# Patient Record
Sex: FEMALE | Race: WHITE | NOT HISPANIC OR LATINO | Employment: FULL TIME | URBAN - METROPOLITAN AREA
[De-identification: names, ages, dates, MRNs, and addresses within clinical notes are randomized per-mention and may not be internally consistent; named-entity substitution may affect disease eponyms.]

---

## 2023-02-28 PROBLEM — G89.29 CHRONIC FOOT PAIN: Status: ACTIVE | Noted: 2023-02-28

## 2023-02-28 PROBLEM — F32.1 DEPRESSION, MAJOR, SINGLE EPISODE, MODERATE (MULTI): Status: ACTIVE | Noted: 2023-02-28

## 2023-02-28 PROBLEM — E66.811 CLASS 1 OBESITY WITH BODY MASS INDEX (BMI) OF 34.0 TO 34.9 IN ADULT: Status: ACTIVE | Noted: 2023-02-28

## 2023-02-28 PROBLEM — R74.8 ELEVATED LIVER ENZYMES: Status: ACTIVE | Noted: 2023-02-28

## 2023-02-28 PROBLEM — E78.2 MIXED HYPERLIPIDEMIA: Status: ACTIVE | Noted: 2023-02-28

## 2023-02-28 PROBLEM — R73.01 IMPAIRED FASTING GLUCOSE: Status: ACTIVE | Noted: 2023-02-28

## 2023-02-28 PROBLEM — R92.8 ABNORMAL MAMMOGRAM: Status: ACTIVE | Noted: 2023-02-28

## 2023-02-28 PROBLEM — M25.512 CHRONIC PAIN OF BOTH SHOULDERS: Status: ACTIVE | Noted: 2023-02-28

## 2023-02-28 PROBLEM — M25.551 HIP PAIN, RIGHT: Status: ACTIVE | Noted: 2023-02-28

## 2023-02-28 PROBLEM — F11.20 OPIOID DEPENDENCE ON AGONIST THERAPY (MULTI): Status: ACTIVE | Noted: 2023-02-28

## 2023-02-28 PROBLEM — M25.511 CHRONIC PAIN OF BOTH SHOULDERS: Status: ACTIVE | Noted: 2023-02-28

## 2023-02-28 PROBLEM — M79.7 FIBROMYALGIA: Status: ACTIVE | Noted: 2023-02-28

## 2023-02-28 PROBLEM — M54.2 NECK PAIN: Status: ACTIVE | Noted: 2023-02-28

## 2023-02-28 PROBLEM — R80.9 MICROALBUMINURIA: Status: ACTIVE | Noted: 2023-02-28

## 2023-02-28 PROBLEM — F11.90 METHADONE USE: Status: ACTIVE | Noted: 2023-02-28

## 2023-02-28 PROBLEM — I10 PRIMARY HYPERTENSION: Status: ACTIVE | Noted: 2023-02-28

## 2023-02-28 PROBLEM — G89.29 CHRONIC PAIN OF BOTH SHOULDERS: Status: ACTIVE | Noted: 2023-02-28

## 2023-02-28 PROBLEM — E66.9 CLASS 1 OBESITY WITH BODY MASS INDEX (BMI) OF 34.0 TO 34.9 IN ADULT: Status: ACTIVE | Noted: 2023-02-28

## 2023-02-28 PROBLEM — R03.0 ELEVATED BP WITHOUT DIAGNOSIS OF HYPERTENSION: Status: ACTIVE | Noted: 2023-02-28

## 2023-02-28 PROBLEM — F41.1 GENERALIZED ANXIETY DISORDER: Status: ACTIVE | Noted: 2023-02-28

## 2023-02-28 PROBLEM — M79.673 CHRONIC FOOT PAIN: Status: ACTIVE | Noted: 2023-02-28

## 2023-02-28 RX ORDER — NALOXONE HYDROCHLORIDE 4 MG/.1ML
SPRAY NASAL
COMMUNITY
Start: 2020-11-05

## 2023-02-28 RX ORDER — BUPRENORPHINE AND NALOXONE 8; 2 MG/1; MG/1
FILM, SOLUBLE BUCCAL; SUBLINGUAL
COMMUNITY
Start: 2021-03-24

## 2023-02-28 RX ORDER — MELOXICAM 7.5 MG/1
7.5 TABLET ORAL
COMMUNITY
End: 2023-03-22 | Stop reason: ALTCHOICE

## 2023-02-28 RX ORDER — LOSARTAN POTASSIUM 25 MG/1
25 TABLET ORAL DAILY
COMMUNITY
End: 2023-09-21

## 2023-03-22 ENCOUNTER — OFFICE VISIT (OUTPATIENT)
Dept: PRIMARY CARE | Facility: CLINIC | Age: 43
End: 2023-03-22
Payer: COMMERCIAL

## 2023-03-22 VITALS
BODY MASS INDEX: 39.75 KG/M2 | SYSTOLIC BLOOD PRESSURE: 136 MMHG | HEART RATE: 96 BPM | WEIGHT: 216 LBS | HEIGHT: 62 IN | OXYGEN SATURATION: 97 % | RESPIRATION RATE: 16 BRPM | DIASTOLIC BLOOD PRESSURE: 88 MMHG

## 2023-03-22 DIAGNOSIS — J32.0 MAXILLARY SINUSITIS, UNSPECIFIED CHRONICITY: ICD-10-CM

## 2023-03-22 DIAGNOSIS — I10 PRIMARY HYPERTENSION: ICD-10-CM

## 2023-03-22 DIAGNOSIS — R74.8 ELEVATED LIVER ENZYMES: Primary | ICD-10-CM

## 2023-03-22 DIAGNOSIS — E66.09 CLASS 1 OBESITY DUE TO EXCESS CALORIES WITH SERIOUS COMORBIDITY AND BODY MASS INDEX (BMI) OF 34.0 TO 34.9 IN ADULT: ICD-10-CM

## 2023-03-22 DIAGNOSIS — R73.03 PREDIABETES: ICD-10-CM

## 2023-03-22 DIAGNOSIS — F11.20 OPIOID DEPENDENCE ON AGONIST THERAPY (MULTI): ICD-10-CM

## 2023-03-22 DIAGNOSIS — M79.7 FIBROMYALGIA: ICD-10-CM

## 2023-03-22 PROBLEM — F11.90 METHADONE USE: Status: RESOLVED | Noted: 2023-02-28 | Resolved: 2023-03-22

## 2023-03-22 PROBLEM — F32.1 DEPRESSION, MAJOR, SINGLE EPISODE, MODERATE (MULTI): Status: RESOLVED | Noted: 2023-02-28 | Resolved: 2023-03-22

## 2023-03-22 PROBLEM — R03.0 ELEVATED BP WITHOUT DIAGNOSIS OF HYPERTENSION: Status: RESOLVED | Noted: 2023-02-28 | Resolved: 2023-03-22

## 2023-03-22 PROCEDURE — 99214 OFFICE O/P EST MOD 30 MIN: CPT | Performed by: STUDENT IN AN ORGANIZED HEALTH CARE EDUCATION/TRAINING PROGRAM

## 2023-03-22 PROCEDURE — 3008F BODY MASS INDEX DOCD: CPT | Performed by: STUDENT IN AN ORGANIZED HEALTH CARE EDUCATION/TRAINING PROGRAM

## 2023-03-22 PROCEDURE — 3075F SYST BP GE 130 - 139MM HG: CPT | Performed by: STUDENT IN AN ORGANIZED HEALTH CARE EDUCATION/TRAINING PROGRAM

## 2023-03-22 PROCEDURE — 1036F TOBACCO NON-USER: CPT | Performed by: STUDENT IN AN ORGANIZED HEALTH CARE EDUCATION/TRAINING PROGRAM

## 2023-03-22 PROCEDURE — 3079F DIAST BP 80-89 MM HG: CPT | Performed by: STUDENT IN AN ORGANIZED HEALTH CARE EDUCATION/TRAINING PROGRAM

## 2023-03-22 PROCEDURE — 96127 BRIEF EMOTIONAL/BEHAV ASSMT: CPT | Performed by: STUDENT IN AN ORGANIZED HEALTH CARE EDUCATION/TRAINING PROGRAM

## 2023-03-22 RX ORDER — AMOXICILLIN 875 MG/1
875 TABLET, FILM COATED ORAL 2 TIMES DAILY
Qty: 20 TABLET | Refills: 0 | Status: SHIPPED | OUTPATIENT
Start: 2023-03-22 | End: 2023-04-01

## 2023-03-22 RX ORDER — IBUPROFEN 800 MG/1
800 TABLET ORAL 3 TIMES DAILY PRN
Qty: 90 TABLET | Refills: 1 | Status: SHIPPED | OUTPATIENT
Start: 2023-03-22 | End: 2023-04-21

## 2023-03-22 ASSESSMENT — COLUMBIA-SUICIDE SEVERITY RATING SCALE - C-SSRS
6. HAVE YOU EVER DONE ANYTHING, STARTED TO DO ANYTHING, OR PREPARED TO DO ANYTHING TO END YOUR LIFE?: NO
1. IN THE PAST MONTH, HAVE YOU WISHED YOU WERE DEAD OR WISHED YOU COULD GO TO SLEEP AND NOT WAKE UP?: NO
2. HAVE YOU ACTUALLY HAD ANY THOUGHTS OF KILLING YOURSELF?: NO

## 2023-03-22 ASSESSMENT — ENCOUNTER SYMPTOMS
SINUS PAIN: 1
LOSS OF SENSATION IN FEET: 0
APPETITE CHANGE: 0
DYSPHORIC MOOD: 0
PALPITATIONS: 0
OCCASIONAL FEELINGS OF UNSTEADINESS: 0
SINUS PRESSURE: 1
FATIGUE: 1
ACTIVITY CHANGE: 0
UNEXPECTED WEIGHT CHANGE: 0
DEPRESSION: 0
DECREASED CONCENTRATION: 0
FEVER: 0
ARTHRALGIAS: 1
SHORTNESS OF BREATH: 0

## 2023-03-22 ASSESSMENT — PATIENT HEALTH QUESTIONNAIRE - PHQ9
1. LITTLE INTEREST OR PLEASURE IN DOING THINGS: NOT AT ALL
2. FEELING DOWN, DEPRESSED OR HOPELESS: NOT AT ALL
SUM OF ALL RESPONSES TO PHQ9 QUESTIONS 1 AND 2: 0

## 2023-03-22 NOTE — ASSESSMENT & PLAN NOTE
Hx did not tolerate well muscle relaxers, mobic, or gabapentin  Ibuprofen working well, will send more, review of kidney function, needs to take with food

## 2023-03-22 NOTE — ASSESSMENT & PLAN NOTE
>15m  Medication discussion-not a candidate for phentermine d/t BP, discussion of wellbutrin  Discussion of emotional aspect to eating and identifying triggers, identifying positive food choices like fiber and protein to help cravings and fuel body  Counseling on finding activity that is enjoyable and able to be done on a consistent basis  Handout with more in depth info given

## 2023-03-22 NOTE — ASSESSMENT & PLAN NOTE
LV liver enzymes remained elevated and elevated lipid panel  Discussed repeating labs, avoiding alcohol, and if still elevated pursuing US

## 2023-03-22 NOTE — PROGRESS NOTES
"Subjective   Patient ID: Etelvina Banks is a 42 y.o. female who presents for Follow-up and Sinus Problem (Drainage ).    HPI   41 yo female presents for follow up    LV liver enzymes remained elevated and elevated lipid panel  Discussed repeating labs, avoiding alcohol, and if still elevated pursuing US     We discussed fibromyalgia pains and trying non opioid therapies given her hx  Is est with [facility], denies any cravings or lapses  Started on mobic patient did not find this of much help however  Does not tolerate muscle relaxers well  Does not tolerate gabapentin well, did better on lyrica    Sinus drainage 2 weeks of sinus pain and pressure    BP up today mother recently    Review of Systems   Constitutional:  Positive for fatigue. Negative for activity change, appetite change, fever and unexpected weight change.   HENT:  Positive for congestion, sinus pressure and sinus pain.    Respiratory:  Negative for shortness of breath.    Cardiovascular:  Negative for chest pain, palpitations and leg swelling.   Musculoskeletal:  Positive for arthralgias.   Allergic/Immunologic: Negative for immunocompromised state.   Psychiatric/Behavioral:  Negative for decreased concentration and dysphoric mood.      Objective   /88   Pulse 96   Resp 16   Ht 1.575 m (5' 2\")   Wt 98 kg (216 lb)   LMP 03/01/2023 (Exact Date)   SpO2 97%   BMI 39.51 kg/m²     Physical Exam  Constitutional:       Appearance: Normal appearance. She is obese.   HENT:      Head: Normocephalic and atraumatic.      Comments: Sinus pressure with palpation  Cardiovascular:      Rate and Rhythm: Normal rate and regular rhythm.      Pulses: Normal pulses.   Pulmonary:      Effort: Pulmonary effort is normal. No respiratory distress.      Breath sounds: No wheezing.   Musculoskeletal:      Right lower leg: No edema.      Left lower leg: No edema.   Skin:     Coloration: Skin is not pale.   Neurological:      General: No focal deficit present.      " Mental Status: She is alert and oriented to person, place, and time.   Psychiatric:         Mood and Affect: Mood normal.         Behavior: Behavior normal.     Assessment/Plan   Problem List Items Addressed This Visit          Circulatory    Primary hypertension       Musculoskeletal    Fibromyalgia     Hx did not tolerate well muscle relaxers, mobic, or gabapentin  Ibuprofen working well, will send more, review of kidney function, needs to take with food          Relevant Medications    ibuprofen 800 mg tablet       Endocrine/Metabolic    Class 1 obesity with body mass index (BMI) of 34.0 to 34.9 in adult     >15m  Medication discussion-not a candidate for phentermine d/t BP, discussion of wellbutrin  Discussion of emotional aspect to eating and identifying triggers, identifying positive food choices like fiber and protein to help cravings and fuel body  Counseling on finding activity that is enjoyable and able to be done on a consistent basis  Handout with more in depth info given                   Other    Opioid dependence on agonist therapy (CMS/HCC)     Est with [facility] for suboxone therapy, no cravings or concerns          Elevated liver enzymes - Primary     LV liver enzymes remained elevated and elevated lipid panel  Discussed repeating labs, avoiding alcohol, and if still elevated pursuing US          Relevant Orders    Hepatic function panel    Lipid Panel    CBC     Other Visit Diagnoses       Prediabetes        Maxillary sinusitis, unspecified chronicity        Relevant Medications    amoxicillin (Amoxil) 875 mg tablet        Patient has exhibited symptoms for more than 7 days with worsened features and symptoms including one of the clinical practice guidelines to indicate bacterial sinusitis (purulent nasal drainage, unilateral nasal obstruction, facial pain/pressure, and/or anosmia).In patients with rhinosinusitis, antibiotic therapy is recommended if the above criteria are met.   Patient is to be  treated with: amoxicillin  Patient is advised on recommended duration of treatment, allergies are checked with the patient to assure no hx of reaction, patient advised on possible side effects of this medication.   Patient may also use symptomatic relief for this condition including nasal saline irrigation, intranasal corticosteroids.

## 2023-07-11 DIAGNOSIS — M79.673 CHRONIC FOOT PAIN, UNSPECIFIED LATERALITY: Primary | ICD-10-CM

## 2023-07-11 DIAGNOSIS — G89.29 CHRONIC FOOT PAIN, UNSPECIFIED LATERALITY: Primary | ICD-10-CM

## 2023-07-11 RX ORDER — IBUPROFEN 800 MG/1
800 TABLET ORAL 3 TIMES DAILY PRN
Qty: 90 TABLET | Refills: 0 | Status: SHIPPED | OUTPATIENT
Start: 2023-07-11 | End: 2023-08-10

## 2023-07-11 RX ORDER — IBUPROFEN 800 MG/1
800 TABLET ORAL 3 TIMES DAILY PRN
COMMUNITY
End: 2023-07-11 | Stop reason: SDUPTHER

## 2023-09-21 ENCOUNTER — OFFICE VISIT (OUTPATIENT)
Dept: PRIMARY CARE | Facility: CLINIC | Age: 43
End: 2023-09-21
Payer: COMMERCIAL

## 2023-09-21 ENCOUNTER — APPOINTMENT (OUTPATIENT)
Dept: PRIMARY CARE | Facility: CLINIC | Age: 43
End: 2023-09-21
Payer: COMMERCIAL

## 2023-09-21 VITALS
WEIGHT: 200.4 LBS | BODY MASS INDEX: 36.88 KG/M2 | SYSTOLIC BLOOD PRESSURE: 124 MMHG | HEART RATE: 86 BPM | DIASTOLIC BLOOD PRESSURE: 86 MMHG | HEIGHT: 62 IN | OXYGEN SATURATION: 96 %

## 2023-09-21 DIAGNOSIS — I10 PRIMARY HYPERTENSION: ICD-10-CM

## 2023-09-21 DIAGNOSIS — R73.03 PREDIABETES: ICD-10-CM

## 2023-09-21 DIAGNOSIS — M25.511 CHRONIC PAIN OF BOTH SHOULDERS: ICD-10-CM

## 2023-09-21 DIAGNOSIS — R74.8 ELEVATED LIVER ENZYMES: Primary | ICD-10-CM

## 2023-09-21 DIAGNOSIS — F11.20 OPIOID DEPENDENCE ON AGONIST THERAPY (MULTI): ICD-10-CM

## 2023-09-21 DIAGNOSIS — M25.512 CHRONIC PAIN OF BOTH SHOULDERS: ICD-10-CM

## 2023-09-21 DIAGNOSIS — G89.29 CHRONIC PAIN OF BOTH SHOULDERS: ICD-10-CM

## 2023-09-21 PROCEDURE — 99214 OFFICE O/P EST MOD 30 MIN: CPT | Performed by: STUDENT IN AN ORGANIZED HEALTH CARE EDUCATION/TRAINING PROGRAM

## 2023-09-21 PROCEDURE — 1036F TOBACCO NON-USER: CPT | Performed by: STUDENT IN AN ORGANIZED HEALTH CARE EDUCATION/TRAINING PROGRAM

## 2023-09-21 PROCEDURE — 3074F SYST BP LT 130 MM HG: CPT | Performed by: STUDENT IN AN ORGANIZED HEALTH CARE EDUCATION/TRAINING PROGRAM

## 2023-09-21 PROCEDURE — 3008F BODY MASS INDEX DOCD: CPT | Performed by: STUDENT IN AN ORGANIZED HEALTH CARE EDUCATION/TRAINING PROGRAM

## 2023-09-21 PROCEDURE — 3079F DIAST BP 80-89 MM HG: CPT | Performed by: STUDENT IN AN ORGANIZED HEALTH CARE EDUCATION/TRAINING PROGRAM

## 2023-09-21 RX ORDER — IBUPROFEN 800 MG/1
800 TABLET ORAL 3 TIMES DAILY PRN
Qty: 180 TABLET | Refills: 3 | Status: SHIPPED | OUTPATIENT
Start: 2023-09-21 | End: 2024-09-20

## 2023-09-21 RX ORDER — LOSARTAN POTASSIUM 50 MG/1
50 TABLET ORAL DAILY
Qty: 90 TABLET | Refills: 3 | Status: SHIPPED | OUTPATIENT
Start: 2023-09-21 | End: 2024-09-20

## 2023-09-21 RX ORDER — DICLOFENAC SODIUM 10 MG/G
4 GEL TOPICAL 4 TIMES DAILY
Qty: 450 G | Refills: 2 | Status: SHIPPED | OUTPATIENT
Start: 2023-09-21 | End: 2024-04-24 | Stop reason: WASHOUT

## 2023-09-21 RX ORDER — LIDOCAINE 50 MG/G
1 PATCH TOPICAL DAILY
Qty: 30 PATCH | Refills: 0 | Status: SHIPPED | OUTPATIENT
Start: 2023-09-21 | End: 2023-10-21

## 2023-09-21 ASSESSMENT — ENCOUNTER SYMPTOMS
FACIAL ASYMMETRY: 0
SHORTNESS OF BREATH: 0
WOUND: 0
LIGHT-HEADEDNESS: 0
ARTHRALGIAS: 1
CONFUSION: 0
DEPRESSION: 0
FEVER: 0
LOSS OF SENSATION IN FEET: 0
OCCASIONAL FEELINGS OF UNSTEADINESS: 0

## 2023-09-21 ASSESSMENT — COLUMBIA-SUICIDE SEVERITY RATING SCALE - C-SSRS
1. IN THE PAST MONTH, HAVE YOU WISHED YOU WERE DEAD OR WISHED YOU COULD GO TO SLEEP AND NOT WAKE UP?: NO
2. HAVE YOU ACTUALLY HAD ANY THOUGHTS OF KILLING YOURSELF?: NO

## 2023-09-21 ASSESSMENT — ANXIETY QUESTIONNAIRES
3. WORRYING TOO MUCH ABOUT DIFFERENT THINGS: NOT AT ALL
GAD7 TOTAL SCORE: 0
IF YOU CHECKED OFF ANY PROBLEMS ON THIS QUESTIONNAIRE, HOW DIFFICULT HAVE THESE PROBLEMS MADE IT FOR YOU TO DO YOUR WORK, TAKE CARE OF THINGS AT HOME, OR GET ALONG WITH OTHER PEOPLE: NOT DIFFICULT AT ALL
5. BEING SO RESTLESS THAT IT IS HARD TO SIT STILL: NOT AT ALL
7. FEELING AFRAID AS IF SOMETHING AWFUL MIGHT HAPPEN: NOT AT ALL
2. NOT BEING ABLE TO STOP OR CONTROL WORRYING: NOT AT ALL
4. TROUBLE RELAXING: NOT AT ALL
6. BECOMING EASILY ANNOYED OR IRRITABLE: NOT AT ALL
1. FEELING NERVOUS, ANXIOUS, OR ON EDGE: NOT AT ALL

## 2023-09-21 ASSESSMENT — PATIENT HEALTH QUESTIONNAIRE - PHQ9
2. FEELING DOWN, DEPRESSED OR HOPELESS: NOT AT ALL
1. LITTLE INTEREST OR PLEASURE IN DOING THINGS: NOT AT ALL
SUM OF ALL RESPONSES TO PHQ9 QUESTIONS 1 AND 2: 0

## 2023-09-21 NOTE — ASSESSMENT & PLAN NOTE
Continue ibuprofen, meloxicam and celebrex not as effective  Lidocaine, icy hot, voltaren gel for topical options  Can consider shoulder injection, steroid burst, at next appointment if continued issues  Has complete PT before  Last xray 2020, no concerns

## 2023-09-21 NOTE — PROGRESS NOTES
"Patient Name:  Etelvina Banks  MRN:  [id number]  :  1980    Subjective   Patient ID: Etelvina Banks is a 43 y.o. female who presents for Annual Exam.    HPI     42 yo female presents for shoulder and BP concerns    New concern of R arm pain for 1-2 months, different from usual arthritis   Aching pain, trouble with lifting arm over head   Has been taking the ibuprofen     At home Bps have been high and at suboxone    Review of Systems   Constitutional:  Negative for fever.   Eyes:  Negative for visual disturbance.   Respiratory:  Negative for shortness of breath.    Cardiovascular:  Negative for chest pain.   Musculoskeletal:  Positive for arthralgias.   Skin:  Negative for wound.   Allergic/Immunologic: Negative for immunocompromised state.   Neurological:  Negative for facial asymmetry and light-headedness.   Psychiatric/Behavioral:  Negative for behavioral problems and confusion.      Objective   /86   Pulse 86   Ht 1.575 m (5' 2\")   Wt 90.9 kg (200 lb 6.4 oz)   SpO2 96%   BMI 36.65 kg/m²     Physical Exam  Constitutional:       Appearance: Normal appearance.   HENT:      Head: Normocephalic and atraumatic.   Eyes:      Extraocular Movements: Extraocular movements intact.   Cardiovascular:      Rate and Rhythm: Normal rate and regular rhythm.   Pulmonary:      Effort: Pulmonary effort is normal. No respiratory distress.   Musculoskeletal:      Right lower leg: No edema.      Left lower leg: No edema.      Comments: AC joint tenderness  Decreased abduction   Skin:     Coloration: Skin is not jaundiced or pale.   Neurological:      General: No focal deficit present.      Mental Status: She is alert and oriented to person, place, and time.   Psychiatric:         Mood and Affect: Mood normal.         Behavior: Behavior normal.         Thought Content: Thought content normal.         Judgment: Judgment normal.     Assessment/Plan   Problem List Items Addressed This Visit       Chronic pain of both " shoulders     Continue ibuprofen, meloxicam and celebrex not as effective  Lidocaine, icy hot, voltaren gel for topical options  Can consider shoulder injection, steroid burst, at next appointment if continued issues  Has complete PT before  Last xray 2020, no concerns         Relevant Medications    ibuprofen 800 mg tablet    diclofenac sodium (Voltaren) 1 % gel gel    lidocaine (Lidoderm) 5 % patch    Opioid dependence on agonist therapy (CMS/HCC) (Chronic)     Est with [facility] for suboxone therapy, no cravings or concerns          Elevated liver enzymes - Primary    Relevant Orders    CBC and Auto Differential    Comprehensive metabolic panel    Lipid Panel    Primary hypertension (Chronic)     Poor diastolic control, increase losartan to 50mg  Goal BP >130/80  Work on maintaining a healthy weight, monitoring sodium intake, consistent activity and exercise  Avoid chronic use of NSAIDs  Do not use sudafed for decongestant when ill           Relevant Medications    losartan (Cozaar) 50 mg tablet    Other Relevant Orders    Follow Up In [facility] - PCP - Established     Other Visit Diagnoses       Prediabetes        Relevant Orders    Hemoglobin A1c

## 2023-09-21 NOTE — PATIENT INSTRUCTIONS
Increase BP medication  Refill ibuprofen, sent in 2 topical options  Can try capsicin   Labs please complete

## 2023-09-21 NOTE — ASSESSMENT & PLAN NOTE
Poor diastolic control, increase losartan to 50mg  Goal BP >130/80  Work on maintaining a healthy weight, monitoring sodium intake, consistent activity and exercise  Avoid chronic use of NSAIDs  Do not use sudafed for decongestant when ill

## 2023-10-30 ENCOUNTER — APPOINTMENT (OUTPATIENT)
Dept: PRIMARY CARE | Facility: CLINIC | Age: 43
End: 2023-10-30
Payer: COMMERCIAL

## 2024-03-26 ENCOUNTER — LAB (OUTPATIENT)
Dept: LAB | Facility: LAB | Age: 44
End: 2024-03-26
Payer: COMMERCIAL

## 2024-03-26 DIAGNOSIS — R74.8 ELEVATED LIVER ENZYMES: ICD-10-CM

## 2024-03-26 DIAGNOSIS — R73.03 PREDIABETES: ICD-10-CM

## 2024-03-26 DIAGNOSIS — F11.21 OPIOID DEPENDENCE, IN REMISSION (MULTI): Primary | ICD-10-CM

## 2024-03-26 DIAGNOSIS — F11.21 OPIOID DEPENDENCE, IN REMISSION (MULTI): ICD-10-CM

## 2024-03-26 LAB
ALBUMIN SERPL BCP-MCNC: 4.3 G/DL (ref 3.4–5)
ALP SERPL-CCNC: 104 U/L (ref 33–110)
ALT SERPL W P-5'-P-CCNC: 44 U/L (ref 7–45)
ANION GAP SERPL CALC-SCNC: 13 MMOL/L (ref 10–20)
AST SERPL W P-5'-P-CCNC: 28 U/L (ref 9–39)
BASOPHILS # BLD AUTO: 0.05 X10*3/UL (ref 0–0.1)
BASOPHILS NFR BLD AUTO: 0.7 %
BILIRUB SERPL-MCNC: 0.5 MG/DL (ref 0–1.2)
BUN SERPL-MCNC: 13 MG/DL (ref 6–23)
CALCIUM SERPL-MCNC: 9.3 MG/DL (ref 8.6–10.3)
CHLORIDE SERPL-SCNC: 104 MMOL/L (ref 98–107)
CHOLEST SERPL-MCNC: 201 MG/DL (ref 0–199)
CHOLESTEROL/HDL RATIO: 4.5
CO2 SERPL-SCNC: 28 MMOL/L (ref 21–32)
CREAT SERPL-MCNC: 0.81 MG/DL (ref 0.5–1.05)
EGFRCR SERPLBLD CKD-EPI 2021: >90 ML/MIN/1.73M*2
EOSINOPHIL # BLD AUTO: 0.07 X10*3/UL (ref 0–0.7)
EOSINOPHIL NFR BLD AUTO: 1 %
ERYTHROCYTE [DISTWIDTH] IN BLOOD BY AUTOMATED COUNT: 12.5 % (ref 11.5–14.5)
GLUCOSE SERPL-MCNC: 126 MG/DL (ref 74–99)
HBV SURFACE AB SER-ACNC: <3.1 MIU/ML
HBV SURFACE AG SERPL QL IA: NONREACTIVE
HCT VFR BLD AUTO: 41.9 % (ref 36–46)
HDLC SERPL-MCNC: 44.4 MG/DL
HGB BLD-MCNC: 13.6 G/DL (ref 12–16)
IMM GRANULOCYTES # BLD AUTO: 0.01 X10*3/UL (ref 0–0.7)
IMM GRANULOCYTES NFR BLD AUTO: 0.1 % (ref 0–0.9)
LDLC SERPL CALC-MCNC: 138 MG/DL
LYMPHOCYTES # BLD AUTO: 2.14 X10*3/UL (ref 1.2–4.8)
LYMPHOCYTES NFR BLD AUTO: 30.8 %
MCH RBC QN AUTO: 28.6 PG (ref 26–34)
MCHC RBC AUTO-ENTMCNC: 32.5 G/DL (ref 32–36)
MCV RBC AUTO: 88 FL (ref 80–100)
MONOCYTES # BLD AUTO: 0.4 X10*3/UL (ref 0.1–1)
MONOCYTES NFR BLD AUTO: 5.8 %
NEUTROPHILS # BLD AUTO: 4.27 X10*3/UL (ref 1.2–7.7)
NEUTROPHILS NFR BLD AUTO: 61.6 %
NON HDL CHOLESTEROL: 157 MG/DL (ref 0–149)
NRBC BLD-RTO: 0 /100 WBCS (ref 0–0)
PLATELET # BLD AUTO: 304 X10*3/UL (ref 150–450)
POTASSIUM SERPL-SCNC: 4.5 MMOL/L (ref 3.5–5.3)
PROT SERPL-MCNC: 6.9 G/DL (ref 6.4–8.2)
RBC # BLD AUTO: 4.75 X10*6/UL (ref 4–5.2)
SODIUM SERPL-SCNC: 140 MMOL/L (ref 136–145)
TRIGL SERPL-MCNC: 92 MG/DL (ref 0–149)
VLDL: 18 MG/DL (ref 0–40)
WBC # BLD AUTO: 6.9 X10*3/UL (ref 4.4–11.3)

## 2024-03-26 PROCEDURE — 36415 COLL VENOUS BLD VENIPUNCTURE: CPT

## 2024-03-26 PROCEDURE — 86481 TB AG RESPONSE T-CELL SUSP: CPT

## 2024-03-26 PROCEDURE — 87389 HIV-1 AG W/HIV-1&-2 AB AG IA: CPT

## 2024-03-26 PROCEDURE — 86704 HEP B CORE ANTIBODY TOTAL: CPT

## 2024-03-26 PROCEDURE — 87340 HEPATITIS B SURFACE AG IA: CPT

## 2024-03-26 PROCEDURE — 80053 COMPREHEN METABOLIC PANEL: CPT

## 2024-03-26 PROCEDURE — 80061 LIPID PANEL: CPT

## 2024-03-26 PROCEDURE — 86706 HEP B SURFACE ANTIBODY: CPT

## 2024-03-26 PROCEDURE — 85025 COMPLETE CBC W/AUTO DIFF WBC: CPT

## 2024-03-26 PROCEDURE — 83036 HEMOGLOBIN GLYCOSYLATED A1C: CPT

## 2024-03-26 PROCEDURE — 86803 HEPATITIS C AB TEST: CPT

## 2024-03-26 PROCEDURE — 86780 TREPONEMA PALLIDUM: CPT

## 2024-03-27 LAB
EST. AVERAGE GLUCOSE BLD GHB EST-MCNC: 140 MG/DL
HBA1C MFR BLD: 6.5 %
HBV CORE AB SER QL: NONREACTIVE
HCV AB SER QL: NONREACTIVE
HIV 1+2 AB+HIV1 P24 AG SERPL QL IA: NONREACTIVE
TREPONEMA PALLIDUM IGG+IGM AB [PRESENCE] IN SERUM OR PLASMA BY IMMUNOASSAY: NONREACTIVE

## 2024-03-28 LAB
NIL(NEG) CONTROL SPOT COUNT: NORMAL
PANEL A SPOT COUNT: 0
PANEL B SPOT COUNT: 0
POS CONTROL SPOT COUNT: NORMAL
T-SPOT. TB INTERPRETATION: NEGATIVE

## 2024-04-24 ENCOUNTER — TELEPHONE (OUTPATIENT)
Dept: PRIMARY CARE | Facility: CLINIC | Age: 44
End: 2024-04-24

## 2024-04-24 ENCOUNTER — OFFICE VISIT (OUTPATIENT)
Dept: PRIMARY CARE | Facility: CLINIC | Age: 44
End: 2024-04-24
Payer: COMMERCIAL

## 2024-04-24 VITALS
DIASTOLIC BLOOD PRESSURE: 80 MMHG | WEIGHT: 203 LBS | OXYGEN SATURATION: 96 % | BODY MASS INDEX: 37.36 KG/M2 | HEIGHT: 62 IN | HEART RATE: 81 BPM | SYSTOLIC BLOOD PRESSURE: 110 MMHG

## 2024-04-24 DIAGNOSIS — Z12.11 ENCOUNTER FOR SCREENING FOR MALIGNANT NEOPLASM OF COLON: ICD-10-CM

## 2024-04-24 DIAGNOSIS — Z12.31 ENCOUNTER FOR SCREENING MAMMOGRAM FOR BREAST CANCER: ICD-10-CM

## 2024-04-24 DIAGNOSIS — F11.20 OPIOID DEPENDENCE ON AGONIST THERAPY (MULTI): Chronic | ICD-10-CM

## 2024-04-24 DIAGNOSIS — E11.9 TYPE 2 DIABETES MELLITUS WITHOUT COMPLICATION, WITHOUT LONG-TERM CURRENT USE OF INSULIN (MULTI): Primary | ICD-10-CM

## 2024-04-24 DIAGNOSIS — E66.01 CLASS 2 SEVERE OBESITY DUE TO EXCESS CALORIES WITH SERIOUS COMORBIDITY AND BODY MASS INDEX (BMI) OF 37.0 TO 37.9 IN ADULT (MULTI): ICD-10-CM

## 2024-04-24 DIAGNOSIS — M25.473 ANKLE EDEMA: ICD-10-CM

## 2024-04-24 PROBLEM — E66.812 CLASS 2 OBESITY WITH BODY MASS INDEX (BMI) OF 37.0 TO 37.9 IN ADULT: Status: ACTIVE | Noted: 2023-02-28

## 2024-04-24 PROBLEM — E66.9 CLASS 2 OBESITY WITH BODY MASS INDEX (BMI) OF 37.0 TO 37.9 IN ADULT: Chronic | Status: ACTIVE | Noted: 2023-02-28

## 2024-04-24 PROBLEM — E66.812 CLASS 2 OBESITY WITH BODY MASS INDEX (BMI) OF 37.0 TO 37.9 IN ADULT: Chronic | Status: ACTIVE | Noted: 2023-02-28

## 2024-04-24 PROCEDURE — 4010F ACE/ARB THERAPY RXD/TAKEN: CPT | Performed by: STUDENT IN AN ORGANIZED HEALTH CARE EDUCATION/TRAINING PROGRAM

## 2024-04-24 PROCEDURE — 1036F TOBACCO NON-USER: CPT | Performed by: STUDENT IN AN ORGANIZED HEALTH CARE EDUCATION/TRAINING PROGRAM

## 2024-04-24 PROCEDURE — 3074F SYST BP LT 130 MM HG: CPT | Performed by: STUDENT IN AN ORGANIZED HEALTH CARE EDUCATION/TRAINING PROGRAM

## 2024-04-24 PROCEDURE — 99214 OFFICE O/P EST MOD 30 MIN: CPT | Performed by: STUDENT IN AN ORGANIZED HEALTH CARE EDUCATION/TRAINING PROGRAM

## 2024-04-24 PROCEDURE — 3044F HG A1C LEVEL LT 7.0%: CPT | Performed by: STUDENT IN AN ORGANIZED HEALTH CARE EDUCATION/TRAINING PROGRAM

## 2024-04-24 PROCEDURE — 3079F DIAST BP 80-89 MM HG: CPT | Performed by: STUDENT IN AN ORGANIZED HEALTH CARE EDUCATION/TRAINING PROGRAM

## 2024-04-24 PROCEDURE — 3008F BODY MASS INDEX DOCD: CPT | Performed by: STUDENT IN AN ORGANIZED HEALTH CARE EDUCATION/TRAINING PROGRAM

## 2024-04-24 PROCEDURE — 96127 BRIEF EMOTIONAL/BEHAV ASSMT: CPT | Performed by: STUDENT IN AN ORGANIZED HEALTH CARE EDUCATION/TRAINING PROGRAM

## 2024-04-24 PROCEDURE — 3050F LDL-C >= 130 MG/DL: CPT | Performed by: STUDENT IN AN ORGANIZED HEALTH CARE EDUCATION/TRAINING PROGRAM

## 2024-04-24 RX ORDER — CREAM BASE NO.31
CREAM (GRAM) MISCELLANEOUS
COMMUNITY

## 2024-04-24 RX ORDER — TIRZEPATIDE 2.5 MG/.5ML
2.5 INJECTION, SOLUTION SUBCUTANEOUS
Qty: 3 ML | Refills: 0 | Status: SHIPPED | OUTPATIENT
Start: 2024-04-28 | End: 2024-04-24 | Stop reason: WASHOUT

## 2024-04-24 RX ORDER — METFORMIN HYDROCHLORIDE 500 MG/1
TABLET, EXTENDED RELEASE ORAL
Qty: 100 TABLET | Refills: 0 | Status: SHIPPED | OUTPATIENT
Start: 2024-04-24

## 2024-04-24 RX ORDER — HYDROCHLOROTHIAZIDE 12.5 MG/1
12.5 TABLET ORAL DAILY PRN
Qty: 30 TABLET | Refills: 2 | Status: SHIPPED | OUTPATIENT
Start: 2024-04-24 | End: 2025-04-24

## 2024-04-24 ASSESSMENT — ENCOUNTER SYMPTOMS
FATIGUE: 1
OCCASIONAL FEELINGS OF UNSTEADINESS: 0
WHEEZING: 0
CONFUSION: 0
LOSS OF SENSATION IN FEET: 0
FEVER: 0
PALPITATIONS: 0
DEPRESSION: 0
SHORTNESS OF BREATH: 0
HEADACHES: 0
UNEXPECTED WEIGHT CHANGE: 1
FACIAL ASYMMETRY: 0
ARTHRALGIAS: 1

## 2024-04-24 ASSESSMENT — ANXIETY QUESTIONNAIRES
7. FEELING AFRAID AS IF SOMETHING AWFUL MIGHT HAPPEN: NOT AT ALL
GAD7 TOTAL SCORE: 0
5. BEING SO RESTLESS THAT IT IS HARD TO SIT STILL: NOT AT ALL
4. TROUBLE RELAXING: NOT AT ALL
3. WORRYING TOO MUCH ABOUT DIFFERENT THINGS: NOT AT ALL
1. FEELING NERVOUS, ANXIOUS, OR ON EDGE: NOT AT ALL
6. BECOMING EASILY ANNOYED OR IRRITABLE: NOT AT ALL
2. NOT BEING ABLE TO STOP OR CONTROL WORRYING: NOT AT ALL

## 2024-04-24 ASSESSMENT — PATIENT HEALTH QUESTIONNAIRE - PHQ9
2. FEELING DOWN, DEPRESSED OR HOPELESS: NOT AT ALL
SUM OF ALL RESPONSES TO PHQ9 QUESTIONS 1 AND 2: 0
1. LITTLE INTEREST OR PLEASURE IN DOING THINGS: NOT AT ALL

## 2024-04-24 ASSESSMENT — COLUMBIA-SUICIDE SEVERITY RATING SCALE - C-SSRS
1. IN THE PAST MONTH, HAVE YOU WISHED YOU WERE DEAD OR WISHED YOU COULD GO TO SLEEP AND NOT WAKE UP?: NO
2. HAVE YOU ACTUALLY HAD ANY THOUGHTS OF KILLING YOURSELF?: NO
6. HAVE YOU EVER DONE ANYTHING, STARTED TO DO ANYTHING, OR PREPARED TO DO ANYTHING TO END YOUR LIFE?: NO

## 2024-04-24 NOTE — ASSESSMENT & PLAN NOTE
Previoulsy well tolerated hydrochlorothiazide, will make PRN only monitoring labs for K and kidney function

## 2024-04-24 NOTE — TELEPHONE ENCOUNTER
Patient said that her pharmacy cannot get the Mounjaro in and is asking if you can prescribe something like metformin

## 2024-04-24 NOTE — ASSESSMENT & PLAN NOTE
>15m  Medication discussion-ozempic and mounjaro now that she is also diabetic    Discussion of possible SE, she understands risks  Discussion of emotional aspect to eating and identifying triggers, identifying positive food choices like fiber and protein to help cravings and fuel body  Counseling on finding activity that is enjoyable and able to be done on a consistent basis

## 2024-04-24 NOTE — PROGRESS NOTES
"Patient Name:  Etelvina Banks  MRN:  [id number]  :  1980    Subjective   Patient ID: Etelvina Banks is a 44 y.o. female who presents for Weight Gain.    HPI     43 yo female presents for follow up and has concerns    Last visit 200, today 203  She is very concerned about weight gain     New diabetes also on labs   Strong FH     Ongoing edema of the legs    No PND or orthopnea   Worse with walking and heat    Review of Systems   Constitutional:  Positive for fatigue and unexpected weight change. Negative for fever.   Respiratory:  Negative for shortness of breath and wheezing.    Cardiovascular:  Positive for leg swelling. Negative for chest pain and palpitations.   Musculoskeletal:  Positive for arthralgias.   Allergic/Immunologic: Negative for immunocompromised state.   Neurological:  Negative for facial asymmetry and headaches.   Psychiatric/Behavioral:  Negative for confusion.      Objective   /80 (BP Location: Right arm, Patient Position: Sitting)   Pulse 81   Ht 1.575 m (5' 2\")   Wt 92.1 kg (203 lb)   SpO2 96%   BMI 37.13 kg/m²     Physical Exam  Constitutional:       Appearance: Normal appearance.   HENT:      Head: Normocephalic and atraumatic.   Cardiovascular:      Rate and Rhythm: Normal rate and regular rhythm.   Pulmonary:      Effort: Pulmonary effort is normal. No respiratory distress.      Breath sounds: No stridor.   Musculoskeletal:      Comments: Trace bilateral ankle edema    Skin:     General: Skin is warm.      Coloration: Skin is not jaundiced or pale.   Neurological:      General: No focal deficit present.      Mental Status: She is alert and oriented to person, place, and time.   Psychiatric:         Mood and Affect: Mood normal.         Behavior: Behavior normal.         Assessment/Plan   Problem List Items Addressed This Visit             ICD-10-CM    Opioid dependence on agonist therapy (Multi) (Chronic) F11.20     Est with [facility] for suboxone therapy, no cravings or " concerns          Class 2 obesity with body mass index (BMI) of 37.0 to 37.9 in adult (Chronic) E66.9, Z68.37     >15m  Medication discussion-ozempic and mounjaro now that she is also diabetic    Discussion of possible SE, she understands risks  Discussion of emotional aspect to eating and identifying triggers, identifying positive food choices like fiber and protein to help cravings and fuel body  Counseling on finding activity that is enjoyable and able to be done on a consistent basis             Type 2 diabetes mellitus without complication, without long-term current use of insulin (Multi) - Primary (Chronic) E11.9     Mounjaro sent to start given concomitant desire for weight loss   -Encourage regular follow up with opthomology  -Encourage regular follow up with podiatry     Diabetes Preventative Measures:   Lipid: Given high cholesterol recommendation statin, she would like to work on lifestyle initially   BP: on losartan            Relevant Medications    tirzepatide (Mounjaro) 2.5 mg/0.5 mL pen injector (Start on 4/28/2024)    Other Relevant Orders    Basic metabolic panel    Hemoglobin A1c    Follow Up In [facility] - PCP - Established    Ankle edema (Chronic) M25.473     Previoulsy well tolerated hydrochlorothiazide, will make PRN only monitoring labs for K and kidney function            Relevant Medications    hydroCHLOROthiazide (Microzide) 12.5 mg tablet     Other Visit Diagnoses         Codes    Encounter for screening for malignant neoplasm of colon     Z12.11    Relevant Orders    Cologuard® colon cancer screening    Encounter for screening mammogram for breast cancer     Z12.31    Relevant Orders    BI mammo bilateral screening tomosynthesis

## 2024-04-24 NOTE — TELEPHONE ENCOUNTER
I can send to another pharmacy, I dont think we have even done the prior auth yet. I can also try to ozempic. But if she does not wish to try either I can send metformin.

## 2024-04-24 NOTE — ASSESSMENT & PLAN NOTE
Mounjaro sent to start given concomitant desire for weight loss   -Encourage regular follow up with opthomology  -Encourage regular follow up with podiatry     Diabetes Preventative Measures:   Lipid: Given high cholesterol recommendation statin, she would like to work on lifestyle initially   BP: on losartan

## 2024-05-08 ENCOUNTER — HOSPITAL ENCOUNTER (OUTPATIENT)
Dept: RADIOLOGY | Facility: HOSPITAL | Age: 44
Discharge: HOME | End: 2024-05-08
Payer: COMMERCIAL

## 2024-05-08 VITALS — BODY MASS INDEX: 35.88 KG/M2 | HEIGHT: 62 IN | WEIGHT: 195 LBS

## 2024-05-08 DIAGNOSIS — Z12.31 ENCOUNTER FOR SCREENING MAMMOGRAM FOR BREAST CANCER: ICD-10-CM

## 2024-05-08 PROCEDURE — 77063 BREAST TOMOSYNTHESIS BI: CPT | Performed by: RADIOLOGY

## 2024-05-08 PROCEDURE — 77063 BREAST TOMOSYNTHESIS BI: CPT

## 2024-05-08 PROCEDURE — 77067 SCR MAMMO BI INCL CAD: CPT | Performed by: RADIOLOGY

## 2024-05-14 ENCOUNTER — TELEPHONE (OUTPATIENT)
Dept: PRIMARY CARE | Facility: CLINIC | Age: 44
End: 2024-05-14
Payer: COMMERCIAL

## 2024-05-14 DIAGNOSIS — R92.8 ABNORMAL MAMMOGRAM: Primary | ICD-10-CM

## 2024-05-14 NOTE — TELEPHONE ENCOUNTER
----- Message from Judith Baeza DO sent at 5/14/2024 11:26 AM EDT -----  Can we please reach out to the radiology dept- recommending spot magnification view. Is that an additional order I need to place or are they following up with the patient? Thanks

## 2024-05-29 LAB — NONINV COLON CA DNA+OCC BLD SCRN STL QL: NEGATIVE

## 2024-05-30 ENCOUNTER — TELEPHONE (OUTPATIENT)
Dept: PRIMARY CARE | Facility: CLINIC | Age: 44
End: 2024-05-30
Payer: COMMERCIAL

## 2024-05-30 NOTE — TELEPHONE ENCOUNTER
----- Message from Judith Baeza, DO sent at 5/30/2024  6:55 AM EDT -----  Cologuard negative, which means they did not find anything abnormal. Plan to repeat in 3 years.

## 2024-06-04 ENCOUNTER — HOSPITAL ENCOUNTER (OUTPATIENT)
Dept: RADIOLOGY | Facility: HOSPITAL | Age: 44
Discharge: HOME | End: 2024-06-04
Payer: COMMERCIAL

## 2024-06-04 VITALS — WEIGHT: 190 LBS | BODY MASS INDEX: 34.96 KG/M2 | HEIGHT: 62 IN

## 2024-06-04 DIAGNOSIS — R92.8 ABNORMAL MAMMOGRAM: ICD-10-CM

## 2024-06-04 DIAGNOSIS — R92.8 ABNORMAL FINDINGS ON DIAGNOSTIC IMAGING OF BREAST: Primary | ICD-10-CM

## 2024-06-04 DIAGNOSIS — R92.1 BREAST CALCIFICATION, LEFT: ICD-10-CM

## 2024-06-04 PROCEDURE — 77065 DX MAMMO INCL CAD UNI: CPT | Mod: LEFT SIDE

## 2024-06-04 PROCEDURE — 77065 DX MAMMO INCL CAD UNI: CPT | Mod: LT

## 2024-06-04 NOTE — NURSING NOTE
After patient review of diagnostic results with Dr. Emmanuel, support provided. Written literature regarding abnormal breast imaging and breast biopsy including what to expect before, during, and after the procedure reviewed with the patient. All questions answered. Patient selected Dr. Dimas for surgical consultation 6/5 12:30PM with biopsy to follow 6/7 8:00AM. Information provided and reviewed to include provider information and how to reach me directly with questions or concerns before concluding visit.

## 2024-06-04 NOTE — Clinical Note
Your patient Etelvina Banks seen for diagnostic breast imaging today has final results available for your review. I assisted with follow up scheduling and education review today. She will see Dr. Dimas for consultation 6/5, with biopsy to follow 6/7.

## 2024-06-04 NOTE — PROGRESS NOTES
Patient follow up scheduling:  left breast stereotactic biopsy per provider recommendation, 6/7 8:00AM.

## 2024-06-05 ENCOUNTER — TELEPHONE (OUTPATIENT)
Dept: PRIMARY CARE | Facility: CLINIC | Age: 44
End: 2024-06-05

## 2024-06-05 ENCOUNTER — OFFICE VISIT (OUTPATIENT)
Dept: SURGERY | Facility: CLINIC | Age: 44
End: 2024-06-05
Payer: COMMERCIAL

## 2024-06-05 VITALS
HEART RATE: 82 BPM | OXYGEN SATURATION: 98 % | SYSTOLIC BLOOD PRESSURE: 123 MMHG | HEIGHT: 62 IN | DIASTOLIC BLOOD PRESSURE: 87 MMHG | BODY MASS INDEX: 35.11 KG/M2 | WEIGHT: 190.8 LBS

## 2024-06-05 DIAGNOSIS — Z91.89 AT HIGH RISK FOR BREAST CANCER: ICD-10-CM

## 2024-06-05 DIAGNOSIS — R92.30 DENSE BREAST TISSUE: ICD-10-CM

## 2024-06-05 DIAGNOSIS — R92.1 CALCIFICATION OF LEFT BREAST: Primary | ICD-10-CM

## 2024-06-05 PROCEDURE — 3050F LDL-C >= 130 MG/DL: CPT | Performed by: SURGERY

## 2024-06-05 PROCEDURE — 99203 OFFICE O/P NEW LOW 30 MIN: CPT | Performed by: SURGERY

## 2024-06-05 PROCEDURE — 3079F DIAST BP 80-89 MM HG: CPT | Performed by: SURGERY

## 2024-06-05 PROCEDURE — 3008F BODY MASS INDEX DOCD: CPT | Performed by: SURGERY

## 2024-06-05 PROCEDURE — 3074F SYST BP LT 130 MM HG: CPT | Performed by: SURGERY

## 2024-06-05 PROCEDURE — 4010F ACE/ARB THERAPY RXD/TAKEN: CPT | Performed by: SURGERY

## 2024-06-05 PROCEDURE — 3044F HG A1C LEVEL LT 7.0%: CPT | Performed by: SURGERY

## 2024-06-05 NOTE — PROGRESS NOTES
GENERAL SURGERY OFFICE NOTE    Patient: Etelvina Bnaks    Age: 44 y.o.   Gender: female    MRN: [id number]    PCP: Judith Baeza DO        SUBJECTIVE     Chief Complaint  New Patient Visit (Patient was referred by Dr. Baeza for abnormal left breast mammogram. Patient states that she has always had tenderness to both of her breasts. )       ZIYAD Main is a 44-year-old white female who I am seeing in consultation at the request of her primary care physician for evaluation of left breast calcifications.  She states that she occasionally gets some breast tenderness, but this is fairly normal for her.  It is more of a diffuse tenderness, and not 1 specific spot.  She normally gets her yearly screening mammograms.  She had no associated symptoms or new concerns going into her most recent screening mammogram.  Screening mammogram identified coarse calcifications of the upper outer quadrant of the left breast which are unchanged from prior mammograms.  However, new branching calcifications of the lower outer quadrant of the left breast were identified.  This area spanned approximately a 6 cm region.  Stereotactic biopsy was recommended.    Risk factors for breast cancer: 44-year-old white female; menarche at age 9; first live birth at age 21; no previous breast biopsy; mother diagnosed with breast cancer in her mid 60s.  She is premenopausal.  She also had a maternal uncle and a maternal aunt who both had cancer, but she is not sure which type.  This gives her a 5-year Tracie score of 1.8% and a lifetime risk of 18.9% which puts her in a high risk category.  Using the [facility] Breast cancer risk evaluation tool, this patient's 10-year risk of breast cancer is 4.6% ( average risk is 2.3%) and lifetime risk is 19.4% (average lifetime risk is 10.1%). This puts her in a high risk category for developing breast cancer.    ROS  Review of Systems   Constitutional:  Negative for chills, fatigue and fever.   HENT:   Negative for congestion, ear pain and hearing loss.    Eyes:  Negative for pain and redness.   Respiratory:  Negative for cough and shortness of breath.    Cardiovascular:  Negative for chest pain and leg swelling.   Gastrointestinal:  Negative for abdominal pain, constipation, diarrhea, nausea and vomiting.   Endocrine: Negative for polyphagia.   Genitourinary:  Negative for dysuria, flank pain, frequency and hematuria.   Musculoskeletal:  Negative for arthralgias and myalgias.   Skin:  Negative for rash and wound.   Allergic/Immunologic: Negative for immunocompromised state.   Neurological:  Positive for headaches. Negative for speech difficulty and weakness.   Hematological:  Does not bruise/bleed easily.   Psychiatric/Behavioral:  Negative for agitation and confusion.           HISTORY     Past Medical History:   Diagnosis Date    Body mass index (BMI)30.0-30.9, adult 06/04/2021    Body mass index (BMI) of 30.0 to 30.9 in adult    Depression, major, single episode, moderate (Multi) 02/28/2023    Headache, unspecified     Bad headache        Past Surgical History:   Procedure Laterality Date    OTHER SURGICAL HISTORY  03/11/2019    Ovarian cystectomy    OTHER SURGICAL HISTORY  10/14/2022    Tubal ligation    OTHER SURGICAL HISTORY  03/22/2021    Foot surgery        Family History   Problem Relation Name Age of Onset    Breast cancer Mother      Arthritis Mother      Hypertension Mother      Asthma Brother      Diabetes Other      Cancer Other aunt         No Known Allergies     Social History     Tobacco Use   Smoking Status Former    Types: Cigarettes    Passive exposure: Past   Smokeless Tobacco Never        Social History     Substance and Sexual Activity   Alcohol Use Never        HOME MEDICATIONS  Current Outpatient Medications   Medication Instructions    buprenorphine-naloxone (Suboxone) 8-2 mg SL film sublingual, Take 1 film SL twice kurt    cream base no.31, bulk, (Transdermal Pain Base) cream  independent "Transdermal Pain Base External Cream Refills: 0 Active    hydroCHLOROthiazide (MICROZIDE) 12.5 mg, oral, Daily PRN    ibuprofen 800 mg, oral, 3 times daily PRN    losartan (COZAAR) 50 mg, oral, Daily    metFORMIN XR (Glucophage-XR) 500 mg 24 hr tablet Take 1 tablet PO daily for 2 weeks, if tolerating well increase to 2 tablets PO daily.    naloxone (Narcan) 4 mg/0.1 mL nasal spray nasal, Spray 4 mg intranasally once if needed for overdose or respiratory depression          OBJECTIVE   Last Recorded Vitals.  Blood pressure 123/87, pulse 82, height 1.575 m (5' 2\"), weight 86.5 kg (190 lb 12.8 oz), SpO2 98%.     PHYSICAL EXAM  Physical Exam   General: Well-developed, well-nourished and in no acute distress.  Head: Normocephalic. Atraumatic.  Neck/thyroid: Neck is supple.   Eyes: Pupils equal round and reactive to light. Conjunctiva normal.  ENMT: No masses or deformity of external nose. External ears without masses.  Respiratory/Chest:  Normal respiratory effort.  Breast: Moderate sized, symmetrical breasts.  No palpable masses of either breast.  No skin changes and no nipple discharge.  Lymphatics: No palpable lymphadenopathy of the cervical, supraclavicular or axillary regions.  Cardiovascular: Regular rate and rhythm.   Abdomen: Soft, nontender, nondistended.   Musculoskeletal: Joints and limbs are grossly normal. Normal gait. Normal range of motion of major joints.  Neuro: Oriented to person, place and time. No obvious neurological deficit. Motor strength grossly normal.  Psych: Normal mood and affect.    RESULTS   Labs  No results found for this or any previous visit (from the past 24 hour(s)).    Radiology Resutls  BI mammo left diagnostic    Result Date: 6/4/2024  Interpreted By:  Ryanne Emmanuel, STUDY: BI MAMMO LEFT DIAGNOSTIC;  6/4/2024 2:04 pm   ACCESSION NUMBER(S): [id number]   ORDERING CLINICIAN: KRISTY VARGHESE   INDICATION: Signs/Symptoms:abnormal mammogram.     COMPARISON: 05/08/2024 and 06/03/2022   " FINDINGS: Density:  The breast tissue is heterogeneously dense, which may obscure small masses.   Numerous very fine calcifications are present lower outer quadrant posterior depth which are in a distribution suggesting branching pattern. The calcifications of greatest concern span in AP length of about 6 cm. No associated mass is identified. More superiorly in the upper-outer quadrant there are scattered more coarse calcifications which have not changed.   I discussed my findings and recommendation for biopsy with the patient. Through our nurse navigator arrangements were made for the patient to see Dr. Dimas 06/05/2024 with biopsy scheduled for 06/07/2024.       Suspicious calcifications lower outer quadrant posterior depth for which surgical referral and stereotactic biopsy are recommended   BI-RADS CATEGORY:   BI-RADS Category:  4 Suspicious. Recommendation:  Surgical Consultation and Biopsy. Recommended Date:  Immediate. Laterality:  Left.   For any future breast imaging appointments, please call [phone]).       MACRO: None     Signed by: Ryanne Emmanuel 6/4/2024 3:14 PM Dictation workstation:   [id number]        ASSESSMENT / PLAN   Diagnoses and all orders for this visit:  Calcification of left breast  Dense breast tissue  At high risk for breast cancer      Plan  June 2024: LEFT: 6cm area of LOQ with branching calcifications    1.  Reviewed with the patient that she does have some coarse calcifications of the upper outer quadrant of the left breast, but these are unchanged over the last several mammograms.  No further intervention is required for these.  2.  She does have some new calcifications of the lower outer quadrant of the left breast which appear to span over 6 cm area and are in a branching formation.  Therefore, stereotactic biopsy of these calcifications is recommended.  The process of the biopsy was reviewed with the patient.  Her stereotactic biopsy is scheduled for 6/7/2024.   Reviewed with the radiologist regarding possible to site biopsy.  She felt that the calcifications all appeared similar and did not require any to site biopsy at this time.  3.  She will follow-up in the office with a virtual appointment on 6/14/2024 to review biopsy results.  4.  If her final pathology is benign, she would qualify for more frequent screening with alternating yearly mammogram and breast MRI given her lifetime risk score of near 20%.  Can discuss this with her in further detail and provided her with educational material regarding the high risk screening process depending on her test results from her stereotactic biopsy of her left breast calcifications.  5.  With her increased risk of breast cancer, and mother diagnosed with breast cancer in her mid 60s, can consider genetic consultation for possible genetic panel testing.      Mirtha Dimas MD, FACS  [facility] General Surgery  [address] [place] [address];   [address] [employer] [address]  [place]  [address] [phone]

## 2024-06-05 NOTE — PATIENT INSTRUCTIONS
1.  Keep your appointment for your mammogram-guided biopsy of your left breast calcifications on Friday, 6/7/2024.  This biopsy is performed in the radiology department of [facility].  2.  If you have any problems with the biopsy site (bleeding or concern for infection), please contact Dr. Dimas's office.  [phone]  3.  Follow-up in Dr. Dimas's office on Friday, 6/14/2024, with a virtual appointment to review your biopsy results.

## 2024-06-05 NOTE — LETTER
June 6, 2024     Judith Baeza DO  [address] [facility] [address]  [facility] [address]    Patient: Etelvina Banks   YOB: 1980   Date of Visit: 6/5/2024       Dear Dr. Judith Baeza DO:    Thank you for referring Etelvina Banks to me for evaluation. Below are my notes for this consultation.  If you have questions, please do not hesitate to call me. I look forward to following your patient along with you.       Sincerely,     Mirtha Dimas MD      CC: No Recipients  ______________________________________________________________________________________        GENERAL SURGERY OFFICE NOTE    Patient: Etelvina Banks    Age: 44 y.o.   Gender: female    MRN: [id number]    PCP: Judith Baeza DO        SUBJECTIVE     Chief Complaint  New Patient Visit (Patient was referred by Dr. Baeza for abnormal left breast mammogram. Patient states that she has always had tenderness to both of her breasts. )       ZIYAD Main is a 44-year-old white female who I am seeing in consultation at the request of her primary care physician for evaluation of left breast calcifications.  She states that she occasionally gets some breast tenderness, but this is fairly normal for her.  It is more of a diffuse tenderness, and not 1 specific spot.  She normally gets her yearly screening mammograms.  She had no associated symptoms or new concerns going into her most recent screening mammogram.  Screening mammogram identified coarse calcifications of the upper outer quadrant of the left breast which are unchanged from prior mammograms.  However, new branching calcifications of the lower outer quadrant of the left breast were identified.  This area spanned approximately a 6 cm region.  Stereotactic biopsy was recommended.    Risk factors for breast cancer: 44-year-old white female; menarche at age 9; first live birth at age 21; no previous breast biopsy; mother diagnosed with breast cancer in her mid 60s.   She is premenopausal.  She also had a maternal uncle and a maternal aunt who both had cancer, but she is not sure which type.  This gives her a 5-year Tracie score of 1.8% and a lifetime risk of 18.9% which puts her in a high risk category.  Using the [facility] Breast cancer risk evaluation tool, this patient's 10-year risk of breast cancer is 4.6% ( average risk is 2.3%) and lifetime risk is 19.4% (average lifetime risk is 10.1%). This puts her in a high risk category for developing breast cancer.    ROS  Review of Systems   Constitutional:  Negative for chills, fatigue and fever.   HENT:  Negative for congestion, ear pain and hearing loss.    Eyes:  Negative for pain and redness.   Respiratory:  Negative for cough and shortness of breath.    Cardiovascular:  Negative for chest pain and leg swelling.   Gastrointestinal:  Negative for abdominal pain, constipation, diarrhea, nausea and vomiting.   Endocrine: Negative for polyphagia.   Genitourinary:  Negative for dysuria, flank pain, frequency and hematuria.   Musculoskeletal:  Negative for arthralgias and myalgias.   Skin:  Negative for rash and wound.   Allergic/Immunologic: Negative for immunocompromised state.   Neurological:  Positive for headaches. Negative for speech difficulty and weakness.   Hematological:  Does not bruise/bleed easily.   Psychiatric/Behavioral:  Negative for agitation and confusion.           HISTORY     Past Medical History:   Diagnosis Date   • Body mass index (BMI)30.0-30.9, adult 06/04/2021    Body mass index (BMI) of 30.0 to 30.9 in adult   • Depression, major, single episode, moderate (Multi) 02/28/2023   • Headache, unspecified     Bad headache        Past Surgical History:   Procedure Laterality Date   • OTHER SURGICAL HISTORY  03/11/2019    Ovarian cystectomy   • OTHER SURGICAL HISTORY  10/14/2022    Tubal ligation   • OTHER SURGICAL HISTORY  03/22/2021    Foot surgery        Family History   Problem Relation Name Age of Onset   •  "Breast cancer Mother     • Arthritis Mother     • Hypertension Mother     • Asthma Brother     • Diabetes Other     • Cancer Other aunt         No Known Allergies     Social History     Tobacco Use   Smoking Status Former   • Types: Cigarettes   • Passive exposure: Past   Smokeless Tobacco Never        Social History     Substance and Sexual Activity   Alcohol Use Never        HOME MEDICATIONS  Current Outpatient Medications   Medication Instructions   • buprenorphine-naloxone (Suboxone) 8-2 mg SL film sublingual, Take 1 film SL twice kurt   • cream base no.31, bulk, (Transdermal Pain Base) cream Transdermal Pain Base External Cream Refills: 0 Active   • hydroCHLOROthiazide (MICROZIDE) 12.5 mg, oral, Daily PRN   • ibuprofen 800 mg, oral, 3 times daily PRN   • losartan (COZAAR) 50 mg, oral, Daily   • metFORMIN XR (Glucophage-XR) 500 mg 24 hr tablet Take 1 tablet PO daily for 2 weeks, if tolerating well increase to 2 tablets PO daily.   • naloxone (Narcan) 4 mg/0.1 mL nasal spray nasal, Spray 4 mg intranasally once if needed for overdose or respiratory depression          OBJECTIVE   Last Recorded Vitals.  Blood pressure 123/87, pulse 82, height 1.575 m (5' 2\"), weight 86.5 kg (190 lb 12.8 oz), SpO2 98%.     PHYSICAL EXAM  Physical Exam   General: Well-developed, well-nourished and in no acute distress.  Head: Normocephalic. Atraumatic.  Neck/thyroid: Neck is supple.   Eyes: Pupils equal round and reactive to light. Conjunctiva normal.  ENMT: No masses or deformity of external nose. External ears without masses.  Respiratory/Chest:  Normal respiratory effort.  Breast: Moderate sized, symmetrical breasts.  No palpable masses of either breast.  No skin changes and no nipple discharge.  Lymphatics: No palpable lymphadenopathy of the cervical, supraclavicular or axillary regions.  Cardiovascular: Regular rate and rhythm.   Abdomen: Soft, nontender, nondistended.   Musculoskeletal: Joints and limbs are grossly normal. " Normal gait. Normal range of motion of major joints.  Neuro: Oriented to person, place and time. No obvious neurological deficit. Motor strength grossly normal.  Psych: Normal mood and affect.    RESULTS   Labs  No results found for this or any previous visit (from the past 24 hour(s)).    Radiology Resutls  BI mammo left diagnostic    Result Date: 6/4/2024  Interpreted By:  Ryanne Emmanuel, STUDY: BI MAMMO LEFT DIAGNOSTIC;  6/4/2024 2:04 pm   ACCESSION NUMBER(S): [id number]   ORDERING CLINICIAN: KRISTY VARGHESE   INDICATION: Signs/Symptoms:abnormal mammogram.     COMPARISON: 05/08/2024 and 06/03/2022   FINDINGS: Density:  The breast tissue is heterogeneously dense, which may obscure small masses.   Numerous very fine calcifications are present lower outer quadrant posterior depth which are in a distribution suggesting branching pattern. The calcifications of greatest concern span in AP length of about 6 cm. No associated mass is identified. More superiorly in the upper-outer quadrant there are scattered more coarse calcifications which have not changed.   I discussed my findings and recommendation for biopsy with the patient. Through our nurse navigator arrangements were made for the patient to see Dr. Dimas 06/05/2024 with biopsy scheduled for 06/07/2024.       Suspicious calcifications lower outer quadrant posterior depth for which surgical referral and stereotactic biopsy are recommended   BI-RADS CATEGORY:   BI-RADS Category:  4 Suspicious. Recommendation:  Surgical Consultation and Biopsy. Recommended Date:  Immediate. Laterality:  Left.   For any future breast imaging appointments, please call [phone] ([phone]).       MACRO: None     Signed by: Ryanne Emmanuel 6/4/2024 3:14 PM Dictation workstation:   [id number]        ASSESSMENT / PLAN   Diagnoses and all orders for this visit:  Calcification of left breast  Dense breast tissue  At high risk for breast cancer      Plan  June 2024: LEFT: 6cm area of  LOQ with branching calcifications    1.  Reviewed with the patient that she does have some coarse calcifications of the upper outer quadrant of the left breast, but these are unchanged over the last several mammograms.  No further intervention is required for these.  2.  She does have some new calcifications of the lower outer quadrant of the left breast which appear to span over 6 cm area and are in a branching formation.  Therefore, stereotactic biopsy of these calcifications is recommended.  The process of the biopsy was reviewed with the patient.  Her stereotactic biopsy is scheduled for 6/7/2024.  Reviewed with the radiologist regarding possible to site biopsy.  She felt that the calcifications all appeared similar and did not require any to site biopsy at this time.  3.  She will follow-up in the office with a virtual appointment on 6/14/2024 to review biopsy results.  4.  If her final pathology is benign, she would qualify for more frequent screening with alternating yearly mammogram and breast MRI given her lifetime risk score of near 20%.  Can discuss this with her in further detail and provided her with educational material regarding the high risk screening process depending on her test results from her stereotactic biopsy of her left breast calcifications.  5.  With her increased risk of breast cancer, and mother diagnosed with breast cancer in her mid 60s, can consider genetic consultation for possible genetic panel testing.      Mirtha Dimas MD, FACS  [facility] General Surgery  [address] [place] [address];   [facility] [address]; [address]  [place]  [address] [phone]

## 2024-06-05 NOTE — TELEPHONE ENCOUNTER
----- Message from Judith Baeza DO sent at 6/5/2024  7:07 AM EDT -----  Messaged from breast team, they have alerted patient to findings and set up follow up times.

## 2024-06-06 ASSESSMENT — ENCOUNTER SYMPTOMS
WEAKNESS: 0
NAUSEA: 0
VOMITING: 0
HEMATURIA: 0
COUGH: 0
SHORTNESS OF BREATH: 0
DYSURIA: 0
EYE PAIN: 0
ARTHRALGIAS: 0
BRUISES/BLEEDS EASILY: 0
FREQUENCY: 0
CONFUSION: 0
FEVER: 0
FLANK PAIN: 0
SPEECH DIFFICULTY: 0
ABDOMINAL PAIN: 0
POLYPHAGIA: 0
MYALGIAS: 0
AGITATION: 0
DIARRHEA: 0
CHILLS: 0
HEADACHES: 1
WOUND: 0
CONSTIPATION: 0
EYE REDNESS: 0
FATIGUE: 0

## 2024-06-07 ENCOUNTER — HOSPITAL ENCOUNTER (OUTPATIENT)
Dept: RADIOLOGY | Facility: HOSPITAL | Age: 44
Discharge: HOME | End: 2024-06-07
Payer: COMMERCIAL

## 2024-06-07 DIAGNOSIS — R92.8 ABNORMAL FINDINGS ON DIAGNOSTIC IMAGING OF BREAST: ICD-10-CM

## 2024-06-07 DIAGNOSIS — R92.1 BREAST CALCIFICATION, LEFT: ICD-10-CM

## 2024-06-07 PROCEDURE — 77065 DX MAMMO INCL CAD UNI: CPT | Mod: LT

## 2024-06-07 PROCEDURE — 2720000007 HC OR 272 NO HCPCS

## 2024-06-07 PROCEDURE — 19081 BX BREAST 1ST LESION STRTCTC: CPT | Mod: LT

## 2024-06-13 LAB
LAB AP ASR DISCLAIMER: NORMAL
LABORATORY COMMENT REPORT: NORMAL
PATH REPORT.FINAL DX SPEC: NORMAL
PATH REPORT.GROSS SPEC: NORMAL
PATH REPORT.RELEVANT HX SPEC: NORMAL
PATH REPORT.TOTAL CANCER: NORMAL

## 2024-06-14 ENCOUNTER — APPOINTMENT (OUTPATIENT)
Dept: SURGERY | Facility: CLINIC | Age: 44
End: 2024-06-14
Payer: COMMERCIAL

## 2024-06-14 DIAGNOSIS — Z91.89 AT HIGH RISK FOR BREAST CANCER: ICD-10-CM

## 2024-06-14 DIAGNOSIS — R92.1 CALCIFICATION OF LEFT BREAST: ICD-10-CM

## 2024-06-14 DIAGNOSIS — N60.92 ATYPICAL DUCTAL HYPERPLASIA OF LEFT BREAST: Primary | ICD-10-CM

## 2024-06-14 DIAGNOSIS — R92.30 DENSE BREAST TISSUE: ICD-10-CM

## 2024-06-14 PROCEDURE — 4010F ACE/ARB THERAPY RXD/TAKEN: CPT | Performed by: SURGERY

## 2024-06-14 PROCEDURE — 3050F LDL-C >= 130 MG/DL: CPT | Performed by: SURGERY

## 2024-06-14 PROCEDURE — 99442 PR PHYS/QHP TELEPHONE EVALUATION 11-20 MIN: CPT | Performed by: SURGERY

## 2024-06-14 PROCEDURE — 3044F HG A1C LEVEL LT 7.0%: CPT | Performed by: SURGERY

## 2024-06-14 PROCEDURE — 1036F TOBACCO NON-USER: CPT | Performed by: SURGERY

## 2024-06-14 PROCEDURE — 3008F BODY MASS INDEX DOCD: CPT | Performed by: SURGERY

## 2024-06-14 ASSESSMENT — ENCOUNTER SYMPTOMS
EYE REDNESS: 0
MYALGIAS: 0
POLYPHAGIA: 0
VOMITING: 0
DYSURIA: 0
FATIGUE: 0
FLANK PAIN: 0
BRUISES/BLEEDS EASILY: 0
ABDOMINAL PAIN: 0
ARTHRALGIAS: 0
CHILLS: 0
CONSTIPATION: 0
WOUND: 0
CONFUSION: 0
DIARRHEA: 0
HEMATURIA: 0
FREQUENCY: 0
HEADACHES: 1
EYE PAIN: 0
SHORTNESS OF BREATH: 0
WEAKNESS: 0
SPEECH DIFFICULTY: 0
NAUSEA: 0
AGITATION: 0
FEVER: 0
COUGH: 0

## 2024-06-14 NOTE — PROGRESS NOTES
GENERAL SURGERY OFFICE NOTE    Patient: Etelvina Banks    Age: 44 y.o.   Gender: female    MRN: [id number]    PCP: Judith Baeza, DO        SUBJECTIVE     Chief Complaint  Follow-up (Patient is following up for a left breast biopsy. Patient states that she has a lot of bruising. Patient states that she did not have any problems with the biopsy. )       HPI  The patient was interviewed via a phone. We spent approximately 10.5 minutes in the phone communication. The patient gave consent for this type of visit.  I performed this visit using real-time telehealth tools, including a telephone connection between Etelvina at her home and myself / Dr. Dimas at the [facility] office.    Etelvina returns the office via phone visit for follow-up after undergoing a 2 site stereotactic biopsy for left breast calcifications (spanning approximately 6 cm).  She states that she had a moderate amount of bruising which has affected about the force of her breast tissue.  However, she has no concerns for bleeding or infection.    Risk factors for breast cancer: 44-year-old white female; menarche at age 9; first live birth at age 21; 1 breast biopsy with atypical ductal hyperplasia; mother diagnosed with breast cancer in her mid 60s.  She is premenopausal.  She also had a maternal uncle and a maternal aunt who both had cancer, but she is not sure which type.  This gives her a 5-year-old Tracie score of 5.1 and a lifetime risk of 38.5% which puts her in a high risk category.  Using the Tyrer-Cuzick Breast cancer risk evaluation tool, this patient's 10-year risk of breast cancer is 11.2% ( average risk is 2.3%) and lifetime risk is 40.5% (average lifetime risk is 10.1%). This puts her in a high risk category for developing breast cancer.    ROS  Review of Systems   Constitutional:  Negative for chills, fatigue and fever.   HENT:  Negative for congestion, ear pain and hearing loss.    Eyes:  Negative for pain and redness.   Respiratory:   Negative for cough and shortness of breath.    Cardiovascular:  Negative for chest pain and leg swelling.   Gastrointestinal:  Negative for abdominal pain, constipation, diarrhea, nausea and vomiting.   Endocrine: Negative for polyphagia.   Genitourinary:  Negative for dysuria, flank pain, frequency and hematuria.   Musculoskeletal:  Negative for arthralgias and myalgias.   Skin:  Negative for rash and wound.   Allergic/Immunologic: Negative for immunocompromised state.   Neurological:  Positive for headaches. Negative for speech difficulty and weakness.   Hematological:  Does not bruise/bleed easily.   Psychiatric/Behavioral:  Negative for agitation and confusion.           HISTORY     Past Medical History:   Diagnosis Date    Body mass index (BMI)30.0-30.9, adult 06/04/2021    Body mass index (BMI) of 30.0 to 30.9 in adult    Depression, major, single episode, moderate (Multi) 02/28/2023    Diabetes mellitus (Multi) 4-01-24    Headache, unspecified     Bad headache        Past Surgical History:   Procedure Laterality Date    BREAST BIOPSY Left 06/07/2024    OTHER SURGICAL HISTORY  03/11/2019    Ovarian cystectomy    OTHER SURGICAL HISTORY  10/14/2022    Tubal ligation    OTHER SURGICAL HISTORY  03/22/2021    Foot surgery        Family History   Problem Relation Name Age of Onset    Breast cancer Mother Wanda     Arthritis Mother Wanda     Hypertension Mother Wanda     Asthma Brother Zenon     Diabetes Other      Cancer Other aunt         No Known Allergies     Social History     Tobacco Use   Smoking Status Former    Current packs/day: 0.00    Types: Cigarettes    Passive exposure: Past   Smokeless Tobacco Never        Social History     Substance and Sexual Activity   Alcohol Use Never        HOME MEDICATIONS  Current Outpatient Medications   Medication Instructions    buprenorphine-naloxone (Suboxone) 8-2 mg SL film sublingual, Take 1 film SL twice kurt    cream base no.31, bulk, (Transdermal Pain Base) cream  Transdermal Pain Base External Cream Refills: 0 Active    hydroCHLOROthiazide (MICROZIDE) 12.5 mg, oral, Daily PRN    ibuprofen 800 mg, oral, 3 times daily PRN    losartan (COZAAR) 50 mg, oral, Daily    metFORMIN XR (Glucophage-XR) 500 mg 24 hr tablet Take 1 tablet PO daily for 2 weeks, if tolerating well increase to 2 tablets PO daily.    naloxone (Narcan) 4 mg/0.1 mL nasal spray nasal, Spray 4 mg intranasally once if needed for overdose or respiratory depression          OBJECTIVE   Last Recorded Vitals.  There were no vitals taken for this visit.     PHYSICAL EXAM  Physical Exam   Previous exam:  General: Well-developed, well-nourished and in no acute distress.  Head: Normocephalic. Atraumatic.  Neck/thyroid: Neck is supple.   Eyes: Pupils equal round and reactive to light. Conjunctiva normal.  ENMT: No masses or deformity of external nose. External ears without masses.  Respiratory/Chest:  Normal respiratory effort.  Breast: Moderate sized, symmetrical breasts.  No palpable masses of either breast.  No skin changes and no nipple discharge.  Lymphatics: No palpable lymphadenopathy of the cervical, supraclavicular or axillary regions.  Cardiovascular: Regular rate and rhythm.   Abdomen: Soft, nontender, nondistended.   Musculoskeletal: Joints and limbs are grossly normal. Normal gait. Normal range of motion of major joints.  Neuro: Oriented to person, place and time. No obvious neurological deficit. Motor strength grossly normal.  Psych: Normal mood and affect.    RESULTS   Labs  A.  LEFT BREAST CALCIFICATIONS, POSTERIOR, STEREOTACTIC CORE NEEDLE BIOPSY:  --ATYPICAL DUCTAL HYPERPLASIA WITH ASSOCIATED MICROCALCIFICATIONS, SEE NOTE  --BENIGN BREAST DUCTS AND LOBULES WITH ASSOCIATED MICROCALCIFICATIONS     Note: Multiple deeper levels are examined.  Immunohistochemical stains with appropriate controls for CK5/6 and ER are performed on block A1, A2, A3, A4, and A5.  CK5/6 immunohistochemical stain shows absent mosaic  pattern of staining with ER positivity, supporting above diagnosis.     B.  LEFT BREAST CALCIFICATIONS, ANTERIOR, STEREOTACTIC CORE NEEDLE BIOPSY:  --ATYPICAL DUCTAL HYPERPLASIA WITH ASSOCIATED MICROCALCIFICATIONS, SEE NOTE     Note: Multiple deeper levels are examined.  Immunohistochemical stains with appropriate controls for CK5/6 and ER are performed on block B1, B2, B4 and B5.  CK5/6 immunohistochemical stain shows absent mosaic pattern of staining with ER positivity, supporting above diagnosis.     : Dr. Slade (A1, B1).   Electronically signed by Alberta Flowers MD on 6/13/2024 at 1314      [facility]       Radiology Resutls  Addendum    Interpreted By:  Ryanne Emmanuel,   ADDENDUM:  Pathology shows atypical ductal hyperplasia at both biopsy sites.  Pathology is concordant with imaging.      Signed by: Ryanne Emmanuel 6/13/2024 2:48 PM      -------- ORIGINAL REPORT --------  Dictation workstation:   [id number]   Addended by Ryanne Emmanuel MD on 6/13/2024  2:48 PM     Study Result    Narrative & Impression   Interpreted By:  Ryanne Emmanuel,   STUDY:  BI STEREOTACTIC GUIDED BREAST LEFT LOCALIZATION AND BIOPSY; BI MAMMO  LEFT POST BIOPSY CLIP;  6/7/2024 8:56 am; 6/7/2024 9:07 am      ACCESSION NUMBER(S):  [id number]; [id number]      ORDERING CLINICIAN:  PAYAM SILVER      INDICATION:  Left breast calcifications.      FINDINGS:  The procedure was explained to the patient and the patient agreed to  undergo the procedure.      Prior to the procedure, an audible timeout was done to verify patient  identification, site and type of procedure.      PROCEDURE: The region of calcifications spans about 6 cm so posterior  and anterior sampling areas were selected. Posterior sampling was  performed 1st followed by anterior sampling. A  view of the left  breast localized the calcifications of concern in the lower outer  quadrant. A lateral approach was used for both samples. The breast  was  prepped and draped in a sterile manner.  6.5 mL of 2% lidocaine  was injected subcutaneously and then into the deeper tissues of the  posterior site. When advanced to the anterior site 5 mL of 2%  lidocaine was injected subcutaneously and into the deeper tissues. 5  tissue core specimens were then obtained with a 9-gauge vacuum  assisted biopsy needle from the posterior site and using a separate  biopsy device 5 tissue core samples were obtained from the anterior  sample site, with minimal bleeding (estimated blood loss less than 10  mL) from the entire biopsy. The specimen radiograph demonstrated  multiple calcifications in the tissues from both sample sites. A  Securmark tissue marker was then placed into the posterior biopsy  site and a Tumark Q marker was placed at the anterior sample site.  Hemostasis was achieved with manual compression.  The patient  tolerated the procedure without difficulty.      Pathology specimens were labeled with patient identifiers while the  patient was in the procedure room, and then sent to the pathology  department.      The postbiopsy mammogram demonstrates satisfactory placement of the  tissue markers which are  by about 5 cm on both mammographic  projections.      The patient experienced no complications during the procedure.  Home-going/follow-up instructions were reviewed with the patient  before she left the department.      IMPRESSION:  Status post stereotactic guided core needle biopsy of Left breast  calcifications at 2 sites followed by tissue marker placement.  Pathology is pending.      POST PROCEDURE MAMMOGRAM FOR MARKER PLACEMENT.           ASSESSMENT / PLAN   Diagnoses and all orders for this visit:  Atypical ductal hyperplasia of left breast  -     MR breast bilateral w contrast full protocol; Future  Calcification of left breast  At high risk for breast cancer  -     Referral to Genetics; Future  Dense breast tissue        Plan  June 2024: LEFT: 6cm area  of LOQ with branching calcifications (6cm) with CNBx2 showing ADH    1.  Reviewed with the patient that she does have some coarse calcifications of the upper outer quadrant of the left breast, but these are unchanged over the last several mammograms.  No further intervention is required for these.  2.  On screening mammogram she was found to have new calcifications of the lower outer quadrant of the left breast which appear to span over 6 cm area and are in a branching formation.  Therefore, a 2 site stereotactic biopsy of these calcifications was performed.  Both biopsies demonstrate atypical ductal hyperplasia.  Given the large area of potential involvement (6 cm) will get an MRI of the breast before recommending surgical intervention.  Reviewed that diagnosis of atypical ductal hyperplasia is usually an indication for surgical excision to rule out underlying cancer.  Given the patient's high risk factors for developing cancer, would recommend excision of this region.  However a 6 cm area could cause significant disfigurement.  If MRI not suggestive of underlying cancer, may treat her with chemo preventative medications and close monitoring of the area.  3.  Her new calculations of her risk factors put her well over the 20% lifetime risk for developing breast cancer which puts her in a high risk category.  Will send the high risk information booklet for her education.  Pending results of current MRI, she would qualify for more frequent screening with a yearly mammogram alternating with a yearly breast MRI so that she gets radiographic evaluation every 6 months.  4.  Her 5-year Tracie score is greater than 3%.  Therefore, she would qualify for chemo preventative medications.  5.  Refer to genetics for consultation and possible genetic testing.  6.  I will call the patient with the MRI results to determine the next step in her treatment plan.      Mirtha Dimas MD, FACS  [facility] General Surgery  [address];   [address]  [place]  [address] [phone]

## 2024-06-14 NOTE — PATIENT INSTRUCTIONS
1.  The biopsy of both sites in your breast showed atypical ductal hyperplasia.  This can indicate some underlying cancer, and does put you at increased risk of developing breast cancer.  To better evaluate this area (potentially a 6 cm area) a breast MRI will be performed.  Dr. Dimas will call you with these results.  2.  Because of your increased risk of developing breast cancer, a referral has been made for genetics consultation to consider genetic testing.  3.  Please read the high risk booklet provided from the office.  You do qualify for more frequent breast cancer screening with a yearly mammogram and a yearly breast MRI.  4.  Depending on the results of your breast MRI, can consider chemo preventative medication (a pill that you will take daily for 5 years to block the hormone effects on your breast tissue) to reduce your risk of developing breast cancer.

## 2024-06-18 ENCOUNTER — TELEPHONE (OUTPATIENT)
Dept: PRIMARY CARE | Facility: CLINIC | Age: 44
End: 2024-06-18
Payer: COMMERCIAL

## 2024-06-18 DIAGNOSIS — E11.9 TYPE 2 DIABETES MELLITUS WITHOUT COMPLICATION, WITHOUT LONG-TERM CURRENT USE OF INSULIN (MULTI): ICD-10-CM

## 2024-06-18 NOTE — TELEPHONE ENCOUNTER
Needs a refill on her Metformin 500 mg takes  it 2 times a day please send to [facility] in [place]

## 2024-06-19 RX ORDER — METFORMIN HYDROCHLORIDE 500 MG/1
TABLET, EXTENDED RELEASE ORAL
Qty: 180 TABLET | Refills: 1 | Status: SHIPPED | OUTPATIENT
Start: 2024-06-19 | End: 2024-06-20 | Stop reason: SDUPTHER

## 2024-06-20 RX ORDER — METFORMIN HYDROCHLORIDE 500 MG/1
TABLET, EXTENDED RELEASE ORAL
Qty: 180 TABLET | Refills: 1 | Status: SHIPPED | OUTPATIENT
Start: 2024-06-20

## 2024-07-03 ENCOUNTER — HOSPITAL ENCOUNTER (OUTPATIENT)
Dept: RADIOLOGY | Facility: HOSPITAL | Age: 44
Discharge: HOME | End: 2024-07-03
Payer: COMMERCIAL

## 2024-07-03 DIAGNOSIS — N60.92 ATYPICAL DUCTAL HYPERPLASIA OF LEFT BREAST: ICD-10-CM

## 2024-07-03 PROCEDURE — 2550000001 HC RX 255 CONTRASTS: Performed by: SURGERY

## 2024-07-03 PROCEDURE — 77049 MRI BREAST C-+ W/CAD BI: CPT

## 2024-07-03 PROCEDURE — A9575 INJ GADOTERATE MEGLUMI 0.1ML: HCPCS | Performed by: SURGERY

## 2024-07-03 RX ORDER — GADOTERATE MEGLUMINE 376.9 MG/ML
0.2 INJECTION INTRAVENOUS
Status: COMPLETED | OUTPATIENT
Start: 2024-07-03 | End: 2024-07-03

## 2024-07-09 ENCOUNTER — TELEPHONE (OUTPATIENT)
Dept: SURGERY | Facility: CLINIC | Age: 44
End: 2024-07-09
Payer: COMMERCIAL

## 2024-07-09 DIAGNOSIS — N63.20 MASS OF LEFT BREAST, UNSPECIFIED QUADRANT: Primary | ICD-10-CM

## 2024-07-09 NOTE — TELEPHONE ENCOUNTER
----- Message from Usha Rivas MA sent at 7/9/2024  3:49 PM EDT -----  Regarding: FW: Schedule for diagnostic mammogram/ultrasound  Patient is scheduled for diagnostic mammogram 7/18/24 at 12:30 with ultrasound to follow. Left message for patient to call the office.  ----- Message -----  From: Mirtha Dimas MD  Sent: 7/9/2024   2:26 PM EDT  To: Usha Rivas MA  Subject: RE: Schedule for diagnostic mammogram/ultras#    Done. Make sure this is done at the same time as the diagnostic mammogram.  Yes  ----- Message -----  From: Usha Rivas MA  Sent: 7/9/2024   2:18 PM EDT  To: Mirtha Dimas MD  Subject: RE: Schedule for diagnostic mammogram/ultras#    Please put in the ultrasound order.  ----- Message -----  From: Mirtha Dimas MD  Sent: 7/9/2024   1:16 PM EDT  To: Usha Rivas MA  Subject: Schedule for diagnostic mammogram/ultrasound     Schedule for first available: Left diagnostic mammogram and ultrasound (for evaluation of left breast mass seen on breast MRI).  Dr. Dimas will call the patient with the results.

## 2024-07-09 NOTE — TELEPHONE ENCOUNTER
Talked with Etelvina regarding the MRI results.  She has agreed to proceed with the diagnostic mammogram and ultrasound evaluation of the left breast to try to identify the left breast mass seen on breast MRI.  If able to see the mass by mammogram/ultrasound, can schedule biopsy at [facility].  If unable to see the mass by mammogram/ultrasound, will need to schedule for an MRI guided biopsy of the left breast mass.  Will call the patient with the results of the diagnostic mammogram/ultrasound to determine how and where the biopsy will be scheduled.    Also reviewed that the area of the atypical ductal hyperplasia does extend for fairly large area.  Treatment of the atypical ductal hyperplasia area will depend on the results of the biopsy of the left breast mass.  Treatment options could include excision (with likely disfigurement of the left breast given the large area), monitoring every 6 months which she already qualifies for given her high risk factors for chemo preventative medication since her 5-year Tracie score is 5% with close monitoring every 6 months as well.    ----- Message from Mirtha Dimas MD sent at 7/8/2024  9:47 AM EDT -----  Regarding: MRI follow up  Tried calling her about the breast MRI results. The non-mass enhancing area with biopsy x2 proven ADH measures 8.8x2.5x1,5cm. There is also suggestion of a mass superior to this area for which a dx mammo, US and possible biopsy is recommended. If unable to see on mammo/US, will need MRI-guided biopsy. Left message on her cell phone voice mail box. Will try calling again later. I did review the MRI results with [facility] radiologist who agreed with the plan.

## 2024-07-17 ENCOUNTER — APPOINTMENT (OUTPATIENT)
Dept: PRIMARY CARE | Facility: CLINIC | Age: 44
End: 2024-07-17
Payer: COMMERCIAL

## 2024-07-18 ENCOUNTER — HOSPITAL ENCOUNTER (OUTPATIENT)
Dept: RADIOLOGY | Facility: HOSPITAL | Age: 44
Discharge: HOME | End: 2024-07-18
Payer: COMMERCIAL

## 2024-07-18 DIAGNOSIS — N63.20 MASS OF LEFT BREAST, UNSPECIFIED QUADRANT: ICD-10-CM

## 2024-07-18 DIAGNOSIS — N63.20 MASS OF LEFT BREAST, UNSPECIFIED QUADRANT: Primary | ICD-10-CM

## 2024-07-18 PROCEDURE — 77065 DX MAMMO INCL CAD UNI: CPT | Mod: LT

## 2024-07-18 PROCEDURE — 76642 ULTRASOUND BREAST LIMITED: CPT | Mod: LT

## 2024-07-18 NOTE — NURSING NOTE
After patient review of diagnostic results with Dr. Rendon, support provided. Patient reports previous discussion with Dr. Dimas regarding consideration for MRI biopsy if 2nd look imaging did not present another option, and states she was aware this would be done at another campus. Patient scheduling preferences reviewed, and in agreement for a call with visit details after collaboration with Dr. Dimas's office. Patient denies further questions or needs at this time.

## 2024-07-19 ENCOUNTER — TELEPHONE (OUTPATIENT)
Dept: SURGERY | Facility: CLINIC | Age: 44
End: 2024-07-19
Payer: COMMERCIAL

## 2024-07-19 NOTE — TELEPHONE ENCOUNTER
----- Message from Usha VALENTE sent at 7/19/2024  9:35 AM EDT -----  Regarding: RE: Scheduled for MRI guided left breast biopsy  Patient needs to be scheduled for an H&P prior to her MRI guided breast biopsy. This can be virtual or in person. Left message for patient to call the office.  ----- Message -----  From: Usha Rivas MA  Sent: 7/18/2024   3:10 PM EDT  To: Usha Rivas MA  Subject: RE: Scheduled for MRI guided left breast bio#    Emailed radiology with the patient info. Radiology will reach out to the patient to schedule. Patient is informed.  ----- Message -----  From: Mirtha Dimas MD  Sent: 7/18/2024   2:43 PM EDT  To: Usha Rivas MA  Subject: Scheduled for MRI guided left breast biopsy      1.  Will schedule for left breast biopsy MRI guided.  Please discuss patient preferences with breast nurse navigator who has already talked with the patient today regarding the need for the MRI guided biopsy.  2.  Schedule for office appointment with me 1 week after the MRI guided biopsy to review the results.

## 2024-07-23 ENCOUNTER — APPOINTMENT (OUTPATIENT)
Dept: SURGERY | Facility: CLINIC | Age: 44
End: 2024-07-23
Payer: COMMERCIAL

## 2024-07-23 VITALS
OXYGEN SATURATION: 95 % | WEIGHT: 184.8 LBS | DIASTOLIC BLOOD PRESSURE: 85 MMHG | SYSTOLIC BLOOD PRESSURE: 119 MMHG | BODY MASS INDEX: 34.01 KG/M2 | HEIGHT: 62 IN | HEART RATE: 90 BPM

## 2024-07-23 DIAGNOSIS — R92.30 DENSE BREAST TISSUE: ICD-10-CM

## 2024-07-23 DIAGNOSIS — N60.92 ATYPICAL DUCTAL HYPERPLASIA OF LEFT BREAST: Primary | ICD-10-CM

## 2024-07-23 DIAGNOSIS — Z91.89 AT HIGH RISK FOR BREAST CANCER: ICD-10-CM

## 2024-07-23 DIAGNOSIS — R92.1 CALCIFICATION OF LEFT BREAST: ICD-10-CM

## 2024-07-23 DIAGNOSIS — N63.20 MASS OF LEFT BREAST, UNSPECIFIED QUADRANT: ICD-10-CM

## 2024-07-23 PROCEDURE — 1036F TOBACCO NON-USER: CPT | Performed by: SURGERY

## 2024-07-23 PROCEDURE — 3050F LDL-C >= 130 MG/DL: CPT | Performed by: SURGERY

## 2024-07-23 PROCEDURE — 99213 OFFICE O/P EST LOW 20 MIN: CPT | Performed by: SURGERY

## 2024-07-23 PROCEDURE — 4010F ACE/ARB THERAPY RXD/TAKEN: CPT | Performed by: SURGERY

## 2024-07-23 PROCEDURE — 3008F BODY MASS INDEX DOCD: CPT | Performed by: SURGERY

## 2024-07-23 PROCEDURE — 3079F DIAST BP 80-89 MM HG: CPT | Performed by: SURGERY

## 2024-07-23 PROCEDURE — 3074F SYST BP LT 130 MM HG: CPT | Performed by: SURGERY

## 2024-07-23 PROCEDURE — 3044F HG A1C LEVEL LT 7.0%: CPT | Performed by: SURGERY

## 2024-07-23 ASSESSMENT — ENCOUNTER SYMPTOMS
HEADACHES: 1
CHILLS: 0
CONSTIPATION: 0
SHORTNESS OF BREATH: 0
COUGH: 0
HEMATURIA: 0
VOMITING: 0
EYE PAIN: 0
FATIGUE: 0
CONFUSION: 0
FLANK PAIN: 0
POLYPHAGIA: 0
MYALGIAS: 0
DIARRHEA: 0
FEVER: 0
DYSURIA: 0
WOUND: 0
ARTHRALGIAS: 0
ABDOMINAL PAIN: 0
SPEECH DIFFICULTY: 0
BRUISES/BLEEDS EASILY: 0
WEAKNESS: 0
EYE REDNESS: 0
NAUSEA: 0
AGITATION: 0
FREQUENCY: 0

## 2024-07-23 NOTE — PROGRESS NOTES
GENERAL SURGERY OFFICE NOTE    Patient: Etelvina Banks    Age: 44 y.o.   Gender: female    MRN: [id number]    PCP: Judith Baeza, DO        SUBJECTIVE     Chief Complaint  Follow-up (Patient is here for an H&P prior to her MRI guided breast biopsy 7/30/24. Patient states no new or worsening problems. )       ZIYAD Main returns the office for follow-up of the left breast atypical ductal hyperplasia and abnormal mass/non-mass enhancement area measuring 1.5 cm as seen on breast MRI.  After last office visit, she underwent a breast MRI showing the enhancement area of the left breast (previously biopsies showed ADH) measuring 8.8 x 2.5 x 1.5 cm with an additional 1.5 cm area of non-mass enhancement (possible underlying mass) in the lower outer quadrant of the left breast separate from the previous ADH area.  She underwent a follow-up mammogram and ultrasound, but the 1.5 cm area of concern could not be identified on either evaluation.  Therefore, she requires an MRI guided biopsy of this region.  She has not had any changes in her breast.  The previous bruising has resolved.  No palpable masses, skin changes or nipple discharge.  No change in medications.    Risk factors for breast cancer: 44-year-old white female; menarche at age 9; first live birth at age 21; 1 breast biopsy with atypical ductal hyperplasia; mother diagnosed with breast cancer in her mid 60s.  She is premenopausal.  She also had a maternal uncle and a maternal aunt who both had cancer, but she is not sure which type.  This gives her a 5-year-old Tracie score of 5.1 and a lifetime risk of 38.5% which puts her in a high risk category.  Using the Tyrer-Cuzick Breast cancer risk evaluation tool, this patient's 10-year risk of breast cancer is 11.2% ( average risk is 2.3%) and lifetime risk is 40.5% (average lifetime risk is 10.1%). This puts her in a high risk category for developing breast cancer.    ROS  Review of Systems   Constitutional:  Negative  for chills, fatigue and fever.   HENT:  Negative for congestion, ear pain and hearing loss.    Eyes:  Negative for pain and redness.   Respiratory:  Negative for cough and shortness of breath.    Cardiovascular:  Negative for chest pain and leg swelling.   Gastrointestinal:  Negative for abdominal pain, constipation, diarrhea, nausea and vomiting.   Endocrine: Negative for polyphagia.   Genitourinary:  Negative for dysuria, flank pain, frequency and hematuria.   Musculoskeletal:  Negative for arthralgias and myalgias.   Skin:  Negative for rash and wound.   Allergic/Immunologic: Negative for immunocompromised state.   Neurological:  Positive for headaches. Negative for speech difficulty and weakness.   Hematological:  Does not bruise/bleed easily.   Psychiatric/Behavioral:  Negative for agitation and confusion.           HISTORY     Past Medical History:   Diagnosis Date    Body mass index (BMI)30.0-30.9, adult 06/04/2021    Body mass index (BMI) of 30.0 to 30.9 in adult    Depression, major, single episode, moderate (Multi) 02/28/2023    Diabetes mellitus (Multi) 4-01-24    Headache, unspecified     Bad headache        Past Surgical History:   Procedure Laterality Date    BREAST BIOPSY Left 06/07/2024    OTHER SURGICAL HISTORY  03/11/2019    Ovarian cystectomy    OTHER SURGICAL HISTORY  10/14/2022    Tubal ligation    OTHER SURGICAL HISTORY  03/22/2021    Foot surgery        Family History   Problem Relation Name Age of Onset    Breast cancer Mother Wanda     Arthritis Mother Wanda     Hypertension Mother Wanda     Asthma Brother Zenon     Diabetes Other      Cancer Other aunt         No Known Allergies     Social History     Tobacco Use   Smoking Status Former    Types: Cigarettes    Passive exposure: Past   Smokeless Tobacco Never        Social History     Substance and Sexual Activity   Alcohol Use Never        HOME MEDICATIONS  Current Outpatient Medications   Medication Instructions    buprenorphine-naloxone  "(Suboxone) 8-2 mg SL film sublingual, Take 1 film SL twice kurt    cream base no.31, bulk, (Transdermal Pain Base) cream Transdermal Pain Base External Cream Refills: 0 Active    hydroCHLOROthiazide (MICROZIDE) 12.5 mg, oral, Daily PRN    ibuprofen 800 mg, oral, 3 times daily PRN    losartan (COZAAR) 50 mg, oral, Daily    metFORMIN XR (Glucophage-XR) 500 mg 24 hr tablet Take 2 tablet PO daily    naloxone (Narcan) 4 mg/0.1 mL nasal spray nasal, Spray 4 mg intranasally once if needed for overdose or respiratory depression          OBJECTIVE   Last Recorded Vitals.  Blood pressure 119/85, pulse 90, height 1.575 m (5' 2\"), weight 83.8 kg (184 lb 12.8 oz), SpO2 95%.     PHYSICAL EXAM  Physical Exam   Previous exam:  General: Well-developed, well-nourished and in no acute distress.  Head: Normocephalic. Atraumatic.  Neck/thyroid: Neck is supple.   Eyes: Pupils equal round and reactive to light. Conjunctiva normal.  ENMT: No masses or deformity of external nose. External ears without masses.  Respiratory/Chest:  Normal respiratory effort.  Breast: Moderate sized, symmetrical breasts.  No palpable masses of either breast.  No skin changes and no nipple discharge.  Lymphatics: No palpable lymphadenopathy of the cervical, supraclavicular or axillary regions.  Cardiovascular: Regular rate and rhythm.   Abdomen: Soft, nontender, nondistended.   Musculoskeletal: Joints and limbs are grossly normal. Normal gait. Normal range of motion of major joints.  Neuro: Oriented to person, place and time. No obvious neurological deficit. Motor strength grossly normal.  Psych: Normal mood and affect.    RESULTS   Labs  A.  LEFT BREAST CALCIFICATIONS, POSTERIOR, STEREOTACTIC CORE NEEDLE BIOPSY:  --ATYPICAL DUCTAL HYPERPLASIA WITH ASSOCIATED MICROCALCIFICATIONS, SEE NOTE  --BENIGN BREAST DUCTS AND LOBULES WITH ASSOCIATED MICROCALCIFICATIONS     Note: Multiple deeper levels are examined.  Immunohistochemical stains with appropriate controls for " CK5/6 and ER are performed on block A1, A2, A3, A4, and A5.  CK5/6 immunohistochemical stain shows absent mosaic pattern of staining with ER positivity, supporting above diagnosis.     B.  LEFT BREAST CALCIFICATIONS, ANTERIOR, STEREOTACTIC CORE NEEDLE BIOPSY:  --ATYPICAL DUCTAL HYPERPLASIA WITH ASSOCIATED MICROCALCIFICATIONS, SEE NOTE     Note: Multiple deeper levels are examined.  Immunohistochemical stains with appropriate controls for CK5/6 and ER are performed on block B1, B2, B4 and B5.  CK5/6 immunohistochemical stain shows absent mosaic pattern of staining with ER positivity, supporting above diagnosis.     : Dr. Slade (A1, B1).   Electronically signed by Alberta Flowers MD on 6/13/2024 at 1314      [facility]       Radiology Resutls  MR BREAST BILATERAL WITH CONTRAST FULL PROTOCOL;  7/3/2024 6:12 pm      ACCESSION NUMBER(S):  [id number]      ORDERING CLINICIAN:  PAYAM SILVER      INDICATION:  Recently diagnosed left breast atypical ductal hyperplasia x2.      COMPARISON:  No prior MRIs are available for comparison. Correlation is made to  mammograms 05/08/2024 and 06/07/2024.      TECHNIQUE:  Using a dedicated breast coil, STIR axial and T1-weighted fat  saturation axial images of the breasts were obtained, the latter both  before and after intravenous administration of Gadolinium DTPA. On an  independent workstation, 3-D images were formulated using [employer]  including time enhancement curves, subtraction images and MIP images.      Intravenous contrast: 17 mL of Dotarem      FINDINGS:  Density: Heterogeneous fibroglandular tissue.      There is symmetric minimal bilateral background enhancement.      RIGHT BREAST:  No suspicious mass or nonmass enhancement is  identified.      No axillary or internal mammary lymphadenopathy is appreciated.      LEFT BREAST: Signal void from a tissue marker is identified in the  lower outer quadrant at anterior depth on slice 41 from recent core  biopsy  with pathology demonstrating ADH. There is an associated 1.1  cm post biopsy hematoma. Signal void from a tissue marker is also  identified in the deep lower outer quadrant on slice 50 also from  recent core biopsy with pathology demonstrating ADH. Segmental non  mass enhancement spans the 2 tissue markers and measures  approximately 8.8 x 2.5 x 1.5 cm in anteroposterior, transverse and  craniocaudal dimension on slice 50. Anterior extent clears the nipple  by at least 2 cm. Focal non mass enhancement versus a possible mass  is present just superior to this area in the lower outer quadrant at  middle to posterior depth on slice 57 and measures approximately 1.5  cm in maximum dimension. This is best visualized on the sagittal  reconstructions. Review of mammograms 05/08/2024 demonstrates a  possible mass/architectural distortion with calcifications in the  slight inferior/lateral breast at middle to posterior depth on MLO  slice 15 and CC slice 13 which possibly corresponds with the area on  MRI.      No axillary or internal mammary lymphadenopathy is appreciated.      NON-BREAST FINDINGS:  None.      IMPRESSION:  1. 2 site biopsy-proven ADH in the left breast with associated  segmental non mass enhancement as described above. Suspicious focal  non mass enhancement versus a possible mass is noted just superior to  this area with possible correlate seen on mammogram. Recommendation  is for diagnostic additional views, possible MRI directed ultrasound  and possible biopsy. If a target is not visualized for biopsy, an MRI  guided biopsy is recommended. A yellow alert was submitted  communicating findings to the referring physician. A pre-procedure  form has been completed.  2. No MRI evidence of malignancy in the right breast.      BI-RADS CATEGORY:  BI-RADS Category:  4 Suspicious.  Recommendation:  Surgical Consultation and Biopsy.  Recommended Date:  Immediate.  Laterality:  Left.  Method of Detection: Category Sdbt  - 3D Screening    ASSESSMENT / PLAN   Diagnoses and all orders for this visit:  Atypical ductal hyperplasia of left breast  Mass of left breast, unspecified quadrant  Calcification of left breast  At high risk for breast cancer  Dense breast tissue          Plan  June 2024: LEFT: 6cm area of LOQ with branching calcifications (6cm) with CNBx2 showing ADH; MRI showed area of enhancement of the left breast (area previously biopsy proven ADH) measuring 8.8 x 2.5 x 1.5 cm with a 1.5 cm non-mass/mass enhancement area of the lower outer quadrant separate from ADH area (not visible by mammogram or ultrasound); core needle biopsy of 1.5 cm non-mass/mass seen on MRI    1.  Reviewed with the patient that she does have some coarse calcifications of the upper outer quadrant of the left breast, but these are unchanged over the last several mammograms.  Breast MRI did not show any concerns in this area.  No further intervention is required for these.  2.  On screening mammogram she was found to have new calcifications of the lower outer quadrant of the left breast which appear to span over 6 cm area and are in a branching formation.  Therefore, a 2 site stereotactic biopsy of these calcifications was performed.  Both biopsies demonstrate atypical ductal hyperplasia.  Given the large area of potential involvement (6 cm by mammogram) a breast MRI was obtained.  The breast MRI suggest the area of enhancement with the atypical ductal hyperplasia measured 8.8 x 2.5 x 1.5 cm.  There was also another area measuring 1.5 cm of a non-mass enhancement (possibly with an underlying mass) on the MRI which was concerning and required biopsy.  Mammogram and ultrasound of this 1.5 cm area could not identify an underlying abnormality.  Therefore, she is scheduled for an MRI guided biopsy of this 1.5 cm area on 7/30/2024.  She will follow-up with a virtual appointment on 8/6/2024 to review the biopsy results.  3.  Her new calculations of her risk  factors put her well over the 20% lifetime risk for developing breast cancer which puts her in a high risk category.  She was given the high risk booklet.  Should she elect not to undergo any surgical intervention for the atypical ductal hyperplasia, she would at least qualify for more frequent screening with alternating mammograms and breast MRIs so that a radiographic evaluation would be performed every 6 months.  4.  Her 5-year Tracie score is greater than 3%.  Therefore, she would qualify for chemo preventative medications.  5.  She has genetics consultation scheduled for 10/1/2024.  6.  Discussed multiple different treatment options which all depend on the results of the MRI biopsy of the 1.5 cm non-mass enhancement/mass area.  Reviewed that with the atypical ductal hyperplasia, that underlying invasive cancer cannot be completely ruled out.  Would typically recommend at least a lumpectomy surgery for the atypical ductal hyperplasia.  She could also consider postponing the lumpectomy until after her genetics evaluation to see if she is BRCA positive, or not.  Could also consider starting her chemo preventative medications while awaiting genetic testing.  Will discuss next step in her treatment plan pending the results of the MRI biopsy.      Mirtha Dimas MD, FACS  [facility] General Surgery  [address];   [address]; [address]  [place]  [address] [phone]

## 2024-07-23 NOTE — PATIENT INSTRUCTIONS
1.  Keep your appointment for your MRI-guided biopsy of your left breast mass on 7/30/2024.  This biopsy is performed in the radiology department of [facility].  2.  If you have any problems with the biopsy site (bleeding or concern for infection), please contact Dr. Dimas's office.  [phone]  3.  Follow-up with a virtual appointment with Dr. Dimas's office on 8/6/2024 to review your biopsy results.  4.  Plan to keep your appointment for genetics consultation on 10/1/2024.

## 2024-07-29 ENCOUNTER — TELEPHONE (OUTPATIENT)
Dept: RADIOLOGY | Facility: HOSPITAL | Age: 44
End: 2024-07-29
Payer: COMMERCIAL

## 2024-07-30 ENCOUNTER — HOSPITAL ENCOUNTER (OUTPATIENT)
Dept: RADIOLOGY | Facility: CLINIC | Age: 44
Discharge: HOME | End: 2024-07-30
Payer: COMMERCIAL

## 2024-07-30 DIAGNOSIS — N63.20 MASS OF LEFT BREAST, UNSPECIFIED QUADRANT: ICD-10-CM

## 2024-07-30 DIAGNOSIS — R92.8 OTHER ABNORMAL AND INCONCLUSIVE FINDINGS ON DIAGNOSTIC IMAGING OF BREAST: ICD-10-CM

## 2024-07-30 PROCEDURE — 19085 BX BREAST 1ST LESION MR IMAG: CPT

## 2024-07-30 PROCEDURE — 2500000005 HC RX 250 GENERAL PHARMACY W/O HCPCS: Performed by: RADIOLOGY

## 2024-07-30 PROCEDURE — 2720000007 HC OR 272 NO HCPCS

## 2024-07-30 PROCEDURE — 2550000001 HC RX 255 CONTRASTS: Performed by: SURGERY

## 2024-07-30 PROCEDURE — A9575 INJ GADOTERATE MEGLUMI 0.1ML: HCPCS | Performed by: SURGERY

## 2024-07-30 PROCEDURE — 77065 DX MAMMO INCL CAD UNI: CPT

## 2024-07-30 RX ORDER — GADOTERATE MEGLUMINE 376.9 MG/ML
17 INJECTION INTRAVENOUS
Status: COMPLETED | OUTPATIENT
Start: 2024-07-30 | End: 2024-07-30

## 2024-07-30 ASSESSMENT — PAIN SCALES - GENERAL
PAINLEVEL_OUTOF10: 0 - NO PAIN
PAINLEVEL_OUTOF10: 0 - NO PAIN

## 2024-07-30 ASSESSMENT — PAIN - FUNCTIONAL ASSESSMENT: PAIN_FUNCTIONAL_ASSESSMENT: 0-10

## 2024-07-30 NOTE — DISCHARGE INSTRUCTIONS
1300:Procedural steps explained and patient given opportunity to verbalize concerns and seek clarification.  Post procedure self-care and potential for bruising , hematoma, and pain reviewed.  Patient verbalizes understanding.      1300:Patient offered aromatherapy, warm blankets and music.   Guided imagery, touch and relaxation breathing to be used throughout the procedure.        1343:Pressure held x 10 minutes, incision dry, steri strips intact and compression dressing applied. Ice pack applied.     1410:AFTER THE TEST  A steri-strip and bandage will be placed over the incision. You may shower after 24 hours. Remove bandage after 24 hours. Remove bandage after the shower. Leave the steri-strips in place to fall off on their own. If after 1 week the steri-strips are still on, you may remove them. Avoid swimming or soaking in tub for 3 days.     You may have mild discomfort at the test site. If needed, you may take Tylenol (Acetaminophen) for pain. Please avoid taking NSAIDs, Motrin, Advil, Aleve, or ibuprofen for 24 hours following the biopsy. After 24 hours you may resume NSAIDSs.     If you take aspirin, Plavix, Coumadin, Xarelto or Eliquis please tell us. If these medications were stopped by your provider, please ask them when to resume.     You may have some tenderness, bruising or slight bleeding at the site. Please apply ice packs to the site for 15 minutes on and 15 minutes off for a 2 hour minimum.     Most people can return to their usual routine after the procedure. Avoid Strenuous activity for 24 hours.     Sleep in a bra the night after your biopsy. Continue to do so for comfort.     Call your provider if you have any of the following symptoms :  Fever  Increased pain  Increased bleeding  Redness  Increased swelling  Yellowish drainage  Your provider will get the biopsy results within 5 - 7 days. Call your provider with any questions.     Patient education brochure and pain/comfort measures have been  reviewed.   Phone number provided to contact [facility] if problems arise.     Patient verbalized understanding of home going instructions.     1414:  Patient discharged ambulatory

## 2024-08-05 LAB
LABORATORY COMMENT REPORT: NORMAL
PATH REPORT.FINAL DX SPEC: NORMAL
PATH REPORT.GROSS SPEC: NORMAL
PATH REPORT.TOTAL CANCER: NORMAL

## 2024-08-06 ENCOUNTER — APPOINTMENT (OUTPATIENT)
Dept: SURGERY | Facility: CLINIC | Age: 44
End: 2024-08-06
Payer: COMMERCIAL

## 2024-08-06 DIAGNOSIS — N60.92 ATYPICAL DUCTAL HYPERPLASIA OF LEFT BREAST: Primary | ICD-10-CM

## 2024-08-06 DIAGNOSIS — Z91.89 AT HIGH RISK FOR BREAST CANCER: ICD-10-CM

## 2024-08-06 DIAGNOSIS — N63.20 MASS OF LEFT BREAST, UNSPECIFIED QUADRANT: ICD-10-CM

## 2024-08-06 DIAGNOSIS — R92.30 DENSE BREAST TISSUE: ICD-10-CM

## 2024-08-06 DIAGNOSIS — R92.1 CALCIFICATION OF LEFT BREAST: ICD-10-CM

## 2024-08-06 PROCEDURE — 99442 PR PHYS/QHP TELEPHONE EVALUATION 11-20 MIN: CPT | Performed by: SURGERY

## 2024-08-06 PROCEDURE — 3044F HG A1C LEVEL LT 7.0%: CPT | Performed by: SURGERY

## 2024-08-06 PROCEDURE — 3050F LDL-C >= 130 MG/DL: CPT | Performed by: SURGERY

## 2024-08-06 PROCEDURE — 4010F ACE/ARB THERAPY RXD/TAKEN: CPT | Performed by: SURGERY

## 2024-08-06 PROCEDURE — 1036F TOBACCO NON-USER: CPT | Performed by: SURGERY

## 2024-08-06 ASSESSMENT — ENCOUNTER SYMPTOMS
EYE PAIN: 0
CHILLS: 0
DYSURIA: 0
ARTHRALGIAS: 0
COUGH: 0
FREQUENCY: 0
FLANK PAIN: 0
FEVER: 0
DIARRHEA: 0
HEMATURIA: 0
CONFUSION: 0
NAUSEA: 0
HEADACHES: 1
CONSTIPATION: 0
FATIGUE: 0
BRUISES/BLEEDS EASILY: 0
AGITATION: 0
ABDOMINAL PAIN: 0
SHORTNESS OF BREATH: 0
WOUND: 0
POLYPHAGIA: 0
WEAKNESS: 0
VOMITING: 0
MYALGIAS: 0
SPEECH DIFFICULTY: 0
EYE REDNESS: 0

## 2024-08-06 NOTE — PROGRESS NOTES
GENERAL SURGERY OFFICE NOTE    Patient: Etelvina Banks    Age: 44 y.o.   Gender: female    MRN: [id number]    PCP: Judith Baeza, DO        SUBJECTIVE     Chief Complaint  Follow-up (Patient id following up after left breast MRI guided biopsy. Patient states that she has minimal bruising. )       HPI  The patient was interviewed via a phone. We spent approximately 19 minutes in the phone communication. The patient gave consent for this type of visit.  I performed this visit using real-time telehealth tools, including a telephone connection between Etelvina at work and myself / Dr. Dimas at the [facility] office.    Etelvina returns to the office via phone visit for follow-up after undergoing an MRI guided biopsy of the left breast mass.  This breast biopsy was the third biopsy of the left breast.  Previous 2 biopsies of the areas of calcification showed atypical ductal hyperplasia.  Since the MRI guided biopsy, she has not had any significant issues.  She did have some bruising, but felt that it was less than what she had previously.  She has no active bleeding and is not concerned for any infection.  She is here to review biopsy results.    Risk factors for breast cancer: 44-year-old white female; menarche at age 9; first live birth at age 21; 1 breast biopsy with atypical ductal hyperplasia; mother diagnosed with breast cancer in her mid 60s.  She is premenopausal.  She also had a maternal uncle and a maternal aunt who both had cancer, but she is not sure which type.  This gives her a 5-year-old Tracie score of 5.1 and a lifetime risk of 38.5% which puts her in a high risk category.  Using the Tyrer-Cuzick Breast cancer risk evaluation tool, this patient's 10-year risk of breast cancer is 11.2% ( average risk is 2.3%) and lifetime risk is 40.5% (average lifetime risk is 10.1%). This puts her in a high risk category for developing breast cancer.    ROS  Review of Systems   Constitutional:  Negative for chills,  fatigue and fever.   HENT:  Negative for congestion, ear pain and hearing loss.    Eyes:  Negative for pain and redness.   Respiratory:  Negative for cough and shortness of breath.    Cardiovascular:  Negative for chest pain and leg swelling.   Gastrointestinal:  Negative for abdominal pain, constipation, diarrhea, nausea and vomiting.   Endocrine: Negative for polyphagia.   Genitourinary:  Negative for dysuria, flank pain, frequency and hematuria.   Musculoskeletal:  Negative for arthralgias and myalgias.   Skin:  Negative for rash and wound.   Allergic/Immunologic: Negative for immunocompromised state.   Neurological:  Positive for headaches. Negative for speech difficulty and weakness.   Hematological:  Does not bruise/bleed easily.   Psychiatric/Behavioral:  Negative for agitation and confusion.           HISTORY     Past Medical History:   Diagnosis Date    Body mass index (BMI)30.0-30.9, adult 06/04/2021    Body mass index (BMI) of 30.0 to 30.9 in adult    Depression, major, single episode, moderate (Multi) 02/28/2023    Diabetes mellitus (Multi) 4-01-24    Headache, unspecified     Bad headache        Past Surgical History:   Procedure Laterality Date    BREAST BIOPSY Left 06/07/2024    OTHER SURGICAL HISTORY  03/11/2019    Ovarian cystectomy    OTHER SURGICAL HISTORY  10/14/2022    Tubal ligation    OTHER SURGICAL HISTORY  03/22/2021    Foot surgery        Family History   Problem Relation Name Age of Onset    Breast cancer Mother Wanda     Arthritis Mother Wanda     Hypertension Mother Wanda     Asthma Brother Zenon     Diabetes Other      Cancer Other aunt         No Known Allergies     Social History     Tobacco Use   Smoking Status Former    Current packs/day: 0.00    Types: Cigarettes    Passive exposure: Past   Smokeless Tobacco Never        Social History     Substance and Sexual Activity   Alcohol Use Never        HOME MEDICATIONS  Current Outpatient Medications   Medication Instructions     buprenorphine-naloxone (Suboxone) 8-2 mg SL film sublingual, Take 1 film SL twice kurt    cream base no.31, bulk, (Transdermal Pain Base) cream Transdermal Pain Base External Cream Refills: 0 Active    hydroCHLOROthiazide (MICROZIDE) 12.5 mg, oral, Daily PRN    ibuprofen 800 mg, oral, 3 times daily PRN    losartan (COZAAR) 50 mg, oral, Daily    metFORMIN XR (Glucophage-XR) 500 mg 24 hr tablet Take 2 tablet PO daily    naloxone (Narcan) 4 mg/0.1 mL nasal spray nasal, Spray 4 mg intranasally once if needed for overdose or respiratory depression          OBJECTIVE   Last Recorded Vitals.  Last menstrual period 07/26/2024.     PHYSICAL EXAM  Physical Exam   Previous exam:  General: Well-developed, well-nourished and in no acute distress.  Head: Normocephalic. Atraumatic.  Neck/thyroid: Neck is supple.   Eyes: Pupils equal round and reactive to light. Conjunctiva normal.  ENMT: No masses or deformity of external nose. External ears without masses.  Respiratory/Chest:  Normal respiratory effort.  Breast: Moderate sized, symmetrical breasts.  No palpable masses of either breast.  No skin changes and no nipple discharge.  Lymphatics: No palpable lymphadenopathy of the cervical, supraclavicular or axillary regions.  Cardiovascular: Regular rate and rhythm.   Abdomen: Soft, nontender, nondistended.   Musculoskeletal: Joints and limbs are grossly normal. Normal gait. Normal range of motion of major joints.  Neuro: Oriented to person, place and time. No obvious neurological deficit. Motor strength grossly normal.  Psych: Normal mood and affect.    RESULTS   Labs  A.  LEFT BREAST CALCIFICATIONS, POSTERIOR, STEREOTACTIC CORE NEEDLE BIOPSY:  --ATYPICAL DUCTAL HYPERPLASIA WITH ASSOCIATED MICROCALCIFICATIONS, SEE NOTE  --BENIGN BREAST DUCTS AND LOBULES WITH ASSOCIATED MICROCALCIFICATIONS     Note: Multiple deeper levels are examined.  Immunohistochemical stains with appropriate controls for CK5/6 and ER are performed on block A1,  A2, A3, A4, and A5.  CK5/6 immunohistochemical stain shows absent mosaic pattern of staining with ER positivity, supporting above diagnosis.     B.  LEFT BREAST CALCIFICATIONS, ANTERIOR, STEREOTACTIC CORE NEEDLE BIOPSY:  --ATYPICAL DUCTAL HYPERPLASIA WITH ASSOCIATED MICROCALCIFICATIONS, SEE NOTE     Note: Multiple deeper levels are examined.  Immunohistochemical stains with appropriate controls for CK5/6 and ER are performed on block B1, B2, B4 and B5.  CK5/6 immunohistochemical stain shows absent mosaic pattern of staining with ER positivity, supporting above diagnosis.     : Dr. Slade (A1, B1).   Electronically signed by Alebrta Flowers MD on 6/13/2024 at 1314      [facility]     FINAL DIAGNOSIS   A. BREAST, LEFT, LOWER OUTER QUADRANT, CORE BIOPSY:      -- Atypical ductal hyperplasia with microcalcifications.  -- Flat epithelial atypia with microcalcifications.  -- Microscopic intraductal papilloma.  -- Usual ductal hyperplasia and columnar cell change with microcalcifications.   Electronically signed by Indu Lopez MD PhD on 8/5/2024 at 1235       [facility]iology [facility] [[email or web address]  MR BREAST BILATERAL WITH CONTRAST FULL PROTOCOL;  7/3/2024 6:12 pm      ACCESSION NUMBER(S):  [id number]      ORDERING CLINICIAN:  PAYAM SILVER      INDICATION:  Recently diagnosed left breast atypical ductal hyperplasia x2.      COMPARISON:  No prior MRIs are available for comparison. Correlation is made to  mammograms 05/08/2024 and 06/07/2024.      TECHNIQUE:  Using a dedicated breast coil, STIR axial and T1-weighted fat  saturation axial images of the breasts were obtained, the latter both  before and after intravenous administration of Gadolinium DTPA. On an  independent workstation, 3-D images were formulated using [employer]  including time enhancement curves, subtraction images and MIP images.      Intravenous contrast: 17 mL of Dotarem      FINDINGS:  Density: Heterogeneous fibroglandular tissue.      There is symmetric minimal  bilateral background enhancement.      RIGHT BREAST:  No suspicious mass or nonmass enhancement is  identified.      No axillary or internal mammary lymphadenopathy is appreciated.      LEFT BREAST: Signal void from a tissue marker is identified in the  lower outer quadrant at anterior depth on slice 41 from recent core  biopsy with pathology demonstrating ADH. There is an associated 1.1  cm post biopsy hematoma. Signal void from a tissue marker is also  identified in the deep lower outer quadrant on slice 50 also from  recent core biopsy with pathology demonstrating ADH. Segmental non  mass enhancement spans the 2 tissue markers and measures  approximately 8.8 x 2.5 x 1.5 cm in anteroposterior, transverse and  craniocaudal dimension on slice 50. Anterior extent clears the nipple  by at least 2 cm. Focal non mass enhancement versus a possible mass  is present just superior to this area in the lower outer quadrant at  middle to posterior depth on slice 57 and measures approximately 1.5  cm in maximum dimension. This is best visualized on the sagittal  reconstructions. Review of mammograms 05/08/2024 demonstrates a  possible mass/architectural distortion with calcifications in the  slight inferior/lateral breast at middle to posterior depth on MLO  slice 15 and CC slice 13 which possibly corresponds with the area on  MRI.      No axillary or internal mammary lymphadenopathy is appreciated.      NON-BREAST FINDINGS:  None.      IMPRESSION:  1. 2 site biopsy-proven ADH in the left breast with associated  segmental non mass enhancement as described above. Suspicious focal  non mass enhancement versus a possible mass is noted just superior to  this area with possible correlate seen on mammogram. Recommendation  is for diagnostic additional views, possible MRI directed ultrasound  and possible biopsy. If a target is not visualized for biopsy, an MRI  guided biopsy is recommended. A yellow alert was submitted  communicating  findings to the referring physician. A pre-procedure  form has been completed.  2. No MRI evidence of malignancy in the right breast.      BI-RADS CATEGORY:  BI-RADS Category:  4 Suspicious.  Recommendation:  Surgical Consultation and Biopsy.  Recommended Date:  Immediate.  Laterality:  Left.  Method of Detection: Category Sdbt - 3D Screening    ASSESSMENT / PLAN   Diagnoses and all orders for this visit:  Atypical ductal hyperplasia of left breast  Calcification of left breast  Mass of left breast, unspecified quadrant  At high risk for breast cancer  Dense breast tissue      Plan  June 2024: LEFT: 6cm area of LOQ with branching calcifications (6cm) with CNBx2 showing ADH; MRI showed area of enhancement of the left breast (area previously biopsy proven ADH) measuring 8.8 x 2.5 x 1.5 cm with a 1.5 cm non-mass/mass enhancement area of the lower outer quadrant separate from ADH area (not visible by mammogram or ultrasound); core needle biopsy of 1.5 cm non-mass/mass seen on MRI also demonstrated ADH    1.  Reviewed with the patient that she does have some coarse calcifications of the upper outer quadrant of the left breast, but these are unchanged over the last several mammograms.  Breast MRI did not show any concerns in this area.  No further intervention is required for these.  2.  On screening mammogram she was found to have new calcifications of the lower outer quadrant of the left breast which appear to span over 6 cm area and are in a branching formation.  Therefore, a 2 site stereotactic biopsy of these calcifications was performed.  Both biopsies demonstrate atypical ductal hyperplasia.  Given the large area of potential involvement (6 cm by mammogram) a breast MRI was obtained.  The breast MRI suggest the area of enhancement with the atypical ductal hyperplasia measured 8.8 x 2.5 x 1.5 cm.  There was also another area measuring 1.5 cm of a non-mass enhancement (possibly with an underlying mass) on the MRI  which was concerning and required biopsy.  Mammogram and ultrasound of this 1.5 cm area could not identify an underlying abnormality.  Therefore, she is scheduled for an MRI guided biopsy of this 1.5 cm area on 7/30/2024.  Pathology shows atypical ductal hyperplasia.  3.  Her new calculations of her risk factors put her well over the 20% lifetime risk for developing breast cancer which puts her in a high risk category.  She was given the high risk booklet.  Should she elect not to undergo any surgical intervention for the atypical ductal hyperplasia, she would at least qualify for more frequent screening with alternating mammograms and breast MRIs so that a radiographic evaluation would be performed every 6 months.  4.  Her 5-year Tracie score is greater than 3%.  Therefore, she would qualify for chemo preventative medications.  5.  She has genetics consultation scheduled for 10/1/2024.  6.  Reviewed her case with mother breast colleague.  She has extensive atypical ductal hyperplasia in the left breast.  However, definitive cancer has not been diagnosed.  We did review that there could be underlying cancer associated with atypical ductal hyperplasia.  Will plan for surgical lumpectomy of all 3 of the previous biopsied sites with anticipation that all of the calcifications will not be removed.  Would not recommend a cancer procedure unless definitive cancer identified on final pathology.  If she does have underlying cancer, would recommend mastectomy at that time.  Surgical intervention for a left breast Magseed lumpectomy x 3 was reviewed with the patient.  Risk included, but not limited to, infection, bleeding, injury to surrounding tissue, possible need for further surgery, nonresolution of all symptoms, allergic reaction to medication and even death.  Will schedule surgery for 9/12/2024.      Mirtha Dimas MD, FACS  [facility] General Surgery  [address] [place] [address];   [employer]  [facility]  [place]  [address]  [phone]

## 2024-08-06 NOTE — PATIENT INSTRUCTIONS
1.  Plan to keep your appointment for genetics consultation on 10/1/2024.  2.  You have had 3 biopsies of your left breast.  All 3 of these biopsies show atypical ductal hyperplasia (ADH).  Although ADH is not cancer, there can be underlying cancer associated with ADH.  A surgical lumpectomy x 3 of the left breast is recommended.  The lumpectomy will be scheduled for 9/12/2024.  3.  Prior to your lumpectomy surgery, you will get a phone call from radiology to schedule an appointment for a Magseed x3 placement.  The Magseed is a location device used at the time of surgery to identify the areas that require biopsy.

## 2024-08-07 ENCOUNTER — APPOINTMENT (OUTPATIENT)
Dept: PRIMARY CARE | Facility: CLINIC | Age: 44
End: 2024-08-07
Payer: COMMERCIAL

## 2024-08-09 ENCOUNTER — TELEPHONE (OUTPATIENT)
Dept: RADIOLOGY | Facility: HOSPITAL | Age: 44
End: 2024-08-09
Payer: COMMERCIAL

## 2024-08-09 DIAGNOSIS — N60.92 ATYPICAL DUCTAL HYPERPLASIA OF LEFT BREAST: Primary | ICD-10-CM

## 2024-08-09 NOTE — TELEPHONE ENCOUNTER
This RN Navigator attempted to contact patient for preoperative outreach to assist with magseed follow up scheduling and to identify barriers/needs, and offer support.  Left message requesting patient call back.

## 2024-08-16 NOTE — TELEPHONE ENCOUNTER
Patient returned call to this RN. Reintroduced myself to patient and role. Patient states understanding need for magseed placement prior to surgery, and accepts 9/4 2:00PM for visit per her scheduling preference for afternoon. Discussed what to expect before, during, and the procedure. Patient questions answered as needed. Patient verbalized understanding and denied further needs before concluding our call.

## 2024-08-27 ENCOUNTER — TELEPHONE (OUTPATIENT)
Dept: PRIMARY CARE | Facility: CLINIC | Age: 44
End: 2024-08-27
Payer: COMMERCIAL

## 2024-08-27 NOTE — TELEPHONE ENCOUNTER
Patient had a 3 month follow up with Dr. Baeza 8/7/2024 that was cancelled, does the patient want to reschedule?

## 2024-09-03 ENCOUNTER — PRE-ADMISSION TESTING (OUTPATIENT)
Dept: PREADMISSION TESTING | Facility: HOSPITAL | Age: 44
End: 2024-09-03
Payer: COMMERCIAL

## 2024-09-03 ENCOUNTER — HOSPITAL ENCOUNTER (OUTPATIENT)
Dept: CARDIOLOGY | Facility: HOSPITAL | Age: 44
Discharge: HOME | End: 2024-09-03
Payer: COMMERCIAL

## 2024-09-03 VITALS
RESPIRATION RATE: 16 BRPM | HEART RATE: 83 BPM | OXYGEN SATURATION: 98 % | BODY MASS INDEX: 33.49 KG/M2 | DIASTOLIC BLOOD PRESSURE: 82 MMHG | HEIGHT: 62 IN | SYSTOLIC BLOOD PRESSURE: 112 MMHG | TEMPERATURE: 98.2 F | WEIGHT: 182 LBS

## 2024-09-03 DIAGNOSIS — I10 PRIMARY HYPERTENSION: Chronic | ICD-10-CM

## 2024-09-03 DIAGNOSIS — E11.9 TYPE 2 DIABETES MELLITUS WITHOUT COMPLICATION, WITHOUT LONG-TERM CURRENT USE OF INSULIN (MULTI): Chronic | ICD-10-CM

## 2024-09-03 DIAGNOSIS — Z01.818 PRE-OP EVALUATION: ICD-10-CM

## 2024-09-03 DIAGNOSIS — N60.92 ATYPICAL DUCTAL HYPERPLASIA OF LEFT BREAST: ICD-10-CM

## 2024-09-03 DIAGNOSIS — Z01.818 PRE-OP EVALUATION: Primary | ICD-10-CM

## 2024-09-03 LAB
ANION GAP SERPL CALC-SCNC: 10 MMOL/L (ref 10–20)
ATRIAL RATE: 76 BPM
BUN SERPL-MCNC: 11 MG/DL (ref 6–23)
CALCIUM SERPL-MCNC: 8.9 MG/DL (ref 8.6–10.3)
CHLORIDE SERPL-SCNC: 104 MMOL/L (ref 98–107)
CO2 SERPL-SCNC: 27 MMOL/L (ref 21–32)
CREAT SERPL-MCNC: 0.72 MG/DL (ref 0.5–1.05)
EGFRCR SERPLBLD CKD-EPI 2021: >90 ML/MIN/1.73M*2
ERYTHROCYTE [DISTWIDTH] IN BLOOD BY AUTOMATED COUNT: 12.6 % (ref 11.5–14.5)
GLUCOSE SERPL-MCNC: 106 MG/DL (ref 74–99)
HCT VFR BLD AUTO: 39.1 % (ref 36–46)
HGB BLD-MCNC: 13.2 G/DL (ref 12–16)
MCH RBC QN AUTO: 29.3 PG (ref 26–34)
MCHC RBC AUTO-ENTMCNC: 33.8 G/DL (ref 32–36)
MCV RBC AUTO: 87 FL (ref 80–100)
NRBC BLD-RTO: 0 /100 WBCS (ref 0–0)
P AXIS: 22 DEGREES
PLATELET # BLD AUTO: 304 X10*3/UL (ref 150–450)
POTASSIUM SERPL-SCNC: 3.9 MMOL/L (ref 3.5–5.3)
PR INTERVAL: 133 MS
Q ONSET: 249 MS
QRS COUNT: 12 BEATS
QRS DURATION: 98 MS
QT INTERVAL: 392 MS
QTC CALCULATION(BAZETT): 444 MS
QTC FREDERICIA: 425 MS
R AXIS: -1 DEGREES
RBC # BLD AUTO: 4.51 X10*6/UL (ref 4–5.2)
SODIUM SERPL-SCNC: 137 MMOL/L (ref 136–145)
T AXIS: 9 DEGREES
T OFFSET: 445 MS
VENTRICULAR RATE: 77 BPM
WBC # BLD AUTO: 5.8 X10*3/UL (ref 4.4–11.3)

## 2024-09-03 PROCEDURE — 93010 ELECTROCARDIOGRAM REPORT: CPT | Performed by: INTERNAL MEDICINE

## 2024-09-03 PROCEDURE — 93005 ELECTROCARDIOGRAM TRACING: CPT

## 2024-09-03 PROCEDURE — 85027 COMPLETE CBC AUTOMATED: CPT

## 2024-09-03 PROCEDURE — 36415 COLL VENOUS BLD VENIPUNCTURE: CPT

## 2024-09-03 PROCEDURE — 80048 BASIC METABOLIC PNL TOTAL CA: CPT

## 2024-09-03 ASSESSMENT — ENCOUNTER SYMPTOMS
ALLERGIC/IMMUNOLOGIC NEGATIVE: 1
PSYCHIATRIC NEGATIVE: 1
GASTROINTESTINAL NEGATIVE: 1
EYES NEGATIVE: 1
MUSCULOSKELETAL NEGATIVE: 1
ENDOCRINE NEGATIVE: 1
CONSTITUTIONAL NEGATIVE: 1
NEUROLOGICAL NEGATIVE: 1
RESPIRATORY NEGATIVE: 1
HEMATOLOGIC/LYMPHATIC NEGATIVE: 1
CARDIOVASCULAR NEGATIVE: 1

## 2024-09-03 ASSESSMENT — LIFESTYLE VARIABLES: SMOKING_STATUS: NONSMOKER

## 2024-09-03 NOTE — PREPROCEDURE INSTRUCTIONS
Medication List            Accurate as of September 3, 2024 11:27 AM. Always use your most recent med list.                buprenorphine-naloxone 8-2 mg SL film  Commonly known as: Suboxone     hydroCHLOROthiazide 12.5 mg tablet  Commonly known as: Microzide  Take 1 tablet (12.5 mg) by mouth once daily as needed (pitting edema).     ibuprofen 800 mg tablet  Take 1 tablet (800 mg) by mouth 3 times a day as needed for mild pain (1 - 3) (pain).     losartan 50 mg tablet  Commonly known as: Cozaar  Take 1 tablet (50 mg) by mouth once daily.     metFORMIN  mg 24 hr tablet  Commonly known as: Glucophage-XR  Take 2 tablet PO daily     naloxone 4 mg/0.1 mL nasal spray  Commonly known as: Narcan     Transdermal Pain Base cream  Generic drug: cream base no.31 (bulk)                              NPO Instructions:  Nothing to eat or drink after midnight  Hydrate well the day before  Continue suboxone up to surgery but clarify with prescribing doctor for day of surgery  Do a bedtime snack night before protein and a carb      Additional Instructions:     Wear  comfortable loose fitting clothing  Do not use moisturizers, creams, lotions or perfume  All jewelry and valuables should be left at home  Shower morning of surgery no deodorant under the left  Call with any questions [phone]

## 2024-09-03 NOTE — H&P
History Of Present Illness  Etelvina Banks is a 44 y.o. female presenting with atypical ductal hyperplasia of left breast. Scheduled for left breast magseed lumpectomy x3 under general anesthesia per Dr. Kim on 9/12/24.   Patient has a family history of breast cancer.      Past Medical History  Past Medical History:   Diagnosis Date    Arthritis     Body mass index (BMI)30.0-30.9, adult 06/04/2021    Body mass index (BMI) of 30.0 to 30.9 in adult    Depression, major, single episode, moderate (Multi) 02/28/2023    Diabetes mellitus (Multi) 4-01-24    Headache, unspecified     Bad headache    Hypertension     Type 2 diabetes mellitus (Multi)     Vision loss     Wears dentures     upper only       Surgical History  Past Surgical History:   Procedure Laterality Date    BREAST BIOPSY Left 06/07/2024    OTHER SURGICAL HISTORY  03/11/2019    Ovarian cystectomy    OTHER SURGICAL HISTORY  10/14/2022    Tubal ligation    OTHER SURGICAL HISTORY Right 03/22/2021    arthritis metal plate and screw        Social History  She reports that she has quit smoking. Her smoking use included cigarettes. She has been exposed to tobacco smoke. She has never used smokeless tobacco. She reports that she does not drink alcohol and does not use drugs.    Family History  Family History   Problem Relation Name Age of Onset    Breast cancer Mother Wanda     Arthritis Mother Wanda     Hypertension Mother Wanda     Asthma Brother Zenon     Diabetes Other      Cancer Other aunt         Allergies  Patient has no known allergies.    Review of Systems   Constitutional: Negative.    HENT: Negative.     Eyes: Negative.    Respiratory: Negative.     Cardiovascular: Negative.    Gastrointestinal: Negative.    Endocrine: Negative.    Genitourinary: Negative.    Musculoskeletal: Negative.    Skin: Negative.         Breast mass.   Allergic/Immunologic: Negative.    Neurological: Negative.    Hematological: Negative.    Psychiatric/Behavioral: Negative.    "  All other systems reviewed and are negative.       Physical Exam  Vitals and nursing note reviewed.   Constitutional:       Appearance: Normal appearance.   HENT:      Head: Normocephalic.      Nose: Nose normal.      Mouth/Throat:      Comments:   Mallampati: 2  TMD: >3  Finger breadth: 3  Dentition:  full upper denture   Neck ROM: full   Eyes:      Pupils: Pupils are equal, round, and reactive to light.   Cardiovascular:      Rate and Rhythm: Normal rate and regular rhythm.      Heart sounds: Normal heart sounds, S1 normal and S2 normal.   Pulmonary:      Effort: Pulmonary effort is normal.      Breath sounds: Normal breath sounds.      Comments: Lungs clear throughout all fields.   Abdominal:      General: Bowel sounds are normal.      Palpations: Abdomen is soft.      Comments: Bowel sounds active x4 quads    Musculoskeletal:         General: Normal range of motion.      Cervical back: Normal range of motion.      Right lower leg: No edema.      Left lower leg: No edema.   Skin:     General: Skin is warm and dry.   Neurological:      General: No focal deficit present.      Mental Status: She is alert and oriented to person, place, and time.   Psychiatric:         Mood and Affect: Mood normal.         Behavior: Behavior normal.         Thought Content: Thought content normal.         Judgment: Judgment normal.          Last Recorded Vitals  Blood pressure 112/82, pulse 83, temperature 36.8 °C (98.2 °F), temperature source Oral, resp. rate 16, height 1.575 m (5' 2\"), weight 82.6 kg (182 lb), SpO2 98%.    Visit Vitals  /82   Pulse 83   Temp 36.8 °C (98.2 °F) (Oral)   Resp 16       DASI Risk Score    No data to display       Caprini DVT Assessment      Flowsheet Row Most Recent Value   DVT Score 7   History Prior major surgery, Previous malignancy   Age 40-59 years   BMI 31-40 (Obesity)          Modified Frailty Index    No data to display       CHADS2 Stroke Risk  Current as of 5 hours ago        N/A 3 to " 100%: High Risk   2 to < 3%: Medium Risk   0 to < 2%: Low Risk     Last Change: N/A          This score determines the patient's risk of having a stroke if the patient has atrial fibrillation.        This score is not applicable to this patient. Components are not calculated.          Revised Cardiac Risk Index      Flowsheet Row Most Recent Value   Revised Cardiac Risk Calculator 0          Apfel Simplified Score      Flowsheet Row Most Recent Value   Apfel Simplified Score Calculator 3          Risk Analysis Index Results This Encounter    No data found in the last 1 encounters.       Stop Bang Score      Flowsheet Row Most Recent Value   Do you snore loudly? 0   Do you often feel tired or fatigued after your sleep? 0   Has anyone ever observed you stop breathing in your sleep? 0   Do you have or are you being treated for high blood pressure? 1   Recent BMI (Calculated) 33.8   Is BMI greater than 35 kg/m2? 0=No   Age older than 50 years old? 0=No   Is your neck circumference greater than 17 inches (Male) or 16 inches (Female)? 0   Gender - Male 0=No   STOP-BANG Total Score 1          Current Outpatient Medications on File Prior to Visit   Medication Sig Dispense Refill    buprenorphine-naloxone (Suboxone) 8-2 mg SL film Place under the tongue. Take 1 film SL twice kurt      cream base no.31, bulk, (Transdermal Pain Base) cream once daily as needed.      hydroCHLOROthiazide (Microzide) 12.5 mg tablet Take 1 tablet (12.5 mg) by mouth once daily as needed (pitting edema). (Patient taking differently: Take 1 tablet (12.5 mg) by mouth once daily as needed (pitting edema). Takes every other day) 30 tablet 2    ibuprofen 800 mg tablet Take 1 tablet (800 mg) by mouth 3 times a day as needed for mild pain (1 - 3) (pain). 180 tablet 3    losartan (Cozaar) 50 mg tablet Take 1 tablet (50 mg) by mouth once daily. (Patient taking differently: Take 1 tablet (50 mg) by mouth once daily at bedtime.) 90 tablet 3    metFORMIN XR  (Glucophage-XR) 500 mg 24 hr tablet Take 2 tablet PO daily 180 tablet 1    naloxone (Narcan) 4 mg/0.1 mL nasal spray Administer into affected nostril(s). Spray 4 mg intranasally once if needed for overdose or respiratory depression       No current facility-administered medications on file prior to visit.        Relevant Results  Results for orders placed or performed in visit on 09/03/24 (from the past 24 hour(s))   Basic Metabolic Panel   Result Value Ref Range    Glucose 106 (H) 74 - 99 mg/dL    Sodium 137 136 - 145 mmol/L    Potassium 3.9 3.5 - 5.3 mmol/L    Chloride 104 98 - 107 mmol/L    Bicarbonate 27 21 - 32 mmol/L    Anion Gap 10 10 - 20 mmol/L    Urea Nitrogen 11 6 - 23 mg/dL    Creatinine 0.72 0.50 - 1.05 mg/dL    eGFR >90 >60 mL/min/1.73m*2    Calcium 8.9 8.6 - 10.3 mg/dL   CBC   Result Value Ref Range    WBC 5.8 4.4 - 11.3 x10*3/uL    nRBC 0.0 0.0 - 0.0 /100 WBCs    RBC 4.51 4.00 - 5.20 x10*6/uL    Hemoglobin 13.2 12.0 - 16.0 g/dL    Hematocrit 39.1 36.0 - 46.0 %    MCV 87 80 - 100 fL    MCH 29.3 26.0 - 34.0 pg    MCHC 33.8 32.0 - 36.0 g/dL    RDW 12.6 11.5 - 14.5 %    Platelets 304 150 - 450 x10*3/uL                 Assessment/Plan   Problem List Items Addressed This Visit       Primary hypertension (Chronic)    Relevant Orders    Basic Metabolic Panel (Completed)    CBC (Completed)    ECG 12 Lead    Type 2 diabetes mellitus without complication, without long-term current use of insulin (Multi) (Chronic)    Relevant Orders    Basic Metabolic Panel (Completed)    CBC (Completed)    ECG 12 Lead    Atypical ductal hyperplasia of left breast    Relevant Orders    Basic Metabolic Panel (Completed)    CBC (Completed)    ECG 12 Lead     Other Visit Diagnoses       Pre-op evaluation    -  Primary    Relevant Orders    Basic Metabolic Panel (Completed)    CBC (Completed)    ECG 12 Lead            Atypical ductal hyperplasia of left breast. Scheduled for left breast magseed lumpectomy x3 under general anesthesia  per Dr. Kim on 9/12/24.   CBC, BMP ordered and reviewed. WNL.  EKG today is SR.  H&P completed today.   Patient will check with her Suboxone clinic about a medication hold order to see if they would like her to stop it.    All surgery instructions reviewed with patient by RN. Verbalized understanding.       I spent 30 minutes in the professional and overall care of this patient.      Ada Christiansen, KATIANA-CNS

## 2024-09-04 ENCOUNTER — HOSPITAL ENCOUNTER (OUTPATIENT)
Dept: RADIOLOGY | Facility: HOSPITAL | Age: 44
Discharge: HOME | End: 2024-09-04
Payer: COMMERCIAL

## 2024-09-04 DIAGNOSIS — N60.92 ATYPICAL DUCTAL HYPERPLASIA OF LEFT BREAST: ICD-10-CM

## 2024-09-04 PROCEDURE — 2780000003 HC OR 278 NO HCPCS

## 2024-09-04 PROCEDURE — A4648 IMPLANTABLE TISSUE MARKER: HCPCS

## 2024-09-04 PROCEDURE — 19281 PERQ DEVICE BREAST 1ST IMAG: CPT | Mod: LT

## 2024-09-10 ENCOUNTER — ANESTHESIA EVENT (OUTPATIENT)
Dept: OPERATING ROOM | Facility: HOSPITAL | Age: 44
End: 2024-09-10
Payer: COMMERCIAL

## 2024-09-11 ENCOUNTER — PREP FOR PROCEDURE (OUTPATIENT)
Dept: SURGERY | Facility: HOSPITAL | Age: 44
End: 2024-09-11
Payer: COMMERCIAL

## 2024-09-11 NOTE — H&P
GENERAL SURGERY PREOP H&P     Patient: Etelvina Banks    Age: 44 y.o.   Gender: female    MRN: [id number]    PCP: Judith Baeza,           SUBJECTIVE        HPI  Etelvina presents for a left breast lumpectomy x 3 for atypical ductal hyperplasia.  She was found on a screening mammogram to have extensive calcifications of the left breast.  She ultimately underwent multiple core needle biopsies and an MRI.  This breast biopsy was the third biopsy of the left breast.  Previous 2 biopsies of the areas of calcification showed atypical ductal hyperplasia.  Since the MRI guided biopsy, she has not had any significant issues.  She did have some bruising, but felt that it was less than what she had previously.  She has no active bleeding and is not concerned for any infection.  All 3 areas showed atypical ductal hyperplasia.  Definitive cancer was not identified.  However, with atypical ductal hyperplasia and high risk factors for breast cancer, it was felt that she needed further tissue diagnosis.  Therefore a 3 point Magseed lumpectomy surgery was recommended.  She has already undergone placement of Magseed x 3.     Risk factors for breast cancer: 44-year-old white female; menarche at age 9; first live birth at age 21; 1 breast biopsy with atypical ductal hyperplasia; mother diagnosed with breast cancer in her mid 60s.  She is premenopausal.  She also had a maternal uncle and a maternal aunt who both had cancer, but she is not sure which type.  This gives her a 5-year-old Tracie score of 5.1 and a lifetime risk of 38.5% which puts her in a high risk category.  Using the Tyrer-Cuzick Breast cancer risk evaluation tool, this patient's 10-year risk of breast cancer is 11.2% ( average risk is 2.3%) and lifetime risk is 40.5% (average lifetime risk is 10.1%). This puts her in a high risk category for developing breast cancer.     ROS  Review of Systems   Constitutional:  Negative for chills, fatigue and fever.   HENT:  Negative  for congestion, ear pain and hearing loss.    Eyes:  Negative for pain and redness.   Respiratory:  Negative for cough and shortness of breath.    Cardiovascular:  Negative for chest pain and leg swelling.   Gastrointestinal:  Negative for abdominal pain, constipation, diarrhea, nausea and vomiting.   Endocrine: Negative for polyphagia.   Genitourinary:  Negative for dysuria, flank pain, frequency and hematuria.   Musculoskeletal:  Negative for arthralgias and myalgias.   Skin:  Negative for rash and wound.   Allergic/Immunologic: Negative for immunocompromised state.   Neurological:  Positive for headaches. Negative for speech difficulty and weakness.   Hematological:  Does not bruise/bleed easily.   Psychiatric/Behavioral:  Negative for agitation and confusion.             HISTORY      Medical History        Past Medical History:   Diagnosis Date    Body mass index (BMI)30.0-30.9, adult 06/04/2021     Body mass index (BMI) of 30.0 to 30.9 in adult    Depression, major, single episode, moderate (Multi) 02/28/2023    Diabetes mellitus (Multi) 4-01-24    Headache, unspecified       Bad headache            Surgical History         Past Surgical History:   Procedure Laterality Date    BREAST BIOPSY Left 06/07/2024    OTHER SURGICAL HISTORY   03/11/2019     Ovarian cystectomy    OTHER SURGICAL HISTORY   10/14/2022     Tubal ligation    OTHER SURGICAL HISTORY   03/22/2021     Foot surgery            Family History          Family History   Problem Relation Name Age of Onset    Breast cancer Mother Wanda      Arthritis Mother Wanda      Hypertension Mother Wanda      Asthma Brother Zenon      Diabetes Other        Cancer Other aunt              Allergies   No Known Allergies         Tobacco Use History   Social History           Tobacco Use   Smoking Status Former    Current packs/day: 0.00    Types: Cigarettes    Passive exposure: Past   Smokeless Tobacco Never            Social History          Substance and Sexual  Activity   Alcohol Use Never         HOME MEDICATIONS       Current Outpatient Medications   Medication Instructions    buprenorphine-naloxone (Suboxone) 8-2 mg SL film sublingual, Take 1 film SL twice kurt    cream base no.31, bulk, (Transdermal Pain Base) cream Transdermal Pain Base External Cream Refills: 0 Active    hydroCHLOROthiazide (MICROZIDE) 12.5 mg, oral, Daily PRN    ibuprofen 800 mg, oral, 3 times daily PRN    losartan (COZAAR) 50 mg, oral, Daily    metFORMIN XR (Glucophage-XR) 500 mg 24 hr tablet Take 2 tablet PO daily    naloxone (Narcan) 4 mg/0.1 mL nasal spray nasal, Spray 4 mg intranasally once if needed for overdose or respiratory depression            OBJECTIVE   Last Recorded Vitals.  Last menstrual period 07/26/2024.      PHYSICAL EXAM  Physical Exam   Previous exam:  General: Well-developed, well-nourished and in no acute distress.  Head: Normocephalic. Atraumatic.  Neck/thyroid: Neck is supple.   Eyes: Pupils equal round and reactive to light. Conjunctiva normal.  ENMT: No masses or deformity of external nose. External ears without masses.  Respiratory/Chest:  Normal respiratory effort.  Breast: Moderate sized, symmetrical breasts.  No palpable masses of either breast.  No skin changes and no nipple discharge.  Lymphatics: No palpable lymphadenopathy of the cervical, supraclavicular or axillary regions.  Cardiovascular: Regular rate and rhythm.   Abdomen: Soft, nontender, nondistended.   Musculoskeletal: Joints and limbs are grossly normal. Normal gait. Normal range of motion of major joints.  Neuro: Oriented to person, place and time. No obvious neurological deficit. Motor strength grossly normal.  Psych: Normal mood and affect.     RESULTS   Labs       A.  LEFT BREAST CALCIFICATIONS, POSTERIOR, STEREOTACTIC CORE NEEDLE BIOPSY:  --ATYPICAL DUCTAL HYPERPLASIA WITH ASSOCIATED MICROCALCIFICATIONS, SEE NOTE  --BENIGN BREAST DUCTS AND LOBULES WITH ASSOCIATED MICROCALCIFICATIONS     Note: Multiple  deeper levels are examined.  Immunohistochemical stains with appropriate controls for CK5/6 and ER are performed on block A1, A2, A3, A4, and A5.  CK5/6 immunohistochemical stain shows absent mosaic pattern of staining with ER positivity, supporting above diagnosis.     B.  LEFT BREAST CALCIFICATIONS, ANTERIOR, STEREOTACTIC CORE NEEDLE BIOPSY:  --ATYPICAL DUCTAL HYPERPLASIA WITH ASSOCIATED MICROCALCIFICATIONS, SEE NOTE     Note: Multiple deeper levels are examined.  Immunohistochemical stains with appropriate controls for CK5/6 and ER are performed on block B1, B2, B4 and B5.  CK5/6 immunohistochemical stain shows absent mosaic pattern of staining with ER positivity, supporting above diagnosis.     : Dr. Slade (A1, B1).   Electronically signed by Alberta Flowers MD on 6/13/2024 at 1314       [facility]      FINAL DIAGNOSIS   A. BREAST, LEFT, LOWER OUTER QUADRANT, CORE BIOPSY:      -- Atypical ductal hyperplasia with microcalcifications.  -- Flat epithelial atypia with microcalcifications.  -- Microscopic intraductal papilloma.  -- Usual ductal hyperplasia and columnar cell change with microcalcifications.   Electronically signed by Indu Lopez MD PhD on 8/5/2024 at 1235         [facility]  MR BREAST BILATERAL WITH CONTRAST FULL PROTOCOL;  7/3/2024 6:12 pm      ACCESSION NUMBER(S):  [id number]      ORDERING CLINICIAN:  PAYAM SILVER      INDICATION:  Recently diagnosed left breast atypical ductal hyperplasia x2.      COMPARISON:  No prior MRIs are available for comparison. Correlation is made to  mammograms 05/08/2024 and 06/07/2024.      TECHNIQUE:  Using a dedicated breast coil, STIR axial and T1-weighted fat  saturation axial images of the breasts were obtained, the latter both  before and after intravenous administration of Gadolinium DTPA. On an  independent workstation, 3-D images were formulated using MICMALI  including time enhancement curves, subtraction images and MIP images.       Intravenous contrast: 17 mL of Dotarem      FINDINGS:  Density: Heterogeneous fibroglandular tissue.      There is symmetric minimal bilateral background enhancement.      RIGHT BREAST:  No suspicious mass or nonmass enhancement is  identified.      No axillary or internal mammary lymphadenopathy is appreciated.      LEFT BREAST: Signal void from a tissue marker is identified in the  lower outer quadrant at anterior depth on slice 41 from recent core  biopsy with pathology demonstrating ADH. There is an associated 1.1  cm post biopsy hematoma. Signal void from a tissue marker is also  identified in the deep lower outer quadrant on slice 50 also from  recent core biopsy with pathology demonstrating ADH. Segmental non  mass enhancement spans the 2 tissue markers and measures  approximately 8.8 x 2.5 x 1.5 cm in anteroposterior, transverse and  craniocaudal dimension on slice 50. Anterior extent clears the nipple  by at least 2 cm. Focal non mass enhancement versus a possible mass  is present just superior to this area in the lower outer quadrant at  middle to posterior depth on slice 57 and measures approximately 1.5  cm in maximum dimension. This is best visualized on the sagittal  reconstructions. Review of mammograms 05/08/2024 demonstrates a  possible mass/architectural distortion with calcifications in the  slight inferior/lateral breast at middle to posterior depth on MLO  slice 15 and CC slice 13 which possibly corresponds with the area on  MRI.      No axillary or internal mammary lymphadenopathy is appreciated.      NON-BREAST FINDINGS:  None.      IMPRESSION:  1. 2 site biopsy-proven ADH in the left breast with associated  segmental non mass enhancement as described above. Suspicious focal  non mass enhancement versus a possible mass is noted just superior to  this area with possible correlate seen on mammogram. Recommendation  is for diagnostic additional views, possible MRI directed ultrasound  and  possible biopsy. If a target is not visualized for biopsy, an MRI  guided biopsy is recommended. A yellow alert was submitted  communicating findings to the referring physician. A pre-procedure  form has been completed.  2. No MRI evidence of malignancy in the right breast.      BI-RADS CATEGORY:  BI-RADS Category:  4 Suspicious.  Recommendation:  Surgical Consultation and Biopsy.  Recommended Date:  Immediate.  Laterality:  Left.  Method of Detection: Category Sdbt - 3D Screening     ASSESSMENT / PLAN   Diagnoses and all orders for this visit:  Atypical ductal hyperplasia of left breast  Calcification of left breast  Mass of left breast, unspecified quadrant  At high risk for breast cancer  Dense breast tissue        Plan  June 2024: LEFT: 6cm area of LOQ with branching calcifications (6cm) with CNBx2 showing ADH; MRI showed area of enhancement of the left breast (area previously biopsy proven ADH) measuring 8.8 x 2.5 x 1.5 cm with a 1.5 cm non-mass/mass enhancement area of the lower outer quadrant separate from ADH area (not visible by mammogram or ultrasound); core needle biopsy of 1.5 cm non-mass/mass seen on MRI also demonstrated ADH     1.  Reviewed with the patient that she does have some coarse calcifications of the upper outer quadrant of the left breast, but these are unchanged over the last several mammograms.  Breast MRI did not show any concerns in this area.  No further intervention is required for these.  2.  On screening mammogram she was found to have new calcifications of the lower outer quadrant of the left breast which appear to span over 6 cm area and are in a branching formation.  Therefore, a 2 site stereotactic biopsy of these calcifications was performed.  Both biopsies demonstrate atypical ductal hyperplasia.  Given the large area of potential involvement (6 cm by mammogram) a breast MRI was obtained.  The breast MRI suggest the area of enhancement with the atypical ductal hyperplasia  measured 8.8 x 2.5 x 1.5 cm.  There was also another area measuring 1.5 cm of a non-mass enhancement (possibly with an underlying mass) on the MRI which was concerning and required biopsy.  Mammogram and ultrasound of this 1.5 cm area could not identify an underlying abnormality.  Therefore, she is scheduled for an MRI guided biopsy of this 1.5 cm area on 7/30/2024.  Pathology shows atypical ductal hyperplasia.  3.  Her new calculations of her risk factors put her well over the 20% lifetime risk for developing breast cancer which puts her in a high risk category.  She was given the high risk booklet.  Should she elect not to undergo any surgical intervention for the atypical ductal hyperplasia, she would at least qualify for more frequent screening with alternating mammograms and breast MRIs so that a radiographic evaluation would be performed every 6 months.  4.  Her 5-year Tracie score is greater than 3%.  Therefore, she would qualify for chemo preventative medications.  5.  She has genetics consultation scheduled for 10/1/2024.  6.  She has extensive atypical ductal hyperplasia in the left breast.  However, definitive cancer has not been diagnosed.  We did review that there could be underlying cancer associated with atypical ductal hyperplasia.  Will plan for surgical lumpectomy of all 3 of the previous biopsied sites with anticipation that all of the calcifications will not be removed.  Would not recommend a cancer procedure unless definitive cancer identified on final pathology.  If she does have underlying cancer, would recommend mastectomy at that time.  Surgical intervention for a left breast Magseed lumpectomy x 3 was reviewed with the patient.  Risk included, but not limited to, infection, bleeding, injury to surrounding tissue, possible need for further surgery, nonresolution of all symptoms, allergic reaction to medication and even death.  Will schedule surgery for 9/12/2024.        Mirtha Dimas,  MD, FACS  [facility]  [address] [place] [address];   [address]  [place]  [address] [phone]

## 2024-09-11 NOTE — H&P (VIEW-ONLY)
GENERAL SURGERY PREOP H&P     Patient: Etelvina Banks    Age: 44 y.o.   Gender: female    MRN: [id number]    PCP: Judith Baeza,           SUBJECTIVE        HPI  Etelvina presents for a left breast lumpectomy x 3 for atypical ductal hyperplasia.  She was found on a screening mammogram to have extensive calcifications of the left breast.  She ultimately underwent multiple core needle biopsies and an MRI.  This breast biopsy was the third biopsy of the left breast.  Previous 2 biopsies of the areas of calcification showed atypical ductal hyperplasia.  Since the MRI guided biopsy, she has not had any significant issues.  She did have some bruising, but felt that it was less than what she had previously.  She has no active bleeding and is not concerned for any infection.  All 3 areas showed atypical ductal hyperplasia.  Definitive cancer was not identified.  However, with atypical ductal hyperplasia and high risk factors for breast cancer, it was felt that she needed further tissue diagnosis.  Therefore a 3 point Magseed lumpectomy surgery was recommended.  She has already undergone placement of Magseed x 3.     Risk factors for breast cancer: 44-year-old white female; menarche at age 9; first live birth at age 21; 1 breast biopsy with atypical ductal hyperplasia; mother diagnosed with breast cancer in her mid 60s.  She is premenopausal.  She also had a maternal uncle and a maternal aunt who both had cancer, but she is not sure which type.  This gives her a 5-year-old Tracie score of 5.1 and a lifetime risk of 38.5% which puts her in a high risk category.  Using the Tyrer-Cuzick Breast cancer risk evaluation tool, this patient's 10-year risk of breast cancer is 11.2% ( average risk is 2.3%) and lifetime risk is 40.5% (average lifetime risk is 10.1%). This puts her in a high risk category for developing breast cancer.     ROS  Review of Systems   Constitutional:  Negative for chills, fatigue and fever.   HENT:  Negative  for congestion, ear pain and hearing loss.    Eyes:  Negative for pain and redness.   Respiratory:  Negative for cough and shortness of breath.    Cardiovascular:  Negative for chest pain and leg swelling.   Gastrointestinal:  Negative for abdominal pain, constipation, diarrhea, nausea and vomiting.   Endocrine: Negative for polyphagia.   Genitourinary:  Negative for dysuria, flank pain, frequency and hematuria.   Musculoskeletal:  Negative for arthralgias and myalgias.   Skin:  Negative for rash and wound.   Allergic/Immunologic: Negative for immunocompromised state.   Neurological:  Positive for headaches. Negative for speech difficulty and weakness.   Hematological:  Does not bruise/bleed easily.   Psychiatric/Behavioral:  Negative for agitation and confusion.             HISTORY      Medical History        Past Medical History:   Diagnosis Date    Body mass index (BMI)30.0-30.9, adult 06/04/2021     Body mass index (BMI) of 30.0 to 30.9 in adult    Depression, major, single episode, moderate (Multi) 02/28/2023    Diabetes mellitus (Multi) 4-01-24    Headache, unspecified       Bad headache            Surgical History         Past Surgical History:   Procedure Laterality Date    BREAST BIOPSY Left 06/07/2024    OTHER SURGICAL HISTORY   03/11/2019     Ovarian cystectomy    OTHER SURGICAL HISTORY   10/14/2022     Tubal ligation    OTHER SURGICAL HISTORY   03/22/2021     Foot surgery            Family History          Family History   Problem Relation Name Age of Onset    Breast cancer Mother Wanda      Arthritis Mother Wanda      Hypertension Mother Wanda      Asthma Brother Zenon      Diabetes Other        Cancer Other aunt              Allergies   No Known Allergies         Tobacco Use History   Social History           Tobacco Use   Smoking Status Former    Current packs/day: 0.00    Types: Cigarettes    Passive exposure: Past   Smokeless Tobacco Never            Social History          Substance and Sexual  Activity   Alcohol Use Never         HOME MEDICATIONS       Current Outpatient Medications   Medication Instructions    buprenorphine-naloxone (Suboxone) 8-2 mg SL film sublingual, Take 1 film SL twice kurt    cream base no.31, bulk, (Transdermal Pain Base) cream Transdermal Pain Base External Cream Refills: 0 Active    hydroCHLOROthiazide (MICROZIDE) 12.5 mg, oral, Daily PRN    ibuprofen 800 mg, oral, 3 times daily PRN    losartan (COZAAR) 50 mg, oral, Daily    metFORMIN XR (Glucophage-XR) 500 mg 24 hr tablet Take 2 tablet PO daily    naloxone (Narcan) 4 mg/0.1 mL nasal spray nasal, Spray 4 mg intranasally once if needed for overdose or respiratory depression            OBJECTIVE   Last Recorded Vitals.  Last menstrual period 07/26/2024.      PHYSICAL EXAM  Physical Exam   Previous exam:  General: Well-developed, well-nourished and in no acute distress.  Head: Normocephalic. Atraumatic.  Neck/thyroid: Neck is supple.   Eyes: Pupils equal round and reactive to light. Conjunctiva normal.  ENMT: No masses or deformity of external nose. External ears without masses.  Respiratory/Chest:  Normal respiratory effort.  Breast: Moderate sized, symmetrical breasts.  No palpable masses of either breast.  No skin changes and no nipple discharge.  Lymphatics: No palpable lymphadenopathy of the cervical, supraclavicular or axillary regions.  Cardiovascular: Regular rate and rhythm.   Abdomen: Soft, nontender, nondistended.   Musculoskeletal: Joints and limbs are grossly normal. Normal gait. Normal range of motion of major joints.  Neuro: Oriented to person, place and time. No obvious neurological deficit. Motor strength grossly normal.  Psych: Normal mood and affect.     RESULTS   Labs       A.  LEFT BREAST CALCIFICATIONS, POSTERIOR, STEREOTACTIC CORE NEEDLE BIOPSY:  --ATYPICAL DUCTAL HYPERPLASIA WITH ASSOCIATED MICROCALCIFICATIONS, SEE NOTE  --BENIGN BREAST DUCTS AND LOBULES WITH ASSOCIATED MICROCALCIFICATIONS     Note: Multiple  deeper levels are examined.  Immunohistochemical stains with appropriate controls for CK5/6 and ER are performed on block A1, A2, A3, A4, and A5.  CK5/6 immunohistochemical stain shows absent mosaic pattern of staining with ER positivity, supporting above diagnosis.     B.  LEFT BREAST CALCIFICATIONS, ANTERIOR, STEREOTACTIC CORE NEEDLE BIOPSY:  --ATYPICAL DUCTAL HYPERPLASIA WITH ASSOCIATED MICROCALCIFICATIONS, SEE NOTE     Note: Multiple deeper levels are examined.  Immunohistochemical stains with appropriate controls for CK5/6 and ER are performed on block B1, B2, B4 and B5.  CK5/6 immunohistochemical stain shows absent mosaic pattern of staining with ER positivity, supporting above diagnosis.     : Dr. Slade (A1, B1).   Electronically signed by Alberta Flowers MD on 6/13/2024 at 1314       [facility]      FINAL DIAGNOSIS   A. BREAST, LEFT, LOWER OUTER QUADRANT, CORE BIOPSY:      -- Atypical ductal hyperplasia with microcalcifications.  -- Flat epithelial atypia with microcalcifications.  -- Microscopic intraductal papilloma.  -- Usual ductal hyperplasia and columnar cell change with microcalcifications.   Electronically signed by Indu Lopez MD PhD on 8/5/2024 at 1235         [facility]  MR BREAST BILATERAL WITH CONTRAST FULL PROTOCOL;  7/3/2024 6:12 pm      ACCESSION NUMBER(S):  [id number]      ORDERING CLINICIAN:  PAYAM SILVER      INDICATION:  Recently diagnosed left breast atypical ductal hyperplasia x2.      COMPARISON:  No prior MRIs are available for comparison. Correlation is made to  mammograms 05/08/2024 and 06/07/2024.      TECHNIQUE:  Using a dedicated breast coil, STIR axial and T1-weighted fat  saturation axial images of the breasts were obtained, the latter both  before and after intravenous administration of Gadolinium DTPA. On an  independent workstation, 3-D images were formulated using FERTILE EARTH SYSTEMS  including time enhancement curves, subtraction images and MIP images.       Intravenous contrast: 17 mL of Dotarem      FINDINGS:  Density: Heterogeneous fibroglandular tissue.      There is symmetric minimal bilateral background enhancement.      RIGHT BREAST:  No suspicious mass or nonmass enhancement is  identified.      No axillary or internal mammary lymphadenopathy is appreciated.      LEFT BREAST: Signal void from a tissue marker is identified in the  lower outer quadrant at anterior depth on slice 41 from recent core  biopsy with pathology demonstrating ADH. There is an associated 1.1  cm post biopsy hematoma. Signal void from a tissue marker is also  identified in the deep lower outer quadrant on slice 50 also from  recent core biopsy with pathology demonstrating ADH. Segmental non  mass enhancement spans the 2 tissue markers and measures  approximately 8.8 x 2.5 x 1.5 cm in anteroposterior, transverse and  craniocaudal dimension on slice 50. Anterior extent clears the nipple  by at least 2 cm. Focal non mass enhancement versus a possible mass  is present just superior to this area in the lower outer quadrant at  middle to posterior depth on slice 57 and measures approximately 1.5  cm in maximum dimension. This is best visualized on the sagittal  reconstructions. Review of mammograms 05/08/2024 demonstrates a  possible mass/architectural distortion with calcifications in the  slight inferior/lateral breast at middle to posterior depth on MLO  slice 15 and CC slice 13 which possibly corresponds with the area on  MRI.      No axillary or internal mammary lymphadenopathy is appreciated.      NON-BREAST FINDINGS:  None.      IMPRESSION:  1. 2 site biopsy-proven ADH in the left breast with associated  segmental non mass enhancement as described above. Suspicious focal  non mass enhancement versus a possible mass is noted just superior to  this area with possible correlate seen on mammogram. Recommendation  is for diagnostic additional views, possible MRI directed ultrasound  and  possible biopsy. If a target is not visualized for biopsy, an MRI  guided biopsy is recommended. A yellow alert was submitted  communicating findings to the referring physician. A pre-procedure  form has been completed.  2. No MRI evidence of malignancy in the right breast.      BI-RADS CATEGORY:  BI-RADS Category:  4 Suspicious.  Recommendation:  Surgical Consultation and Biopsy.  Recommended Date:  Immediate.  Laterality:  Left.  Method of Detection: Category Sdbt - 3D Screening     ASSESSMENT / PLAN   Diagnoses and all orders for this visit:  Atypical ductal hyperplasia of left breast  Calcification of left breast  Mass of left breast, unspecified quadrant  At high risk for breast cancer  Dense breast tissue        Plan  June 2024: LEFT: 6cm area of LOQ with branching calcifications (6cm) with CNBx2 showing ADH; MRI showed area of enhancement of the left breast (area previously biopsy proven ADH) measuring 8.8 x 2.5 x 1.5 cm with a 1.5 cm non-mass/mass enhancement area of the lower outer quadrant separate from ADH area (not visible by mammogram or ultrasound); core needle biopsy of 1.5 cm non-mass/mass seen on MRI also demonstrated ADH     1.  Reviewed with the patient that she does have some coarse calcifications of the upper outer quadrant of the left breast, but these are unchanged over the last several mammograms.  Breast MRI did not show any concerns in this area.  No further intervention is required for these.  2.  On screening mammogram she was found to have new calcifications of the lower outer quadrant of the left breast which appear to span over 6 cm area and are in a branching formation.  Therefore, a 2 site stereotactic biopsy of these calcifications was performed.  Both biopsies demonstrate atypical ductal hyperplasia.  Given the large area of potential involvement (6 cm by mammogram) a breast MRI was obtained.  The breast MRI suggest the area of enhancement with the atypical ductal hyperplasia  measured 8.8 x 2.5 x 1.5 cm.  There was also another area measuring 1.5 cm of a non-mass enhancement (possibly with an underlying mass) on the MRI which was concerning and required biopsy.  Mammogram and ultrasound of this 1.5 cm area could not identify an underlying abnormality.  Therefore, she is scheduled for an MRI guided biopsy of this 1.5 cm area on 7/30/2024.  Pathology shows atypical ductal hyperplasia.  3.  Her new calculations of her risk factors put her well over the 20% lifetime risk for developing breast cancer which puts her in a high risk category.  She was given the high risk booklet.  Should she elect not to undergo any surgical intervention for the atypical ductal hyperplasia, she would at least qualify for more frequent screening with alternating mammograms and breast MRIs so that a radiographic evaluation would be performed every 6 months.  4.  Her 5-year Tracie score is greater than 3%.  Therefore, she would qualify for chemo preventative medications.  5.  She has genetics consultation scheduled for 10/1/2024.  6.  She has extensive atypical ductal hyperplasia in the left breast.  However, definitive cancer has not been diagnosed.  We did review that there could be underlying cancer associated with atypical ductal hyperplasia.  Will plan for surgical lumpectomy of all 3 of the previous biopsied sites with anticipation that all of the calcifications will not be removed.  Would not recommend a cancer procedure unless definitive cancer identified on final pathology.  If she does have underlying cancer, would recommend mastectomy at that time.  Surgical intervention for a left breast Magseed lumpectomy x 3 was reviewed with the patient.  Risk included, but not limited to, infection, bleeding, injury to surrounding tissue, possible need for further surgery, nonresolution of all symptoms, allergic reaction to medication and even death.  Will schedule surgery for 9/12/2024.        Mirtha Dimas,  MD, FACS  [facility] General Surgery  [address] [place] [address];   [address]  [place]  [address] [phone]

## 2024-09-12 ENCOUNTER — HOSPITAL ENCOUNTER (OUTPATIENT)
Facility: HOSPITAL | Age: 44
Setting detail: OUTPATIENT SURGERY
Discharge: HOME | End: 2024-09-12
Attending: SURGERY | Admitting: SURGERY
Payer: COMMERCIAL

## 2024-09-12 ENCOUNTER — APPOINTMENT (OUTPATIENT)
Dept: RADIOLOGY | Facility: HOSPITAL | Age: 44
End: 2024-09-12
Payer: COMMERCIAL

## 2024-09-12 ENCOUNTER — APPOINTMENT (OUTPATIENT)
Dept: CARDIOLOGY | Facility: HOSPITAL | Age: 44
End: 2024-09-12
Payer: COMMERCIAL

## 2024-09-12 ENCOUNTER — PHARMACY VISIT (OUTPATIENT)
Dept: PHARMACY | Facility: CLINIC | Age: 44
End: 2024-09-12
Payer: MEDICAID

## 2024-09-12 ENCOUNTER — ANESTHESIA (OUTPATIENT)
Dept: OPERATING ROOM | Facility: HOSPITAL | Age: 44
End: 2024-09-12
Payer: COMMERCIAL

## 2024-09-12 VITALS
SYSTOLIC BLOOD PRESSURE: 106 MMHG | BODY MASS INDEX: 33.49 KG/M2 | TEMPERATURE: 97.6 F | HEIGHT: 62 IN | OXYGEN SATURATION: 99 % | WEIGHT: 182 LBS | HEART RATE: 80 BPM | RESPIRATION RATE: 12 BRPM | DIASTOLIC BLOOD PRESSURE: 65 MMHG

## 2024-09-12 DIAGNOSIS — N60.92 ATYPICAL DUCTAL HYPERPLASIA OF LEFT BREAST: Primary | ICD-10-CM

## 2024-09-12 DIAGNOSIS — R92.8 ABNORMAL MAMMOGRAM OF LEFT BREAST: ICD-10-CM

## 2024-09-12 LAB
ATRIAL RATE: 87 BPM
GLUCOSE BLD MANUAL STRIP-MCNC: 112 MG/DL (ref 74–99)
P AXIS: 34 DEGREES
PR INTERVAL: 131 MS
PREGNANCY TEST URINE, POC: NEGATIVE
Q ONSET: 254 MS
QRS COUNT: 14 BEATS
QRS DURATION: 103 MS
QT INTERVAL: 377 MS
QTC CALCULATION(BAZETT): 457 MS
QTC FREDERICIA: 428 MS
R AXIS: 1 DEGREES
T AXIS: 9 DEGREES
T OFFSET: 443 MS
VENTRICULAR RATE: 88 BPM

## 2024-09-12 PROCEDURE — 2500000005 HC RX 250 GENERAL PHARMACY W/O HCPCS: Performed by: NURSE ANESTHETIST, CERTIFIED REGISTERED

## 2024-09-12 PROCEDURE — 3600000007 HC OR TIME - EACH INCREMENTAL 1 MINUTE - PROCEDURE LEVEL TWO: Performed by: SURGERY

## 2024-09-12 PROCEDURE — 96372 THER/PROPH/DIAG INJ SC/IM: CPT | Performed by: NURSE ANESTHETIST, CERTIFIED REGISTERED

## 2024-09-12 PROCEDURE — 3700000001 HC GENERAL ANESTHESIA TIME - INITIAL BASE CHARGE: Performed by: SURGERY

## 2024-09-12 PROCEDURE — 76098 X-RAY EXAM SURGICAL SPECIMEN: CPT

## 2024-09-12 PROCEDURE — RXMED WILLOW AMBULATORY MEDICATION CHARGE

## 2024-09-12 PROCEDURE — 93005 ELECTROCARDIOGRAM TRACING: CPT | Mod: 59

## 2024-09-12 PROCEDURE — 2500000004 HC RX 250 GENERAL PHARMACY W/ HCPCS (ALT 636 FOR OP/ED): Performed by: ANESTHESIOLOGY

## 2024-09-12 PROCEDURE — 82947 ASSAY GLUCOSE BLOOD QUANT: CPT

## 2024-09-12 PROCEDURE — 19125 EXCISION BREAST LESION: CPT | Performed by: SURGERY

## 2024-09-12 PROCEDURE — 88307 TISSUE EXAM BY PATHOLOGIST: CPT | Mod: TC,PORLAB | Performed by: SURGERY

## 2024-09-12 PROCEDURE — 7100000010 HC PHASE TWO TIME - EACH INCREMENTAL 1 MINUTE: Performed by: SURGERY

## 2024-09-12 PROCEDURE — 7100000002 HC RECOVERY ROOM TIME - EACH INCREMENTAL 1 MINUTE: Performed by: SURGERY

## 2024-09-12 PROCEDURE — 2500000004 HC RX 250 GENERAL PHARMACY W/ HCPCS (ALT 636 FOR OP/ED): Mod: JZ | Performed by: SURGERY

## 2024-09-12 PROCEDURE — 2720000007 HC OR 272 NO HCPCS: Performed by: SURGERY

## 2024-09-12 PROCEDURE — 81025 URINE PREGNANCY TEST: CPT | Performed by: ANESTHESIOLOGY

## 2024-09-12 PROCEDURE — 88307 TISSUE EXAM BY PATHOLOGIST: CPT | Performed by: STUDENT IN AN ORGANIZED HEALTH CARE EDUCATION/TRAINING PROGRAM

## 2024-09-12 PROCEDURE — 2500000004 HC RX 250 GENERAL PHARMACY W/ HCPCS (ALT 636 FOR OP/ED): Performed by: NURSE ANESTHETIST, CERTIFIED REGISTERED

## 2024-09-12 PROCEDURE — 88341 IMHCHEM/IMCYTCHM EA ADD ANTB: CPT | Performed by: STUDENT IN AN ORGANIZED HEALTH CARE EDUCATION/TRAINING PROGRAM

## 2024-09-12 PROCEDURE — 7100000009 HC PHASE TWO TIME - INITIAL BASE CHARGE: Performed by: SURGERY

## 2024-09-12 PROCEDURE — 7100000001 HC RECOVERY ROOM TIME - INITIAL BASE CHARGE: Performed by: SURGERY

## 2024-09-12 PROCEDURE — 2500000005 HC RX 250 GENERAL PHARMACY W/O HCPCS: Performed by: SURGERY

## 2024-09-12 PROCEDURE — 88341 IMHCHEM/IMCYTCHM EA ADD ANTB: CPT | Mod: TC,59,PORLAB | Performed by: SURGERY

## 2024-09-12 PROCEDURE — 93010 ELECTROCARDIOGRAM REPORT: CPT | Performed by: INTERNAL MEDICINE

## 2024-09-12 PROCEDURE — 88342 IMHCHEM/IMCYTCHM 1ST ANTB: CPT | Performed by: STUDENT IN AN ORGANIZED HEALTH CARE EDUCATION/TRAINING PROGRAM

## 2024-09-12 PROCEDURE — 3700000002 HC GENERAL ANESTHESIA TIME - EACH INCREMENTAL 1 MINUTE: Performed by: SURGERY

## 2024-09-12 PROCEDURE — 3600000002 HC OR TIME - INITIAL BASE CHARGE - PROCEDURE LEVEL TWO: Performed by: SURGERY

## 2024-09-12 PROCEDURE — 2500000001 HC RX 250 WO HCPCS SELF ADMINISTERED DRUGS (ALT 637 FOR MEDICARE OP): Performed by: ANESTHESIOLOGY

## 2024-09-12 RX ORDER — KETOROLAC TROMETHAMINE 30 MG/ML
INJECTION, SOLUTION INTRAMUSCULAR; INTRAVENOUS AS NEEDED
Status: DISCONTINUED | OUTPATIENT
Start: 2024-09-12 | End: 2024-09-12

## 2024-09-12 RX ORDER — ONDANSETRON HYDROCHLORIDE 2 MG/ML
4 INJECTION, SOLUTION INTRAVENOUS ONCE AS NEEDED
Status: DISCONTINUED | OUTPATIENT
Start: 2024-09-12 | End: 2024-09-12 | Stop reason: HOSPADM

## 2024-09-12 RX ORDER — BUPIVACAINE HCL/EPINEPHRINE 0.5-1:200K
VIAL (ML) INJECTION AS NEEDED
Status: DISCONTINUED | OUTPATIENT
Start: 2024-09-12 | End: 2024-09-12 | Stop reason: HOSPADM

## 2024-09-12 RX ORDER — ONDANSETRON HYDROCHLORIDE 2 MG/ML
4 INJECTION, SOLUTION INTRAVENOUS ONCE
Status: COMPLETED | OUTPATIENT
Start: 2024-09-12 | End: 2024-09-12

## 2024-09-12 RX ORDER — SODIUM CITRATE AND CITRIC ACID MONOHYDRATE 334; 500 MG/5ML; MG/5ML
30 SOLUTION ORAL ONCE
Status: COMPLETED | OUTPATIENT
Start: 2024-09-12 | End: 2024-09-12

## 2024-09-12 RX ORDER — SODIUM CHLORIDE 9 MG/ML
50 INJECTION, SOLUTION INTRAVENOUS CONTINUOUS
Status: DISCONTINUED | OUTPATIENT
Start: 2024-09-12 | End: 2024-09-12 | Stop reason: HOSPADM

## 2024-09-12 RX ORDER — METOCLOPRAMIDE HYDROCHLORIDE 5 MG/ML
10 INJECTION INTRAMUSCULAR; INTRAVENOUS ONCE
Status: COMPLETED | OUTPATIENT
Start: 2024-09-12 | End: 2024-09-12

## 2024-09-12 RX ORDER — HYDROCODONE BITARTRATE AND ACETAMINOPHEN 7.5; 325 MG/1; MG/1
1 TABLET ORAL EVERY 6 HOURS PRN
Qty: 10 TABLET | Refills: 0 | Status: SHIPPED | OUTPATIENT
Start: 2024-09-12 | End: 2024-09-25 | Stop reason: WASHOUT

## 2024-09-12 RX ORDER — PROPOFOL 10 MG/ML
INJECTION, EMULSION INTRAVENOUS AS NEEDED
Status: DISCONTINUED | OUTPATIENT
Start: 2024-09-12 | End: 2024-09-12

## 2024-09-12 RX ORDER — ACETAMINOPHEN 325 MG/1
975 TABLET ORAL ONCE
Status: COMPLETED | OUTPATIENT
Start: 2024-09-12 | End: 2024-09-12

## 2024-09-12 RX ORDER — LIDOCAINE HCL/PF 100 MG/5ML
SYRINGE (ML) INTRAVENOUS AS NEEDED
Status: DISCONTINUED | OUTPATIENT
Start: 2024-09-12 | End: 2024-09-12

## 2024-09-12 RX ORDER — CEFAZOLIN SODIUM 2 G/100ML
2 INJECTION, SOLUTION INTRAVENOUS ONCE
Status: COMPLETED | OUTPATIENT
Start: 2024-09-12 | End: 2024-09-12

## 2024-09-12 RX ORDER — ALBUTEROL SULFATE 0.83 MG/ML
2.5 SOLUTION RESPIRATORY (INHALATION) ONCE
Status: DISCONTINUED | OUTPATIENT
Start: 2024-09-12 | End: 2024-09-12 | Stop reason: HOSPADM

## 2024-09-12 RX ORDER — PHENYLEPHRINE HCL IN 0.9% NACL 1 MG/10 ML
SYRINGE (ML) INTRAVENOUS AS NEEDED
Status: DISCONTINUED | OUTPATIENT
Start: 2024-09-12 | End: 2024-09-12

## 2024-09-12 RX ORDER — DEXMEDETOMIDINE HYDROCHLORIDE 100 UG/ML
INJECTION, SOLUTION INTRAVENOUS AS NEEDED
Status: DISCONTINUED | OUTPATIENT
Start: 2024-09-12 | End: 2024-09-12

## 2024-09-12 RX ORDER — MIDAZOLAM HYDROCHLORIDE 1 MG/ML
INJECTION, SOLUTION INTRAMUSCULAR; INTRAVENOUS AS NEEDED
Status: DISCONTINUED | OUTPATIENT
Start: 2024-09-12 | End: 2024-09-12

## 2024-09-12 RX ORDER — FAMOTIDINE 10 MG/ML
20 INJECTION INTRAVENOUS ONCE
Status: COMPLETED | OUTPATIENT
Start: 2024-09-12 | End: 2024-09-12

## 2024-09-12 RX ORDER — HYDROMORPHONE HYDROCHLORIDE 0.2 MG/ML
0.2 INJECTION INTRAMUSCULAR; INTRAVENOUS; SUBCUTANEOUS EVERY 5 MIN PRN
Status: DISCONTINUED | OUTPATIENT
Start: 2024-09-12 | End: 2024-09-12 | Stop reason: HOSPADM

## 2024-09-12 RX ORDER — FENTANYL CITRATE 50 UG/ML
INJECTION, SOLUTION INTRAMUSCULAR; INTRAVENOUS AS NEEDED
Status: DISCONTINUED | OUTPATIENT
Start: 2024-09-12 | End: 2024-09-12

## 2024-09-12 SDOH — HEALTH STABILITY: MENTAL HEALTH: CURRENT SMOKER: 0

## 2024-09-12 ASSESSMENT — PAIN SCALES - GENERAL
PAINLEVEL_OUTOF10: 0 - NO PAIN
PAIN_LEVEL: 0
PAINLEVEL_OUTOF10: 0 - NO PAIN

## 2024-09-12 ASSESSMENT — PAIN - FUNCTIONAL ASSESSMENT
PAIN_FUNCTIONAL_ASSESSMENT: 0-10
PAIN_FUNCTIONAL_ASSESSMENT: 0-10

## 2024-09-12 NOTE — ANESTHESIA PROCEDURE NOTES
Airway  Date/Time: 9/12/2024 9:35 AM  Urgency: elective      Staffing  Performed: CRNA   Authorized by: OLEGARIO Turpin    Performed by: OLEGARIO Turpin  Patient location during procedure: OR    Indications and Patient Condition  Indications for airway management: anesthesia  Spontaneous Ventilation: absent  Sedation level: deep  Preoxygenated: yes  Patient position: sniffing  Mask difficulty assessment: 0 - not attempted  Planned trial extubation    Final Airway Details  Final airway type: supraglottic airway      Successful airway: Supraglottic airway: I-gel.  Size 4     Number of attempts at approach: 1    Additional Comments  Atraumatic LMA insertion.

## 2024-09-12 NOTE — LETTER
"September 12, 2024     Patient: Etelvina Banks   YOB: 1980   Date of Visit: 8/6/2024       To Whom It May Concern:    Etelvina Banks underwent surgery on 9/12/2024.  Due to need for recovery postoperatively, she will be out of work for 8 days.  She may return to work after this on \"light duty\".  Light duty includes no lifting more than 10 to 15 pounds, and avoiding any activity that causes pressure or trauma to the chest area.  She will be followed up in the office 2 weeks postoperatively to determine appropriate timing of returning back to work with full duty.    If you have any questions, please contact our office at [phone].        Sincerely,    Mirtha Dimsa MD  [address] [place] [address]  [address] [phone]  "

## 2024-09-12 NOTE — ANESTHESIA PREPROCEDURE EVALUATION
Patient: Etelvina Banks    Procedure Information       Date/Time: 09/12/24 0910    Procedure: Left breast Magseed lumpectomy x 3 (Left: Breast) - 8 AM, 2 hours    Location: [facility] / Virtual [facility]    Surgeons: Mirtha Dimas MD            Relevant Problems   Anesthesia (within normal limits)      Cardiac   (+) Mixed hyperlipidemia   (+) Primary hypertension      Neuro   (+) Generalized anxiety disorder      Endocrine   (+) Class 2 obesity with body mass index (BMI) of 37.0 to 37.9 in adult   (+) Type 2 diabetes mellitus without complication, without long-term current use of insulin (Multi)      Musculoskeletal   (+) Fibromyalgia       Clinical information reviewed:   Tobacco  Allergies  Meds  Problems  Med Hx  Surg Hx   Fam Hx  Soc   Hx        NPO Detail:  NPO/Void Status  Date of Last Liquid: 09/12/24  Time of Last Liquid: 0000  Date of Last Solid: 09/12/24  Time of Last Solid: 0000  Last Intake Type: Light meal         Physical Exam    Airway  Mallampati: III  TM distance: >3 FB  Neck ROM: full     Cardiovascular - normal exam     Dental   (+) upper dentures     Pulmonary - normal exam     Abdominal - normal exam             Anesthesia Plan    History of general anesthesia?: yes  History of complications of general anesthesia?: no    ASA 2     general     The patient is not a current smoker.    intravenous induction   Anesthetic plan and risks discussed with patient.  Use of blood products discussed with patient who consented to blood products.    Plan discussed with CRNA.

## 2024-09-12 NOTE — OP NOTE
Left breast Magseed lumpectomy x 3 (L) Operative Note     Date: 2024  OR Location: [facility]    Name: Etelvina Banks, : 1980, Age: 44 y.o., MRN: [id number], Sex: female    Diagnosis  Pre-op Diagnosis      * Atypical ductal hyperplasia of left breast [N60.92] Post-op Diagnosis     * Atypical ductal hyperplasia of left breast [N60.92]     Procedures  Left breast Magseed lumpectomy x 3  43798 - MD EXC BREAST LES PREOP PLMT RAD MARKER OPEN 1 LES      Surgeons      * Mirtha Dimas - Primary    Resident/Fellow/Other Assistant:  Darnell Roberts SA    Procedure Summary  Anesthesia: General  ASA: II  Anesthesia Staff: CRNA: KATIANA Turpin-CRNA  Estimated Blood Loss: 5mL  Intra-op Medications:   Administrations occurring from 0910 to 1140 on 24:   Medication Name Total Dose   BUPivacaine-EPINEPHrine (Marcaine w/EPI) 0.5 %-1:200,000 injection 30 mL   sodium chloride 0.9% infusion 51.67 mL   ceFAZolin (Ancef) 2 g in dextrose (iso)  mL 2 g              Anesthesia Record               Intraprocedure I/O Totals          Intake    sodium chloride 0.9% infusion 1100.00 mL    ceFAZolin (Ancef) 2 g in dextrose (iso)  mL 100.00 mL    Total Intake 1200 mL          Specimen:   ID Type Source Tests Collected by Time   1 : #1 inferior, short stitch superior, long stitch lateral, skin anterior Tissue BREAST LUMPECTOMY LEFT SURGICAL PATHOLOGY EXAM Mirtha Dimas MD 2024 1001   2 : #2 short stitch superior, long stitch lateral, x2 anterior Tissue BREAST LUMPECTOMY LEFT SURGICAL PATHOLOGY EXAM Mirtha Dimas MD 2024 1012   3 : #3 short stitch supeior, long stitch lateral, x2 anterior Tissue BREAST LUMPECTOMY LEFT SURGICAL PATHOLOGY EXAM Mirtha Dimas MD 2024 1024        Staff:   Mykel: Hayley Ceuto Person: Ruthy  Circulator: Brenda John Circulator: Christie         Drains and/or Catheters: * None in log *    Tourniquet Times:         Implants:     Findings: Radiographic  evaluation of all 3 specimens identified 3 mag seeds, 3 clips and multiple areas of calcifications.    Indications: Etelvina Banks is an 44 y.o. female who is having surgery for Atypical ductal hyperplasia of left breast [N60.92].  She was found to have multiple calcifications of the left breast on mammogram.  She underwent a mammogram guided biopsy x 2 and an MRI guided biopsy x 1 of multiple calcifications of the left breast.  All of these biopsies demonstrated extensive (spanning over a 9 cm area) atypical ductal hyperplasia.  In order to look for any evidence of underlying cancer, 3 point biopsy was recommended.  Prior to surgery, she underwent Magseed placement of all 3 previously biopsied areas.  The benefits and risk of a Magseed lumpectomy x 3 were reviewed with the patient and she signed consent.    The patient was seen in the preoperative area. The risks, benefits, complications, treatment options, non-operative alternatives, expected recovery and outcomes were discussed with the patient. The possibilities of reaction to medication, pulmonary aspiration, injury to surrounding structures, bleeding, recurrent infection, the need for additional procedures, failure to diagnose a condition, and creating a complication requiring transfusion or operation were discussed with the patient. The patient concurred with the proposed plan, giving informed consent.  The site of surgery was properly noted/marked if necessary per policy. The patient has been actively warmed in preoperative area. Preoperative antibiotics have been ordered and given within 1 hours of incision. Venous thrombosis prophylaxis have been ordered including bilateral sequential compression devices    Procedure Details:   She was brought into the operating room and was laid in the supine position.  After placement under general anesthesia, ESTIVEN hose and SCDs were placed on bilateral lower extremities.  The left breast then was prepped with ChloraPrep  and draped in usual sterile fashion.  A pause was taken the correct patient procedure were identified.    Using the Magseed probe, the location of all 3 Magseed was marked on the breast.  Specimens #1 and 2 were inferior to the nipple.  Specimen 1 was slightly superior compared to specimen 2.  Specimen 3 was lateral to the line of the nipple and located in the lower outer quadrant.  The specimen #1, appeared to be quite superficial.  Therefore, a small skin ellipse was made over this area to make sure that adequate tissue was obtained.  This incision then was carried out laterally for easier access to all 3 specimens.  Attention was first focused to specimen #1 which contained the ellipse of skin.  A standard lumpectomy was performed taking approximately a quarter size lumpectomy specimen.  Care was taken to make sure that the Magseed was included in the specimen at all times.  Radiographic evaluation of specimen #1 identified both the Magseed, the previous biopsy clip and multiple calcifications.  This specimen was marked with a short stitch superior, long stitch lateral and skin anteriorly.  A similar process was undertaken for specimen #2 and 3.  Specimen #2 was on the inferior border of specimen #1, and specimen 3 was on the lateral border of specimen #1.  All 3 of the specimens ended up being in communication with each other which created 1 larger cavity.  Specimens #2 and 3 were marked with short stitch superior, long stitch lateral and 2 stitches anteriorly.  At this time clips were used to ede the location of each specimen within the cavity of the lumpectomy.  1 clip was placed at  the previous location of specimen #1, 2 clips were placed inferiorly at the location of specimen #2, and 3 clips were placed laterally indicating the location of specimen #3.   radiographic evaluation of the specimens 2 and 3 also identified within each specimen a biopsy clip, a Magseed and multiple calcifications.      At this  time the cavity was irrigated.  Hemostasis was achieved using cautery.  Half percent Marcaine with epinephrine was used to locally anesthetize the the lumpectomy cavity and skin.  The superficial breast tissue then was closed using interrupted stitches of 2-0 Vicryl suture.  The skin was reapproximated using a running subcuticular stitch of 4-0 Vicryl suture.  The wound then was dressed with Steri-Strips, 4 x 4 and tape.  A standard postoperative bra was put in place.  She tolerated the procedure well.  She was awoken from anesthesia and taken to the recovery room in stable condition.    Counts: Correct x 2  Complications: None.  Drains: None.    Complications:  None; patient tolerated the procedure well.    Disposition: PACU - hemodynamically stable.  Condition: stable         Additional Details:     Attending Attestation: I performed the procedure.    Mirtha Dimas  Phone Number: [phone]

## 2024-09-12 NOTE — ANESTHESIA POSTPROCEDURE EVALUATION
Patient: Etelvina Banks    Procedure Summary       Date: 09/12/24 Room / Location: [facility] 01 / Virtual [facility]    Anesthesia Start: 0923 Anesthesia Stop: 1100    Procedure: Left breast Magseed lumpectomy x 3 (Left: Breast) Diagnosis:       Atypical ductal hyperplasia of left breast      (Atypical ductal hyperplasia of left breast [N60.92])    Surgeons: Mirtha Dimas MD Responsible Provider: OLEGARIO Turpin    Anesthesia Type: general ASA Status: 2            Anesthesia Type: general    Vitals Value Taken Time   /64 09/12/24 1130   Temp 37 °C (98.6 °F) 09/12/24 1051   Pulse 83 09/12/24 1130   Resp 11 09/12/24 1130   SpO2 96 % 09/12/24 1130       Anesthesia Post Evaluation    Patient location during evaluation: PACU  Patient participation: complete - patient participated  Level of consciousness: awake and alert  Pain score: 0  Pain management: adequate  Airway patency: patent  Cardiovascular status: hemodynamically stable  Respiratory status: room air  Hydration status: acceptable  Postoperative Nausea and Vomiting: none    There were no known notable events for this encounter.

## 2024-09-12 NOTE — DISCHARGE INSTRUCTIONS
DISCHARGE INSTRUCTIONS    Patient: Etelvina Bansk  Surgery Date: 9/12/2024    Age: 44 y.o.   Gender: female  Attending: Mirtha Dimas MD    MRN: [id number]  OR Location: [facility]    PCP: Judith Baeza DO           SURGERY INFORMATION   Procedure performed: Procedure(s):  Left breast Magseed lumpectomy x 3   Post-Op diagnosis: Post-op Diagnosis     * Atypical ductal hyperplasia of left breast [N60.92]   Surgeon:    * Mirtha Dimas - Primary     ACTIVITY RESTRICTIONS   1.  Do not engage in sports, heavy work or lifting until cleared by Dr. Dimas.    2.  Do not lift/pull/push with left arm more than 10 pounds for 2 weeks.   3.  Wear the pink postoperative bra at all times for 2 weeks.  Wear this when sleeping as well.  May remove the postoperative bra to shower.  4.  You may drive when off of narcotic pain medication.  5.  Continue wearing the compression stockings (ESTIVEN hose) until you are walking at least 3 times per day.    BATHING   You may shower starting the day after your surgery.  You may use the Hibiclens soap (the liquid soap you used prior to surgery) at your surgical sites.  However, do not use this soap more than 3 times per week.    WOUND CARE / DRAIN CARE   Remove the gauze/tape dressing before your shower.  Pat dry the Steri-Strips after your shower.  You do NOT need to cover the Steri-Strips after your shower unless there is bleeding or drainage.  2.   Allow the Steri-Strips to fall off on their own.  3.   Apply ice to your surgical area for 20 minutes 3 times a day to help with pain and swelling.    MEDICATIONS   A narcotic pain medication has been prescribed.  Use this medication only AS NEEDED for severe pain.  2.   You may use Tylenol, or ibuprofen, in addition to, or instead of, your narcotic pain medication.  3.   Resume all home medications unless previously discussed with your surgeon. Blood thinners can be restarted the day after your surgery unless there is significant  bleeding.    DIET   Diet as tolerated.   For the first 24 hours after surgery, it is recommended that you eat light meals.  Some nausea and vomiting is common for the first 24 hours after surgery.  Drink plenty of fluids.  Minimize your use of caffeinated beverages.    CALLL YOUR SURGEON IF:   Any evidence of infection at your sugical site which can include redness or drainage. Some clear or pinkish drainage is normal for a few days following surgery.  2.   Excessive bleeding from your surgical site. If there is a small amount of bleeding, apply pressure for 20 minutes, then recheck the wound. If the bleeding does not stop, please call your surgeon's office.  3.   Some nausea and vomiting is expected for the first 24 hours after surgery. If you are unable to keep down fluids, or the nausea/vomiting continues beyond 24 hours.  4.   If you are unable to urinate within 8-12 hours after discharge from the hospital.  5.   A low-grade fever after surgery is normal. Notify the office if your temperature goes above 101 degrees.  6.   Any other concerns or questions you have regarding your surgery.      Mirtha Dimas MD, FACS  [facility] General Surgery  [address] [place] [address];   [address]; [address]  [place]  [address] [phone]

## 2024-09-25 ENCOUNTER — APPOINTMENT (OUTPATIENT)
Dept: SURGERY | Facility: CLINIC | Age: 44
End: 2024-09-25
Payer: COMMERCIAL

## 2024-09-25 VITALS
OXYGEN SATURATION: 95 % | HEART RATE: 88 BPM | WEIGHT: 184.8 LBS | HEIGHT: 62 IN | DIASTOLIC BLOOD PRESSURE: 86 MMHG | BODY MASS INDEX: 34.01 KG/M2 | SYSTOLIC BLOOD PRESSURE: 118 MMHG

## 2024-09-25 DIAGNOSIS — Z91.89 AT HIGH RISK FOR BREAST CANCER: ICD-10-CM

## 2024-09-25 DIAGNOSIS — R92.30 DENSE BREAST TISSUE: ICD-10-CM

## 2024-09-25 DIAGNOSIS — Z12.31 ENCOUNTER FOR SCREENING MAMMOGRAM FOR BREAST CANCER: ICD-10-CM

## 2024-09-25 DIAGNOSIS — N60.92 ATYPICAL DUCTAL HYPERPLASIA OF LEFT BREAST: Primary | ICD-10-CM

## 2024-09-25 DIAGNOSIS — R92.1 CALCIFICATION OF LEFT BREAST: ICD-10-CM

## 2024-09-25 PROCEDURE — 3074F SYST BP LT 130 MM HG: CPT | Performed by: SURGERY

## 2024-09-25 PROCEDURE — 99024 POSTOP FOLLOW-UP VISIT: CPT | Performed by: SURGERY

## 2024-09-25 PROCEDURE — 3050F LDL-C >= 130 MG/DL: CPT | Performed by: SURGERY

## 2024-09-25 PROCEDURE — 3079F DIAST BP 80-89 MM HG: CPT | Performed by: SURGERY

## 2024-09-25 PROCEDURE — 3008F BODY MASS INDEX DOCD: CPT | Performed by: SURGERY

## 2024-09-25 PROCEDURE — 3044F HG A1C LEVEL LT 7.0%: CPT | Performed by: SURGERY

## 2024-09-25 ASSESSMENT — ENCOUNTER SYMPTOMS
ABDOMINAL PAIN: 0
DYSURIA: 0
FLANK PAIN: 0
CONSTIPATION: 0
FREQUENCY: 0
HEMATURIA: 0
WEAKNESS: 0
MYALGIAS: 0
COUGH: 0
NAUSEA: 0
SHORTNESS OF BREATH: 0
FEVER: 0
SPEECH DIFFICULTY: 0
POLYPHAGIA: 0
HEADACHES: 1
FATIGUE: 0
EYE PAIN: 0
AGITATION: 0
CHILLS: 0
VOMITING: 0
EYE REDNESS: 0
ARTHRALGIAS: 0
BRUISES/BLEEDS EASILY: 0
DIARRHEA: 0
WOUND: 0
CONFUSION: 0

## 2024-09-25 NOTE — PROGRESS NOTES
GENERAL SURGERY OFFICE NOTE    Patient: Etelvina Banks    Age: 44 y.o.   Gender: female    MRN: [id number]    PCP: Judith Baeza, DO        SUBJECTIVE     Chief Complaint  Follow-up (Patient is here for a 2 weeks PO for her lumpectomy on her left breast on 9/12/24. Patient denies any pain, bleeding or drainage from her breast. Patient does report little tenderness, but nothing to bad. )       HPI  Etelvina returns to the office for 2-week postop check after undergoing a left breast Magseed lumpectomy x 3 for calcifications associated with atypical ductal hyperplasia.  Since surgery, she has not had any significant problems.  She only took 4 of the Norco tablets, and took some ibuprofen for a few days.  She has not had any drainage or bleeding from her incision site.  She is back to eating and drinking well without any nausea or vomiting.  No fevers.  She has gone back to work, but is not doing any heavy lifting.    Risk factors for breast cancer: 44-year-old white female; menarche at age 9; first live birth at age 21; 1 breast biopsy with atypical ductal hyperplasia; mother diagnosed with breast cancer in her mid 60s.  She is premenopausal.  She also had a maternal uncle and a maternal aunt who both had cancer, but she is not sure which type.  This gives her a 5-year-old Tracie score of 5.1 and a lifetime risk of 38.5% which puts her in a high risk category.  Using the Tyrer-Cuzick Breast cancer risk evaluation tool, this patient's 10-year risk of breast cancer is 11.2% ( average risk is 2.3%) and lifetime risk is 40.5% (average lifetime risk is 10.1%). This puts her in a high risk category for developing breast cancer.    ROS  Review of Systems   Constitutional:  Negative for chills, fatigue and fever.   HENT:  Negative for congestion, ear pain and hearing loss.    Eyes:  Negative for pain and redness.   Respiratory:  Negative for cough and shortness of breath.    Cardiovascular:  Negative for chest pain and leg  swelling.   Gastrointestinal:  Negative for abdominal pain, constipation, diarrhea, nausea and vomiting.   Endocrine: Negative for polyphagia.   Genitourinary:  Negative for dysuria, flank pain, frequency and hematuria.   Musculoskeletal:  Negative for arthralgias and myalgias.   Skin:  Negative for rash and wound.   Allergic/Immunologic: Negative for immunocompromised state.   Neurological:  Positive for headaches. Negative for speech difficulty and weakness.   Hematological:  Does not bruise/bleed easily.   Psychiatric/Behavioral:  Negative for agitation and confusion.           HISTORY     Past Medical History:   Diagnosis Date    Arthritis 2015    Body mass index (BMI)30.0-30.9, adult 06/04/2021    Body mass index (BMI) of 30.0 to 30.9 in adult    Depression, major, single episode, moderate (Multi) 02/28/2023    Diabetes mellitus (Multi) 4-01-24    Headache, unspecified     Bad headache    Hypertension     Type 2 diabetes mellitus (Multi)     Vision loss     Wears dentures     upper only        Past Surgical History:   Procedure Laterality Date    BREAST BIOPSY Left 06/07/2024    BREAST BIOPSY Left 9/12/2024    Procedure: Left breast Magseed lumpectomy x 3;  Surgeon: Mirtha Dimas MD;  Location: [facility] OR;  Service: General Surgery;  Laterality: Left;  8 AM, 2 hours    BREAST LUMPECTOMY Left 09/12/2024    Left breast Magseed lumpectomy x 3 (ADH)    OTHER SURGICAL HISTORY  03/11/2019    Ovarian cystectomy    OTHER SURGICAL HISTORY  10/14/2022    Tubal ligation    OTHER SURGICAL HISTORY Right 03/22/2021    arthritis metal plate and screw        Family History   Problem Relation Name Age of Onset    Breast cancer Mother Wanda     Arthritis Mother Wanda     Hypertension Mother Wanda     Asthma Brother Zenon     Diabetes Other Wanda     Cancer Other aunt         No Known Allergies     Social History     Tobacco Use   Smoking Status Former    Types: Cigarettes    Passive exposure: Past   Smokeless Tobacco Never     "    Social History     Substance and Sexual Activity   Alcohol Use Never        HOME MEDICATIONS  Current Outpatient Medications   Medication Instructions    buprenorphine-naloxone (Suboxone) 8-2 mg SL film sublingual, Take 1 film SL twice kurt    cream base no.31, bulk, (Transdermal Pain Base) cream once daily as needed.    hydroCHLOROthiazide (MICROZIDE) 12.5 mg, oral, Daily PRN    losartan (COZAAR) 50 mg, oral, Daily    metFORMIN XR (Glucophage-XR) 500 mg 24 hr tablet Take 2 tablet PO daily    naloxone (Narcan) 4 mg/0.1 mL nasal spray nasal, Spray 4 mg intranasally once if needed for overdose or respiratory depression          OBJECTIVE   Last Recorded Vitals.  Blood pressure 118/86, pulse 88, height 1.575 m (5' 2\"), weight 83.8 kg (184 lb 12.8 oz), SpO2 95%.     PHYSICAL EXAM  Physical Exam   Previous exam:  General: Well-developed, well-nourished and in no acute distress.  Head: Normocephalic. Atraumatic.  Neck/thyroid: Neck is supple.   Eyes: Pupils equal round and reactive to light. Conjunctiva normal.  ENMT: No masses or deformity of external nose. External ears without masses.  Respiratory/Chest:  Normal respiratory effort.  Breast: Moderate sized, symmetrical breasts.  No palpable masses of either breast.  No skin changes and no nipple discharge.  The inferior incision on the left breast is healing well.  There is no bruise or redness.  No drainage.  No soft tissue swelling.  Lymphatics: No palpable lymphadenopathy of the cervical, supraclavicular or axillary regions.  Cardiovascular: Regular rate and rhythm.   Abdomen: Soft, nontender, nondistended.   Musculoskeletal: Joints and limbs are grossly normal. Normal gait. Normal range of motion of major joints.  Neuro: Oriented to person, place and time. No obvious neurological deficit. Motor strength grossly normal.  Psych: Normal mood and affect.    RESULTS   Labs  SURGICAL PATHOLOGY IS STILL PENDING.      A.  LEFT BREAST CALCIFICATIONS, POSTERIOR, " STEREOTACTIC CORE NEEDLE BIOPSY:  --ATYPICAL DUCTAL HYPERPLASIA WITH ASSOCIATED MICROCALCIFICATIONS, SEE NOTE  --BENIGN BREAST DUCTS AND LOBULES WITH ASSOCIATED MICROCALCIFICATIONS     Note: Multiple deeper levels are examined.  Immunohistochemical stains with appropriate controls for CK5/6 and ER are performed on block A1, A2, A3, A4, and A5.  CK5/6 immunohistochemical stain shows absent mosaic pattern of staining with ER positivity, supporting above diagnosis.     B.  LEFT BREAST CALCIFICATIONS, ANTERIOR, STEREOTACTIC CORE NEEDLE BIOPSY:  --ATYPICAL DUCTAL HYPERPLASIA WITH ASSOCIATED MICROCALCIFICATIONS, SEE NOTE     Note: Multiple deeper levels are examined.  Immunohistochemical stains with appropriate controls for CK5/6 and ER are performed on block B1, B2, B4 and B5.  CK5/6 immunohistochemical stain shows absent mosaic pattern of staining with ER positivity, supporting above diagnosis.     : Dr. Slade (A1, B1).   Electronically signed by Alberta Flowers MD on 6/13/2024 at 1314      [facility]     FINAL DIAGNOSIS   A. BREAST, LEFT, LOWER OUTER QUADRANT, CORE BIOPSY:      -- Atypical ductal hyperplasia with microcalcifications.  -- Flat epithelial atypia with microcalcifications.  -- Microscopic intraductal papilloma.  -- Usual ductal hyperplasia and columnar cell change with microcalcifications.   Electronically signed by Indu Lopez MD PhD on 8/5/2024 at 1235       Radiology [facility]  MR BREAST BILATERAL WITH CONTRAST FULL PROTOCOL;  7/3/2024 6:12 pm      ACCESSION NUMBER(S):  [id number]      ORDERING CLINICIAN:  PAYAM SILVER      INDICATION:  Recently diagnosed left breast atypical ductal hyperplasia x2.      COMPARISON:  No prior MRIs are available for comparison. Correlation is made to  mammograms 05/08/2024 and 06/07/2024.      TECHNIQUE:  Using a dedicated breast coil, STIR axial and T1-weighted fat  saturation axial images of the breasts were obtained, the latter both  before and after  intravenous administration of Gadolinium DTPA. On an  independent workstation, 3-D images were formulated using Velomedix[employer]  including time enhancement curves, subtraction images and MIP images.      Intravenous contrast: 17 mL of Dotarem      FINDINGS:  Density: Heterogeneous fibroglandular tissue.      There is symmetric minimal bilateral background enhancement.      RIGHT BREAST:  No suspicious mass or nonmass enhancement is  identified.      No axillary or internal mammary lymphadenopathy is appreciated.      LEFT BREAST: Signal void from a tissue marker is identified in the  lower outer quadrant at anterior depth on slice 41 from recent core  biopsy with pathology demonstrating ADH. There is an associated 1.1  cm post biopsy hematoma. Signal void from a tissue marker is also  identified in the deep lower outer quadrant on slice 50 also from  recent core biopsy with pathology demonstrating ADH. Segmental non  mass enhancement spans the 2 tissue markers and measures  approximately 8.8 x 2.5 x 1.5 cm in anteroposterior, transverse and  craniocaudal dimension on slice 50. Anterior extent clears the nipple  by at least 2 cm. Focal non mass enhancement versus a possible mass  is present just superior to this area in the lower outer quadrant at  middle to posterior depth on slice 57 and measures approximately 1.5  cm in maximum dimension. This is best visualized on the sagittal  reconstructions. Review of mammograms 05/08/2024 demonstrates a  possible mass/architectural distortion with calcifications in the  slight inferior/lateral breast at middle to posterior depth on MLO  slice 15 and CC slice 13 which possibly corresponds with the area on  MRI.      No axillary or internal mammary lymphadenopathy is appreciated.      NON-BREAST FINDINGS:  None.      IMPRESSION:  1. 2 site biopsy-proven ADH in the left breast with associated  segmental non mass enhancement as described above. Suspicious focal  non mass enhancement  versus a possible mass is noted just superior to  this area with possible correlate seen on mammogram. Recommendation  is for diagnostic additional views, possible MRI directed ultrasound  and possible biopsy. If a target is not visualized for biopsy, an MRI  guided biopsy is recommended. A yellow alert was submitted  communicating findings to the referring physician. A pre-procedure  form has been completed.  2. No MRI evidence of malignancy in the right breast.      BI-RADS CATEGORY:  BI-RADS Category:  4 Suspicious.  Recommendation:  Surgical Consultation and Biopsy.  Recommended Date:  Immediate.  Laterality:  Left.  Method of Detection: Category Sdbt - 3D Screening    ASSESSMENT / PLAN   Diagnoses and all orders for this visit:  Atypical ductal hyperplasia of left breast  Calcification of left breast  At high risk for breast cancer  Dense breast tissue  Encounter for screening mammogram for breast cancer  -     BI mammo bilateral screening tomosynthesis; Future        Plan  June 2024: LEFT: 6cm area of LOQ with branching calcifications (6cm) with CNBx2 showing ADH; MRI showed area of enhancement of the left breast (area previously biopsy proven ADH) measuring 8.8 x 2.5 x 1.5 cm with a 1.5 cm non-mass/mass enhancement area of the lower outer quadrant separate from ADH area (not visible by mammogram or ultrasound); core needle biopsy of 1.5 cm non-mass/mass seen on MRI also demonstrated ADH    1.  Reviewed with the patient that she does have some coarse calcifications of the upper outer quadrant of the left breast, but these are unchanged over the last several mammograms.  Breast MRI did not show any concerns in this area.  No further intervention is required for these.  2.  On screening mammogram she was found to have new calcifications of the lower outer quadrant of the left breast which appear to span over 6 cm area and are in a branching formation.  Therefore, a 2 site stereotactic biopsy of these  calcifications was performed.  Both biopsies demonstrate atypical ductal hyperplasia.  Given the large area of potential involvement (6 cm by mammogram) a breast MRI was obtained.  The breast MRI suggest the area of enhancement with the atypical ductal hyperplasia measured 8.8 x 2.5 x 1.5 cm.  There was also another area measuring 1.5 cm of a non-mass enhancement (possibly with an underlying mass) on the MRI which was concerning and required biopsy.  Mammogram and ultrasound of this 1.5 cm area could not identify an underlying abnormality.  Therefore, she underwent an MRI guided biopsy which showed yet another area of atypical ductal hyperplasia.  3.  Her new calculations of her risk factors put her well over the 20% lifetime risk for developing breast cancer which puts her in a high risk category.  She was given the high risk booklet.  Should she elect not to undergo any surgical intervention for the atypical ductal hyperplasia, she would at least qualify for more frequent screening with alternating mammograms and breast MRIs so that a radiographic evaluation would be performed every 6 months.  4.  Her 5-year Tracie score is greater than 3%.  Therefore, she would qualify for chemo preventative medications.  5.  She has genetics consultation scheduled for 10/1/2024.  6.  Reviewed her case with mother breast colleague.  She has extensive atypical ductal hyperplasia in the left breast.  However, definitive cancer has not been diagnosed.  We did review that there could be underlying cancer associated with atypical ductal hyperplasia.  Will plan for surgical lumpectomy of all 3 of the previous biopsied sites with anticipation that all of the calcifications will not be removed.  Would not recommend a cancer procedure unless definitive cancer identified on final pathology.  If she does have underlying cancer, would recommend mastectomy at that time.  She underwent a 3 point lumpectomy of the left breast.  She is healing  well from the surgery, but surgical pathology is still pending.  Will call the patient with the results.  7.  To keep in real with her radiographic evaluation, she will be scheduled for a bilateral mammogram in 6 months with follow-up in the office after the mammogram.  8.  She was given a note for work that she may return back to full duty without restrictions.      Mirtha Dimas MD, FACS  [facility] General Surgery  [address] [place] [address];   [employer] [address]; [address]  [place]  [address] [phone]

## 2024-09-25 NOTE — LETTER
September 25, 2024     Patient: Etelvina Banks   YOB: 1980   Date of Visit: 9/25/2024       To Whom It May Concern:    It is my medical opinion that Etelvina Banks may return to full duty immediately with no restrictions.    If you have any questions or concerns, please don't hesitate to call.         Sincerely,        Mirtha Dimas MD    CC: No Recipients

## 2024-09-25 NOTE — PATIENT INSTRUCTIONS
1.  Your incision site is healing well.  You may wear your own brawl instead of the postoperative brawl as tolerated.  You may also place lotion on at the surgical site for any dryness or itching.  Any vitamin E based or cocoa butter based lotion can help the healing process.  2.  Dr. Dimas will call you when your pathology report from your surgery is available.  3.  Continue doing your monthly self breast exams.  Please call the office if you feel any abnormalities.  [phone]  4.  You will be due for a mammogram of both breast in 6 months.  Follow-up in the office after this mammogram.

## 2024-09-30 PROCEDURE — 88360 TUMOR IMMUNOHISTOCHEM/MANUAL: CPT | Mod: TC,PORLAB | Performed by: SURGERY

## 2024-09-30 PROCEDURE — 88360 TUMOR IMMUNOHISTOCHEM/MANUAL: CPT | Performed by: STUDENT IN AN ORGANIZED HEALTH CARE EDUCATION/TRAINING PROGRAM

## 2024-10-01 ENCOUNTER — APPOINTMENT (OUTPATIENT)
Dept: GENETICS | Facility: CLINIC | Age: 44
End: 2024-10-01
Payer: COMMERCIAL

## 2024-10-01 ENCOUNTER — TELEPHONE (OUTPATIENT)
Dept: SURGERY | Facility: CLINIC | Age: 44
End: 2024-10-01

## 2024-10-02 ENCOUNTER — TELEPHONE (OUTPATIENT)
Dept: SURGERY | Facility: HOSPITAL | Age: 44
End: 2024-10-02
Payer: COMMERCIAL

## 2024-10-02 DIAGNOSIS — D05.12 DUCTAL CARCINOMA IN SITU (DCIS) OF LEFT BREAST: Primary | ICD-10-CM

## 2024-10-02 LAB
LAB AP ASR DISCLAIMER: NORMAL
LAB AP BLOCK FOR ADDITIONAL STUDIES: NORMAL
LABORATORY COMMENT REPORT: NORMAL
PATH REPORT.ADDENDUM SPEC: NORMAL
PATH REPORT.FINAL DX SPEC: NORMAL
PATH REPORT.GROSS SPEC: NORMAL
PATH REPORT.RELEVANT HX SPEC: NORMAL
PATH REPORT.TOTAL CANCER: NORMAL
PATHOLOGY SYNOPTIC REPORT: NORMAL

## 2024-10-02 NOTE — TELEPHONE ENCOUNTER
Was able to talk with Etelvina today, 10/2/24.  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  Given the extent of the DCIS in the left breast, do not feel that breast conservation surgery would leave a good cosmetic outcome.  Therefore did recommend a mastectomy on the left side.  Provided her with different options including a left-sided mastectomy (with and without reconstruction) versus bilateral mastectomy (with and without reconstruction).  Also reviewed that in her final pathology there could be invasive cancer identified.  To help her make her decisions, will:  1.  Refer to plastic surgery for consultation on reconstructive surgery.  2.  Referred to Dr. Ortiz (breast surgeon) for consideration of nipple sparing mastectomy.  She states that she is a B+/C- cup.  3.  She missed her consultation yesterday with genetics.  Will connect with genetics to get her genetic consultation done stat within the next 1 to 2 weeks so that her genetic testing can be sent off as soon as possible.        ----- Message from Mirtha Dimas sent at 10/1/2024 12:49 PM EDT -----  Regarding: RE: Surgical path report  Her path report came back as DCIS with multiple positive margins.  Given the extent and size of the DCIS, would recommend mastectomy surgery.  Will give her option of reconstruction.  Tried to call the patient, but had to leave message.  She is likely at her genetics consultation appointment currently.  Will try again later.  ----- Message -----  From: Mirtha Dimas MD  Sent: 9/26/2024   3:39 PM EDT  To: Mirtha Dimas MD  Subject: Surgical path report                             Her surgical path report from her left Magseed lumpectomy x 3 surgery was not available at the time of her 2-week postop appointment.  When pathology is available, call the patient with the results.

## 2024-10-03 NOTE — TELEPHONE ENCOUNTER
----- Message from Usha VALENTE sent at 10/3/2024 11:33 AM EDT -----  Regarding: RE: referrals  Referral has been sent to Dr. Mcknight's office. I called Dr. Ortiz's office to schedule the patient and her office will call the patient to scheduled. I informed the patient that both plastics and Angel Estrella's office will be reaching out to her to schedule.  ----- Message -----  From: Mirtha Dimas MD  Sent: 10/2/2024   5:07 PM EDT  To: Usha Rivas MA  Subject: referrals                                        1.  Refer to plastic surgery for consultation on reconstructive surgery.  2.  Referred to Dr. Ortiz (breast surgeon) for consideration of nipple sparing mastectomy.  She states that she is a B+/C- cup.

## 2024-10-04 NOTE — PROGRESS NOTES
History of Present Illness:  Etelvina Banks is a 44 y.o. female with a recent diagnosis of DCIS. Ms. Banks was referred to the Cancer Genetics Clinic at [facility] by Mirtha Dimas MD. Ms. Banks is interested in genetic testing as it may assist in her own treatment planning, as well as help identify cancer risk for her family members.    .MWHEREWITH    Cancer Medical History:  Personal history of cancer? Yes   Type: DCIS  Age at diagnosis: 44 years  Summary:   07/2024: Abnormal mammogram  6/7/2024: core needle biopsy identified atypical ductal hyperplasia  9/12/2024: underwent left breast Magseed lumpectomy x 3 for calcifications associated with atypical ductal hyperplasia, final pathology showed ductal carcinoma in situ (DCIS)  Plan is for consultation with breast surgeon and plastic surgeon to discuss options.    History of other cancers: {YES/NO:31181}    Personal history of neoplasia? {YES/NO:14785}  Summary: ***    Prior genetic testing? {YES/NO:99544}  Family member with prior genetic testing? {YES/NO:98703}    Cancer screening history:  Mammograms?  Yes, yearly.  PAP smear? {Yes, most recent or No:62967}   Colonoscopy?  Cologuard 05/2024, normal.   Patient {Denies, Reports:554636} personal history of colorectal polyps. ***   Upper endoscopy? No   Dermatology? {Yes, most recent or No:43229}   Other cancer screening? ***    Reproductive History:  Number of children: ***  Number of pregnancies: ***  Age first birth: 21  Breast feeding? {YES/NO:200010}  Menarche (age): 9  Menopause (age): ***  OCP: {MWOCPUse:04725}  HRT: {YES/NO:200010}    Hysterectomy? {YES/NO:200010}  Oophorectomy? {YES/NO:200010}    Family history:  A 4-generation pedigree was obtained and was significant for the following: ***    Maternal ancestry is ***. Paternal ancestry is ***. There is {maternal\paternal\no known:81186} Ashkenazi Christian ancestry. There is {maternal\paternal\no known:88066} consanguinity.     Genetic  counseling:    Summary    Ms. Banks is a 44 y.o. female with a personal and family history of breast cancer.      Ms. Banks's reported personal history and family history history is concerning for possible hereditary breast cancer.  Ms. Banks meets current national (NCCN) criteria for testing of high-penetrance breast cancer susceptibility genes, including BRCA1, BRCA2, CDH1, PALB2, PTEN, STK11, and TP53.  .    Testing is medically necessary, as it will help determine if Ms. Banks is a candidate for high-risk breast care, including more frequent screenings and consideration of a risk reducing bilateral mastectomy.    Ms. Banks is interested in testing, which is recommended, and was ordered today via the {MWPANELNAMES:38334} panel.    Our discussion is summarized below.    Discussion    We reviewed genes and chromosomes, and inherited forms of breast cancer.  We discussed that most cancers are not due to an inherited genetic susceptibility.  However, in about 5-10% of families, there is an inherited genetic mutation that can make a person more susceptible to developing certain forms of cancer.  Within families with a genetic predisposition to cancer, we often see certain patterns, such as multiple family members with cancer and cancers occurring in multiple generations. In addition, earlier onset and/or bilateral cancers are suggestive of an inherited predisposition. There also tends to be a clustering of certain types of cancer in these families, such as breast and ovarian cancers, or colon and uterine cancers.     We discussed the BRCA1 and BRCA2 genes, which are two genes that have been linked to early-onset breast and/or ovarian cancer.  Mutations in these genes are inherited in a dominant pattern and confer up to an 87% lifetime risk for breast cancer.  This is elevated compared to the general population risk of 10-12%.  In addition, BRCA1 and BRCA2 mutation carriers have up to a 45% lifetime risk for  ovarian cancer, which is elevated over the 2% general population risk.  Mutation carriers who have already been diagnosed with cancer have an increased risk to develop a second, contralateral breast cancer.  BRCA2 gene mutation carriers have an increased risk for male breast cancer, prostate cancer, melanoma, gastric cancer, and pancreatic cancer.    The BRCA genes are inherited in an autosomal dominant fashion. This means that if an individual has a change in one of these genes, their siblings and children have a 50% chance of also having that gene change and a 50% chance of not having the gene change.      Ms. Banks was counseled about hereditary cancer susceptibility including cancer risks, options for increased screening and/or risk reduction, genetic testing, and the implications for other family members.      We discussed performing testing as part of a multi-gene panel that looks at high and moderate penetrance genes. Some genes are considered highly penetrant cancer genes, meaning a mutation in the gene confers a high risk of cancer. On the other hand, there are other intermediate (moderate risk) cancer genes. For many of the moderate risk genes, there may be limited information regarding the degree to which a mutation in the gene affects risk of different types of cancers. Additionally, for some of these moderate risk genes, the appropriate management for individuals who have a mutation in one of these genes is not always clear. In many cases, even if an individual tests positive for a mutation in a moderate risk gene, recommendations are still based on the family history, not the positive test result.  Additionally, unexpected findings may occur, where a mutation is identified that confers a risk for a cancer not seen in the family history.  Lastly, in the era of multigene panel testing, we know that more than one pathogenic or likely pathogenic variant can be identified in any one individual and/or  family.    After a discussion of the different panel options, Ms. Banks elected the {MWPANELS:18223}    In order to facilitate her upcoming surgical plans, we also discussed starting with a stat/rush 13-gene panel (BRCAPlus: ORIANA, BARD1, BRCA1, BRCA2, CDH1, CHEK2, NF1, PALB2, PTEN, RAD51C, RAD51D, STK11, TP53).     We reviewed the types of results we can get back:     - A positive result means that we identified a change in a gene that confers an increased cancer risk for cancer. We will discuss potential changes in management for Ms. aBnks and her family based on the specific gene mutation found.      - A negative (normal) result clears her for a majority of cancer predisposition syndromes that we are aware of, but cannot clear her for any/all cancer predisposition risks. She would continue to be screened based on her personal and family history.      - A Variant of Uncertain Significance (VUS) means that some kind of change was found in a gene, but it is currently unknown whether this specific change is harmful.  These results are treated like a negative, therefore they don't  recommendations or warrant familial variant testing.    We discussed the Genetic Information Nondiscrimination Act (JANIYA). We discussed the protections and limitations of JANIYA. JANIYA prohibits discrimination by health insurance plans and most employers (with >15 employees) based on one's genetic information. JANIYA does not protect against genetic discrimination for life insurance, disability insurance, long-term care, or other insurances.    After a discussion about the risks, benefits, and limitations of genetic testing, Ms. Banks elected to undergo genetic testing for hereditary cancer using the {MWLab:07136} panel described above.  She gave her {MWCONSENTTYPES:62338} consent to proceed with testing.    The stat results are generally available in <=2 weeks with time of blood draw.  The additional testing can take up to an  additional 3 weeks. We agreed to call out the results to Ms. Banks when they are available, though this is also visible through  [employer].  We will make plans for return of a larger panel results once the initial panel results are discussed.    PLAN:    Ms. Banks elected to undergo genetic testing for hereditary cancer using the {MWPANELNAMES:94313} panel.  She gave her {MWCONSENTTYPES:27877} consent to proceed with testing.      An [email or web address] DNA/RNA blood test kit will be sent to Ms. Banks's home; we asked that this kit be overnighted to the patient to expedite testing. Ms. Banks will then bring the test kit to a  outpatient blood draw lab to have her blood drawn for testing (using the test tubes provided in the kit). The sample will then be sent to [employer] for analysis.    The stat results are generally available in <=2 weeks with time of blood draw.  The additional testing can take up to an additional 3 weeks. We agreed to call out the results to Ms. Banks when they are available, though this is also visible through  [employer].  We will make plans for return of a larger panel results once the initial panel results are discussed.    We remain available to Ms. Banks at [phone] if any questions arise regarding information discussed at today's visit. Elbert can be reached directly at [phone].    {MWSIGNOFF:98857}

## 2024-10-09 PROBLEM — R92.8 ABNORMAL MAMMOGRAM: Status: RESOLVED | Noted: 2023-02-28 | Resolved: 2024-10-09

## 2024-10-09 PROBLEM — N60.92 ATYPICAL DUCTAL HYPERPLASIA OF LEFT BREAST: Status: RESOLVED | Noted: 2024-06-14 | Resolved: 2024-10-09

## 2024-10-09 PROBLEM — N63.20 LEFT BREAST MASS: Status: RESOLVED | Noted: 2024-07-09 | Resolved: 2024-10-09

## 2024-10-09 PROBLEM — R92.1 CALCIFICATION OF LEFT BREAST: Status: RESOLVED | Noted: 2024-06-05 | Resolved: 2024-10-09

## 2024-10-09 PROBLEM — Z91.89 AT HIGH RISK FOR BREAST CANCER: Status: RESOLVED | Noted: 2024-06-14 | Resolved: 2024-10-09

## 2024-10-09 NOTE — PROGRESS NOTES
Subjective     Diagnosis date: 2024 left atypical ductal hyperplasia which upgraded on excisional biopsy 2024 to nuclear grade 1-2 ductal carcinoma in situ, ER >95% 3+, pTis cN0, stage 0     Cancer Staging   Ductal carcinoma in situ (DCIS) of left breast  Staging form: Breast, AJCC 8th Edition  - Pathologic stage from 2024: Stage 0 - Signed by Destinee Ortiz MD on 10/10/2024  pTis (DCIS)  cN0  cM0  ER Status: Positive  LA Status: Not assessed  HER2 Status: Not assessed        Etelvina Banks is a 44 y.o. female who was referred by: Dr. Mirtha Dimas  for management of left screen detected atypical ductal hyperplasia which upgraded to nuclear grade 1-2 DCIS at 3 different sites of excisional biopsy with one positive and multiple close margins. She presents to the [facility] for treatment recommendations. Diagnostic imaging  before surgery demonstrated up to 9cm of NME on MRI correlating with the biopsy sites. She denies palpable masses, skin changes, or nipple discharge. She has a family history of breast cancer in her mom.    BREAST IMAGIN24 Bilateral screening mammogram demonstrates heterogeneously dense breast tissue, BI-RADS Category 0, Extensive bilateral calcifications are again noted. Calcifications of the left breast posterior depth inferior to the posterior nipple line MLO tomographic are similar to prior examination but have increased in extent. Spot magnification compression view recommended. There are multiple round asymmetries of the right and left breast similar to prior examination.  24 left diagnostic mammogram and targeted ultrasound, indicates BI-RADS Category 4, Numerous very fine calcifications are present lower outer quadrant posterior depth which are in a distribution suggesting branching pattern. The calcifications of greatest concern span in AP length of about 6 cm. No associated mass is identified. More superiorly in the upper-outer quadrant there are  scattered more coarse calcifications  which have not changed.  7/3/24 bilateral breast MRI RIGHT BREAST:  No suspicious mass or nonmass enhancement is identified. No axillary or internal mammary lymphadenopathy is appreciated. LEFT BREAST: Signal void from a tissue marker is identified in the LOQ at anterior depth (ADH). Signal void from a tissue marker is also identified in the deep LOQ (ADH). Segmental non mass enhancement spans the 2 tissue markers and measures up to 8.8cm. Focal non mass enhancement versus a possible mass is present just superior to this area in the LOQ at middle to posterior depth on slice 57 and measures approximately 1.5 cm in maximum dimension. Review of mammograms 2024 demonstrates a possible mass/architectural distortion with calcifications in the  slight inferior/lateral breast at middle to posterior depth which possibly corresponds with the area on  MRI. No axillary or internal mammary lymphadenopathy is appreciated. NON-BREAST FINDINGS:  None.  24 left diagnostic mammogram/US No discrete mammographic or sonographic finding corresponding to the MRI findings. Consider MRI guided biopsy of the discernible abnormality.    BREAST PROCEDURE: 24 left breast, LOQ, 2 site stereotactic-guided core biopsy at the anterior and posterior aspect of the 6cm of calcifications  24 left breast MR-guided biopsy for NME  24 left Magseed localized excisional biopsy x3    REPRODUCTIVE HEALTH: menarche at 10, pre-menopausal, , age at first birth 21, did not breastfeed, used OCPs for <5 years    MEDICAL HISTORY: hypertension, diabetes, obesity BMI 35    FAMILY CANCER HISTORY:   Mom with breast cancer mid-60s    MEDICAL ONCOLOGY: referral post op if indicated    RADIATION ONCOLOGY: referral post op if indicated    GENETICS: meets NCCN criteria, scheduled now for 10/15    Cancer-related family history includes Breast cancer in her mother; Cancer in an other family member.    Review  "of Systems   Constitutional: Negative.    HENT: Negative.     Eyes: Negative.    Respiratory: Negative.     Cardiovascular: Negative.    Gastrointestinal: Negative.    Endocrine: Negative.    Genitourinary: Negative.    Musculoskeletal: Negative.    Skin: Negative.    Neurological: Negative.    Hematological: Negative.    Psychiatric/Behavioral: Negative.          Objective     Visit Vitals  /85   Pulse 84   Temp 36.1 °C (97 °F) (Temporal)   Resp 18   Ht 1.575 m (5' 2.01\")   Wt 82.9 kg (182 lb 12.2 oz)   SpO2 97%   BMI 33.42 kg/m²   OB Status Having periods   Smoking Status Former   BSA 1.9 m²        Breast: Exam performed in sitting and supine positions.   cup size: C  RIGHT BREAST EXAM:  Breast: no discrete mass  Skin Erythema: no  Peau d' orange: no  Nipple Inversion: no  Nipple Discharge: no     LEFT BREAST EXAM:  Breast: healing excisional biopsy incision in the LOQ above the IMF, at least 2 biopsy sites in the lateral breast with skin tethering, no erythema  Skin Erythema: no  Attachment of Overlying Skin: no  Peau d' orange: no  Chest Wall Attachment: no  Nipple Inversion: no  Nipple Discharge: no     RIGHT EDDIE BASIN EXAM:  Axillary: negative  Infraclavicular: negative  Supraclavicular: negative     LEFT EDDIE BASIN EXAM:  Axillary: negative  Infraclavicular: negative  Supraclavicular: negative        General: Otherwise healthy appearing. Patient is in no acute distress.     HEENT: Conjunctivae are well colored and sclerae nonicteric. Neck is supple, trachea midline. No lymphadenopathy or thyromegaly.     Chest: Respiratory rate and effort normal. No cough.     CV: regular rate and rhythm.     Abdomen: Abdomen is obese, non-tender to palpation.     Extremities: Extremities are atraumatic. There is no evidence of lymphedema.    Shoulder abduction test: (raising affected arm(s) from lateral to 180 degrees overhead, one at a time) no limitations     Neurologic: Neurologically intact. Alert and " oriented.        Imaging    Images from mammogram and MRI were reviewed with breast imaging and results were shared with Ms. Banks today.    Assessment and Plan  The natural history and evolution of DCIS was discussed with Ms. Banks. This included the difference between in-situ and invasive carcinoma, and the distinction between local and systemic disease and local and systemic therapy. For local treatment options, I explained the risks and benefits of breast conservation and mastectomy (with or without reconstruction), including the fact that survival rates are equal with these two approaches, which is essentially 100% with non-invasive disease. She understands the need for free margins, which has not been achieved with her prior surgery. We discussed the option of re-excision v. Mastectomy. She understands it would be incredibly challenging for me to do re-excision, since I was not the original surgeon and I do not know where each of the 3 excisions came from based on where her seeds are on localization imaging. She feels mastectomy would be the better choice for her. The patient was told that meghna staging is not usually required for DCIS, but can be recommended if mastectomy is planned. The reasons for this were explained. I explained that for pure DCIS, for systemic therapy chemotherapy would never be recommended, although endocrine agents such as tamoxifen can be used in women with hormone sensitive disease in order to reduce the risk of local recurrence and future new primaries.    Ms. Banks meets NCCN criteria for genetic testing. I discussed the purpose of obtaining this information would be to help us determine the risk of future breast cancers, but it does not tell us anything about the behavior of the cancer she has today. She understands approximately 5-10% of all breast cancers have a genetic component, so the most likely outcome from testing would be negative for a pathogenic variant. Should she be  positive and at elevated risk for future breast cancer, we could discuss the value of bilateral mastectomy to address her current breast cancer diagnosis and for risk reduction of future breast cancers.    From a surgery standpoint, Ms. Banks would be most interested in mastectomy with reconstruction. We discussed skin sparing v. Nipple sparing. I think oncologically, nipple sparing could be appropriate, but would defer to plastic surgery if the nipple position and blood supply would be appropriately preserved. I think she would be an excellent autologous candidate based on her abdominal adiposity and shape of her chest. I recommended she meet with Dr. Jaye Dewey to discuss her reconstruction options. We discussed that while she could be an immediate MANUEL, we could also consider proceeding in a staged fashion with TE or implant placement at the time of mastectomy and MANUEL performed in a delayed fashion. Referrals will be placed to medical oncology and radiation oncology post op if indicated.    Following this discussion, where all of the patient's questions were answered, we agreed to proceed with genetic testing, at least left skin sparing possible nipple sparing mastectomy, intraoperative lymphatic mapping, axillary sentinel lymph node biopsy followed by immediate reconstruction. Informed consent was obtained in clinic today and surgery will be scheduled as we decide her reconstruction partner and a date that would work for both of us at either [facility] or [facility] if proceeding with immediate MANUEL. Chronic co-morbidities that can increase surgical complications (obese, hypertension, diabetes) were reviewed and noted; these will continue to be managed by her PCP and respective specialists.     Destinee Ortiz MD

## 2024-10-10 ENCOUNTER — TELEPHONE (OUTPATIENT)
Dept: GENETICS | Facility: CLINIC | Age: 44
End: 2024-10-10

## 2024-10-10 ENCOUNTER — APPOINTMENT (OUTPATIENT)
Facility: CLINIC | Age: 44
End: 2024-10-10
Payer: COMMERCIAL

## 2024-10-10 ENCOUNTER — OFFICE VISIT (OUTPATIENT)
Dept: SURGICAL ONCOLOGY | Facility: HOSPITAL | Age: 44
End: 2024-10-10
Payer: COMMERCIAL

## 2024-10-10 VITALS
WEIGHT: 182.76 LBS | SYSTOLIC BLOOD PRESSURE: 127 MMHG | HEIGHT: 62 IN | OXYGEN SATURATION: 97 % | BODY MASS INDEX: 33.63 KG/M2 | DIASTOLIC BLOOD PRESSURE: 85 MMHG | RESPIRATION RATE: 18 BRPM | HEART RATE: 84 BPM | TEMPERATURE: 97 F

## 2024-10-10 DIAGNOSIS — D05.12 DUCTAL CARCINOMA IN SITU (DCIS) OF LEFT BREAST: Primary | ICD-10-CM

## 2024-10-10 PROCEDURE — 3074F SYST BP LT 130 MM HG: CPT | Performed by: SURGERY

## 2024-10-10 PROCEDURE — 1036F TOBACCO NON-USER: CPT | Performed by: SURGERY

## 2024-10-10 PROCEDURE — 3044F HG A1C LEVEL LT 7.0%: CPT | Performed by: SURGERY

## 2024-10-10 PROCEDURE — 3008F BODY MASS INDEX DOCD: CPT | Performed by: SURGERY

## 2024-10-10 PROCEDURE — 3079F DIAST BP 80-89 MM HG: CPT | Performed by: SURGERY

## 2024-10-10 PROCEDURE — 99215 OFFICE O/P EST HI 40 MIN: CPT | Performed by: SURGERY

## 2024-10-10 PROCEDURE — 99205 OFFICE O/P NEW HI 60 MIN: CPT | Performed by: SURGERY

## 2024-10-10 PROCEDURE — 3050F LDL-C >= 130 MG/DL: CPT | Performed by: SURGERY

## 2024-10-10 ASSESSMENT — ENCOUNTER SYMPTOMS
NEUROLOGICAL NEGATIVE: 1
CONSTITUTIONAL NEGATIVE: 1
CARDIOVASCULAR NEGATIVE: 1
RESPIRATORY NEGATIVE: 1
PSYCHIATRIC NEGATIVE: 1
MUSCULOSKELETAL NEGATIVE: 1
GASTROINTESTINAL NEGATIVE: 1
HEMATOLOGIC/LYMPHATIC NEGATIVE: 1
ENDOCRINE NEGATIVE: 1
EYES NEGATIVE: 1

## 2024-10-10 ASSESSMENT — PAIN SCALES - GENERAL: PAINLEVEL: 0-NO PAIN

## 2024-10-10 NOTE — TELEPHONE ENCOUNTER
I called Ms. Banks for our genetics appointment this morning and let her know most genetic counseling appointments take ~1 hour. She informed me she was on her 15-minute break at work and requested a new appointment next week when we have more time. I explained that this would delay genetic testing, patient voiced understanding.    Her appointment with me has been rescheduled for Tuesday,10/15/2024 at 9:30 AM.

## 2024-10-13 NOTE — PROGRESS NOTES
History of Present Illness:  Etelvina Banks is a 44 y.o. female with a personal and family history of breast cancer. Ms. Banks was referred to the Cancer Genetics Clinic at [facility] by Mirtha Dimas MD. Ms. Banks is interested in genetic testing as it may assist in her own treatment planning, as well as help identify cancer risk for her family members.    Cancer Medical History:  Personal history of cancer? Yes   Type: Left breast DCIS  Age at diagnosis: 44  Summary:   Abnormal screening mammogram in 2024 with diagnostic mammogram on 2024 noting suspicious calcifications.  Underwent stereotactic guided core needle biopsy of left breast calcifications at 2 sites followed by tissue marker placement on 2024. Pathology showed left atypical ductal hyperplasia. Underwent left breast Magseed lumpectomy x 3 for calcifications associated with atypical ductal hyperplasia on 2024 with final path indicating nuclear grade 1-2 ductal carcinoma in situ, ER >95% 3+, pTis cN0, stage 0.    No surgery date set, meeting with plastic surgeon later today.    History of other cancers: No     Prior genetic testing? No   Family member with prior genetic testing? No     Cancer screening history:  Mammograms?  Yes, see cancer summary above.  PAP smear?  Last was in her 30's. No history of abnormal paps.    Colonoscopy? No. Cologuard 2024, normal.   Upper endoscopy? No   Dermatology? No   Other cancer screening? None.    Reproductive History:  Number of children: 4  Number of pregnancies: 4  Age first birth: 21  Breast feeding?  For a few weeks.  Menarche (age): 9  Menopause (age): Premenopausal  OCP:  Yes for ~1 month total.  HRT: No     Hysterectomy? No   Oophorectomy? No     Family history:  A 4-generation pedigree was obtained and was significant for the following:     - Patient, 44, recently diagnosed with left breast DCIS.  - Mother,  at 73, with a history of breast cancer diagnosed at  64.  - Maternal aunt,  in her mid to late 40's d/t an unspecified cancer diagnosed in her 40's.  - Maternal uncle  in his mid 50's to late 60's d/t lung cancer.  - Maternal grandfather,  in his late 70's to early 80's, with a history of an unspecified cancer.  - Paternal history is unknown.    Maternal ancestry is Kazakh and Romansh. Paternal ancestry is unknown. There is no known Ashkenazi Episcopal ancestry. There is no known consanguinity.     Genetic counseling:    Summary    Ms. Banks is a 44 y.o. female with a personal and family history of breast cancer.       Ms. Banks's reported personal and family history history is concerning for possible hereditary breast cancer.  Ms. Banks meets current national (NCCN) criteria for testing of high-penetrance breast cancer susceptibility genes, including BRCA1, BRCA2, CDH1, PALB2, PTEN, STK11, and TP53.     Testing is medically necessary, as it will help determine if Ms. Banks is a candidate for high-risk breast care, including more frequent screenings and consideration of a risk reducing bilateral mastectomy.    Ms. Banks is interested in testing, which is recommended, and was ordered today via the [employer] CancerNext-Expanded +Imagine K12 and [employer] BRCAPlus panel.    Our discussion is summarized below.    Discussion    We reviewed genes and chromosomes, and inherited forms of breast cancer.  We discussed that most cancers are not due to an inherited genetic susceptibility.  However, in about 5-10% of families, there is an inherited genetic mutation that can make a person more susceptible to developing certain forms of cancer.  Within families with a genetic predisposition to cancer, we often see certain patterns, such as multiple family members with cancer and cancers occurring in multiple generations. In addition, earlier onset and/or bilateral cancers are suggestive of an inherited predisposition. There also tends to be a clustering of certain types of cancer  in these families, such as breast and ovarian cancers, or colon and uterine cancers.     Ms. Banks has a recent diagnosis of an early-onset breast cancer. We discussed the BRCA1 and BRCA2 genes, which are two genes that have been linked to early-onset breast and/or ovarian cancer.  Mutations in these genes are inherited in a dominant pattern and confer up to an 87% lifetime risk for breast cancer.  This is elevated compared to the general population risk of 10-12%.  In addition, BRCA1 and BRCA2 mutation carriers have up to a 45% lifetime risk for ovarian cancer, which is elevated over the 2% general population risk.  Mutation carriers who have already been diagnosed with cancer have an increased risk to develop a second, contralateral breast cancer.  BRCA2 gene mutation carriers have an increased risk for male breast cancer, prostate cancer, melanoma, gastric cancer, and pancreatic cancer.    The BRCA genes are inherited in an autosomal dominant fashion. This means that if an individual has a change in one of these genes, their siblings and children have a 50% chance of also having that gene change and a 50% chance of not having the gene change.      Ms. Banks was counseled about hereditary cancer susceptibility including cancer risks, options for increased screening and/or risk reduction, genetic testing, and the implications for other family members.      We discussed performing testing as part of a multi-gene panel that looks at high and moderate penetrance genes. Some genes are considered highly penetrant cancer genes, meaning a mutation in the gene confers a high risk of cancer. On the other hand, there are other intermediate (moderate risk) cancer genes. For many of the moderate risk genes, there may be limited information regarding the degree to which a mutation in the gene affects risk of different types of cancers. Additionally, for some of these moderate risk genes, the appropriate management for  individuals who have a mutation in one of these genes is not always clear. In many cases, even if an individual tests positive for a mutation in a moderate risk gene, recommendations are still based on the family history, not the positive test result.  Additionally, unexpected findings may occur, where a mutation is identified that confers a risk for a cancer not seen in the family history.  Lastly, in the era of multigene panel testing, we know that more than one pathogenic or likely pathogenic variant can be identified in any one individual and/or family.    After a discussion of the different panel options, Ms. Banks elected the [facility] CancerNext-Applied [employer] +Replenish panel, which examines the following 71 genes:  AIP, ALK, APC, ORIANA, AXIN2, BAP1, BARD1, BMPR1A, BRCA1, BRCA2, BRIP1, CDC73, CDH1, CDK4, CDKN1B, CDKN2A, CHEK2, CTNNA1, DICER1, EGFR, EGLN1, EPCAM, FH, FLCN, GREM1, HOXB13, KIF1B, KIT, LZTR1, MAX, MEN1, MET, MITF, MLH1, MSH2, MSH3, MSH6, MUTYH, NF1, NF2, NTHL1, PALB2, PDGFRA, PHOX2B, PMS2, POLD12, POLE, POT1, CFCWH4H, PTCH1, PTEN, RAD51C, RAD51D, RB1, RET, SDHA, SDHAF2, SDHB, SDHC, SDHD, SMAD4, SMARCA4, SMARCB1, SMARCE1, STK11, SUFU, BMBC572, TP53, TSC1, TSC2, VHL.     In order to facilitate her upcoming surgical plans, we also discussed starting with a stat/rush 13-gene panel (BRCAPlus: ORIANA, BARD1, BRCA1, BRCA2, CDH1, CHEK2, NF1, PALB2, PTEN, RAD51C, RAD51D, STK11, TP53).     We reviewed the types of results we can get back:     - A positive result means that we identified a change in a gene that confers an increased cancer risk for cancer. We will discuss potential changes in management for Ms. Banks and her family based on the specific gene mutation found.      - A negative (normal) result clears her for a majority of cancer predisposition syndromes that we are aware of, but cannot clear her for any/all cancer predisposition risks. She would continue to be screened based on her personal and family  history.      - A Variant of Uncertain Significance (VUS) means that some kind of change was found in a gene, but it is currently unknown whether this specific change is harmful.  These results are treated like a negative, therefore they don't  recommendations or warrant familial variant testing.    After a discussion about the risks, benefits, and limitations of genetic testing, Ms. Banks elected to undergo genetic testing for hereditary cancer using the [employer] [facility] panel described above.  She gave her verbal consent to proceed with testing.    Results are typically available in 3 weeks from the time of blood draw. Ms. Banks will return to the Cancer Genetics Clinic to discuss her testing results.  At that time, we will make recommendations for both Ms. Banks and her family members in terms of cancer screening and/or cancer risk reduction options.     The stat results are generally available in <=2 weeks with time of blood draw.  The additional testing can take up to an additional 3 weeks. We agreed to call out the results to Ms. Banks when they are available, though this is also visible through  [employer].  We will make plans for return of larger panel results once the initial panel results are discussed.    PLAN:    Ms. Banks elected to undergo genetic testing for hereditary cancer using the [employer] CancerNext-Expanded +RNAinsight and [employer] BRCAPlus panels.  She gave her verbal consent to proceed with testing.      An [employer] DNA/RNA blood test kit will be sent to Ms. Banks's home; we asked that this kit be overnighted to the patient to expedite testing. Etelvina Banks will then bring the test kit to a  outpatient blood draw lab to have her blood drawn for testing (using the test tubes provided in the kit). The sample will then be sent to [facility] for analysis.    The stat results are generally available in <=2 weeks with time of blood draw.  The additional testing can take up to an  additional 3 weeks. We agreed to call out the results to Ms. Banks when they are available, though this is also visible through  [employer].  We will make plans for return of a larger panel results once the initial panel results are discussed.    We remain available to Ms. Banks at [phone] if any questions arise regarding information discussed at today's visit. Elbert can be reached directly at [phone].    Elbert Caruso MS, [facility]  Licensed Genetic Counselor  [facility]  Ph: [phone]     Reviewed by:  Leslye Medina MD, PhD  Clinical   [facility]     Time spent with patient: 39 minutes, (9:30 - 10:09)    Virtual or Telephone Consent    A telephone (audio only) visit between the patient (at the originating site) and provider (at the distant site) was utilized to provide this telehealth service. Verbal consent was requested and obtained from Etelvina Banks on this date, October 15, 2024 for a telehealth visit.    The patient stated they were located in [place] at the time of visit.

## 2024-10-14 NOTE — PROGRESS NOTES
Plastic Surgery Clinic Visit  Breast Reconstruction    Patient Name: Etelvina Banks  MRN: [id number]  Date:  10/15/24    Subjective  Etelvina Banks is a 44 y.o. female newly diagnosed with Left Breat Ductal Carcinoma in Situ (DCIS)  pTis (DCIS) cN0; cM0; ER Status: Positive. She is referred by Dr. Ortiz for breast reconstruction options.    Patient is willing to consider implants. She has no problem with the right breast being worked on to be made symmetric to the other, but would rather not go overboard on the surgery.     Last Mammogram: 5/8/24    Past Medical History:   Diagnosis Date    Arthritis 2015    Body mass index (BMI)30.0-30.9, adult 06/04/2021    Body mass index (BMI) of 30.0 to 30.9 in adult    Depression, major, single episode, moderate (Multi) 02/28/2023    Diabetes mellitus (Multi) 4-01-24    Headache, unspecified     Bad headache    Hypertension     Type 2 diabetes mellitus     Vision loss     Wears dentures     upper only     Past Surgical History:   Procedure Laterality Date    BREAST BIOPSY Left 06/07/2024    BREAST BIOPSY Left 9/12/2024    Procedure: Left breast Magseed lumpectomy x 3;  Surgeon: Mirtha Dimas MD;  Location: [facility];  Service: General Surgery;  Laterality: Left;  8 AM, 2 hours    BREAST LUMPECTOMY Left 09/12/2024    Left breast Magseed lumpectomy x 3 (ADH)    OTHER SURGICAL HISTORY  03/11/2019    Ovarian cystectomy    OTHER SURGICAL HISTORY  10/14/2022    Tubal ligation    OTHER SURGICAL HISTORY Right 03/22/2021    arthritis metal plate and screw     No Known Allergies    Current Outpatient Medications:     buprenorphine-naloxone (Suboxone) 8-2 mg SL film, Place under the tongue. Take 1 film SL twice kurt, Disp: , Rfl:     cream base no.31, bulk, (Transdermal Pain Base) cream, once daily as needed., Disp: , Rfl:     hydroCHLOROthiazide (Microzide) 12.5 mg tablet, Take 1 tablet (12.5 mg) by mouth once daily as needed (pitting edema). (Patient taking differently: Take 1 tablet  (12.5 mg) by mouth once daily as needed (pitting edema). Takes every other day), Disp: 30 tablet, Rfl: 2    metFORMIN XR (Glucophage-XR) 500 mg 24 hr tablet, Take 2 tablet PO daily, Disp: 180 tablet, Rfl: 1    naloxone (Narcan) 4 mg/0.1 mL nasal spray, Administer into affected nostril(s). Spray 4 mg intranasally once if needed for overdose or respiratory depression, Disp: , Rfl:     losartan (Cozaar) 50 mg tablet, Take 1 tablet (50 mg) by mouth once daily. (Patient taking differently: Take 1 tablet (50 mg) by mouth once daily at bedtime.), Disp: 90 tablet, Rfl: 3   Family History   Problem Relation Name Age of Onset    Breast cancer Mother Wanda     Arthritis Mother Wanda     Hypertension Mother Wanda     Asthma Brother Zenon     Diabetes Other Wanda     Cancer Other aunt      Social History     Tobacco Use    Smoking status: Former     Types: Cigarettes     Passive exposure: Past    Smokeless tobacco: Never   Vaping Use    Vaping status: Never Used   Substance Use Topics    Alcohol use: Never    Drug use: Never     Comment: clean 15 yrs   pain medication       ROS:  ROS: All 10 systems were reviewed and are unremarkable except for those mentioned in HPI.    Objective    /77 (BP Location: Right arm, Patient Position: Sitting, BP Cuff Size: Small adult)   Pulse 101   Temp 36.6 °C (97.9 °F) (Temporal)   Wt 84 kg (185 lb 3 oz)   BMI 33.86 kg/m²      Physical Exam:   Constitutional: A&Ox3, calm and cooperative, NAD  Eyes: PERRL, clear sclera   ENMT: Moist mucous membranes, no apparent injuries or lesions  Head/Neck: Neck supple, no JVD  Cardiovascular: Normal rate and regular rhythm, 2+ equal pulses of the distal extremities  Respiratory/Thorax: CTAB, regular respirations on RA, good symmetric chest expansion  Gastrointestinal: Abdomen soft, non tender   Extremities: No peripheral edema  Neurological: A&Ox3  Psychological: Appropriate mood and behavior  Skin: Warm and dry with no lesions or rashes    Breast  Exam:  Bra size:     Left Breast:  NTN: 27.5  BW: 21  NTF:   Comments: webbing present in between breasts. Nipple is 2cm lower than right. Incision 6cm diagonal incision on her lower lateral breast and 4cm incision from the nipple.    Right Breast:  NTN: 25  BW: 21  NTF:   Comments: webbing present in between breasts    Photos  Completed at this visit      Diagnostics   No results found for this or any previous visit (from the past 24 hour(s)).  No results found.    Assessment/Plan  Etelvina Banks is a 44 y.o. female newly diagnosed with Left Breat Ductal Carcinoma in Situ (DCIS)  pTis (DCIS) cN0; cM0; ER Status: Positive. She is referred by Dr. Ortiz for breast reconstruction options.     Discussed lumpectomy vs. Mastectomy and the potential risks and long term effects of both surgeries. Etelvina is leaning more towards a mastectomy  Etelvina is pending genetic lab results to determine if she will proceed with a prophylactic mastectomy on the right side  We are happy to proceed with tissue reconstruction in the event of a repeat lumpectomy to help avoid breast deformity  Recommended tissue expander if we proceed with a mastectomy with bioabsorbable mesh and possible adjacent tissue rearrangment  During tissue expansion, we can than revisit desire for autogulous tissue vs. An implant  6.   In either decision, recommend a mastopexy of the right breast for symmetry   7.   Repeat HGB A1C, CBC, CMP and Pre Albumin ordered today    RTC in two weeks for preop -- consent, TE ordering will need to be done    Scribe Attestation  By signing my name below, Sneha SANDHU, Dinorahibcaitlyn   attest that this documentation has been prepared under the direction and in the presence of Jaye Dewey MD. Verbal consent was obtained from the patient.    Attending Attestation:  Jaye SANDHU MD, personal performed the history, exam, and decision making on this patient.  A total of 45 mins were spent in patient counseling, review of medical  records, and coordination of care.

## 2024-10-15 ENCOUNTER — OFFICE VISIT (OUTPATIENT)
Dept: PLASTIC SURGERY | Facility: CLINIC | Age: 44
End: 2024-10-15
Payer: COMMERCIAL

## 2024-10-15 ENCOUNTER — APPOINTMENT (OUTPATIENT)
Dept: GENETICS | Facility: CLINIC | Age: 44
End: 2024-10-15
Payer: COMMERCIAL

## 2024-10-15 VITALS
HEART RATE: 101 BPM | WEIGHT: 185.19 LBS | TEMPERATURE: 97.9 F | BODY MASS INDEX: 33.86 KG/M2 | SYSTOLIC BLOOD PRESSURE: 120 MMHG | DIASTOLIC BLOOD PRESSURE: 77 MMHG

## 2024-10-15 DIAGNOSIS — Z80.3 FAMILY HISTORY OF BREAST CANCER: ICD-10-CM

## 2024-10-15 DIAGNOSIS — Z01.818 PRE-OP TESTING: Primary | ICD-10-CM

## 2024-10-15 DIAGNOSIS — Z91.89 AT HIGH RISK FOR BREAST CANCER: ICD-10-CM

## 2024-10-15 DIAGNOSIS — Z71.83 ENCOUNTER FOR NONPROCREATIVE GENETIC COUNSELING: Primary | ICD-10-CM

## 2024-10-15 DIAGNOSIS — Z80.1 FAMILY HISTORY OF LUNG CANCER: ICD-10-CM

## 2024-10-15 DIAGNOSIS — D05.12 DUCTAL CARCINOMA IN SITU (DCIS) OF LEFT BREAST: ICD-10-CM

## 2024-10-15 PROCEDURE — 3078F DIAST BP <80 MM HG: CPT | Performed by: SURGERY

## 2024-10-15 PROCEDURE — 3050F LDL-C >= 130 MG/DL: CPT | Performed by: SURGERY

## 2024-10-15 PROCEDURE — 3044F HG A1C LEVEL LT 7.0%: CPT | Performed by: SURGERY

## 2024-10-15 PROCEDURE — 99204 OFFICE O/P NEW MOD 45 MIN: CPT | Performed by: SURGERY

## 2024-10-15 PROCEDURE — GENMD PR GENETICS VISIT (MEDICAID/MEDICARE)

## 2024-10-15 PROCEDURE — 3074F SYST BP LT 130 MM HG: CPT | Performed by: SURGERY

## 2024-10-15 ASSESSMENT — PAIN SCALES - GENERAL: PAINLEVEL: 0-NO PAIN

## 2024-10-17 ENCOUNTER — LAB (OUTPATIENT)
Dept: LAB | Facility: LAB | Age: 44
End: 2024-10-17
Payer: COMMERCIAL

## 2024-10-17 DIAGNOSIS — Z80.1 FAMILY HISTORY OF LUNG CANCER: ICD-10-CM

## 2024-10-17 DIAGNOSIS — Z01.818 PRE-OP TESTING: ICD-10-CM

## 2024-10-17 DIAGNOSIS — Z80.3 FAMILY HISTORY OF BREAST CANCER: ICD-10-CM

## 2024-10-17 DIAGNOSIS — D05.12 DUCTAL CARCINOMA IN SITU (DCIS) OF LEFT BREAST: ICD-10-CM

## 2024-10-17 LAB
ALBUMIN SERPL BCP-MCNC: 4.3 G/DL (ref 3.4–5)
ALP SERPL-CCNC: 84 U/L (ref 33–110)
ALT SERPL W P-5'-P-CCNC: 20 U/L (ref 7–45)
ANION GAP SERPL CALC-SCNC: 12 MMOL/L (ref 10–20)
AST SERPL W P-5'-P-CCNC: 18 U/L (ref 9–39)
BILIRUB SERPL-MCNC: 0.5 MG/DL (ref 0–1.2)
BUN SERPL-MCNC: 7 MG/DL (ref 6–23)
CALCIUM SERPL-MCNC: 9.2 MG/DL (ref 8.6–10.3)
CHLORIDE SERPL-SCNC: 102 MMOL/L (ref 98–107)
CO2 SERPL-SCNC: 30 MMOL/L (ref 21–32)
CREAT SERPL-MCNC: 0.7 MG/DL (ref 0.5–1.05)
EGFRCR SERPLBLD CKD-EPI 2021: >90 ML/MIN/1.73M*2
ERYTHROCYTE [DISTWIDTH] IN BLOOD BY AUTOMATED COUNT: 12.3 % (ref 11.5–14.5)
GLUCOSE SERPL-MCNC: 152 MG/DL (ref 74–99)
HCT VFR BLD AUTO: 37.6 % (ref 36–46)
HGB BLD-MCNC: 12.5 G/DL (ref 12–16)
MCH RBC QN AUTO: 28.9 PG (ref 26–34)
MCHC RBC AUTO-ENTMCNC: 33.2 G/DL (ref 32–36)
MCV RBC AUTO: 87 FL (ref 80–100)
NRBC BLD-RTO: 0 /100 WBCS (ref 0–0)
PLATELET # BLD AUTO: 282 X10*3/UL (ref 150–450)
POTASSIUM SERPL-SCNC: 3.7 MMOL/L (ref 3.5–5.3)
PROT SERPL-MCNC: 6.5 G/DL (ref 6.4–8.2)
RBC # BLD AUTO: 4.32 X10*6/UL (ref 4–5.2)
SODIUM SERPL-SCNC: 140 MMOL/L (ref 136–145)
WBC # BLD AUTO: 7.1 X10*3/UL (ref 4.4–11.3)

## 2024-10-17 PROCEDURE — 80053 COMPREHEN METABOLIC PANEL: CPT

## 2024-10-17 PROCEDURE — 84134 ASSAY OF PREALBUMIN: CPT

## 2024-10-17 PROCEDURE — 36415 COLL VENOUS BLD VENIPUNCTURE: CPT

## 2024-10-17 PROCEDURE — 83036 HEMOGLOBIN GLYCOSYLATED A1C: CPT

## 2024-10-17 PROCEDURE — 85027 COMPLETE CBC AUTOMATED: CPT

## 2024-10-18 LAB
EST. AVERAGE GLUCOSE BLD GHB EST-MCNC: 120 MG/DL
HBA1C MFR BLD: 5.8 %
PREALB SERPL-MCNC: 28.2 MG/DL (ref 18–40)

## 2024-10-23 ENCOUNTER — TELEPHONE (OUTPATIENT)
Dept: SURGERY | Facility: HOSPITAL | Age: 44
End: 2024-10-23
Payer: COMMERCIAL

## 2024-10-23 DIAGNOSIS — D05.12 DUCTAL CARCINOMA IN SITU (DCIS) OF LEFT BREAST: ICD-10-CM

## 2024-10-23 NOTE — TELEPHONE ENCOUNTER
She is scheduled for a left nipple sparing mastectomy with reconstruction with Dr. Ortiz on 11/11/2024.  She may continue to follow-up with Dr. Ortiz postoperatively.    ----- Message from Mirtha Dimas sent at 10/2/2024  5:11 PM EDT -----  Regarding: follow up  The pathology of her lumpectomy x 3 showed DCIS of all 3 sites.  She has been referred for a stat genetics consultation, plastic surgery consultation for possible reconstructive surgery and referral to Dr. Ortiz (breast surgeon) for consideration of a nipple sparing mastectomy.  Follow-up with the patient regarding her appointments and decision regarding the next step in scheduling her surgery.

## 2024-10-27 ENCOUNTER — DOCUMENTATION (OUTPATIENT)
Dept: GENETICS | Facility: CLINIC | Age: 44
End: 2024-10-27
Payer: COMMERCIAL

## 2024-10-28 ENCOUNTER — TELEPHONE (OUTPATIENT)
Dept: GENETICS | Facility: CLINIC | Age: 44
End: 2024-10-28
Payer: COMMERCIAL

## 2024-10-28 LAB
COMMENTS - MP RESULT TYPE: NORMAL
SCAN RESULT: NORMAL

## 2024-10-28 NOTE — H&P (VIEW-ONLY)
PLASTIC SURGERY PRE-OP CLINIC NOTE  Date: 10/29/24  Subjective :  Patient ID: Etelvina Banks  is a 44 y.o. female is here for pre-op appointment     Planned Date of Procedure: 11/11/24  Planned Surgical Procedure: Left Mastectomy with Tissue Expander     HPI:   Etelvina Banks is a 44 y.o. female is here for pre-operative appointment for the above procedure(s).     Currently, the patient states there were no changes in their medications or medical history.     Patient's vitals are Blood pressure 125/89, pulse 89, temperature 36.4 °C (97.6 °F), temperature source Temporal, weight 81.9 kg (180 lb 10.7 oz).   today.     Patient is not currently on an ACE, ARB, or Diuretic.     Patient has Good Rx Card.     Patient is not on chronic NSAIDs.       MEDICATIONS    Current Outpatient Medications:     buprenorphine-naloxone (Suboxone) 8-2 mg SL film, Place under the tongue. Take 1 film SL twice kurt, Disp: , Rfl:     cream base no.31, bulk, (Transdermal Pain Base) cream, once daily as needed., Disp: , Rfl:     hydroCHLOROthiazide (Microzide) 12.5 mg tablet, Take 1 tablet (12.5 mg) by mouth once daily as needed (pitting edema). (Patient taking differently: Take 1 tablet (12.5 mg) by mouth once daily as needed (pitting edema). Takes every other day), Disp: 30 tablet, Rfl: 2    losartan (Cozaar) 50 mg tablet, Take 1 tablet (50 mg) by mouth once daily. (Patient taking differently: Take 1 tablet (50 mg) by mouth once daily at bedtime.), Disp: 90 tablet, Rfl: 3    metFORMIN XR (Glucophage-XR) 500 mg 24 hr tablet, Take 2 tablet PO daily, Disp: 180 tablet, Rfl: 1    naloxone (Narcan) 4 mg/0.1 mL nasal spray, Administer into affected nostril(s). Spray 4 mg intranasally once if needed for overdose or respiratory depression, Disp: , Rfl:      REVIEW OF SYSTEMS:    Constitutional: negative for fevers, chills, unintentional weight loss  HEENT: negative for changes in vision, headaches, changes in hearing, congestion, sore  throat  Cardiovascular: negative for chest pain, palpitations  Respiratory: negative for cough, shortness of breath  Gastrointestinal: negative for nausea, vomiting, diarrhea  Genitourinary: negative for dysuria, hematuria  Musculoskeletal: negative for joint swelling or pain  Skin: negative for rashes or lesions  Psych: negative for depression, anxiety  Endocrine: negative for polyuria, polydipsia, cold/heat intolerance  Hem/Lymph: negative for bleeding disorder    PHYSICAL EXAM  General: alert and oriented, no apparent distress  Psych: normal mood and affect, judgement intact.   Eyes: No conjunctival erythema, extraocular movements intact, no scleral icterus, pupils equal and reactive to light  HENT: normocephalic, atraumatic, no rhinorrhea, no trauma to external meatus, no prior surgical scars  CV: hemodynamically stable  Resp: unlabored, no increased work of breathing, equal bilateral chest rise, no cough or stridor  Abdomen: soft, non-tender, non-distended. No surgical scars present. No rashes noted. No rectus diastasis present   Neuro: Unremarkable without focal findings, AAOx3, CN 2-12 grossly intact  Extremities/Musculoskeletal: Upper and lower extremities are atraumatic in appearance without tenderness or deformity. No swelling or erythema. Full range of motion is noted to all joints. Steady gait noted.    Skin: Intact, soft and warm; no rashes, lesions, or bruising. No large masses or keloids noted on exam.     Focused exam of the breasts:  Left Breast:  NTN: 27.5  BW: 21  NTF:   Comments: webbing present in between breasts. Nipple is 2cm lower than right. Incision 6cm diagonal incision on her lower lateral breast and 4cm incision from the nipple.     Right Breast:  NTN: 25  BW: 21  NTF:   Comments: webbing present in between breasts      LABORATORY  Nutrition  Lab Results   Component Value Date    ALBUMIN 4.3 10/17/2024          ASSESSMENT/PLAN  Etelvina Banks is a 44 y.o. 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 She is referred by Dr. Ortiz for breast reconstruction options.     The patient will be undergoing Left Mastectomy with Tissue Expander on 11/11/24 [facility]    Informed consent discussed with the patient, including: condition, proposed care, treatments and services, alternative forms of treatment, and risks of no treatment.  Details discussed around the procedures to be used, and the risks and hazards involved, potential benefits, and side effects of the patient's proposed care, treatment, and services; the likelihood of the patient achieving his or her goals; and any potential problems that might occur during recuperation. Reasonable alternative also discussed with the patient's proposed care, treatment, and services. The discussion encompasses risks, benefits, and side effects related to the alternative and risks related to not receiving the proposed care, treatment, and services. Written informed consent was obtained.    The patient was counseled to avoid anticoagulation medications and herbals including - but not limited to - ASA, NSAIDs, Plavix, fish oils, and green tea    Patient was counseled to avoid nicotine and areas with high amounts of secondhand smoke.     Patient was counseled to increase protein intake after surgery to aid with wound healing with goal of 120g/day.     Patient was counseled on need for compression garments and/or support bras, given information about where to acquire these garments, and instructions to bring with on the day of surgery.     We discussed postoperative follow-up visits, recuperation and return to work.    Advised patient to contact office with any questions or concerns    All findings and diagnosis was discussed with patient at the time of this visit. Patient states they understand and are in agreement with the treatment plan at the time of this visit.     Photos completed  10/15/24    Medications eRx'd:  -Celebrex 200 mg BID with meals- Good Rx card provided to the patient.  -Gabapentin 300 mg Q8H   -Tylenol 1000 mg Q8H  -BactrimDS BID x 7 days   -Hibiclens - instructed the patient to wash breast and/or abdomen and margins the night before surgery or the morning of surgery; avoid washing face/eyes (2 packets if breast only, and 5 packets if breast and abdomen)      RTC 11/19/24 after surgery     Attending Attestation:  Jaye SANDHU MD, personal performed the history, exam, and decision making on this patient.  A total of 30 mins were spent in patient counseling, review of medical records, and coordination of care.

## 2024-10-28 NOTE — PROGRESS NOTES
PLASTIC SURGERY PRE-OP CLINIC NOTE  Date: 10/29/24  Subjective :  Patient ID: Etelvina Banks  is a 44 y.o. female is here for pre-op appointment     Planned Date of Procedure: 11/11/24  Planned Surgical Procedure: Left Mastectomy with Tissue Expander     HPI:   Etelvina Banks is a 44 y.o. female is here for pre-operative appointment for the above procedure(s).     Currently, the patient states there were no changes in their medications or medical history.     Patient's vitals are Blood pressure 125/89, pulse 89, temperature 36.4 °C (97.6 °F), temperature source Temporal, weight 81.9 kg (180 lb 10.7 oz).   today.     Patient is not currently on an ACE, ARB, or Diuretic.     Patient has Good Rx Card.     Patient is not on chronic NSAIDs.       MEDICATIONS    Current Outpatient Medications:     buprenorphine-naloxone (Suboxone) 8-2 mg SL film, Place under the tongue. Take 1 film SL twice kurt, Disp: , Rfl:     cream base no.31, bulk, (Transdermal Pain Base) cream, once daily as needed., Disp: , Rfl:     hydroCHLOROthiazide (Microzide) 12.5 mg tablet, Take 1 tablet (12.5 mg) by mouth once daily as needed (pitting edema). (Patient taking differently: Take 1 tablet (12.5 mg) by mouth once daily as needed (pitting edema). Takes every other day), Disp: 30 tablet, Rfl: 2    losartan (Cozaar) 50 mg tablet, Take 1 tablet (50 mg) by mouth once daily. (Patient taking differently: Take 1 tablet (50 mg) by mouth once daily at bedtime.), Disp: 90 tablet, Rfl: 3    metFORMIN XR (Glucophage-XR) 500 mg 24 hr tablet, Take 2 tablet PO daily, Disp: 180 tablet, Rfl: 1    naloxone (Narcan) 4 mg/0.1 mL nasal spray, Administer into affected nostril(s). Spray 4 mg intranasally once if needed for overdose or respiratory depression, Disp: , Rfl:      REVIEW OF SYSTEMS:    Constitutional: negative for fevers, chills, unintentional weight loss  HEENT: negative for changes in vision, headaches, changes in hearing, congestion, sore  throat  Cardiovascular: negative for chest pain, palpitations  Respiratory: negative for cough, shortness of breath  Gastrointestinal: negative for nausea, vomiting, diarrhea  Genitourinary: negative for dysuria, hematuria  Musculoskeletal: negative for joint swelling or pain  Skin: negative for rashes or lesions  Psych: negative for depression, anxiety  Endocrine: negative for polyuria, polydipsia, cold/heat intolerance  Hem/Lymph: negative for bleeding disorder    PHYSICAL EXAM  General: alert and oriented, no apparent distress  Psych: normal mood and affect, judgement intact.   Eyes: No conjunctival erythema, extraocular movements intact, no scleral icterus, pupils equal and reactive to light  HENT: normocephalic, atraumatic, no rhinorrhea, no trauma to external meatus, no prior surgical scars  CV: hemodynamically stable  Resp: unlabored, no increased work of breathing, equal bilateral chest rise, no cough or stridor  Abdomen: soft, non-tender, non-distended. No surgical scars present. No rashes noted. No rectus diastasis present   Neuro: Unremarkable without focal findings, AAOx3, CN 2-12 grossly intact  Extremities/Musculoskeletal: Upper and lower extremities are atraumatic in appearance without tenderness or deformity. No swelling or erythema. Full range of motion is noted to all joints. Steady gait noted.    Skin: Intact, soft and warm; no rashes, lesions, or bruising. No large masses or keloids noted on exam.     Focused exam of the breasts:  Left Breast:  NTN: 27.5  BW: 21  NTF:   Comments: webbing present in between breasts. Nipple is 2cm lower than right. Incision 6cm diagonal incision on her lower lateral breast and 4cm incision from the nipple.     Right Breast:  NTN: 25  BW: 21  NTF:   Comments: webbing present in between breasts      LABORATORY  Nutrition  Lab Results   Component Value Date    ALBUMIN 4.3 10/17/2024          ASSESSMENT/PLAN  Etelvina Banks is a 44 y.o. 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 She is referred by Dr. Ortiz for breast reconstruction options.     The patient will be [place] Left M[place] with [place] [facility]    Informed consent discussed with the patient, including: condition, proposed care, treatments and services, alternative forms of treatment, and risks of no treatment.  Details discussed around the procedures to be used, and the risks and hazards involved, potential benefits, and side effects of the patient's proposed care, treatment, and services; the likelihood of the patient achieving his or her goals; and any potential problems that might occur during recuperation. Reasonable alternative also discussed with the patient's proposed care, treatment, and services. The discussion encompasses risks, benefits, and side effects related to the alternative and risks related to not receiving the proposed care, treatment, and services. Written informed consent was obtained.    The patient was counseled to avoid anticoagulation medications and herbals including - but not limited to - ASA, NSAIDs, Plavix, fish oils, and green tea    Patient was counseled to avoid nicotine and areas with high amounts of secondhand smoke.     Patient was counseled to increase protein intake after surgery to aid with wound healing with goal of 120g/day.     Patient was counseled on need for compression garments and/or support bras, given information about where to acquire these garments, and instructions to bring with on the day of surgery.     We discussed postoperative follow-up visits, recuperation and return to work.    Advised patient to contact office with any questions or concerns    All findings and diagnosis was discussed with patient at the time of this visit. Patient states they understand and are in agreement with the treatment plan at the time of this visit.     Photos completed  10/15/24    Medications eRx'd:  -Celebrex 200 mg BID with meals- Good Rx card provided to the patient.  -Gabapentin 300 mg Q8H   -Tylenol 1000 mg Q8H  -BactrimDS BID x 7 days   -Hibiclens - instructed the patient to wash breast and/or abdomen and margins the night before surgery or the morning of surgery; avoid washing face/eyes (2 packets if breast only, and 5 packets if breast and abdomen)      RTC 11/19/24 after surgery     Attending Attestation:  Jaye SANDHU MD, personal performed the history, exam, and decision making on this patient.  A total of 30 mins were spent in patient counseling, review of medical records, and coordination of care.

## 2024-10-29 ENCOUNTER — OFFICE VISIT (OUTPATIENT)
Dept: PLASTIC SURGERY | Facility: CLINIC | Age: 44
End: 2024-10-29
Payer: COMMERCIAL

## 2024-10-29 VITALS
SYSTOLIC BLOOD PRESSURE: 125 MMHG | WEIGHT: 180.67 LBS | HEART RATE: 89 BPM | BODY MASS INDEX: 33.04 KG/M2 | TEMPERATURE: 97.6 F | DIASTOLIC BLOOD PRESSURE: 89 MMHG

## 2024-10-29 DIAGNOSIS — D05.12 DUCTAL CARCINOMA IN SITU (DCIS) OF LEFT BREAST: Primary | ICD-10-CM

## 2024-10-29 DIAGNOSIS — G89.18 POST-OP PAIN: ICD-10-CM

## 2024-10-29 PROCEDURE — 3044F HG A1C LEVEL LT 7.0%: CPT | Performed by: SURGERY

## 2024-10-29 PROCEDURE — 3050F LDL-C >= 130 MG/DL: CPT | Performed by: SURGERY

## 2024-10-29 PROCEDURE — 99214 OFFICE O/P EST MOD 30 MIN: CPT | Performed by: SURGERY

## 2024-10-29 PROCEDURE — 3074F SYST BP LT 130 MM HG: CPT | Performed by: SURGERY

## 2024-10-29 PROCEDURE — 3079F DIAST BP 80-89 MM HG: CPT | Performed by: SURGERY

## 2024-10-29 RX ORDER — SULFAMETHOXAZOLE AND TRIMETHOPRIM 800; 160 MG/1; MG/1
1 TABLET ORAL 2 TIMES DAILY
Qty: 28 TABLET | Refills: 0 | Status: SHIPPED | OUTPATIENT
Start: 2024-10-29 | End: 2024-11-12

## 2024-10-29 RX ORDER — CHLORHEXIDINE GLUCONATE 40 MG/ML
SOLUTION TOPICAL ONCE
Qty: 118 ML | Refills: 0 | Status: SHIPPED | OUTPATIENT
Start: 2024-10-29 | End: 2024-10-29

## 2024-10-29 RX ORDER — GABAPENTIN 300 MG/1
300 CAPSULE ORAL EVERY 8 HOURS
Qty: 42 CAPSULE | Refills: 0 | Status: SHIPPED | OUTPATIENT
Start: 2024-10-29 | End: 2024-11-12

## 2024-10-29 RX ORDER — CELECOXIB 200 MG/1
200 CAPSULE ORAL 2 TIMES DAILY
Qty: 28 CAPSULE | Refills: 0 | Status: SHIPPED | OUTPATIENT
Start: 2024-10-29 | End: 2024-11-12

## 2024-10-29 RX ORDER — ACETAMINOPHEN 500 MG
1000 TABLET ORAL EVERY 8 HOURS
Qty: 84 TABLET | Refills: 0 | Status: SHIPPED | OUTPATIENT
Start: 2024-10-29 | End: 2024-11-12

## 2024-10-29 ASSESSMENT — PAIN SCALES - GENERAL: PAINLEVEL_OUTOF10: 0-NO PAIN

## 2024-10-29 NOTE — LETTER
October 29, 2024     Patient: Etelvina Banks   YOB: 1980   Date of Visit: 10/29/2024       To Whom It May Concern:    Etelvina Banks was seen in my clinic on 10/29/2024. Please excuse Etelvina for her absence from work on this day to make the appointment.    If you have any questions or concerns, please don't hesitate to call.         Sincerely,         Jaye Dewey MD        CC: No Recipients

## 2024-11-04 ENCOUNTER — TELEPHONE (OUTPATIENT)
Dept: GENETICS | Facility: CLINIC | Age: 44
End: 2024-11-04
Payer: COMMERCIAL

## 2024-11-11 ENCOUNTER — HOSPITAL ENCOUNTER (OUTPATIENT)
Dept: RADIOLOGY | Facility: HOSPITAL | Age: 44
Setting detail: OUTPATIENT SURGERY
Discharge: HOME | End: 2024-11-11
Payer: COMMERCIAL

## 2024-11-11 ENCOUNTER — ANESTHESIA EVENT (OUTPATIENT)
Dept: OPERATING ROOM | Facility: HOSPITAL | Age: 44
End: 2024-11-11
Payer: COMMERCIAL

## 2024-11-11 ENCOUNTER — HOSPITAL ENCOUNTER (OUTPATIENT)
Facility: HOSPITAL | Age: 44
Setting detail: OUTPATIENT SURGERY
Discharge: HOME | End: 2024-11-11
Attending: SURGERY | Admitting: SURGERY
Payer: COMMERCIAL

## 2024-11-11 ENCOUNTER — ANESTHESIA (OUTPATIENT)
Dept: OPERATING ROOM | Facility: HOSPITAL | Age: 44
End: 2024-11-11
Payer: COMMERCIAL

## 2024-11-11 VITALS
SYSTOLIC BLOOD PRESSURE: 134 MMHG | RESPIRATION RATE: 14 BRPM | HEART RATE: 94 BPM | HEIGHT: 62 IN | TEMPERATURE: 97.2 F | WEIGHT: 185.19 LBS | OXYGEN SATURATION: 94 % | DIASTOLIC BLOOD PRESSURE: 76 MMHG | BODY MASS INDEX: 34.08 KG/M2

## 2024-11-11 DIAGNOSIS — D05.12 DUCTAL CARCINOMA IN SITU (DCIS) OF LEFT BREAST: Primary | ICD-10-CM

## 2024-11-11 DIAGNOSIS — D05.12 DUCTAL CARCINOMA IN SITU (DCIS) OF LEFT BREAST: ICD-10-CM

## 2024-11-11 LAB
GLUCOSE BLD MANUAL STRIP-MCNC: 133 MG/DL (ref 74–99)
GLUCOSE BLD MANUAL STRIP-MCNC: 186 MG/DL (ref 74–99)
PREGNANCY TEST URINE, POC: NEGATIVE

## 2024-11-11 PROCEDURE — 2500000004 HC RX 250 GENERAL PHARMACY W/ HCPCS (ALT 636 FOR OP/ED): Performed by: SURGERY

## 2024-11-11 PROCEDURE — 88342 IMHCHEM/IMCYTCHM 1ST ANTB: CPT | Performed by: SPECIALIST

## 2024-11-11 PROCEDURE — A38525 PR BX/REMV,LYMPH NODE,DEEP AXILL: Performed by: ANESTHESIOLOGY

## 2024-11-11 PROCEDURE — 2500000005 HC RX 250 GENERAL PHARMACY W/O HCPCS: Performed by: ANESTHESIOLOGY

## 2024-11-11 PROCEDURE — A9520 TC99 TILMANOCEPT DIAG 0.5MCI: HCPCS | Performed by: SURGERY

## 2024-11-11 PROCEDURE — 14302 TIS TRNFR ADDL 30 SQ CM: CPT | Performed by: SURGERY

## 2024-11-11 PROCEDURE — 97165 OT EVAL LOW COMPLEX 30 MIN: CPT | Mod: GO

## 2024-11-11 PROCEDURE — 38900 IO MAP OF SENT LYMPH NODE: CPT | Performed by: SURGERY

## 2024-11-11 PROCEDURE — 97110 THERAPEUTIC EXERCISES: CPT | Mod: GO

## 2024-11-11 PROCEDURE — 38792 RA TRACER ID OF SENTINL NODE: CPT | Performed by: SURGERY

## 2024-11-11 PROCEDURE — 3700000002 HC GENERAL ANESTHESIA TIME - EACH INCREMENTAL 1 MINUTE: Performed by: SURGERY

## 2024-11-11 PROCEDURE — 2500000005 HC RX 250 GENERAL PHARMACY W/O HCPCS: Performed by: NURSE ANESTHETIST, CERTIFIED REGISTERED

## 2024-11-11 PROCEDURE — 2720000007 HC OR 272 NO HCPCS: Performed by: SURGERY

## 2024-11-11 PROCEDURE — A4649 SURGICAL SUPPLIES: HCPCS | Performed by: SURGERY

## 2024-11-11 PROCEDURE — 81025 URINE PREGNANCY TEST: CPT | Performed by: SURGERY

## 2024-11-11 PROCEDURE — 88307 TISSUE EXAM BY PATHOLOGIST: CPT | Mod: TC,AHULAB | Performed by: SURGERY

## 2024-11-11 PROCEDURE — 82947 ASSAY GLUCOSE BLOOD QUANT: CPT

## 2024-11-11 PROCEDURE — 96372 THER/PROPH/DIAG INJ SC/IM: CPT | Performed by: SURGERY

## 2024-11-11 PROCEDURE — 3430000001 HC RX 343 DIAGNOSTIC RADIOPHARMACEUTICALS: Performed by: SURGERY

## 2024-11-11 PROCEDURE — 3600000009 HC OR TIME - EACH INCREMENTAL 1 MINUTE - PROCEDURE LEVEL FOUR: Performed by: SURGERY

## 2024-11-11 PROCEDURE — 19303 MAST SIMPLE COMPLETE: CPT | Performed by: SURGERY

## 2024-11-11 PROCEDURE — 97535 SELF CARE MNGMENT TRAINING: CPT | Mod: GO

## 2024-11-11 PROCEDURE — 88307 TISSUE EXAM BY PATHOLOGIST: CPT | Performed by: SPECIALIST

## 2024-11-11 PROCEDURE — 38525 BIOPSY/REMOVAL LYMPH NODES: CPT | Performed by: SURGERY

## 2024-11-11 PROCEDURE — 3600000004 HC OR TIME - INITIAL BASE CHARGE - PROCEDURE LEVEL FOUR: Performed by: SURGERY

## 2024-11-11 PROCEDURE — 2500000001 HC RX 250 WO HCPCS SELF ADMINISTERED DRUGS (ALT 637 FOR MEDICARE OP): Performed by: ANESTHESIOLOGY

## 2024-11-11 PROCEDURE — 7100000009 HC PHASE TWO TIME - INITIAL BASE CHARGE: Performed by: SURGERY

## 2024-11-11 PROCEDURE — C1889 IMPLANT/INSERT DEVICE, NOC: HCPCS | Performed by: SURGERY

## 2024-11-11 PROCEDURE — 2500000005 HC RX 250 GENERAL PHARMACY W/O HCPCS: Performed by: SURGERY

## 2024-11-11 PROCEDURE — 3700000001 HC GENERAL ANESTHESIA TIME - INITIAL BASE CHARGE: Performed by: SURGERY

## 2024-11-11 PROCEDURE — 38792 RA TRACER ID OF SENTINL NODE: CPT

## 2024-11-11 PROCEDURE — 88305 TISSUE EXAM BY PATHOLOGIST: CPT | Performed by: SPECIALIST

## 2024-11-11 PROCEDURE — A38525 PR BX/REMV,LYMPH NODE,DEEP AXILL: Performed by: NURSE ANESTHETIST, CERTIFIED REGISTERED

## 2024-11-11 PROCEDURE — 2780000003 HC OR 278 NO HCPCS: Performed by: SURGERY

## 2024-11-11 PROCEDURE — 7100000002 HC RECOVERY ROOM TIME - EACH INCREMENTAL 1 MINUTE: Performed by: SURGERY

## 2024-11-11 PROCEDURE — 2500000004 HC RX 250 GENERAL PHARMACY W/ HCPCS (ALT 636 FOR OP/ED): Performed by: ANESTHESIOLOGY

## 2024-11-11 PROCEDURE — 7100000010 HC PHASE TWO TIME - EACH INCREMENTAL 1 MINUTE: Performed by: SURGERY

## 2024-11-11 PROCEDURE — 2500000004 HC RX 250 GENERAL PHARMACY W/ HCPCS (ALT 636 FOR OP/ED): Performed by: NURSE ANESTHETIST, CERTIFIED REGISTERED

## 2024-11-11 PROCEDURE — 14301 TIS TRNFR ANY 30.1-60 SQ CM: CPT | Performed by: SURGERY

## 2024-11-11 PROCEDURE — 7100000001 HC RECOVERY ROOM TIME - INITIAL BASE CHARGE: Performed by: SURGERY

## 2024-11-11 PROCEDURE — 19357 TISS XPNDR PLMT BRST RCNSTJ: CPT | Performed by: SURGERY

## 2024-11-11 PROCEDURE — 2500000001 HC RX 250 WO HCPCS SELF ADMINISTERED DRUGS (ALT 637 FOR MEDICARE OP): Performed by: SURGERY

## 2024-11-11 DEVICE — IMPLANTABLE DEVICE: Type: IMPLANTABLE DEVICE | Site: BREAST | Status: FUNCTIONAL

## 2024-11-11 RX ORDER — GABAPENTIN 300 MG/1
600 CAPSULE ORAL ONCE
Status: COMPLETED | OUTPATIENT
Start: 2024-11-11 | End: 2024-11-11

## 2024-11-11 RX ORDER — SODIUM CHLORIDE, SODIUM LACTATE, POTASSIUM CHLORIDE, CALCIUM CHLORIDE 600; 310; 30; 20 MG/100ML; MG/100ML; MG/100ML; MG/100ML
INJECTION, SOLUTION INTRAVENOUS CONTINUOUS PRN
Status: DISCONTINUED | OUTPATIENT
Start: 2024-11-11 | End: 2024-11-11

## 2024-11-11 RX ORDER — ACETAMINOPHEN 325 MG/1
975 TABLET ORAL ONCE
Status: COMPLETED | OUTPATIENT
Start: 2024-11-11 | End: 2024-11-11

## 2024-11-11 RX ORDER — PROPOFOL 10 MG/ML
INJECTION, EMULSION INTRAVENOUS AS NEEDED
Status: DISCONTINUED | OUTPATIENT
Start: 2024-11-11 | End: 2024-11-11

## 2024-11-11 RX ORDER — ISOSULFAN BLUE 50 MG/5ML
INJECTION, SOLUTION SUBCUTANEOUS AS NEEDED
Status: DISCONTINUED | OUTPATIENT
Start: 2024-11-11 | End: 2024-11-11 | Stop reason: HOSPADM

## 2024-11-11 RX ORDER — MIDAZOLAM HYDROCHLORIDE 1 MG/ML
INJECTION INTRAMUSCULAR; INTRAVENOUS AS NEEDED
Status: DISCONTINUED | OUTPATIENT
Start: 2024-11-11 | End: 2024-11-11

## 2024-11-11 RX ORDER — PROCHLORPERAZINE EDISYLATE 5 MG/ML
5 INJECTION INTRAMUSCULAR; INTRAVENOUS ONCE AS NEEDED
Status: DISCONTINUED | OUTPATIENT
Start: 2024-11-11 | End: 2024-11-11 | Stop reason: HOSPADM

## 2024-11-11 RX ORDER — LABETALOL HYDROCHLORIDE 5 MG/ML
INJECTION, SOLUTION INTRAVENOUS AS NEEDED
Status: DISCONTINUED | OUTPATIENT
Start: 2024-11-11 | End: 2024-11-11

## 2024-11-11 RX ORDER — FENTANYL CITRATE 50 UG/ML
INJECTION, SOLUTION INTRAMUSCULAR; INTRAVENOUS AS NEEDED
Status: DISCONTINUED | OUTPATIENT
Start: 2024-11-11 | End: 2024-11-11

## 2024-11-11 RX ORDER — PHENYLEPHRINE HCL IN 0.9% NACL 1 MG/10 ML
SYRINGE (ML) INTRAVENOUS AS NEEDED
Status: DISCONTINUED | OUTPATIENT
Start: 2024-11-11 | End: 2024-11-11

## 2024-11-11 RX ORDER — HYDRALAZINE HYDROCHLORIDE 20 MG/ML
5 INJECTION INTRAMUSCULAR; INTRAVENOUS EVERY 30 MIN PRN
Status: DISCONTINUED | OUTPATIENT
Start: 2024-11-11 | End: 2024-11-11 | Stop reason: HOSPADM

## 2024-11-11 RX ORDER — ROCURONIUM BROMIDE 10 MG/ML
INJECTION, SOLUTION INTRAVENOUS AS NEEDED
Status: DISCONTINUED | OUTPATIENT
Start: 2024-11-11 | End: 2024-11-11

## 2024-11-11 RX ORDER — SODIUM CHLORIDE, SODIUM LACTATE, POTASSIUM CHLORIDE, CALCIUM CHLORIDE 600; 310; 30; 20 MG/100ML; MG/100ML; MG/100ML; MG/100ML
100 INJECTION, SOLUTION INTRAVENOUS CONTINUOUS
Status: DISCONTINUED | OUTPATIENT
Start: 2024-11-11 | End: 2024-11-11 | Stop reason: HOSPADM

## 2024-11-11 RX ORDER — OXYCODONE HYDROCHLORIDE 5 MG/1
5 TABLET ORAL
Status: DISCONTINUED | OUTPATIENT
Start: 2024-11-11 | End: 2024-11-11 | Stop reason: HOSPADM

## 2024-11-11 RX ORDER — KETOROLAC TROMETHAMINE 30 MG/ML
INJECTION, SOLUTION INTRAMUSCULAR; INTRAVENOUS AS NEEDED
Status: DISCONTINUED | OUTPATIENT
Start: 2024-11-11 | End: 2024-11-11

## 2024-11-11 RX ORDER — ONDANSETRON HYDROCHLORIDE 2 MG/ML
4 INJECTION, SOLUTION INTRAVENOUS ONCE AS NEEDED
Status: DISCONTINUED | OUTPATIENT
Start: 2024-11-11 | End: 2024-11-11 | Stop reason: HOSPADM

## 2024-11-11 RX ORDER — CEFAZOLIN 1 G/1
INJECTION, POWDER, FOR SOLUTION INTRAVENOUS AS NEEDED
Status: DISCONTINUED | OUTPATIENT
Start: 2024-11-11 | End: 2024-11-11

## 2024-11-11 RX ORDER — ONDANSETRON HYDROCHLORIDE 2 MG/ML
INJECTION, SOLUTION INTRAVENOUS AS NEEDED
Status: DISCONTINUED | OUTPATIENT
Start: 2024-11-11 | End: 2024-11-11

## 2024-11-11 RX ORDER — DEXMEDETOMIDINE IN 0.9 % NACL 20 MCG/5ML
SYRINGE (ML) INTRAVENOUS AS NEEDED
Status: DISCONTINUED | OUTPATIENT
Start: 2024-11-11 | End: 2024-11-11

## 2024-11-11 RX ORDER — SCOLOPAMINE TRANSDERMAL SYSTEM 1 MG/1
1 PATCH, EXTENDED RELEASE TRANSDERMAL ONCE
Status: DISCONTINUED | OUTPATIENT
Start: 2024-11-11 | End: 2024-11-11 | Stop reason: HOSPADM

## 2024-11-11 RX ORDER — WATER 1 ML/ML
IRRIGANT IRRIGATION AS NEEDED
Status: DISCONTINUED | OUTPATIENT
Start: 2024-11-11 | End: 2024-11-11 | Stop reason: HOSPADM

## 2024-11-11 RX ORDER — SODIUM CHLORIDE, SODIUM LACTATE, POTASSIUM CHLORIDE, CALCIUM CHLORIDE 600; 310; 30; 20 MG/100ML; MG/100ML; MG/100ML; MG/100ML
20 INJECTION, SOLUTION INTRAVENOUS CONTINUOUS
Status: DISCONTINUED | OUTPATIENT
Start: 2024-11-11 | End: 2024-11-11 | Stop reason: HOSPADM

## 2024-11-11 RX ORDER — LIDOCAINE HYDROCHLORIDE 20 MG/ML
INJECTION, SOLUTION EPIDURAL; INFILTRATION; INTRACAUDAL; PERINEURAL AS NEEDED
Status: DISCONTINUED | OUTPATIENT
Start: 2024-11-11 | End: 2024-11-11

## 2024-11-11 RX ORDER — DROPERIDOL 2.5 MG/ML
0.62 INJECTION, SOLUTION INTRAMUSCULAR; INTRAVENOUS ONCE AS NEEDED
Status: DISCONTINUED | OUTPATIENT
Start: 2024-11-11 | End: 2024-11-11 | Stop reason: HOSPADM

## 2024-11-11 SDOH — HEALTH STABILITY: MENTAL HEALTH: CURRENT SMOKER: 0

## 2024-11-11 ASSESSMENT — PAIN - FUNCTIONAL ASSESSMENT
PAIN_FUNCTIONAL_ASSESSMENT: UNABLE TO SELF-REPORT
PAIN_FUNCTIONAL_ASSESSMENT: 0-10
PAIN_FUNCTIONAL_ASSESSMENT: UNABLE TO SELF-REPORT
PAIN_FUNCTIONAL_ASSESSMENT: 0-10

## 2024-11-11 ASSESSMENT — PAIN SCALES - GENERAL
PAINLEVEL_OUTOF10: 7
PAINLEVEL_OUTOF10: 3
PAINLEVEL_OUTOF10: 7
PAINLEVEL_OUTOF10: 4
PAINLEVEL_OUTOF10: 3
PAINLEVEL_OUTOF10: 7
PAINLEVEL_OUTOF10: 7
PAINLEVEL_OUTOF10: 0 - NO PAIN
PAINLEVEL_OUTOF10: 3
PAINLEVEL_OUTOF10: 3

## 2024-11-11 ASSESSMENT — COLUMBIA-SUICIDE SEVERITY RATING SCALE - C-SSRS
2. HAVE YOU ACTUALLY HAD ANY THOUGHTS OF KILLING YOURSELF?: NO
6. HAVE YOU EVER DONE ANYTHING, STARTED TO DO ANYTHING, OR PREPARED TO DO ANYTHING TO END YOUR LIFE?: NO
1. IN THE PAST MONTH, HAVE YOU WISHED YOU WERE DEAD OR WISHED YOU COULD GO TO SLEEP AND NOT WAKE UP?: NO
2. HAVE YOU ACTUALLY HAD ANY THOUGHTS OF KILLING YOURSELF?: NO
6. HAVE YOU EVER DONE ANYTHING, STARTED TO DO ANYTHING, OR PREPARED TO DO ANYTHING TO END YOUR LIFE?: NO
1. IN THE PAST MONTH, HAVE YOU WISHED YOU WERE DEAD OR WISHED YOU COULD GO TO SLEEP AND NOT WAKE UP?: NO

## 2024-11-11 ASSESSMENT — PAIN DESCRIPTION - LOCATION: LOCATION: BREAST

## 2024-11-11 ASSESSMENT — ACTIVITIES OF DAILY LIVING (ADL): HOME_MANAGEMENT_TIME_ENTRY: 15

## 2024-11-11 ASSESSMENT — PAIN DESCRIPTION - ORIENTATION: ORIENTATION: LEFT

## 2024-11-11 NOTE — DISCHARGE INSTRUCTIONS
There are no restrictions after surgery about what you may eat or drink. Please note anesthesia and pain medication can make you feel sick (nauseated).  Please use the Rx prescribed for you by Dr. Dewey and contact her office for concerns about pain control.  Pain medication and anesthesia can cause constipation. It is important to be physically active (walk) and drink lots of water after surgery. You may need to start an OTC stool softener or laxative if you have not had a bowel movement after 2 days.  You may drive when you are not in pain and when you are not taking pain medication, as these both delay reaction time.  If you have drains, strip and record output for each drain separately as you were instructed in clinic and by OT in PACU.  I will call you with your pathology report when it is available, usually 2 weeks after surgery.  You will have a post op visit to see me in clinic when we have surgical pathology to discuss. If this has not already been scheduled for you, please call Priya to schedule [phone].  If you have questions or concerns when you are home, please call the office: [place] [phone]         Plastic and Reconstructive Surgery Post Operative Instructions  You had surgery today at [facility] for  with Dr. Dewey of plastic and reconstructive surgery. Included below are post-operative instructions and details regarding follow-up.     Thank you for allowing us to participate in your care and we wish you the best!    Best Regards,  [facility]  Department of Plastic and Reconstructive Surgery    Activity:  Please no strenuous activities. Start walking short distances as soon as possible, as this helps to reduce swelling and lowers the chance of blood clots. No pushing, pulling or lifting objects greater than 5 pounds.     You may not  shower while wound vac is in place. Avoid soaking or submerging surgical incisions/sites or wetting wound vac  dressing or device.      Surgical Site/Wound Care:  wound care recs    Prevena/wound vac   You are being discharge with a Prevena incisional wound vac in place over a closed surgical incision to your chest. Please allow Prevena wound vac dressing to remain in place until follow-up with plastic surgery. Do not allow the wound vac dressing or device to become wet. When resting, please make sure to plug in the device with the supplied power cord to maintain the battery. The device has a power button at the center which is encircled by green lights. There will be one less light illuminated each day (every 24 hours) which is to be expected, and signifies the count down of the duration of the vac device. If you experience any issues with your wound vac including alarms, malfunction or dressing issues please refer to the provided instruction manual or contact the plastic surgery office at [phone].      Please do not get your vac dressing wet. You may reinforce the edges as needed with the provided tape.    Avoid application of creams, lotions or ointments to surgical site, no soaking or scrubbing of surgical sites.     Please do not apply ice or heat directly to the skin as feeling to the area may be diminished.    Please notify our office immediately if developing signs of infection which include increased redness, swelling, fever/chills, green/yellow drainage, or foul odor from wound. Plastic Surgery office line: [phone].      3 weeks  after surgery you may begin to massage your incisions with body lotion, BioOil, or scar cream. Do not use 100% vitamin E as it can cause skin irritation.    Avoid exposing scars to sun for at least 12 months. Always use a strong sunblock if sun exposure is unavoidable (SPF 30 or greater).    Drain care:  You are being discharged with ROSCOE drains. Please empty and record the output every 8 hours or as needed. To empty the drain, open the cap, tip into cup and squeeze to empty.  Squeeze drain flat then replace the cap.  Please empty the drain and record its output 3 times a day and bring these numbers to your follow up appointment.  The drain output should decrease and the color of the drainage should become lighter (red to pink to yellow).  This drain is sutured into place.  Keep the area around the drain clean and dry.  You may use mild soap and water to cleanse around the drain.  Do not soak in a tub. Call the office if you notice drastic changes in drain output, bloody drain output, or redness/drainage around insertion site.    Nutrition:  You may resume a regular diet following surgery with increased protein. Ensure that you are drinking an adequate amount of fluids to maintain hydration as well as consuming a diet high in protein and low in sugar. You may consider increasing your fiber intake to avoid constipation.    Do not smoke, as smoking delays healing and increases the risk of complications. Also be sure you are not around people that smoke for at least 6 weeks after surgery.  Second hand smoke is just as harmful as if you were to smoke.    Medication Instructions:  You may resume use of your home prescribed mediations as previously directed following discharge from the hospital. If you were taking medications prior to your surgery and they are not listed on your discharge homegoing instructions medications list, consult your MD before you resume these medications.    Some postoperative pain is not unusual. This is usually relieved by taking prescribed or over the counter Acetaminophen/Tylenol, celebrex and gabapentin as directed. Severe pain despite administration of pain medication must be reported to your physician.    Remember when taking Acetaminophen, do NOT exceed more than 1000 milligrams (mg) per dose or more than 4000 mg total per day. Taking too much Acetaminophen at one time can damage your liver. The maximum amount of ibuprofen in adults is 800 mg per dose or 3200  mg per day. Call your MD if you have any questions about your medications. To prevent constipation while taking narcotic pain medications, please utilize your prescribed bowel regimen, ensure that you drink plenty of water, eat fiber rich foods (a good source is fruit) and increase activity progressively.    DO NOT drive a car while utilizing narcotic pain medications and until cleared by MD at follow-up appointment. Driving or operating heavy machinery, lawnmowers or power tools while taking opiod/narcotic pain medications may impair your judgement.    Call Physician If:  Call your MD or seek immediate medical attention if you experience any of the following symptoms:  1. Fever of 101.5 (38.5 C) or greater  2. Pain not controlled with prescribed pain medications  3. Uncontrolled nausea and/or vomiting  4. Drainage or swelling around your incisions and/or surgical sites   5. Separation of incisions, or tearing of the incision line  6. Large fluid collection under or around the incision or flap sites   7. Flap discoloration (including darkened appearance)  8. Difficulty breathing  9. Swelling, pain, heat and/or redness in your legs and/or calves  10. Inability to tolerate diet/fluid intake    Contact the plastic surgery office for any questions and/or concerns regarding the surgical incision/site.  1. [phone] if Monday-Friday (8 a.m. - 4:30 p.m.)  2. [phone] and ask for the Plastic Surgeon radha if after hours or on weekends  3. Please send a picture with any wound concerns to our plastic surgery email at [email or web address]    Follow-up/Post Discharge Appointments:  Follow-up care is a key part of your treatment and safety. It is very important that you maintain follow-up care as directed so that your surgical site heals properly and does not lead to problems. Always carry a current medication list with you and bring it to ALL healthcare Provider visits. Be sure to maintain follow up with  plastic surgery at your scheduled appointment. If you are unable to keep your appointment, or need to reschedule please contact our office at [phone].

## 2024-11-11 NOTE — OP NOTE
Left Tissue Expander and Adjacent Tissue Arrangement (L) Operative Note     Date: 2024  OR Location: [facility]    Name: Etelvina Banks, : 1980, Age: 44 y.o., MRN: [id number], Sex: female    Diagnosis  Pre-op Diagnosis      * Ductal carcinoma in situ (DCIS) of left breast [D05.12] Post-op Diagnosis     * Ductal carcinoma in situ (DCIS) of left breast [D05.12]     Procedures  Left Breast Insertion Tissue Expander with adjacent tissue rearrangement 21 cm x 20 cm = 420 cm2  99918 - MD TISSUE EXPANDER PLACEMENT BREAST RECONSTRUCTION  MD ADJNT TIS TRNSFR/REARGMT ANY AREA 30.1-60 SQ CM [66998]   14302 x 12    Surgeons      * Jaye Dewey - Primary  Resident/Fellow/Other Assistant:  There was no skilled surgical resident assistance available.  The Surgical Assistant was necessary for soft tissue handling, retraction, hemostasis and closure in order to perform the procedure.      Staff:   Circulator: Anastasia  Scrub Person: Gloria  Scrub Person: Susy John Circulator: Krystal John Scrub: Ketty    Anesthesia Staff: Anesthesiologist: Jessica Loza MD  CRNA: KATIANA Kaba-VIOLETA, LESA  C-AA: OSCAR Montiel    Procedure Summary  Anesthesia: General  ASA: III  Estimated Blood Loss: 5 mL  Intra-op Medications:   Administrations occurring from 0730 to 1130 on 24:   Medication Name Total Dose   BUPivacaine-EPINEPHrine (Marcaine w/EPI) 50 mL in sodium chloride 0.9% 50 mL syringe 100 mL   isosulfan blue (Lumphazurin) 1 % injection 2.5 mL   EPINEPHrine (Adrenalin) 1 mg in lactated Ringer's 1,000 mL irrigation 1 mg   sterile water irrigation solution 750 mL   ceFAZolin (Ancef) vial 1 g 2 g   dexAMETHasone (Decadron) injection 4 mg/mL 8 mg   dexMEDETOMidine 4 mcg/mL in NS syringe 20 mcg   fentaNYL (Sublimaze) injection 50 mcg/mL 200 mcg   ketamine injection 50 mg/ 5 mL (10 mg/mL) 25 mg   ketorolac (Toradol) injection 30 mg 30 mg   labetalol (Normodyne,Trandate) injection 5 mg   lidocaine PF  (Xylocaine-MPF) local injection 2 % 50 mg   midazolam PF (Versed) injection 1 mg/mL 2 mg   ondansetron (Zofran) 2 mg/mL injection 4 mg   phenylephrine 100 mcg/mL syringe 10 mL (prefilled) 600 mcg   propofol (Diprivan) injection 10 mg/mL 200 mg   rocuronium (ZeMuron) 50 mg/5 mL injection 60 mg   sugammadex (Bridion) 200 mg/2 mL injection 200 mg              Anesthesia Record               Intraprocedure I/O Totals          Output    Urine 0 mL    Est. Blood Loss 100 mL    NG/OG Tube Output 50 mL    Other 0 mL    Total Output 150 mL          Specimen:   ID Type Source Tests Collected by Time   1 : LEFT AXILLARY SENTINEL LYMPH NODES Tissue SENTINEL LYMPH NODE BREAST LEFT SURGICAL PATHOLOGY EXAM Destinee Ortiz MD 11/11/2024 0909   2 : LEFT SKIN SPARING MASTECTOMY, STITCH AT 12:00 Tissue BREAST MASTECTOMY LEFT SURGICAL PATHOLOGY EXAM Destinee Ortiz MD 11/11/2024 0903   3 : LEFT INFERIOR MASTECTOMY SKIN Tissue SKIN EXCISION SURGICAL PATHOLOGY EXAM Destinee Ortiz MD 11/11/2024 0822                 Drains and/or Catheters:   Closed/Suction Drain Left Breast Bulb 15 Fr. (Active)       Closed/Suction Drain Left Breast Bulb 15 Fr. (Active)       Tourniquet Times:         Implants:  Implants       Type Name Action Serial No.       MENTOR ARTOURA PLUS SMOOTH ULTRA HIGH PROFILE BREAST TISSUE EXPANDER 535 CC Implanted [id number]              Indications: Etelvina Banks is an 44 y.o. female who is having surgery for Ductal carcinoma in situ (DCIS) of left breast [D05.12].  She will be getting a skin sparing mastectomy by Dr. Destinee Ortiz with immediate reconstruction with a tissue expander and adjacent tissue rearrangement to support the tissue expander.    The patient was seen in the preoperative area. The risks, benefits, complications, treatment options, non-operative alternatives, expected recovery and outcomes were discussed with the patient. The risks of the procedure were discussed with her prior to surgery.  These include  but are not limited to the risks of anesthesia, infection, bleeding, pain, need for further procedures, hematoma, seroma, wound healing complications, and asymmetry. The possibilities of reaction to medication, pulmonary aspiration, injury to surrounding structures, bleeding, recurrent infection, the need for additional procedures, failure to diagnose a condition, and creating a complication requiring transfusion or operation were discussed with the patient. The patient concurred with the proposed plan, giving informed consent.  The site of surgery was properly noted/marked if necessary per policy. The patient has been actively warmed in preoperative area. Preoperative antibiotics have been ordered and given within 1 hours of incision. Venous thrombosis prophylaxis have been ordered including bilateral sequential compression devices    Procedure Details:   The patient was seen in the preoperative area. The risks, benefits, complications, treatment options, non-operative alternatives, expected recovery and outcomes were discussed with the patient. The possibilities of reaction to medication, pulmonary aspiration, injury to surrounding structures, bleeding, recurrent infection, the need for additional procedures, failure to diagnose a condition, and creating a complication requiring transfusion or operation were discussed with the patient. The risks of the procedure were discussed with her prior to surgery.  These include but are not limited to the risks of anesthesia, infection, bleeding, pain, need for further procedures, hematoma, seroma, wound healing complications, and asymmetry.  The patient concurred with the proposed plan, giving informed consent.  The site of surgery was properly noted/marked if necessary per policy. The patient has been actively warmed in preoperative area. Preoperative antibiotics have been ordered and given within 1 hours of incision. Venous thrombosis prophylaxis have been ordered  including bilateral sequential compression devices     Procedure Details: The patient was identified and marked in the upright and standing position.  She was taken to the operative room and placed in the supine position.  A preoperative time was performed identifying the patient, procedeure and laterality.  An atraumatic endotracheal intubation was performed by the anesthesia team.  Her arms were padded and carefully secured to the arm boards, abducted less than 90 degrees. She was prepped and draped in the usual sterile fashion.     I marked the left breast around the nipple areolar complex with transverse incisions.  I then sharply de-epithelialized the lower pole with a 10 blade.  I turned the case over to Dr. Destinee Ortiz who then performed a left skin sparing mastectomy and sentinel lymph node biopsy.  Please refer to a separately dictated operative note for details of her portion of the procedure.  I was called once her portion of the procedure was completed.  The specimen weighed approximately 865 g.  I copiously irrigated the pocket and obtained hemostasis. Based on her chest wall width and mastectomy specimen weight I used a 535 cc ultra high profile smooth Artouro tissue expander.   Then I placed the tissue expander, filled with air, on her chest wall.  I sutures all the tabs to the chest wall using 2-0 nylon.    The inferior adipodermal flap measured 30 cm x 10 cm.  I dissected the lateral 15 cm off the chest wall, including the lateral subcutaneous fat keeping the lateral chest wall perforators intact.  I carried this dissection to the chest wall and then carried it from the base of the serratus anteriorly raising the serratus fascia with it.  The serratus pedicle was also maintained and this fascial dermal adipose flap that was well-perfused.  It measured 15 cm x 8 cm x 17 cm.  This was then advanced superior medially to secure the lateral edge of the tissue expander. I secured this to the chest wall  using 2-0 Vicryl.  The medial portion of the inferior flap was dissected off 4 cm from the chest wall creating a 4 x 3 cm flap.  This was advanced superiorly and centrally and secured to the chest wall to continue to treat and secure the  tissue expander.  The remainder of the inferior flap was draped superiorly over the tissue expander and secured to the chest wall creating a completely closed construct.  Two 19 Kinyarwanda Brian drains were placed.  The superior mastectomy flap was then advanced inferiorly to the inframammary fold.  The excess transverse portion of the superior mastectomy flap was imbricated centrally as a vertical incision.  The buried portion was marked and sharply de-epithelialized.  This measured 6.5 cm x 2 cm.  The total area of adjacent tissue rearrangement measured 21 cm x 20 cm.      I diluted 50 cc of 0.50% Marcaine with epinephrine with 50 cc of normal saline.  I used this to place a left chest wall block including a pectoralis block intercostal blocks as well as field blocks on the chest wall sentinel lymph node site and ROSCOE site.   The incisions were closed in multiple layers using 3-0 Monocryl in the parenchyma as well as interrupted buried dermals and a running 4-0 Monocryl subcuticular.    A Prevena Shayla form VAC dressing was placed.  The drains were covered with CHG Tegaderm dressings.  The patient tolerated the procedure well she was awoken extubated and taken to the recovery room in good condition     Attending Attestation: I performed the procedure.    Jaye Dewey MD  Phone Number: [phone]     Complications:  None; patient tolerated the procedure well.    Disposition: PACU - hemodynamically stable.  Condition: stable

## 2024-11-11 NOTE — PROGRESS NOTES
"       PLASTIC SURGERY CLINIC VISIT  POSTOP BREAST RECONSTRUCTION     Date: 11/19/24  Date of Surgery: 11/11/24  Surgical Procedure: Left Mastectomy with Tissue Expander        HPI:   Etelvina Banks 44 y.o. female is POD#8 from Left Mastectomy (Dr. Ortiz) and TE insertion.      Interval changes as of this date:   11/19 Doing well overall. Prevena in place and functioning. ROSCOE drains with SS output Number 2 less than 30 ml per day for three consecutive days. But Number 1 remains with elevated output. Pain has now resolved.        MEDICATIONS    Current Outpatient Medications:     buprenorphine-naloxone (Suboxone) 8-2 mg SL film, Place under the tongue. Take 1 film SL twice kurt, Disp: , Rfl:     cream base no.31, bulk, (Transdermal Pain Base) cream, once daily as needed., Disp: , Rfl:     gabapentin (Neurontin) 300 mg capsule, Take 1 capsule (300 mg) by mouth every 8 hours for 14 days., Disp: 42 capsule, Rfl: 0    gabapentin (Neurontin) 300 mg capsule, Take 1 capsule (300 mg) by mouth every 8 hours for 14 days., Disp: 42 capsule, Rfl: 0    hydroCHLOROthiazide (Microzide) 12.5 mg tablet, Take 1 tablet (12.5 mg) by mouth once daily as needed (pitting edema). (Patient taking differently: Take 1 tablet (12.5 mg) by mouth once daily as needed (pitting edema). Takes every other day), Disp: 30 tablet, Rfl: 2    losartan (Cozaar) 50 mg tablet, Take 1 tablet (50 mg) by mouth once daily. (Patient taking differently: Take 1 tablet (50 mg) by mouth once daily at bedtime.), Disp: 90 tablet, Rfl: 3    metFORMIN XR (Glucophage-XR) 500 mg 24 hr tablet, Take 2 tablet PO daily, Disp: 180 tablet, Rfl: 1    sulfamethoxazole-trimethoprim (Bactrim DS) 800-160 mg tablet, Take 1 tablet by mouth 2 times a day for 10 days., Disp: 20 tablet, Rfl: 0      OBJECTIVE [x]Expand by Default  Blood pressure 127/84, pulse 101, height 1.575 m (5' 2\"), weight 83.9 kg (185 lb).     REVIEW OF SYSTEMS:    Constitutional: negative for fevers, chills, " unintentional weight loss  HEENT: negative for changes in vision, headaches, changes in hearing, congestion, sore throat  Cardiovascular: negative for chest pain, palpitations  Respiratory: negative for cough, shortness of breath  Gastrointestinal: negative for nausea, vomiting, diarrhea  Genitourinary: negative for dysuria, hematuria  Musculoskeletal: negative for joint swelling or pain  Skin: negative for rashes or lesions  Psych: negative for depression, anxiety  Endocrine: negative for polyuria, polydipsia, cold/heat intolerance  Hem/Lymph: negative for bleeding disorder     PHYSICAL EXAM  General: alert and oriented, no apparent distress    Focused exam of the breasts:  Left: Blue Dye still Visible.   Things are healing well, a little bit of bruising, partial thickness, mastectomy skin necrosis along the edge of lateral flap vertically and inferiorly        MENTOR ARTOURA PLUS SMOOTH ULTRA HIGH PROFILE BREAST TISSUE EXPANDER 535 CC airfilled       ASSESSMENT/PLAN  Etelvina Banks 44 y.o. female who had Left Mastectomy with Tissue Expander on 11/11/24 who presents for POV.    Prevena vac in place. Removed at this visit.    ROSCOE drain(s) in place. No erythema or edema surrounding the drain site. There is serous output from the drain. Patient recorded output of the drains showed 2 consecutive days of less than 30cc output at time of removal. Patient was educated on purpose of surgical drains and informed of risk for seroma post drain removal.      Doing well, incisions are healing well, some bruising      ROSCOE drain(s) removed at this visit: 1     Continue with a healthy consisting of increased Protien.  Continue activity restrictions.   Steri strips placed  Will refill her abx for another 2 weeks (Bactrim DS)     RTC in 1 week.     Scribe Attestation  By signing my name below, IBrenda Scribe, attest that this documentation has been prepared under the direction and in the presence of Jaye Dewey MD. Verbal  consent was obtained from the patient.     Attending Attestation:  Jaye SANDHU MD, personal performed the history, exam, and decision making on this patient.

## 2024-11-11 NOTE — ANESTHESIA PREPROCEDURE EVALUATION
Patient: Etelvina Banks    Procedure Information       Date/Time: 11/11/24 0730    Procedures:       Left Breast Skin Sparing, Possible Nipple Sparing, Mastectomy (Left: Breast)      Left Breast Intraoperative Lymphatic Mapping, Axillary [place] Lymph Node Biopsy (Left: Axilla)      Left Breast Insertion Tissue Expander (Left: Breast)    Location: [facility] A OR 03 / [facility] OR    Surgeons: Destinee Ortiz MD; Jaye Dewey MD                                                         Pre- Anesthesia Evaluation                                            Etelvina Banks is a 44 y.o. female who presents for the above mentioned procedure due to Ductal carcinoma in situ (DCIS) of left breast [D05.12]    Past Medical History:   Diagnosis Date    Arthritis 2015    Depression, major, single episode, moderate (Multi) 02/28/2023    Diabetes mellitus (Multi) 4-01-24    Headache, unspecified     Bad headache    Hypertension     Type 2 diabetes mellitus     Vision loss     Wears dentures     upper only     Past Surgical History:   Procedure Laterality Date    BREAST BIOPSY Left 06/07/2024    BREAST BIOPSY Left 9/12/2024    Procedure: Left breast Magseed lumpectomy x 3;  Surgeon: Mirtha Dimas MD;  Location: [facility] OR;  Service: General Surgery;  Laterality: Left;  8 AM, 2 hours    BREAST LUMPECTOMY Left 09/12/2024    Left breast Magseed lumpectomy x 3 (ADH)    OTHER SURGICAL HISTORY  03/11/2019    Ovarian cystectomy    OTHER SURGICAL HISTORY  10/14/2022    Tubal ligation    OTHER SURGICAL HISTORY Right 03/22/2021    arthritis metal plate and screw     Social History   She reports that she has quit smoking. Her smoking use included cigarettes. She has been exposed to tobacco smoke. She has never used smokeless tobacco. She reports that she does not drink alcohol and does not use drugs.    Allergies and Medications   No Known Allergies       Current Outpatient Medications   Medication Instructions    acetaminophen (TYLENOL EXTRA  "STRENGTH) 1,000 mg, oral, Every 8 hours    buprenorphine-naloxone (Suboxone) 8-2 mg SL film Place under the tongue. Take 1 film SL twice kurt    celecoxib (CELEBREX) 200 mg, oral, 2 times daily    cream base no.31, bulk, (Transdermal Pain Base) cream once daily as needed.    gabapentin (NEURONTIN) 300 mg, oral, Every 8 hours    hydroCHLOROthiazide (MICROZIDE) 12.5 mg, oral, Daily PRN    losartan (COZAAR) 50 mg, oral, Daily    metFORMIN XR (Glucophage-XR) 500 mg 24 hr tablet Take 2 tablet PO daily    naloxone (Narcan) 4 mg/0.1 mL nasal spray Administer into affected nostril(s). Spray 4 mg intranasally once if needed for overdose or respiratory depression    sulfamethoxazole-trimethoprim (Bactrim DS) 800-160 mg tablet 1 tablet, oral, 2 times daily       Recent Labs     10/17/24  1518 09/03/24  1126   WBC 7.1 5.8   HGB 12.5 13.2   HCT 37.6 39.1    304   MCV 87 87       Recent Labs     10/17/24  1518 09/03/24  1126 03/26/24  1240   EGFR >90 >90 >90   ANIONGAP 12 10 13   BUN 7 11 13   CREATININE 0.70 0.72 0.81    137 140   K 3.7 3.9 4.5    104 104   CO2 30 27 28   GLUCOSE 152* 106* 126*     Recent Labs     10/17/24  1518 03/26/24  1240   PROT 6.5 6.9   ALBUMIN 4.3 4.3   BILITOT 0.5 0.5   ALKPHOS 84 104   ALT 20 44   AST 18 28       Recent Labs     10/17/24  1518   HGBA1C 5.8*     Recent Labs     10/17/24  1518 09/03/24  1126   CALCIUM 9.2 8.9         Encounter Date: 09/12/24   ECG 12 lead   Result Value    Ventricular Rate 88    Atrial Rate 87    [place] Interval 131    QRS Duration 103    QT Interval 377    QTC Calculation(Bazett) 457    P Axis 34    R Axis 1    T Axis 9    QRS Count 14    Q Onset 254    T Offset 443    QTC Fredericia 428    Narrative    Sinus rhythm    Confirmed by Tyrone Lacy ([id number]) on 9/12/2024 8:30:29 AM        Visit Vitals  /90   Pulse 100   Temp 36 °C (96.8 °F) (Temporal)   Resp 16   Ht 1.575 m (5' 2\")   Wt 84 kg (185 lb 3 oz)   SpO2 95%   BMI 33.87 kg/m²   OB Status Having " periods   Smoking Status Former   BSA 1.92 m²     Medical Gas Therapy: None (Room air)  Lab Results   Component Value Date    PREGTESTUR Negative 11/11/2024     Results from last 7 days   Lab Units 11/11/24  0628   POCT GLUCOSE mg/dL 133*         11/11/2024     6:28 AM 10/29/2024     1:16 PM 10/15/2024     2:35 PM 10/10/2024     2:21 PM 9/25/2024     4:04 PM 9/12/2024    11:50 AM 9/12/2024    11:40 AM   BP REVIEW   BP (ultimate) 136/90 125/89 120/77 127/85 118/86 106/65 109/76                 Relevant Problems   Cardiac   (+) Mixed hyperlipidemia   (+) Primary hypertension      Neuro   (+) Generalized anxiety disorder      Endocrine   (+) Class 1 obesity in adult   (+) Type 2 diabetes mellitus without complication, without long-term current use of insulin (Multi)      Musculoskeletal   (+) Fibromyalgia      Gastrointestinal and Abdominal   (+) Elevated liver enzymes       Clinical information reviewed:   Tobacco  Allergies  Meds  Problems  Med Hx  Surg Hx   Fam Hx  Soc   Hx        NPO Detail:  NPO/Void Status  Carbohydrate Drink Given Prior to Surgery? : N  Date of Last Liquid: 11/11/24  Time of Last Liquid: 0000  Date of Last Solid: 11/11/24  Time of Last Solid: 0000  Last Intake Type: Clear fluids  Time of Last Void: 0623         Physical Exam    Airway  Mallampati: II  TM distance: >3 FB  Neck ROM: full     Cardiovascular   Rhythm: regular  Rate: normal     Dental   (+) upper dentures     Pulmonary   Comments: Normal RR  Non-labored respiration    Abdominal            Anesthesia Plan    History of general anesthesia?: yes  History of complications of general anesthesia?: no    ASA 3     general   (General with ETT. Multimodal analgesia. Standard ASA monitoring.  )  The patient is not a current smoker.    intravenous induction   Postoperative administration of opioids is intended.  Anesthetic plan and risks discussed with patient.    Plan discussed with CRNA.    Jessica Loza MD

## 2024-11-11 NOTE — OP NOTE
Left Breast Skin Sparing Mastectomy (L), Left Breast Intraoperative Lymphatic Mapping, Axillary [place] Lymph Node Biopsy (L) Operative Note     Date: 2024  OR Location: [facility] A OR    Name: Etelvina Banks, : 1980, Age: 44 y.o., MRN: [id number], Sex: female    Diagnosis  Pre-op Diagnosis      * Ductal carcinoma in situ (DCIS) of left breast [D05.12] Post-op Diagnosis     * Ductal carcinoma in situ (DCIS) of left breast [D05.12]     Procedures  Left Breast Skin Sparing, Possible Nipple Sparing, Mastectomy  79522 - PA MASTECTOMY SIMPLE COMPLETE    Left Breast Intraoperative Lymphatic Mapping, Axillary [place] Lymph Node Biopsy  41856 - PA BX/EXC LYMPH NODE OPEN DEEP AXILLARY NODE    Surgeons   Panel 1:     * Destinee Ortiz - Primary  Panel 2:     * Jaye Dewye - Primary    Resident/Fellow/Other Assistant:  Surgeons and Role:  Panel 1:     * Jaye Dewey MD - Assisting    Staff:   RNFA:   Surgical Assistant:   Circulator: Anastasia  Scrub Person: Gloria  Scrub Person: Susy    Anesthesia Staff: Anesthesiologist: Jessica Loza MD  CRNA: KATIANA Kaba-VIOLETA, DNP  C-AA: OSCAR Montiel    Procedure Summary  Anesthesia: General  ASA: III  Estimated Blood Loss: 5mL  Intra-op Medications: lymphazurin    Specimen:   ID Type Source Tests Collected by Time   1 : LEFT AXILLARY SENTINEL LYMPH NODES Tissue SENTINEL LYMPH NODE BREAST LEFT SURGICAL PATHOLOGY EXAM Destinee Ortiz MD 2024 0909   2 : LEFT SKIN SPARING MASTECTOMY, STITCH AT 12:00 Tissue BREAST MASTECTOMY LEFT SURGICAL PATHOLOGY EXAM Destinee Ortiz MD 2024 0903   3 : LEFT INFERIOR MASTECTOMY SKIN Tissue BREAST MASTECTOMY LEFT SURGICAL PATHOLOGY EXAM Destinee Ortiz MD 2024 0822          Drains and/or Catheters: none by me        Implants: none by me    Findings: none    Indications: Etelvina Banks is an 44 y.o. female who is having surgery for Ductal carcinoma in situ (DCIS) of left breast [D05.12]. She is s/p excisional biopsy of  ADH which upgraded to DCIS by another surgeon on 9/12/24 demonstrating extensive DCIS with multiple close and positive margins. Presents today for completion mastectomy with reconstruction.    The patient was seen in the preoperative area. The risks, benefits, complications, treatment options, non-operative alternatives, expected recovery and outcomes were discussed with the patient. The possibilities of reaction to medication, pulmonary aspiration, injury to surrounding structures, bleeding, recurrent infection, the need for additional procedures, failure to diagnose a condition, and creating a complication requiring transfusion or operation were discussed with the patient. The patient concurred with the proposed plan, giving informed consent.  The site of surgery was properly noted/marked if necessary per policy. The patient has been actively warmed in preoperative area. Preoperative antibiotics have been ordered and given within 1 hours of incision. Venous thrombosis prophylaxis have been ordered including bilateral sequential compression devices    Procedure Details:   After informed consent was obtained and the appropriate breast was marked the patient was transferred to the operating room.  She was positioned on the operating room table in a supine position with her pressure points padded appropriately. A surgical time out was performed with the OR team.  General anesthesia was induced and she received perioperative antibiotics in the form of Ancef.    I proceeded with intraoperative lymphatic mapping by injecting her left breast around the border of the areola transdermally from 12-3 o'clock with 1.0 millicurie of technetium-99 tilmanocept (Lymphoseek). I then injected her left breast subareolar with 2.5 mL of Lymphazurin blue dye.  Her left breast was gently massaged for 5 minutes.    Dr. Dewey had seen the patient preoperatively and marked the boundaries of the breast.  We agreed on a transverse incision to  "access the breast. Bilateral breast and axilla was prepped and draped in a sterile fashion.    I first turned my attention to the left skin sparing mastectomy. Tumescent solution was injected in the space between the subcutaneous fat and the breast tissue in all directions and given time to work. Dr. Dewey then proceeded with de-epithelialization of the inferior skin flap. The entire incision was created with a scalpel.  The skin flaps were raised sharply, using scissors, from the lateral border of the sternum medially, to clavicle superiorly, to anterior border latissimus laterally, to the inframammary fold inferiorly.  Once all the skin flaps were raised, the breast was removed from the underlying pectoralis muscle, to include pectoralis fascia, using electrocautery.  Care was taken to clip the medial perforating vessels.  Once the breast was completely excised it was marked with a a stitch at 12 o'clock and sent to Pathology fresh labeled \"Left skin sparing mastectomy, stitch at 12 o'clock.\" The nipple was inadvertently left on the inferior skin flap, so it was excised sharply and reattached with suture to the mastectomy specimen.    I then turned my attention to the intraoperative lymphatic mapping and sentinel node biopsy.  Using the Neoprobe, I was able to confirm there was uptake of technetium in the left axilla. The clavipectoral fascia was already divided with the mastectomy.  I identified a hot spot up against the chest wall in level I.  This tissue was grasped, elevated and excised.  This was associated with a lymph node that had uptake of blue dye and technetium.  Ex Vivo count of 279.  I identified 2 other lymph nodes that had uptake of technetium that had ex Vivo counts lower than this.  They were all sent together as \"left axillary sentinel lymph nodes.\"All specimens were sent for permanent.    It was at this time that Dr. Dewey joined us in the operating room to complete her tissue expander " reconstruction and closure.  At the end of my portion of the case the instrument and sponge counts were correct.  The patient was tolerating general anesthesia without incident.    The procedure was made technically difficult due to skin sparing nature of the mastectomy.    Complications:  None; patient tolerated the procedure well.    Disposition:  stable, intubated in the OR for Dr. Dewey's portion of the procedure  Condition: stable     [place] Node Biopsy for Breast Cancer - Left  Operation performed with curative intent Yes   Tracer(s) used to identify sentinel nodes in the upfront surgery (non-neoadjuvant) setting Dye and Radioactive tracer   Tracer(s) used to identify sentinel nodes in the neoadjuvant setting N/A   All nodes (colored or non-colored) present at the end of a dye-filled lymphatic channel were removed Yes   All significantly radioactive nodes were removed Yes   All palpably suspicious nodes were removed N/A   Biopsy-proven positive nodes marked with clips prior to chemotherapy were identified and removed N/A         Task Performed by RNFA or Surgical Assistant:  Retraction.    Attending Attestation: A qualified resident physician was not available.    Destinee Ortiz  Phone Number: [phone]

## 2024-11-11 NOTE — ANESTHESIA POSTPROCEDURE EVALUATION
Patient: Etelvina Banks    Procedure Summary       Date: 11/11/24 Room / Location: [facility] A OR 03 / [facility] A OR    Anesthesia Start: 0727 Anesthesia Stop: 1120    Procedures:       Left Breast Skin Sparing, Possible Nipple Sparing, Mastectomy (Left: Breast)      Left Breast Intraoperative Lymphatic Mapping, Axillary [place] Lymph Node Biopsy (Left: Axilla)      Left Breast Insertion Tissue Expander (Left: Breast) Diagnosis:       Ductal carcinoma in situ (DCIS) of left breast      (Ductal carcinoma in situ (DCIS) of left breast [D05.12])    Surgeons: Destinee Ortiz MD; Jaye Dewey MD Responsible Provider: Jessica Loza MD    Anesthesia Type: general ASA Status: 3            Anesthesia Type: general    Vitals Value Taken Time   /84 11/11/24 1231   Temp 36.2 °C (97.2 °F) 11/11/24 1215   Pulse 107 11/11/24 1233   Resp 15 11/11/24 1215   SpO2 97 % 11/11/24 1233   Vitals shown include unfiled device data.    Anesthesia Post Evaluation    Patient location during evaluation: PACU  Patient participation: complete - patient participated  Level of consciousness: awake  Pain management: adequate  Multimodal analgesia pain management approach  Airway patency: patent  Cardiovascular status: hemodynamically stable  Respiratory status: spontaneous ventilation  Hydration status: euvolemic  Postoperative Nausea and Vomiting: none        There were no known notable events for this encounter.

## 2024-11-11 NOTE — ANESTHESIA PROCEDURE NOTES
Airway  Date/Time: 11/11/2024 7:43 AM  Urgency: elective    Airway not difficult    Staffing  Performed: VIOLETA   Authorized by: Jessica Loza MD    Performed by: KATIANA Kaba-VIOLETA, DNP  Patient location during procedure: OR    Indications and Patient Condition  Indications for airway management: anesthesia and airway protection  Spontaneous ventilation: present  Sedation level: deep  Preoxygenated: yes  Patient position: sniffing  MILS maintained throughout  Mask difficulty assessment: 1 - vent by mask  No planned trial extubation    Final Airway Details  Final airway type: endotracheal airway      Successful airway: ETT  Cuffed: yes   Successful intubation technique: direct laryngoscopy  Endotracheal tube insertion site: oral  Blade: Emilia  Blade size: #3  ETT size (mm): 7.5  Cormack-Lehane Classification: grade IIa - partial view of glottis  Placement verified by: chest auscultation and capnometry   Cuff volume (mL): 5  Measured from: lips  ETT to lips (cm): 20  Number of attempts at approach: 1  Ventilation between attempts: none  Number of other approaches attempted: 0

## 2024-11-12 DIAGNOSIS — G89.18 POST-OP PAIN: Primary | ICD-10-CM

## 2024-11-12 RX ORDER — TRAMADOL HYDROCHLORIDE 50 MG/1
50 TABLET ORAL EVERY 8 HOURS PRN
Qty: 15 TABLET | Refills: 0 | Status: SHIPPED | OUTPATIENT
Start: 2024-11-12 | End: 2024-11-17

## 2024-11-12 RX ORDER — TRAMADOL HYDROCHLORIDE 50 MG/1
50 TABLET ORAL EVERY 8 HOURS PRN
Qty: 5 TABLET | Refills: 0 | Status: SHIPPED | OUTPATIENT
Start: 2024-11-12 | End: 2024-11-12 | Stop reason: ENTERED-IN-ERROR

## 2024-11-12 RX ORDER — GABAPENTIN 300 MG/1
300 CAPSULE ORAL EVERY 8 HOURS
Qty: 42 CAPSULE | Refills: 0 | Status: SHIPPED | OUTPATIENT
Start: 2024-11-12 | End: 2024-11-26

## 2024-11-15 ENCOUNTER — TELEPHONE (OUTPATIENT)
Dept: SURGICAL ONCOLOGY | Facility: CLINIC | Age: 44
End: 2024-11-15
Payer: COMMERCIAL

## 2024-11-15 NOTE — TELEPHONE ENCOUNTER
Attempted to contact patient as routine follow up from left mastectomy on 11/11/2024 however received her voicemail.  Message left to contact office for any questions or concerns.  Instructed to contact office to schedule postoperative visit.

## 2024-11-19 ENCOUNTER — TELEPHONE (OUTPATIENT)
Dept: PRIMARY CARE | Facility: CLINIC | Age: 44
End: 2024-11-19

## 2024-11-19 ENCOUNTER — APPOINTMENT (OUTPATIENT)
Dept: PLASTIC SURGERY | Facility: CLINIC | Age: 44
End: 2024-11-19
Payer: COMMERCIAL

## 2024-11-19 VITALS
HEART RATE: 101 BPM | HEIGHT: 62 IN | WEIGHT: 185 LBS | SYSTOLIC BLOOD PRESSURE: 127 MMHG | BODY MASS INDEX: 34.04 KG/M2 | DIASTOLIC BLOOD PRESSURE: 84 MMHG

## 2024-11-19 DIAGNOSIS — C50.412 MALIGNANT NEOPLASM OF UPPER-OUTER QUADRANT OF LEFT BREAST IN FEMALE, ESTROGEN RECEPTOR NEGATIVE: ICD-10-CM

## 2024-11-19 DIAGNOSIS — I10 PRIMARY HYPERTENSION: ICD-10-CM

## 2024-11-19 DIAGNOSIS — G89.18 POST-OP PAIN: ICD-10-CM

## 2024-11-19 DIAGNOSIS — Z17.1 MALIGNANT NEOPLASM OF UPPER-OUTER QUADRANT OF LEFT BREAST IN FEMALE, ESTROGEN RECEPTOR NEGATIVE: ICD-10-CM

## 2024-11-19 PROCEDURE — 4010F ACE/ARB THERAPY RXD/TAKEN: CPT | Performed by: SURGERY

## 2024-11-19 PROCEDURE — 3050F LDL-C >= 130 MG/DL: CPT | Performed by: SURGERY

## 2024-11-19 PROCEDURE — 3008F BODY MASS INDEX DOCD: CPT | Performed by: SURGERY

## 2024-11-19 PROCEDURE — 3079F DIAST BP 80-89 MM HG: CPT | Performed by: SURGERY

## 2024-11-19 PROCEDURE — 3074F SYST BP LT 130 MM HG: CPT | Performed by: SURGERY

## 2024-11-19 PROCEDURE — 3044F HG A1C LEVEL LT 7.0%: CPT | Performed by: SURGERY

## 2024-11-19 PROCEDURE — 99024 POSTOP FOLLOW-UP VISIT: CPT | Performed by: SURGERY

## 2024-11-19 RX ORDER — SULFAMETHOXAZOLE AND TRIMETHOPRIM 800; 160 MG/1; MG/1
1 TABLET ORAL 2 TIMES DAILY
Qty: 20 TABLET | Refills: 0 | Status: SHIPPED | OUTPATIENT
Start: 2024-11-19 | End: 2024-11-29

## 2024-11-20 RX ORDER — LOSARTAN POTASSIUM 50 MG/1
50 TABLET ORAL DAILY
Qty: 90 TABLET | Refills: 3 | Status: SHIPPED | OUTPATIENT
Start: 2024-11-20 | End: 2025-11-20

## 2024-11-20 NOTE — TELEPHONE ENCOUNTER
I am not seeing any hx of being on that medication? Can we call the patient, maybe there was a miscommunication.

## 2024-11-22 NOTE — PROGRESS NOTES
PLASTIC SURGERY CLINIC VISIT  POSTOP BREAST RECONSTRUCTION     Date: 11/26/24  Date of Surgery: 11/11/24  Surgical Procedure: Left Mastectomy with Tissue Expander        HPI:   Etelvina Banks 44 y.o. female is POD#15 from Left Mastectomy (Dr. Ortiz) and TE insertion.      Interval changes as of this date:   11/19 Doing well overall. Prevena in place and functioning. ROSCOE drains with SS output Number 2 less than 30 ml per day for three consecutive days. But Number 1 remains with elevated output. Pain has now resolved.   11/26 Doing well overall.. ROSCOE drain in place with SS output greater than 30 ml per day for three consecutive days     MEDICATIONS    Current Outpatient Medications:     buprenorphine-naloxone (Suboxone) 8-2 mg SL film, Place under the tongue. Take 1 film SL twice kurt, Disp: , Rfl:     cream base no.31, bulk, (Transdermal Pain Base) cream, once daily as needed., Disp: , Rfl:     gabapentin (Neurontin) 300 mg capsule, Take 1 capsule (300 mg) by mouth every 8 hours for 14 days., Disp: 42 capsule, Rfl: 0    gabapentin (Neurontin) 300 mg capsule, Take 1 capsule (300 mg) by mouth every 8 hours for 14 days., Disp: 42 capsule, Rfl: 0    hydroCHLOROthiazide (Microzide) 12.5 mg tablet, Take 1 tablet (12.5 mg) by mouth once daily as needed (pitting edema). (Patient taking differently: Take 1 tablet (12.5 mg) by mouth once daily as needed (pitting edema). Takes every other day), Disp: 30 tablet, Rfl: 2    losartan (Cozaar) 50 mg tablet, Take 1 tablet (50 mg) by mouth once daily., Disp: 90 tablet, Rfl: 3    metFORMIN XR (Glucophage-XR) 500 mg 24 hr tablet, Take 2 tablet PO daily, Disp: 180 tablet, Rfl: 1    sulfamethoxazole-trimethoprim (Bactrim DS) 800-160 mg tablet, Take 1 tablet by mouth 2 times a day for 10 days., Disp: 20 tablet, Rfl: 0      OBJECTIVE [x]Expand by Default  Blood pressure 117/79, pulse 86, weight 86.2 kg (190 lb).       REVIEW OF SYSTEMS:    Constitutional: negative for fevers, chills,  unintentional weight loss  HEENT: negative for changes in vision, headaches, changes in hearing, congestion, sore throat  Cardiovascular: negative for chest pain, palpitations  Respiratory: negative for cough, shortness of breath  Gastrointestinal: negative for nausea, vomiting, diarrhea  Genitourinary: negative for dysuria, hematuria  Musculoskeletal: negative for joint swelling or pain  Skin: negative for rashes or lesions  Psych: negative for depression, anxiety  Endocrine: negative for polyuria, polydipsia, cold/heat intolerance  Hem/Lymph: negative for bleeding disorder     PHYSICAL EXAM  General: alert and oriented, no apparent distress    Focused exam of the breasts:  Left: Incisions c/d/I     MENTOR ARTOURA PLUS SMOOTH ULTRA HIGH PROFILE BREAST TISSUE EXPANDER 535 CC        ASSESSMENT/PLAN  Etelvina Banks 44 y.o. female who had Left Mastectomy with Tissue Expander on 11/11/24 who presents for POV.  We  will evaluate her next week and begin filling expanding     Doing well, incisions are healing well, some bruising       ROSCOE drain(s) removed at this visit: none      Continue with a healthy consisting of increased Protien.  Continue activity restrictions.   Steri strips replaced     RTC next week     Attending Attestation:  Jaye SANDHU MD, personal performed the history, exam, and decision making on this patient.

## 2024-11-26 ENCOUNTER — APPOINTMENT (OUTPATIENT)
Dept: PLASTIC SURGERY | Facility: CLINIC | Age: 44
End: 2024-11-26
Payer: COMMERCIAL

## 2024-11-26 VITALS
HEART RATE: 86 BPM | DIASTOLIC BLOOD PRESSURE: 79 MMHG | SYSTOLIC BLOOD PRESSURE: 117 MMHG | WEIGHT: 190 LBS | BODY MASS INDEX: 34.75 KG/M2

## 2024-11-26 DIAGNOSIS — D05.12 DUCTAL CARCINOMA IN SITU (DCIS) OF LEFT BREAST: Primary | ICD-10-CM

## 2024-11-26 PROCEDURE — 4010F ACE/ARB THERAPY RXD/TAKEN: CPT | Performed by: SURGERY

## 2024-11-26 PROCEDURE — 1036F TOBACCO NON-USER: CPT | Performed by: SURGERY

## 2024-11-26 PROCEDURE — 3050F LDL-C >= 130 MG/DL: CPT | Performed by: SURGERY

## 2024-11-26 PROCEDURE — 3044F HG A1C LEVEL LT 7.0%: CPT | Performed by: SURGERY

## 2024-11-26 PROCEDURE — 3078F DIAST BP <80 MM HG: CPT | Performed by: SURGERY

## 2024-11-26 PROCEDURE — 3074F SYST BP LT 130 MM HG: CPT | Performed by: SURGERY

## 2024-11-26 PROCEDURE — 99024 POSTOP FOLLOW-UP VISIT: CPT | Performed by: SURGERY

## 2024-11-30 NOTE — PROGRESS NOTES
PLASTIC SURGERY CLINIC VISIT  POSTOP BREAST RECONSTRUCTION     Date: 12/3/24  Date of Surgery: 11/11/24  Surgical Procedure: Left Mastectomy with Tissue Expander        HPI:   Etelvina Banks 44 y.o. female is POD#15 from Left Mastectomy (Dr. Ortiz) and TE insertion.      Interval changes as of this date:   11/19 Doing well overall. Prevena in place and functioning. ROSCOE drains with SS output Number 2 less than 30 ml per day for three consecutive days. But Number 1 remains with elevated output. Pain has now resolved.   11/26 Doing well overall. ROSCOE drain in place with SS output greater than 30 ml per day for three consecutive days  12/3 Doing well overall. ROSCOE drain in place with SS output <30 ml per day for three consecutive days     MEDICATIONS    Current Outpatient Medications:     buprenorphine-naloxone (Suboxone) 8-2 mg SL film, Place under the tongue. Take 1 film SL twice kurt, Disp: , Rfl:     cream base no.31, bulk, (Transdermal Pain Base) cream, once daily as needed., Disp: , Rfl:     hydroCHLOROthiazide (Microzide) 12.5 mg tablet, Take 1 tablet (12.5 mg) by mouth once daily as needed (pitting edema)., Disp: 30 tablet, Rfl: 2    losartan (Cozaar) 50 mg tablet, Take 1 tablet (50 mg) by mouth once daily., Disp: 90 tablet, Rfl: 3    metFORMIN XR (Glucophage-XR) 500 mg 24 hr tablet, Take 2 tablet PO daily, Disp: 180 tablet, Rfl: 1    gabapentin (Neurontin) 300 mg capsule, Take 1 capsule (300 mg) by mouth every 8 hours for 14 days., Disp: 42 capsule, Rfl: 0      OBJECTIVE [x]Expand by Default  Blood pressure 132/83, pulse 98, temperature 36.7 °C (98.1 °F), resp. rate 18, SpO2 97%.       REVIEW OF SYSTEMS:    Constitutional: negative for fevers, chills, unintentional weight loss  HEENT: negative for changes in vision, headaches, changes in hearing, congestion, sore throat  Cardiovascular: negative for chest pain, palpitations  Respiratory: negative for cough, shortness of breath  Gastrointestinal: negative for  nausea, vomiting, diarrhea  Genitourinary: negative for dysuria, hematuria  Musculoskeletal: negative for joint swelling or pain  Skin: negative for rashes or lesions  Psych: negative for depression, anxiety  Endocrine: negative for polyuria, polydipsia, cold/heat intolerance  Hem/Lymph: negative for bleeding disorder     PHYSICAL EXAM  General: alert and oriented, no apparent distress    Focused exam of the breasts:  Left: Incisions c/d/I. She has healed completely. No more partial mastectomy skin necrosis. Drain output is clear, less than 30cc.     MENTOR ARTOURA PLUS SMOOTH ULTRA HIGH PROFILE BREAST TISSUE EXPANDER 535 CC   12/3 Aspirated all the air all, expanded by 250 ml     ASSESSMENT/PLAN  Etelvina Banks 44 y.o. female who had Left Mastectomy with Tissue Expander on 11/11/24 who presents for POV. Incision healed well, drain output is clear and less than 30 ml. No more partial mastectomy skin necrosis. We will remove the air and begin filling her today for expansion. Will keep the drain in for another week.     ROSCOE drain(s) removed at this visit: none     Continue with increased protein  Continue activity restrictions.   Aspirated all the air out, expanded by 250 ml.   Steri strips re applied  Will refill her antibiotics today  Will keep her ROSCOE drain in for another week.    RTC 12/10/24    Scribe Attestation  By signing my name below, Brenda SANDHU Scribe, attest that this documentation has been prepared under the direction and in the presence of Jaye Dewey MD. Verbal consent was obtained from the patient.    Attending Attestation:  Jaye SANDHU MD, personal performed the history, exam, and decision making on this patient.

## 2024-12-02 NOTE — PROGRESS NOTES
Subjective   Etelvina Banks is a 44 y.o. Premenopausal  female returns to the [facility] breast center for a post op visit. She has been recovering well since surgery and has no breast specific complaints today.    Diagnosis date: 2024 left atypical ductal hyperplasia which upgraded on excisional biopsy 2024 to nuclear grade 1-2 ductal carcinoma in situ, ER >95% 3+, pTis cN0, stage 0      Cancer Staging   Ductal carcinoma in situ (DCIS) of left breast  Staging form: Breast, AJCC 8th Edition  - Pathologic stage from 2024: Stage 0 - Signed by Destinee Ortiz MD on 2024  pTis (DCIS)  pN0  cM0  ER Status: Positive  RI Status: Not assessed  HER2 Status: Not assessed     Etelvina Banks presented at the age of 44 with left screen detected atypical ductal hyperplasia which upgraded to nuclear grade 1-2 DCIS at 3 different sites of excisional biopsy with one positive and multiple close margins (Dr. Dimas). Diagnostic imaging  before surgery demonstrated up to 9cm of NME on MRI correlating with the biopsy sites. She denies palpable masses, skin changes, or nipple discharge. She has a family history of breast cancer in her mom. We proceeded with left skin sparing completion mastectomy, SLN and immediate reconstruction with Dr. Dewey on 24. Final surgical pathology demonstrated no residual DCIS and negative sentinel lymph nodes.     BREAST IMAGIN24 Bilateral screening mammogram demonstrates heterogeneously dense breast tissue, BI-RADS Category 0, Extensive bilateral calcifications are again noted. Calcifications of the left breast posterior depth inferior to the posterior nipple line MLO tomographic are similar to prior examination but have increased in extent. Spot magnification compression view recommended. There are multiple round asymmetries of the right and left breast similar to prior examination.  24 left diagnostic mammogram and targeted ultrasound, indicates BI-RADS Category 4,  Numerous very fine calcifications are present lower outer quadrant posterior depth which are in a distribution suggesting branching pattern. The calcifications of greatest concern span in AP length of about 6 cm. No associated mass is identified. More superiorly in the upper-outer quadrant there are scattered more coarse calcifications  which have not changed.  7/3/24 bilateral breast MRI RIGHT BREAST:  No suspicious mass or nonmass enhancement is identified. No axillary or internal mammary lymphadenopathy is appreciated. LEFT BREAST: Signal void from a tissue marker is identified in the LOQ at anterior depth (ADH). Signal void from a tissue marker is also identified in the deep LOQ (ADH). Segmental non mass enhancement spans the 2 tissue markers and measures up to 8.8cm. Focal non mass enhancement versus a possible mass is present just superior to this area in the LOQ at middle to posterior depth on slice 57 and measures approximately 1.5 cm in maximum dimension. Review of mammograms 2024 demonstrates a possible mass/architectural distortion with calcifications in the  slight inferior/lateral breast at middle to posterior depth which possibly corresponds with the area on  MRI. No axillary or internal mammary lymphadenopathy is appreciated. NON-BREAST FINDINGS:  None.  24 left diagnostic mammogram/US No discrete mammographic or sonographic finding corresponding to the MRI findings. Consider MRI guided biopsy of the discernible abnormality.     BREAST PROCEDURE: 24 left breast, LOQ, 2 site stereotactic-guided core biopsy at the anterior and posterior aspect of the 6cm of calcifications  24 left breast MR-guided biopsy for NME  24 left Magseed localized excisional biopsy x3  24 left skin sparing mastectomy, SLN     REPRODUCTIVE HEALTH: menarche at 10, pre-menopausal, , age at first birth 21, did not breastfeed, used OCPs for <5 years     MEDICAL HISTORY: hypertension, diabetes,  obesity BMI 35     FAMILY CANCER HISTORY:   Mom with breast cancer mid-60s     MEDICAL ONCOLOGY: not indicated     RADIATION ONCOLOGY: not indicated     GENETICS: meets NCCN criteria, negative for pathogenic variant     Cancer-related family history includes Breast cancer in her mother; Cancer in an other family member.     Objective     Physical Exam    left Breast: ***    Assessment/Plan   Stage  0  breast cancer.  Surgery is complete.  Ms. Etelvina Banks was provided with a copy of her pathology report and it was reviewed in detail with her.  Referrals to medical oncology and radiation oncology are not needed with DCIS s/p mastectomy.  Discussion about RENAISSANCE to consider 5mg tamoxifen for risk reduction of contralateral breast cancer as she has heterogeneously dense breast tissue. Right screening mammogram was performed May 2024.  ***Return to clinic 6 months after completion of local therapy. Bilateral diagnostic mammogram will be ordered and obtained before the visit.   Destinee Ortiz MD

## 2024-12-03 ENCOUNTER — OFFICE VISIT (OUTPATIENT)
Dept: PLASTIC SURGERY | Facility: CLINIC | Age: 44
End: 2024-12-03
Payer: COMMERCIAL

## 2024-12-03 ENCOUNTER — APPOINTMENT (OUTPATIENT)
Dept: PLASTIC SURGERY | Facility: CLINIC | Age: 44
End: 2024-12-03
Payer: COMMERCIAL

## 2024-12-03 VITALS
HEART RATE: 98 BPM | TEMPERATURE: 98.1 F | OXYGEN SATURATION: 97 % | DIASTOLIC BLOOD PRESSURE: 83 MMHG | SYSTOLIC BLOOD PRESSURE: 132 MMHG | RESPIRATION RATE: 18 BRPM

## 2024-12-03 DIAGNOSIS — D05.12 DUCTAL CARCINOMA IN SITU (DCIS) OF LEFT BREAST: ICD-10-CM

## 2024-12-03 PROCEDURE — 3079F DIAST BP 80-89 MM HG: CPT | Performed by: SURGERY

## 2024-12-03 PROCEDURE — 4010F ACE/ARB THERAPY RXD/TAKEN: CPT | Performed by: SURGERY

## 2024-12-03 PROCEDURE — 3075F SYST BP GE 130 - 139MM HG: CPT | Performed by: SURGERY

## 2024-12-03 PROCEDURE — 3044F HG A1C LEVEL LT 7.0%: CPT | Performed by: SURGERY

## 2024-12-03 PROCEDURE — 1036F TOBACCO NON-USER: CPT | Performed by: SURGERY

## 2024-12-03 PROCEDURE — 3050F LDL-C >= 130 MG/DL: CPT | Performed by: SURGERY

## 2024-12-03 PROCEDURE — 99024 POSTOP FOLLOW-UP VISIT: CPT | Performed by: SURGERY

## 2024-12-03 ASSESSMENT — PAIN SCALES - GENERAL: PAINLEVEL_OUTOF10: 0-NO PAIN

## 2024-12-04 NOTE — PROGRESS NOTES
Subjective   Etelvina Banks is a 44 y.o. Premenopausal  female returns to the [facility] breast center for a post op visit. She has been recovering well since surgery and has no breast specific complaints today.    Diagnosis date: 2024 left atypical ductal hyperplasia which upgraded on excisional biopsy 2024 to nuclear grade 1-2 ductal carcinoma in situ, ER >95% 3+, pTis cN0, stage 0      Cancer Staging   Ductal carcinoma in situ (DCIS) of left breast  Staging form: Breast, AJCC 8th Edition  - Pathologic stage from 2024: Stage 0 - Signed by Destinee Ortiz MD on 2024  pTis (DCIS)  pN0  cM0  ER Status: Positive  RI Status: Not assessed  HER2 Status: Not assessed     Etelvina Banks presented at the age of 44 with left screen detected atypical ductal hyperplasia which upgraded to nuclear grade 1-2 DCIS at 3 different sites of excisional biopsy with one positive and multiple close margins (Dr. Dimas). Diagnostic imaging  before surgery demonstrated up to 9cm of NME on MRI correlating with the biopsy sites. She denies palpable masses, skin changes, or nipple discharge. She has a family history of breast cancer in her mom. We proceeded with left skin sparing completion mastectomy, SLN and immediate reconstruction with Dr. Dewey on 24. Final surgical pathology demonstrated no residual DCIS and negative sentinel lymph nodes.     BREAST IMAGIN24 Bilateral screening mammogram demonstrates heterogeneously dense breast tissue, BI-RADS Category 0, Extensive bilateral calcifications are again noted. Calcifications of the left breast posterior depth inferior to the posterior nipple line MLO tomographic are similar to prior examination but have increased in extent. Spot magnification compression view recommended. There are multiple round asymmetries of the right and left breast similar to prior examination.  24 left diagnostic mammogram and targeted ultrasound, indicates BI-RADS Category 4,  Numerous very fine calcifications are present lower outer quadrant posterior depth which are in a distribution suggesting branching pattern. The calcifications of greatest concern span in AP length of about 6 cm. No associated mass is identified. More superiorly in the upper-outer quadrant there are scattered more coarse calcifications which have not changed.  7/3/24 bilateral breast MRI RIGHT BREAST:  No suspicious mass or nonmass enhancement is identified. No axillary or internal mammary lymphadenopathy is appreciated. LEFT BREAST: Signal void from a tissue marker is identified in the LOQ at anterior depth (ADH). Signal void from a tissue marker is also identified in the deep LOQ (ADH). Segmental non mass enhancement spans the 2 tissue markers and measures up to 8.8cm. Focal non mass enhancement versus a possible mass is present just superior to this area in the LOQ at middle to posterior depth on slice 57 and measures approximately 1.5 cm in maximum dimension. Review of mammograms 2024 demonstrates a possible mass/architectural distortion with calcifications in the slight inferior/lateral breast at middle to posterior depth which possibly corresponds with the area on MRI. No axillary or internal mammary lymphadenopathy is appreciated. NON-BREAST FINDINGS:  None.  24 left diagnostic mammogram/US No discrete mammographic or sonographic finding corresponding to the MRI findings. Consider MRI guided biopsy of the discernible abnormality.     BREAST PROCEDURE: 24 left breast, LOQ, 2 site stereotactic-guided core biopsy at the anterior and posterior aspect of the 6cm of calcifications  24 left breast MR-guided biopsy for NME  24 left Magseed localized excisional biopsy x3  24 left skin sparing mastectomy, SLN     REPRODUCTIVE HEALTH: menarche at 10, pre-menopausal, , age at first birth 21, did not breastfeed, used OCPs for <5 years     MEDICAL HISTORY: hypertension, diabetes, obesity  BMI 35     FAMILY CANCER HISTORY:   Mom with breast cancer mid-60s     MEDICAL ONCOLOGY: not indicated     RADIATION ONCOLOGY: not indicated     GENETICS: meets NCCN criteria, negative for pathogenic variant     Cancer-related family history includes Breast cancer in her mother; Cancer in an other family member.     Objective     Physical Exam    left Breast: healing mastectomy incision covered by tape, one ROSCOE with serous output, no erythema or undrained fluid collections    Assessment/Plan   Stage  0  breast cancer.  Surgery is complete.  Ms. Etelvina Banks was provided with a copy of her pathology report and it was reviewed in detail with her.  Referrals to medical oncology and radiation oncology are not needed with DCIS s/p mastectomy.  Discussed RENAISSANCE (which is not yet open) to consider 5mg tamoxifen for risk reduction of contralateral breast cancer as she has heterogeneously dense breast tissue. Right screening mammogram was performed May 2024. Would need another mammogram on the right for enrollment. She would be interested in participating. I will contact her when this trial is open, hopefully in the next 1-2 months. Continue to follow with Dr. Dewey for reconstruction needs and drain removal.    Destinee Ortiz MD

## 2024-12-05 RX ORDER — SULFAMETHOXAZOLE AND TRIMETHOPRIM 800; 160 MG/1; MG/1
1 TABLET ORAL 2 TIMES DAILY
Qty: 14 TABLET | Refills: 0 | Status: SHIPPED | OUTPATIENT
Start: 2024-12-05 | End: 2024-12-12

## 2024-12-05 NOTE — PROGRESS NOTES
PLASTIC SURGERY CLINIC VISIT  POSTOP BREAST RECONSTRUCTION     Date: 12/10/24  Date of Surgery: 11/11/24  Surgical Procedure: Left Mastectomy with Tissue Expander        HPI:   Etelvina Banks 44 y.o. female is POD#15 from Left Mastectomy (Dr. Ortiz) and TE insertion.      Interval changes as of this date:   11/19 Doing well overall. Prevena in place and functioning. ROSCOE drains with SS output Number 2 less than 30 ml per day for three consecutive days. But Number 1 remains with elevated output. Pain has now resolved.   11/26 Doing well overall. ROSCOE drain in place with SS output greater than 30 ml per day for three consecutive days  12/3 Doing well overall. ROSCOE drain in place with SS output greater than 30 ml per day for three consecutive days   12/10 Doing well overall. ROSCOE drain in place with SS output greater than 30 ml per day for three consecutive days  MEDICATIONS    Current Outpatient Medications:     buprenorphine-naloxone (Suboxone) 8-2 mg SL film, Place under the tongue. Take 1 film SL twice kurt, Disp: , Rfl:     cream base no.31, bulk, (Transdermal Pain Base) cream, once daily as needed., Disp: , Rfl:     gabapentin (Neurontin) 300 mg capsule, Take 1 capsule (300 mg) by mouth every 8 hours for 14 days., Disp: 42 capsule, Rfl: 0    hydroCHLOROthiazide (Microzide) 12.5 mg tablet, Take 1 tablet (12.5 mg) by mouth once daily as needed (pitting edema)., Disp: 30 tablet, Rfl: 2    losartan (Cozaar) 50 mg tablet, Take 1 tablet (50 mg) by mouth once daily., Disp: 90 tablet, Rfl: 3    metFORMIN XR (Glucophage-XR) 500 mg 24 hr tablet, Take 2 tablet PO daily, Disp: 180 tablet, Rfl: 1    sulfamethoxazole-trimethoprim (Bactrim DS) 800-160 mg tablet, Take 1 tablet by mouth 2 times a day for 7 days., Disp: 14 tablet, Rfl: 0      OBJECTIVE [x]Expand by Default  Blood pressure 110/76, pulse 97, temperature 36.1 °C (97 °F), weight 89.6 kg (197 lb 9.6 oz).         REVIEW OF SYSTEMS:    Constitutional: negative for fevers,  chills, unintentional weight loss  HEENT: negative for changes in vision, headaches, changes in hearing, congestion, sore throat  Cardiovascular: negative for chest pain, palpitations  Respiratory: negative for cough, shortness of breath  Gastrointestinal: negative for nausea, vomiting, diarrhea  Genitourinary: negative for dysuria, hematuria  Musculoskeletal: negative for joint swelling or pain  Skin: negative for rashes or lesions  Psych: negative for depression, anxiety  Endocrine: negative for polyuria, polydipsia, cold/heat intolerance  Hem/Lymph: negative for bleeding disorder     PHYSICAL EXAM  General: alert and oriented, no apparent distress    Focused exam of the breasts:  Left: Incisions c/d/I. She has healed completely. No more partial mastectomy skin necrosis. Minor 3cm partial thickness area on triple point     MENTOR ARTOURA PLUS SMOOTH ULTRA HIGH PROFILE BREAST TISSUE EXPANDER 535 CC   12/3 Aspirated all the air all, expanded by 250 ml (total 250ml)  12/10 Expanded by 75 ml (total 325 ml)     ASSESSMENT/PLAN  Etelvina Banks 44 y.o. female who had Left Mastectomy with Tissue Expander on 11/11/24 who presents for POV. Incision healed well, drain output is clear and less than 30 ml. No more partial mastectomy skin necrosis.     ROSCOE drain(s) removed at this visit: none     Continue with increased protein  Continue activity restrictions.   Steri strips re applied  CHG dressing replaced    RTC next week     Scribe Attestation  By signing my name below, EDSON PresleyShelli Dudley, attest that this documentation has been prepared under the direction and in the presence of Jaye Dewey MD. Verbal consent obtained from the patient.     Attending Attestation:  Jaye SANDHU MD, personal performed the history, exam, and decision making on this patient.

## 2024-12-10 ENCOUNTER — APPOINTMENT (OUTPATIENT)
Dept: SURGICAL ONCOLOGY | Facility: CLINIC | Age: 44
End: 2024-12-10
Payer: COMMERCIAL

## 2024-12-10 ENCOUNTER — APPOINTMENT (OUTPATIENT)
Dept: PLASTIC SURGERY | Facility: CLINIC | Age: 44
End: 2024-12-10
Payer: COMMERCIAL

## 2024-12-10 VITALS
DIASTOLIC BLOOD PRESSURE: 76 MMHG | HEART RATE: 97 BPM | SYSTOLIC BLOOD PRESSURE: 110 MMHG | BODY MASS INDEX: 36.14 KG/M2 | WEIGHT: 197.6 LBS | TEMPERATURE: 97 F

## 2024-12-10 DIAGNOSIS — D05.12 DUCTAL CARCINOMA IN SITU (DCIS) OF LEFT BREAST: Primary | ICD-10-CM

## 2024-12-10 DIAGNOSIS — D05.12 DUCTAL CARCINOMA IN SITU (DCIS) OF LEFT BREAST: ICD-10-CM

## 2024-12-10 PROCEDURE — 3050F LDL-C >= 130 MG/DL: CPT | Performed by: SURGERY

## 2024-12-10 PROCEDURE — 4010F ACE/ARB THERAPY RXD/TAKEN: CPT | Performed by: SURGERY

## 2024-12-10 PROCEDURE — 3078F DIAST BP <80 MM HG: CPT | Performed by: SURGERY

## 2024-12-10 PROCEDURE — 3044F HG A1C LEVEL LT 7.0%: CPT | Performed by: SURGERY

## 2024-12-10 PROCEDURE — 3074F SYST BP LT 130 MM HG: CPT | Performed by: SURGERY

## 2024-12-10 PROCEDURE — 99024 POSTOP FOLLOW-UP VISIT: CPT | Performed by: SURGERY

## 2024-12-10 ASSESSMENT — PAIN SCALES - GENERAL: PAINLEVEL_OUTOF10: 2

## 2024-12-12 DIAGNOSIS — E11.9 TYPE 2 DIABETES MELLITUS WITHOUT COMPLICATION, WITHOUT LONG-TERM CURRENT USE OF INSULIN (MULTI): ICD-10-CM

## 2024-12-12 RX ORDER — SULFAMETHOXAZOLE AND TRIMETHOPRIM 800; 160 MG/1; MG/1
1 TABLET ORAL 2 TIMES DAILY
Qty: 28 TABLET | Refills: 0 | Status: SHIPPED | OUTPATIENT
Start: 2024-12-12 | End: 2024-12-26

## 2024-12-12 RX ORDER — METFORMIN HYDROCHLORIDE 500 MG/1
TABLET, EXTENDED RELEASE ORAL
Qty: 180 TABLET | Refills: 1 | Status: SHIPPED | OUTPATIENT
Start: 2024-12-12

## 2024-12-16 NOTE — PROGRESS NOTES
"       PLASTIC SURGERY CLINIC VISIT  POSTOP BREAST RECONSTRUCTION     Date: 12/17/24  Date of Surgery: 11/11/24  Surgical Procedure: Left Mastectomy with Tissue Expander        HPI:   Etelvina Banks 44 y.o. female is POD#15 from Left Mastectomy (Dr. Ortiz) and TE insertion.      Interval changes as of this date:   11/19 Doing well overall. Prevena in place and functioning. ROSCOE drains with SS output Number 2 less than 30 ml per day for three consecutive days. But Number 1 remains with elevated output. Pain has now resolved.   11/26 Doing well overall. ROSCOE drain in place with SS output greater than 30 ml per day for three consecutive days  12/3 Doing well overall. ROSCOE drain in place with SS output greater than 30 ml per day for three consecutive days   12/10 Doing well overall. ROSCOE drain in place with SS output greater than 30 ml per day for three consecutive days  12/17 Doing well overall. ROSCOE drain in place with SS output greater than 30 ml per day   MEDICATIONS    Current Outpatient Medications:     buprenorphine-naloxone (Suboxone) 8-2 mg SL film, Place under the tongue. Take 1 film SL twice kurt, Disp: , Rfl:     cream base no.31, bulk, (Transdermal Pain Base) cream, once daily as needed., Disp: , Rfl:     gabapentin (Neurontin) 300 mg capsule, Take 1 capsule (300 mg) by mouth every 8 hours for 14 days., Disp: 42 capsule, Rfl: 0    hydroCHLOROthiazide (Microzide) 12.5 mg tablet, Take 1 tablet (12.5 mg) by mouth once daily as needed (pitting edema)., Disp: 30 tablet, Rfl: 2    losartan (Cozaar) 50 mg tablet, Take 1 tablet (50 mg) by mouth once daily., Disp: 90 tablet, Rfl: 3    metFORMIN XR (Glucophage-XR) 500 mg 24 hr tablet, Take 2 tablet PO daily, Disp: 180 tablet, Rfl: 1    sulfamethoxazole-trimethoprim (Bactrim DS) 800-160 mg tablet, Take 1 tablet by mouth 2 times a day for 14 days., Disp: 28 tablet, Rfl: 0      OBJECTIVE [x]Expand by Default  Blood pressure 112/75, pulse 85, height 1.575 m (5' 2\"), weight 87.1 kg " (192 lb).         REVIEW OF SYSTEMS:    Constitutional: negative for fevers, chills, unintentional weight loss  HEENT: negative for changes in vision, headaches, changes in hearing, congestion, sore throat  Cardiovascular: negative for chest pain, palpitations  Respiratory: negative for cough, shortness of breath  Gastrointestinal: negative for nausea, vomiting, diarrhea  Genitourinary: negative for dysuria, hematuria  Musculoskeletal: negative for joint swelling or pain  Skin: negative for rashes or lesions  Psych: negative for depression, anxiety  Endocrine: negative for polyuria, polydipsia, cold/heat intolerance  Hem/Lymph: negative for bleeding disorder     PHYSICAL EXAM  General: alert and oriented, no apparent distress    Focused exam of the breasts:  Left: Incisions c/d/I. She has healed completely. No more partial mastectomy skin necrosis. Minor 3cm partial thickness area on triple point     MENTOR ARTOURA PLUS SMOOTH ULTRA HIGH PROFILE BREAST TISSUE EXPANDER 535 CC   12/3 Aspirated all the air all, expanded by 250 ml (total 250ml)  12/10 Expanded by 75 ml (total 325 ml)   12/17 Expanded by 100  ml (total 425 ml)   ASSESSMENT/PLAN  Etelvina Banks 44 y.o. female who had Left Mastectomy with Tissue Expander on 11/11/24 who presents for POV.     ROSCOE drain(s) removed at this visit: none     Continue with increased protein  Continue activity restrictions.   Steri strips re applied    Scribe Attestation  By signing my name below, Brenda SANDHU Scribe, attest that this documentation has been prepared under the direction and in the presence of Jaye Dewey MD. Verbal consent obtained from the patient.     Attending Attestation:  Jaye SANDHU MD, personal performed the history, exam, and decision making on this patient.

## 2024-12-17 ENCOUNTER — OFFICE VISIT (OUTPATIENT)
Dept: SURGICAL ONCOLOGY | Facility: CLINIC | Age: 44
End: 2024-12-17
Payer: COMMERCIAL

## 2024-12-17 ENCOUNTER — APPOINTMENT (OUTPATIENT)
Dept: PLASTIC SURGERY | Facility: CLINIC | Age: 44
End: 2024-12-17
Payer: COMMERCIAL

## 2024-12-17 VITALS
DIASTOLIC BLOOD PRESSURE: 75 MMHG | BODY MASS INDEX: 35.33 KG/M2 | SYSTOLIC BLOOD PRESSURE: 112 MMHG | HEIGHT: 62 IN | WEIGHT: 192 LBS | HEART RATE: 85 BPM

## 2024-12-17 VITALS — DIASTOLIC BLOOD PRESSURE: 74 MMHG | HEART RATE: 96 BPM | TEMPERATURE: 98.1 F | SYSTOLIC BLOOD PRESSURE: 109 MMHG

## 2024-12-17 DIAGNOSIS — D05.12 DUCTAL CARCINOMA IN SITU (DCIS) OF LEFT BREAST: Primary | ICD-10-CM

## 2024-12-17 PROCEDURE — 3074F SYST BP LT 130 MM HG: CPT | Performed by: SURGERY

## 2024-12-17 PROCEDURE — 99024 POSTOP FOLLOW-UP VISIT: CPT | Performed by: SURGERY

## 2024-12-17 PROCEDURE — 4010F ACE/ARB THERAPY RXD/TAKEN: CPT | Performed by: SURGERY

## 2024-12-17 PROCEDURE — 3044F HG A1C LEVEL LT 7.0%: CPT | Performed by: SURGERY

## 2024-12-17 PROCEDURE — 3078F DIAST BP <80 MM HG: CPT | Performed by: SURGERY

## 2024-12-17 PROCEDURE — 3050F LDL-C >= 130 MG/DL: CPT | Performed by: SURGERY

## 2024-12-17 PROCEDURE — 99211 OFF/OP EST MAY X REQ PHY/QHP: CPT | Performed by: SURGERY

## 2024-12-17 PROCEDURE — 3008F BODY MASS INDEX DOCD: CPT | Performed by: SURGERY

## 2024-12-17 ASSESSMENT — PAIN SCALES - GENERAL: PAINLEVEL_OUTOF10: 0-NO PAIN

## 2024-12-19 ENCOUNTER — OFFICE VISIT (OUTPATIENT)
Dept: PLASTIC SURGERY | Facility: CLINIC | Age: 44
End: 2024-12-19
Payer: COMMERCIAL

## 2024-12-19 VITALS
WEIGHT: 193 LBS | TEMPERATURE: 97.2 F | SYSTOLIC BLOOD PRESSURE: 120 MMHG | HEART RATE: 88 BPM | BODY MASS INDEX: 35.3 KG/M2 | DIASTOLIC BLOOD PRESSURE: 85 MMHG

## 2024-12-19 DIAGNOSIS — D05.12 DUCTAL CARCINOMA IN SITU (DCIS) OF LEFT BREAST: Primary | ICD-10-CM

## 2024-12-19 PROCEDURE — 4010F ACE/ARB THERAPY RXD/TAKEN: CPT | Performed by: SURGERY

## 2024-12-19 PROCEDURE — 3074F SYST BP LT 130 MM HG: CPT | Performed by: SURGERY

## 2024-12-19 PROCEDURE — 3050F LDL-C >= 130 MG/DL: CPT | Performed by: SURGERY

## 2024-12-19 PROCEDURE — 3044F HG A1C LEVEL LT 7.0%: CPT | Performed by: SURGERY

## 2024-12-19 PROCEDURE — 99024 POSTOP FOLLOW-UP VISIT: CPT | Performed by: SURGERY

## 2024-12-19 PROCEDURE — 3079F DIAST BP 80-89 MM HG: CPT | Performed by: SURGERY

## 2024-12-19 ASSESSMENT — PATIENT HEALTH QUESTIONNAIRE - PHQ9
2. FEELING DOWN, DEPRESSED OR HOPELESS: NOT AT ALL
1. LITTLE INTEREST OR PLEASURE IN DOING THINGS: NOT AT ALL
SUM OF ALL RESPONSES TO PHQ9 QUESTIONS 1 & 2: 0

## 2024-12-19 ASSESSMENT — PAIN SCALES - GENERAL: PAINLEVEL_OUTOF10: 0-NO PAIN

## 2024-12-31 NOTE — PROGRESS NOTES
PLASTIC SURGERY CLINIC VISIT  POSTOP BREAST RECONSTRUCTION     Date: 1/7/25  Date of Surgery: 11/11/24  Surgical Procedure: Left Mastectomy with Tissue Expander        HPI:   Etelvina Banks 44 y.o. female is Post of  from Left Mastectomy (Dr. Ortiz) and TE insertion.      Interval changes as of this date:   11/19 Doing well overall. Prevena in place and functioning. ROSCOE drains with SS output Number 2 less than 30 ml per day for three consecutive days. But Number 1 remains with elevated output. Pain has now resolved.   11/26 Doing well overall. ROSCOE drain in place with SS output greater than 30 ml per day for three consecutive days  12/3 Doing well overall. ROSCOE drain in place with SS output greater than 30 ml per day for three consecutive days   12/10 Doing well overall. ROSCOE drain in place with SS output greater than 30 ml per day for three consecutive days  12/17 Doing well overall. ROSCOE drain in place with SS output greater than 30 ml per day   12/19 ROSCOE drain removed at this visit  1/7/25 Doing well overall, here for expansion   MEDICATIONS    Current Outpatient Medications:     buprenorphine-naloxone (Suboxone) 8-2 mg SL film, Place under the tongue. Take 1 film SL twice kurt, Disp: , Rfl:     cream base no.31, bulk, (Transdermal Pain Base) cream, once daily as needed., Disp: , Rfl:     gabapentin (Neurontin) 300 mg capsule, Take 1 capsule (300 mg) by mouth every 8 hours for 14 days., Disp: 42 capsule, Rfl: 0    hydroCHLOROthiazide (Microzide) 12.5 mg tablet, Take 1 tablet (12.5 mg) by mouth once daily as needed (pitting edema)., Disp: 30 tablet, Rfl: 2    losartan (Cozaar) 50 mg tablet, Take 1 tablet (50 mg) by mouth once daily., Disp: 90 tablet, Rfl: 3    metFORMIN XR (Glucophage-XR) 500 mg 24 hr tablet, Take 2 tablet PO daily, Disp: 180 tablet, Rfl: 1      OBJECTIVE [x]Expand by Default  There were no vitals taken for this visit.         REVIEW OF SYSTEMS:    Constitutional: negative for fevers, chills,  unintentional weight loss  HEENT: negative for changes in vision, headaches, changes in hearing, congestion, sore throat  Cardiovascular: negative for chest pain, palpitations  Respiratory: negative for cough, shortness of breath  Gastrointestinal: negative for nausea, vomiting, diarrhea  Genitourinary: negative for dysuria, hematuria  Musculoskeletal: negative for joint swelling or pain  Skin: negative for rashes or lesions  Psych: negative for depression, anxiety  Endocrine: negative for polyuria, polydipsia, cold/heat intolerance  Hem/Lymph: negative for bleeding disorder     PHYSICAL EXAM  General: alert and oriented, no apparent distress    Focused exam of the breasts:  Left: Incisions c/d/I. She has healed completely. No more partial mastectomy skin necrosis. Minor 3cm partial thickness area on triple point well healed    MENTOR ARTOURA PLUS SMOOTH ULTRA HIGH PROFILE BREAST TISSUE EXPANDER 535 CC   12/3 Aspirated all the air all, expanded by 250 ml (total 250ml)  12/10 Expanded by 75 ml (total 325 ml)   12/17 Expanded by 100  ml (total 425 ml)   1/7/25 Expanded by  55 ml (Total 480  ml)    ASSESSMENT/PLAN  Etelvina Banks 44 y.o. female who had Left Mastectomy with Tissue Expander on 11/11/24 who presents for POV.       Continue with increased protein  Continue activity restrictions.   3. Will return next week for final fill of 55cc  4. Etelvina will than return to one last visit in order to get approval for return to work  5. CT Angio of Abd and Pelvis ordered for scheduled Left MANUEL on 7/11/25  6. Pre procedure ERAS ordered (Hep, Gabapentin & Amend on morning of)    It was a pleasure to see you

## 2025-01-02 NOTE — INTERVAL H&P NOTE
H&P reviewed. The patient was examined and there are no changes to the H&P.  Plan today for left mastectomy, SLN followed by immediate reconstruction with Dr. Dewey.  Destinee Ortiz MD     Sacks, Ashley - MD

## 2025-01-07 ENCOUNTER — APPOINTMENT (OUTPATIENT)
Dept: PLASTIC SURGERY | Facility: CLINIC | Age: 45
End: 2025-01-07
Payer: COMMERCIAL

## 2025-01-07 VITALS
BODY MASS INDEX: 35.67 KG/M2 | SYSTOLIC BLOOD PRESSURE: 132 MMHG | DIASTOLIC BLOOD PRESSURE: 90 MMHG | HEART RATE: 92 BPM | WEIGHT: 195 LBS

## 2025-01-07 DIAGNOSIS — D05.12 DUCTAL CARCINOMA IN SITU (DCIS) OF LEFT BREAST: Primary | ICD-10-CM

## 2025-01-07 PROCEDURE — 3075F SYST BP GE 130 - 139MM HG: CPT | Performed by: SURGERY

## 2025-01-07 PROCEDURE — 3080F DIAST BP >= 90 MM HG: CPT | Performed by: SURGERY

## 2025-01-07 PROCEDURE — 99024 POSTOP FOLLOW-UP VISIT: CPT | Performed by: SURGERY

## 2025-01-07 PROCEDURE — 4010F ACE/ARB THERAPY RXD/TAKEN: CPT | Performed by: SURGERY

## 2025-01-07 RX ORDER — APREPITANT 40 MG/1
40 CAPSULE ORAL DAILY
OUTPATIENT
Start: 2025-01-07

## 2025-01-07 RX ORDER — GABAPENTIN 300 MG/1
600 CAPSULE ORAL EVERY 8 HOURS SCHEDULED
OUTPATIENT
Start: 2025-01-07

## 2025-01-07 RX ORDER — HEPARIN SODIUM 5000 [USP'U]/ML
5000 INJECTION, SOLUTION INTRAVENOUS; SUBCUTANEOUS ONCE
OUTPATIENT
Start: 2025-07-11

## 2025-01-11 NOTE — PROGRESS NOTES
"       PLASTIC SURGERY CLINIC VISIT  POSTOP BREAST RECONSTRUCTION     Date: 1/14/25  Date of Surgery: 11/11/24  Surgical Procedure: Left Mastectomy with Tissue Expander        HPI:   Etelvina Banks 44 y.o. female is Post of  from Left Mastectomy (Dr. Ortiz) and TE insertion.      Interval changes as of this date:   11/19 Doing well overall. Prevena in place and functioning. ROSCOE drains with SS output Number 2 less than 30 ml per day for three consecutive days. But Number 1 remains with elevated output. Pain has now resolved.   11/26 Doing well overall. ROSCOE drain in place with SS output greater than 30 ml per day for three consecutive days  12/3 Doing well overall. ROSCOE drain in place with SS output greater than 30 ml per day for three consecutive days   12/10 Doing well overall. ROSCOE drain in place with SS output greater than 30 ml per day for three consecutive days  12/17 Doing well overall. ROSCOE drain in place with SS output greater than 30 ml per day   12/19 ROSCOE drain removed at this visit  1/7/25 Doing well overall, here for expansion   1/14  Doing well overall. Here today for final expansion   MEDICATIONS    Current Outpatient Medications:     buprenorphine-naloxone (Suboxone) 8-2 mg SL film, Place under the tongue. Take 1 film SL twice kurt, Disp: , Rfl:     cream base no.31, bulk, (Transdermal Pain Base) cream, once daily as needed., Disp: , Rfl:     gabapentin (Neurontin) 300 mg capsule, Take 1 capsule (300 mg) by mouth every 8 hours for 14 days., Disp: 42 capsule, Rfl: 0    hydroCHLOROthiazide (Microzide) 12.5 mg tablet, Take 1 tablet (12.5 mg) by mouth once daily as needed (pitting edema)., Disp: 30 tablet, Rfl: 2    losartan (Cozaar) 50 mg tablet, Take 1 tablet (50 mg) by mouth once daily., Disp: 90 tablet, Rfl: 3    metFORMIN XR (Glucophage-XR) 500 mg 24 hr tablet, Take 2 tablet PO daily, Disp: 180 tablet, Rfl: 1      OBJECTIVE [x]Expand by Default  Blood pressure 130/86, pulse 90, height 1.575 m (5' 2\"), weight " 88.5 kg (195 lb).         REVIEW OF SYSTEMS:    Constitutional: negative for fevers, chills, unintentional weight loss  HEENT: negative for changes in vision, headaches, changes in hearing, congestion, sore throat  Cardiovascular: negative for chest pain, palpitations  Respiratory: negative for cough, shortness of breath  Gastrointestinal: negative for nausea, vomiting, diarrhea  Genitourinary: negative for dysuria, hematuria  Musculoskeletal: negative for joint swelling or pain  Skin: negative for rashes or lesions  Psych: negative for depression, anxiety  Endocrine: negative for polyuria, polydipsia, cold/heat intolerance  Hem/Lymph: negative for bleeding disorder     PHYSICAL EXAM  General: alert and oriented, no apparent distress    Focused exam of the breasts:  Left: Incisions c/d/I. She has healed completely.    MENTOR ARTOURA PLUS SMOOTH ULTRA HIGH PROFILE BREAST TISSUE EXPANDER 535 CC   12/3 Aspirated all the air all, expanded by 250 ml (total 250ml)  12/10 Expanded by 75 ml (total 325 ml)   12/17 Expanded by 100  ml (total 425 ml)   1/7/25 Expanded by  55 ml (Total 480  ml)  1/14/25 Expanded by 55 ml (Total 535 ml)    ASSESSMENT/PLAN  Etelvina Banks 44 y.o. female who had Left Mastectomy with Tissue Expander on 11/11/24 who presents for POV.     Continue with increased protein  Okay to slowly increase activity  She is schedule for her MANUEL surgery on 7/11/25  Will be getting CTA  Will bring her in prior to her surgery for pre op.       Scribe Attestation  By signing my name below, Brenda SANDHU Scribe, attest that this documentation has been prepared under the direction and in the presence of Jaye Dewey MD. Verbal consent obtained from the patient.     Attending Attestation:  Jaye SANDHU MD, personal performed the history, exam, and decision making on this patient.

## 2025-01-14 ENCOUNTER — APPOINTMENT (OUTPATIENT)
Dept: PLASTIC SURGERY | Facility: CLINIC | Age: 45
End: 2025-01-14
Payer: COMMERCIAL

## 2025-01-14 VITALS
BODY MASS INDEX: 35.88 KG/M2 | HEART RATE: 90 BPM | WEIGHT: 195 LBS | SYSTOLIC BLOOD PRESSURE: 130 MMHG | DIASTOLIC BLOOD PRESSURE: 86 MMHG | HEIGHT: 62 IN

## 2025-01-14 DIAGNOSIS — D05.12 DUCTAL CARCINOMA IN SITU (DCIS) OF LEFT BREAST: ICD-10-CM

## 2025-01-14 PROCEDURE — 4010F ACE/ARB THERAPY RXD/TAKEN: CPT | Performed by: SURGERY

## 2025-01-14 PROCEDURE — 3008F BODY MASS INDEX DOCD: CPT | Performed by: SURGERY

## 2025-01-14 PROCEDURE — 3075F SYST BP GE 130 - 139MM HG: CPT | Performed by: SURGERY

## 2025-01-14 PROCEDURE — 3079F DIAST BP 80-89 MM HG: CPT | Performed by: SURGERY

## 2025-01-14 PROCEDURE — 99024 POSTOP FOLLOW-UP VISIT: CPT | Performed by: SURGERY

## 2025-01-14 NOTE — LETTER
-Phos elevated at 5.4 on admission  -secondary to progression to ESRD  -Nephrology following  -HD per nephrology      January 14, 2025     Patient: Etelvina Banks   YOB: 1980   Date of Visit: 1/14/2025       To Whom It May Concern:    It is my medical opinion that Etelvina Banks may return to light duty of 20-25 hours per week on 2/9/24>    If you have any questions or concerns, please don't hesitate to call.         Sincerely,        Jaye Dewey MD    CC: No Recipients

## 2025-01-21 ENCOUNTER — APPOINTMENT (OUTPATIENT)
Dept: PLASTIC SURGERY | Facility: CLINIC | Age: 45
End: 2025-01-21
Payer: COMMERCIAL

## 2025-01-22 ENCOUNTER — HOSPITAL ENCOUNTER (OUTPATIENT)
Dept: RADIOLOGY | Facility: HOSPITAL | Age: 45
Discharge: HOME | End: 2025-01-22
Payer: COMMERCIAL

## 2025-01-22 DIAGNOSIS — D05.12 DUCTAL CARCINOMA IN SITU (DCIS) OF LEFT BREAST: ICD-10-CM

## 2025-01-22 PROCEDURE — 2550000001 HC RX 255 CONTRASTS: Performed by: PHYSICIAN ASSISTANT

## 2025-01-22 PROCEDURE — 74174 CTA ABD&PLVS W/CONTRAST: CPT

## 2025-01-22 RX ADMIN — IOHEXOL 75 ML: 350 INJECTION, SOLUTION INTRAVENOUS at 12:35

## 2025-01-23 ENCOUNTER — APPOINTMENT (OUTPATIENT)
Dept: PLASTIC SURGERY | Facility: CLINIC | Age: 45
End: 2025-01-23
Payer: COMMERCIAL

## 2025-04-02 ENCOUNTER — HOSPITAL ENCOUNTER (OUTPATIENT)
Dept: RADIOLOGY | Facility: HOSPITAL | Age: 45
Discharge: HOME | End: 2025-04-02
Payer: COMMERCIAL

## 2025-04-02 VITALS — HEIGHT: 62 IN | BODY MASS INDEX: 35.88 KG/M2 | WEIGHT: 195 LBS

## 2025-04-02 DIAGNOSIS — Z12.31 ENCOUNTER FOR SCREENING MAMMOGRAM FOR BREAST CANCER: ICD-10-CM

## 2025-04-02 PROCEDURE — 77067 SCR MAMMO BI INCL CAD: CPT | Mod: RIGHT SIDE | Performed by: RADIOLOGY

## 2025-04-02 PROCEDURE — 77061 BREAST TOMOSYNTHESIS UNI: CPT | Mod: RT

## 2025-04-02 PROCEDURE — 77063 BREAST TOMOSYNTHESIS BI: CPT | Mod: RIGHT SIDE | Performed by: RADIOLOGY

## 2025-04-09 ENCOUNTER — APPOINTMENT (OUTPATIENT)
Dept: SURGERY | Facility: CLINIC | Age: 45
End: 2025-04-09
Payer: COMMERCIAL

## 2025-04-16 ENCOUNTER — APPOINTMENT (OUTPATIENT)
Dept: SURGERY | Facility: CLINIC | Age: 45
End: 2025-04-16
Payer: COMMERCIAL

## 2025-05-06 DIAGNOSIS — M79.7 FIBROMYALGIA: ICD-10-CM

## 2025-05-06 DIAGNOSIS — E11.9 TYPE 2 DIABETES MELLITUS WITHOUT COMPLICATION, WITHOUT LONG-TERM CURRENT USE OF INSULIN: ICD-10-CM

## 2025-05-06 DIAGNOSIS — M25.511 CHRONIC PAIN OF BOTH SHOULDERS: Primary | ICD-10-CM

## 2025-05-06 DIAGNOSIS — M25.512 CHRONIC PAIN OF BOTH SHOULDERS: Primary | ICD-10-CM

## 2025-05-06 DIAGNOSIS — G89.29 CHRONIC PAIN OF BOTH SHOULDERS: Primary | ICD-10-CM

## 2025-05-06 RX ORDER — METFORMIN HYDROCHLORIDE 500 MG/1
TABLET, EXTENDED RELEASE ORAL
Qty: 180 TABLET | Refills: 0 | Status: SHIPPED | OUTPATIENT
Start: 2025-05-06

## 2025-05-06 NOTE — TELEPHONE ENCOUNTER
Rx Refill Request Telephone Encounter    Name:  Etelvina Banks  :  [id number]  Medication Name:  METFORMIN XR 500mg Take 2 Tablets PO Daily and Ibuprofen 800 mg PO TID PRN  Specific Pharmacy location:  [facility]  Date of last appointment:  2424  Date of next appointment:  September with Dr Baeza  Best number to reach patient:  [phone]    What was second medication and needs to schedule and appointment ASAP        Pended Metformin did not have a script since  of the Ibuprofen

## 2025-05-07 RX ORDER — IBUPROFEN 800 MG/1
800 TABLET ORAL EVERY 8 HOURS PRN
COMMUNITY
End: 2025-05-07 | Stop reason: SDUPTHER

## 2025-05-07 RX ORDER — IBUPROFEN 800 MG/1
800 TABLET ORAL EVERY 8 HOURS PRN
Qty: 90 TABLET | Refills: 5 | Status: SHIPPED | OUTPATIENT
Start: 2025-05-07 | End: 2025-11-03

## 2025-06-02 NOTE — PROGRESS NOTES
.PLASTIC SURGERY PRE-OP CLINIC NOTE  Date: 6/3/25  Subjective :  Patient ID: Etelvina Banks  is a 45 y.o. female is here for pre-op appointment     Planned Date of Procedure: 7/11/25  Planned Surgical Procedure: Left MANUEL    HPI:   Etelvina Banks is a 45 y.o. female is here for pre-operative appointment for the above procedure(s).     Currently, the patient states there were no changes in their medications or medical history.     Patient's vitals are Blood pressure 124/87, pulse 98, temperature 36.8 °C (98.2 °F), temperature source Temporal, weight 88.3 kg (194 lb 12.4 oz), SpO2 98%.   today.     The patient does not have their post-operative compressive garments today.     Patient is not currently on an ACE, ARB, or Diuretic.     Patient has Good Rx Card.     Patient is not on chronic NSAIDs.       MEDICATIONS  Current Medications[1]     REVIEW OF SYSTEMS:    Constitutional: negative for fevers, chills, unintentional weight loss  HEENT: negative for changes in vision, headaches, changes in hearing, congestion, sore throat  Cardiovascular: negative for chest pain, palpitations  Respiratory: negative for cough, shortness of breath  Gastrointestinal: negative for nausea, vomiting, diarrhea  Genitourinary: negative for dysuria, hematuria  Musculoskeletal: negative for joint swelling or pain  Skin: negative for rashes or lesions  Psych: negative for depression, anxiety  Endocrine: negative for polyuria, polydipsia, cold/heat intolerance  Hem/Lymph: negative for bleeding disorder    PHYSICAL EXAM  General: alert and oriented, no apparent distress  Psych: normal mood and affect, judgement intact.   Eyes: No conjunctival erythema, extraocular movements intact, no scleral icterus, pupils equal and reactive to light  HENT: normocephalic, atraumatic, no rhinorrhea, no trauma to external meatus, no prior surgical scars  CV: hemodynamically stable  Resp: unlabored, no increased work of breathing, equal bilateral chest rise,  no cough or stridor  Abdomen: soft, non-tender, non-distended. No surgical scars present. No rashes noted. No rectus diastasis present   Neuro: Unremarkable without focal findings, AAOx3, CN 2-12 grossly intact  Extremities/Musculoskeletal: Upper and lower extremities are atraumatic in appearance without tenderness or deformity. No swelling or erythema. Full range of motion is noted to all joints. Steady gait noted.    Skin: Intact, soft and warm; no rashes, lesions, or bruising. No large masses or keloids noted on exam.     Focused exam of the breasts:      LABORATORY  Nutrition  Lab Results   Component Value Date    ALBUMIN 4.3 10/17/2024          ASSESSMENT/PLAN  Etelvina Banks is a 45 y.o. female with hx of left breast cancer s/p left mastectomy with TE placement on  11/11/24    The patient will be undergoing Left TE removal with MANUEL reconstruction on 7/11/25 at [facility] [place]    Informed consent discussed with the patient, including: condition, proposed care, treatments and services, alternative forms of treatment, and risks of no treatment.  Details discussed around the procedures to be used, and the risks and hazards involved, potential benefits, and side effects of the patient's proposed care, treatment, and services; the likelihood of the patient achieving his or her goals; and any potential problems that might occur during recuperation. Reasonable alternative also discussed with the patient's proposed care, treatment, and services. The discussion encompasses risks, benefits, and side effects related to the alternative and risks related to not receiving the proposed care, treatment, and services. Written informed consent was obtained.    The patient was counseled to avoid anticoagulation medications and herbals including - but not limited to - ASA, NSAIDs, Plavix, fish oils, and green tea    Patient was counseled to avoid nicotine and areas with high amounts of secondhand smoke.     Patient was counseled to increase  protein intake after surgery to aid with wound healing with goal of 120g/day.     Patient was counseled on need for compression garments and/or support bras, given information about where to acquire these garments, and instructions to bring with on the day of surgery.     We discussed postoperative follow-up visits, recuperation and return to work.    Advised patient to contact office with any questions or concerns    All findings and diagnosis was discussed with patient at the time of this visit. Patient states they understand and are in agreement with the treatment plan at the time of this visit.     Photos completed     Medications eRx'd:    -Celebrex 200 mg BID with meals- Good Rx card provided to the patient.  -Gabapentin 600 mg Q8H  -Tylenol 1000 mg Q8H  -Hibiclens - instructed the patient to wash breast and/or abdomen and margins the night before surgery or the morning of surgery; avoid washing face/eyes (    RTC 7/22/25 after surgery     Scribe Attestation  By signing my name below, Brenda SANDHU, Scribe, attest that this documentation has been prepared under the direction and in the presence of Jaye Dewey MD. Verbal consent obtained from the patient.       Attending Attestation:  Jaye SANDHU MD, personal performed the history, exam, and decision making on this patient.  A total of 30 mins were spent in patient counseling, review of medical records, and coordination of care.           [1]   Current Outpatient Medications:     buprenorphine-naloxone (Suboxone) 8-2 mg SL film, Place under the tongue. Take 1 film SL twice kurt, Disp: , Rfl:     cream base no.31, bulk, (Transdermal Pain Base) cream, once daily as needed., Disp: , Rfl:     gabapentin (Neurontin) 300 mg capsule, Take 1 capsule (300 mg) by mouth every 8 hours for 14 days., Disp: 42 capsule, Rfl: 0    hydroCHLOROthiazide (Microzide) 12.5 mg tablet, Take 1 tablet (12.5 mg) by mouth once daily as needed (pitting edema)., Disp: 30 tablet,  Rfl: 2    ibuprofen 800 mg tablet, Take 1 tablet (800 mg) by mouth every 8 hours if needed for mild pain (1 - 3)., Disp: 90 tablet, Rfl: 5    losartan (Cozaar) 50 mg tablet, Take 1 tablet (50 mg) by mouth once daily., Disp: 90 tablet, Rfl: 3    metFORMIN XR (Glucophage-XR) 500 mg 24 hr tablet, Take 2 tablet PO dailyTake 2 tablet PO daily, Disp: 180 tablet, Rfl: 0

## 2025-06-03 ENCOUNTER — APPOINTMENT (OUTPATIENT)
Dept: PLASTIC SURGERY | Facility: CLINIC | Age: 45
End: 2025-06-03
Payer: COMMERCIAL

## 2025-06-03 VITALS
SYSTOLIC BLOOD PRESSURE: 124 MMHG | DIASTOLIC BLOOD PRESSURE: 87 MMHG | BODY MASS INDEX: 35.63 KG/M2 | WEIGHT: 194.78 LBS | TEMPERATURE: 98.2 F | HEART RATE: 98 BPM | OXYGEN SATURATION: 98 %

## 2025-06-03 DIAGNOSIS — D05.12 DUCTAL CARCINOMA IN SITU (DCIS) OF LEFT BREAST: ICD-10-CM

## 2025-06-03 DIAGNOSIS — G89.18 POST-OP PAIN: ICD-10-CM

## 2025-06-03 PROCEDURE — 99214 OFFICE O/P EST MOD 30 MIN: CPT | Performed by: SURGERY

## 2025-06-03 PROCEDURE — 3079F DIAST BP 80-89 MM HG: CPT | Performed by: SURGERY

## 2025-06-03 PROCEDURE — 3074F SYST BP LT 130 MM HG: CPT | Performed by: SURGERY

## 2025-06-03 PROCEDURE — 4010F ACE/ARB THERAPY RXD/TAKEN: CPT | Performed by: SURGERY

## 2025-06-03 ASSESSMENT — PAIN SCALES - GENERAL: PAINLEVEL_OUTOF10: 0-NO PAIN

## 2025-06-04 RX ORDER — CELECOXIB 200 MG/1
200 CAPSULE ORAL 2 TIMES DAILY
Qty: 28 CAPSULE | Refills: 0 | Status: SHIPPED | OUTPATIENT
Start: 2025-06-04 | End: 2025-06-18

## 2025-06-04 RX ORDER — GABAPENTIN 300 MG/1
300 CAPSULE ORAL EVERY 8 HOURS
Qty: 42 CAPSULE | Refills: 0 | Status: SHIPPED | OUTPATIENT
Start: 2025-06-04 | End: 2025-06-18

## 2025-06-04 RX ORDER — ACETAMINOPHEN 500 MG
1000 TABLET ORAL EVERY 8 HOURS
Qty: 84 TABLET | Refills: 0 | Status: SHIPPED | OUTPATIENT
Start: 2025-06-04 | End: 2025-06-18

## 2025-06-04 RX ORDER — CHLORHEXIDINE GLUCONATE 40 MG/ML
SOLUTION TOPICAL ONCE
Qty: 118 ML | Refills: 0 | Status: SHIPPED | OUTPATIENT
Start: 2025-06-04 | End: 2025-06-04

## 2025-07-10 RX ORDER — BISMUTH SUBSALICYLATE 262 MG
1 TABLET,CHEWABLE ORAL DAILY
COMMUNITY

## 2025-07-10 NOTE — PROGRESS NOTES
Pharmacy Medication History Review    Etelvina Banks is a 45 y.o. female who is planned to be admitted for Ductal carcinoma in situ (DCIS) of left breast. Pharmacy called the patient prior to their scheduled procedure and reviewed the patient's [id number] medications for accuracy.    Medications ADDED:  multivitamin  Medications CHANGED:  Suboxone 8-2mg film #1BID to suboxone 8-2mg tablet #1QID  Medications REMOVED:   none    Please review updated prior to admission medication list and comments regarding how patient may be taking medications differently by going to Admission tab --> Admission Orders --> Admit Orders / Review prior to admission medications.     Preferred pharmacy, last doses of medications, and allergies to be confirmed with patient by nursing the day of procedure.     Sources used to complete the med history include:  [facility]  Pharmacy dispense history  Patient Interview Good historian  Chart Review  Care Everywhere  [facility]    Below are additional concerns with the patient's PTA list.  Patient is enrolled into an opiod treatment program thru [facility]. Called [phone] @ 10:50am on 07/10/25 and talked to nurse Humaira to confirm patient's current dose. They state patient takes generic suboxone 8-2mg tablets. Takes 32mg daily. Patient states they split up the dose thru the day and takes #1 tablet four times a day  Patient states they have not started gabapentin 300mg and celebrex 200mg. Those are for post op care    Rach Olivo [facility]  Please reach out via Secure Chat for questions

## 2025-07-11 ENCOUNTER — ANESTHESIA (OUTPATIENT)
Dept: OPERATING ROOM | Facility: HOSPITAL | Age: 45
End: 2025-07-11
Payer: COMMERCIAL

## 2025-07-11 ENCOUNTER — HOSPITAL ENCOUNTER (INPATIENT)
Facility: HOSPITAL | Age: 45
End: 2025-07-11
Attending: SURGERY | Admitting: SURGERY
Payer: COMMERCIAL

## 2025-07-11 ENCOUNTER — ANESTHESIA EVENT (OUTPATIENT)
Dept: OPERATING ROOM | Facility: HOSPITAL | Age: 45
End: 2025-07-11
Payer: COMMERCIAL

## 2025-07-11 DIAGNOSIS — D05.12 DUCTAL CARCINOMA IN SITU (DCIS) OF LEFT BREAST: Primary | ICD-10-CM

## 2025-07-11 DIAGNOSIS — Z98.890 STATUS POST RECONSTRUCTION PROCEDURE: ICD-10-CM

## 2025-07-11 DIAGNOSIS — G89.18 ACUTE POSTOPERATIVE PAIN: ICD-10-CM

## 2025-07-11 LAB
ABO GROUP (TYPE) IN BLOOD: NORMAL
ANTIBODY SCREEN: NORMAL
GLUCOSE BLD MANUAL STRIP-MCNC: 141 MG/DL (ref 74–99)
PREGNANCY TEST URINE, POC: NEGATIVE
RH FACTOR (ANTIGEN D): NORMAL

## 2025-07-11 PROCEDURE — 11971 RMVL TIS XPNDR WO INSJ IMPLT: CPT | Performed by: PHYSICIAN ASSISTANT

## 2025-07-11 PROCEDURE — 7100000001 HC RECOVERY ROOM TIME - INITIAL BASE CHARGE: Performed by: SURGERY

## 2025-07-11 PROCEDURE — P9045 ALBUMIN (HUMAN), 5%, 250 ML: HCPCS | Mod: JZ,SE

## 2025-07-11 PROCEDURE — 19364 BRST RCNSTJ FREE FLAP: CPT

## 2025-07-11 PROCEDURE — 2500000002 HC RX 250 W HCPCS SELF ADMINISTERED DRUGS (ALT 637 FOR MEDICARE OP, ALT 636 FOR OP/ED): Mod: SE | Performed by: PHYSICIAN ASSISTANT

## 2025-07-11 PROCEDURE — 36415 COLL VENOUS BLD VENIPUNCTURE: CPT | Performed by: SURGERY

## 2025-07-11 PROCEDURE — 2720000007 HC OR 272 NO HCPCS: Performed by: SURGERY

## 2025-07-11 PROCEDURE — 3700000001 HC GENERAL ANESTHESIA TIME - INITIAL BASE CHARGE: Performed by: SURGERY

## 2025-07-11 PROCEDURE — 7100000002 HC RECOVERY ROOM TIME - EACH INCREMENTAL 1 MINUTE: Performed by: SURGERY

## 2025-07-11 PROCEDURE — 2500000005 HC RX 250 GENERAL PHARMACY W/O HCPCS: Mod: SE | Performed by: SURGERY

## 2025-07-11 PROCEDURE — 19371 PERI-IMPLT CAPSLC BRST COMPL: CPT | Performed by: PHYSICIAN ASSISTANT

## 2025-07-11 PROCEDURE — 38530 BIOPSY/REMOVAL LYMPH NODES: CPT | Performed by: SURGERY

## 2025-07-11 PROCEDURE — 2060000001 HC INTERMEDIATE ICU ROOM DAILY

## 2025-07-11 PROCEDURE — 19364 BRST RCNSTJ FREE FLAP: CPT | Performed by: SURGERY

## 2025-07-11 PROCEDURE — 2500000004 HC RX 250 GENERAL PHARMACY W/ HCPCS (ALT 636 FOR OP/ED): Mod: SE | Performed by: PHYSICIAN ASSISTANT

## 2025-07-11 PROCEDURE — 96372 THER/PROPH/DIAG INJ SC/IM: CPT

## 2025-07-11 PROCEDURE — 96372 THER/PROPH/DIAG INJ SC/IM: CPT | Performed by: PHYSICIAN ASSISTANT

## 2025-07-11 PROCEDURE — 2500000005 HC RX 250 GENERAL PHARMACY W/O HCPCS: Mod: SE

## 2025-07-11 PROCEDURE — 3700000002 HC GENERAL ANESTHESIA TIME - EACH INCREMENTAL 1 MINUTE: Performed by: SURGERY

## 2025-07-11 PROCEDURE — 99238 HOSP IP/OBS DSCHRG MGMT 30/<: CPT

## 2025-07-11 PROCEDURE — 3600000003 HC OR TIME - INITIAL BASE CHARGE - PROCEDURE LEVEL THREE: Performed by: SURGERY

## 2025-07-11 PROCEDURE — 2500000004 HC RX 250 GENERAL PHARMACY W/ HCPCS (ALT 636 FOR OP/ED): Mod: JZ,SE | Performed by: SURGERY

## 2025-07-11 PROCEDURE — 88304 TISSUE EXAM BY PATHOLOGIST: CPT | Performed by: STUDENT IN AN ORGANIZED HEALTH CARE EDUCATION/TRAINING PROGRAM

## 2025-07-11 PROCEDURE — 88304 TISSUE EXAM BY PATHOLOGIST: CPT | Mod: TC,SUR | Performed by: PHYSICIAN ASSISTANT

## 2025-07-11 PROCEDURE — 2500000001 HC RX 250 WO HCPCS SELF ADMINISTERED DRUGS (ALT 637 FOR MEDICARE OP): Mod: SE | Performed by: PHYSICIAN ASSISTANT

## 2025-07-11 PROCEDURE — 99233 SBSQ HOSP IP/OBS HIGH 50: CPT | Performed by: PHYSICIAN ASSISTANT

## 2025-07-11 PROCEDURE — 19371 PERI-IMPLT CAPSLC BRST COMPL: CPT | Performed by: SURGERY

## 2025-07-11 PROCEDURE — 0HPU0NZ REMOVAL OF TISSUE EXPANDER FROM LEFT BREAST, OPEN APPROACH: ICD-10-PCS | Performed by: SURGERY

## 2025-07-11 PROCEDURE — 88305 TISSUE EXAM BY PATHOLOGIST: CPT | Performed by: STUDENT IN AN ORGANIZED HEALTH CARE EDUCATION/TRAINING PROGRAM

## 2025-07-11 PROCEDURE — 0HRU077 REPLACEMENT OF LEFT BREAST USING DEEP INFERIOR EPIGASTRIC ARTERY PERFORATOR FLAP, OPEN APPROACH: ICD-10-PCS | Performed by: SURGERY

## 2025-07-11 PROCEDURE — 3600000008 HC OR TIME - EACH INCREMENTAL 1 MINUTE - PROCEDURE LEVEL THREE: Performed by: SURGERY

## 2025-07-11 PROCEDURE — 11971 RMVL TIS XPNDR WO INSJ IMPLT: CPT | Performed by: SURGERY

## 2025-07-11 PROCEDURE — 2500000004 HC RX 250 GENERAL PHARMACY W/ HCPCS (ALT 636 FOR OP/ED): Mod: JZ,SE

## 2025-07-11 PROCEDURE — 38530 BIOPSY/REMOVAL LYMPH NODES: CPT

## 2025-07-11 PROCEDURE — 81025 URINE PREGNANCY TEST: CPT | Performed by: PHYSICIAN ASSISTANT

## 2025-07-11 PROCEDURE — 86901 BLOOD TYPING SEROLOGIC RH(D): CPT | Performed by: SURGERY

## 2025-07-11 PROCEDURE — 82947 ASSAY GLUCOSE BLOOD QUANT: CPT

## 2025-07-11 RX ORDER — DOCUSATE SODIUM 100 MG/1
100 CAPSULE, LIQUID FILLED ORAL 2 TIMES DAILY
Status: DISCONTINUED | OUTPATIENT
Start: 2025-07-11 | End: 2025-07-14 | Stop reason: HOSPADM

## 2025-07-11 RX ORDER — PAPAVERINE HYDROCHLORIDE 30 MG/ML
INJECTION INTRAMUSCULAR; INTRAVENOUS AS NEEDED
Status: DISCONTINUED | OUTPATIENT
Start: 2025-07-11 | End: 2025-07-11 | Stop reason: HOSPADM

## 2025-07-11 RX ORDER — HEPARIN SODIUM 5000 [USP'U]/ML
5000 INJECTION, SOLUTION INTRAVENOUS; SUBCUTANEOUS ONCE
Status: COMPLETED | OUTPATIENT
Start: 2025-07-11 | End: 2025-07-11

## 2025-07-11 RX ORDER — OXYCODONE HYDROCHLORIDE 5 MG/1
10 TABLET ORAL EVERY 4 HOURS PRN
Status: DISCONTINUED | OUTPATIENT
Start: 2025-07-11 | End: 2025-07-11 | Stop reason: HOSPADM

## 2025-07-11 RX ORDER — MIDAZOLAM HYDROCHLORIDE 1 MG/ML
INJECTION, SOLUTION INTRAMUSCULAR; INTRAVENOUS AS NEEDED
Status: DISCONTINUED | OUTPATIENT
Start: 2025-07-11 | End: 2025-07-11

## 2025-07-11 RX ORDER — KETOROLAC TROMETHAMINE 30 MG/ML
30 INJECTION, SOLUTION INTRAMUSCULAR; INTRAVENOUS EVERY 6 HOURS
Status: COMPLETED | OUTPATIENT
Start: 2025-07-12 | End: 2025-07-12

## 2025-07-11 RX ORDER — HEPARIN SODIUM 5000 [USP'U]/ML
INJECTION, SOLUTION INTRAVENOUS; SUBCUTANEOUS AS NEEDED
Status: DISCONTINUED | OUTPATIENT
Start: 2025-07-11 | End: 2025-07-11

## 2025-07-11 RX ORDER — MAGNESIUM SULFATE HEPTAHYDRATE 40 MG/ML
INJECTION, SOLUTION INTRAVENOUS AS NEEDED
Status: DISCONTINUED | OUTPATIENT
Start: 2025-07-11 | End: 2025-07-11

## 2025-07-11 RX ORDER — ALBUMIN HUMAN 50 G/1000ML
SOLUTION INTRAVENOUS AS NEEDED
Status: DISCONTINUED | OUTPATIENT
Start: 2025-07-11 | End: 2025-07-11

## 2025-07-11 RX ORDER — PROPOFOL 10 MG/ML
INJECTION, EMULSION INTRAVENOUS AS NEEDED
Status: DISCONTINUED | OUTPATIENT
Start: 2025-07-11 | End: 2025-07-11

## 2025-07-11 RX ORDER — OXYCODONE HYDROCHLORIDE 5 MG/1
5 TABLET ORAL EVERY 4 HOURS PRN
Status: DISCONTINUED | OUTPATIENT
Start: 2025-07-11 | End: 2025-07-11 | Stop reason: HOSPADM

## 2025-07-11 RX ORDER — HEPARIN SODIUM 5000 [USP'U]/ML
5000 INJECTION, SOLUTION INTRAVENOUS; SUBCUTANEOUS EVERY 8 HOURS
Status: DISCONTINUED | OUTPATIENT
Start: 2025-07-11 | End: 2025-07-14 | Stop reason: HOSPADM

## 2025-07-11 RX ORDER — NITROGLYCERIN 20 MG/G
OINTMENT TOPICAL AS NEEDED
Status: DISCONTINUED | OUTPATIENT
Start: 2025-07-11 | End: 2025-07-11 | Stop reason: HOSPADM

## 2025-07-11 RX ORDER — LIDOCAINE HYDROCHLORIDE 20 MG/ML
INJECTION, SOLUTION INFILTRATION; PERINEURAL AS NEEDED
Status: DISCONTINUED | OUTPATIENT
Start: 2025-07-11 | End: 2025-07-11

## 2025-07-11 RX ORDER — CEFAZOLIN SODIUM 2 G/100ML
2 INJECTION, SOLUTION INTRAVENOUS EVERY 8 HOURS
Status: COMPLETED | OUTPATIENT
Start: 2025-07-11 | End: 2025-07-12

## 2025-07-11 RX ORDER — DROPERIDOL 2.5 MG/ML
INJECTION, SOLUTION INTRAMUSCULAR; INTRAVENOUS AS NEEDED
Status: DISCONTINUED | OUTPATIENT
Start: 2025-07-11 | End: 2025-07-11

## 2025-07-11 RX ORDER — ACETAMINOPHEN 325 MG/1
975 TABLET ORAL EVERY 8 HOURS
Status: DISCONTINUED | OUTPATIENT
Start: 2025-07-12 | End: 2025-07-14 | Stop reason: HOSPADM

## 2025-07-11 RX ORDER — HYDROMORPHONE HYDROCHLORIDE 0.2 MG/ML
0.2 INJECTION INTRAMUSCULAR; INTRAVENOUS; SUBCUTANEOUS EVERY 5 MIN PRN
Status: DISCONTINUED | OUTPATIENT
Start: 2025-07-11 | End: 2025-07-11 | Stop reason: HOSPADM

## 2025-07-11 RX ORDER — ONDANSETRON HYDROCHLORIDE 2 MG/ML
4 INJECTION, SOLUTION INTRAVENOUS EVERY 8 HOURS PRN
Status: DISCONTINUED | OUTPATIENT
Start: 2025-07-11 | End: 2025-07-14 | Stop reason: HOSPADM

## 2025-07-11 RX ORDER — ACETAMINOPHEN 10 MG/ML
INJECTION, SOLUTION INTRAVENOUS AS NEEDED
Status: DISCONTINUED | OUTPATIENT
Start: 2025-07-11 | End: 2025-07-11

## 2025-07-11 RX ORDER — GABAPENTIN 300 MG/1
600 CAPSULE ORAL EVERY 8 HOURS SCHEDULED
Status: DISCONTINUED | OUTPATIENT
Start: 2025-07-11 | End: 2025-07-11 | Stop reason: HOSPADM

## 2025-07-11 RX ORDER — ROCURONIUM BROMIDE 10 MG/ML
INJECTION, SOLUTION INTRAVENOUS AS NEEDED
Status: DISCONTINUED | OUTPATIENT
Start: 2025-07-11 | End: 2025-07-11

## 2025-07-11 RX ORDER — CEFAZOLIN 1 G/1
INJECTION, POWDER, FOR SOLUTION INTRAVENOUS AS NEEDED
Status: DISCONTINUED | OUTPATIENT
Start: 2025-07-11 | End: 2025-07-11

## 2025-07-11 RX ORDER — INDOCYANINE GREEN AND WATER 25 MG
KIT INJECTION AS NEEDED
Status: DISCONTINUED | OUTPATIENT
Start: 2025-07-11 | End: 2025-07-11

## 2025-07-11 RX ORDER — ACETAMINOPHEN 325 MG/1
650 TABLET ORAL EVERY 4 HOURS PRN
Status: DISCONTINUED | OUTPATIENT
Start: 2025-07-11 | End: 2025-07-11 | Stop reason: HOSPADM

## 2025-07-11 RX ORDER — NORETHINDRONE AND ETHINYL ESTRADIOL 0.5-0.035
KIT ORAL AS NEEDED
Status: DISCONTINUED | OUTPATIENT
Start: 2025-07-11 | End: 2025-07-11

## 2025-07-11 RX ORDER — ONDANSETRON HYDROCHLORIDE 2 MG/ML
INJECTION, SOLUTION INTRAVENOUS AS NEEDED
Status: DISCONTINUED | OUTPATIENT
Start: 2025-07-11 | End: 2025-07-11

## 2025-07-11 RX ORDER — LOSARTAN POTASSIUM 25 MG/1
50 TABLET ORAL DAILY
Status: DISCONTINUED | OUTPATIENT
Start: 2025-07-12 | End: 2025-07-14 | Stop reason: HOSPADM

## 2025-07-11 RX ORDER — CELECOXIB 100 MG/1
100 CAPSULE ORAL 2 TIMES DAILY
Status: DISCONTINUED | OUTPATIENT
Start: 2025-07-12 | End: 2025-07-14 | Stop reason: HOSPADM

## 2025-07-11 RX ORDER — APREPITANT 40 MG/1
40 CAPSULE ORAL ONCE
Status: COMPLETED | OUTPATIENT
Start: 2025-07-11 | End: 2025-07-11

## 2025-07-11 RX ORDER — ADHESIVE BANDAGE
30 BANDAGE TOPICAL DAILY PRN
Status: DISCONTINUED | OUTPATIENT
Start: 2025-07-11 | End: 2025-07-14 | Stop reason: HOSPADM

## 2025-07-11 RX ORDER — GABAPENTIN 300 MG/1
300 CAPSULE ORAL EVERY 8 HOURS
Status: DISCONTINUED | OUTPATIENT
Start: 2025-07-11 | End: 2025-07-14 | Stop reason: HOSPADM

## 2025-07-11 RX ORDER — SODIUM CHLORIDE, SODIUM LACTATE, POTASSIUM CHLORIDE, CALCIUM CHLORIDE 600; 310; 30; 20 MG/100ML; MG/100ML; MG/100ML; MG/100ML
100 INJECTION, SOLUTION INTRAVENOUS CONTINUOUS
Status: DISCONTINUED | OUTPATIENT
Start: 2025-07-11 | End: 2025-07-11 | Stop reason: HOSPADM

## 2025-07-11 RX ORDER — METFORMIN HYDROCHLORIDE 500 MG/1
500 TABLET, EXTENDED RELEASE ORAL
Status: DISCONTINUED | OUTPATIENT
Start: 2025-07-12 | End: 2025-07-13

## 2025-07-11 RX ORDER — PANTOPRAZOLE SODIUM 40 MG/10ML
40 INJECTION, POWDER, LYOPHILIZED, FOR SOLUTION INTRAVENOUS
Status: DISCONTINUED | OUTPATIENT
Start: 2025-07-12 | End: 2025-07-12

## 2025-07-11 RX ORDER — HYDROCHLOROTHIAZIDE 25 MG/1
12.5 TABLET ORAL DAILY PRN
Status: DISCONTINUED | OUTPATIENT
Start: 2025-07-11 | End: 2025-07-13

## 2025-07-11 RX ORDER — SODIUM CHLORIDE 0.9 G/100ML
INJECTION, SOLUTION IRRIGATION AS NEEDED
Status: DISCONTINUED | OUTPATIENT
Start: 2025-07-11 | End: 2025-07-11 | Stop reason: HOSPADM

## 2025-07-11 RX ORDER — FENTANYL CITRATE 50 UG/ML
INJECTION, SOLUTION INTRAMUSCULAR; INTRAVENOUS AS NEEDED
Status: DISCONTINUED | OUTPATIENT
Start: 2025-07-11 | End: 2025-07-11

## 2025-07-11 RX ORDER — METHOCARBAMOL 500 MG/1
500 TABLET, FILM COATED ORAL EVERY 6 HOURS SCHEDULED
Status: DISCONTINUED | OUTPATIENT
Start: 2025-07-12 | End: 2025-07-14 | Stop reason: HOSPADM

## 2025-07-11 RX ORDER — SODIUM CHLORIDE, SODIUM LACTATE, POTASSIUM CHLORIDE, CALCIUM CHLORIDE 600; 310; 30; 20 MG/100ML; MG/100ML; MG/100ML; MG/100ML
100 INJECTION, SOLUTION INTRAVENOUS CONTINUOUS
Status: DISCONTINUED | OUTPATIENT
Start: 2025-07-11 | End: 2025-07-12

## 2025-07-11 RX ORDER — PHENYLEPHRINE HCL IN 0.9% NACL 0.4MG/10ML
SYRINGE (ML) INTRAVENOUS AS NEEDED
Status: DISCONTINUED | OUTPATIENT
Start: 2025-07-11 | End: 2025-07-11

## 2025-07-11 RX ORDER — BUPRENORPHINE HYDROCHLORIDE AND NALOXONE HYDROCHLORIDE DIHYDRATE 8; 2 MG/1; MG/1
1 TABLET SUBLINGUAL 4 TIMES DAILY
Status: DISCONTINUED | OUTPATIENT
Start: 2025-07-11 | End: 2025-07-14 | Stop reason: HOSPADM

## 2025-07-11 RX ORDER — ONDANSETRON 4 MG/1
4 TABLET, FILM COATED ORAL EVERY 8 HOURS PRN
Status: DISCONTINUED | OUTPATIENT
Start: 2025-07-11 | End: 2025-07-14 | Stop reason: HOSPADM

## 2025-07-11 RX ORDER — GLYCOPYRROLATE 0.2 MG/ML
INJECTION INTRAMUSCULAR; INTRAVENOUS AS NEEDED
Status: DISCONTINUED | OUTPATIENT
Start: 2025-07-11 | End: 2025-07-11

## 2025-07-11 RX ORDER — LIDOCAINE HYDROCHLORIDE 10 MG/ML
0.1 INJECTION, SOLUTION INFILTRATION; PERINEURAL ONCE
Status: DISCONTINUED | OUTPATIENT
Start: 2025-07-11 | End: 2025-07-11 | Stop reason: HOSPADM

## 2025-07-11 RX ORDER — PANTOPRAZOLE SODIUM 40 MG/1
40 TABLET, DELAYED RELEASE ORAL
Status: DISCONTINUED | OUTPATIENT
Start: 2025-07-12 | End: 2025-07-12

## 2025-07-11 RX ORDER — LIDOCAINE HYDROCHLORIDE 20 MG/ML
INJECTION, SOLUTION INFILTRATION; PERINEURAL AS NEEDED
Status: DISCONTINUED | OUTPATIENT
Start: 2025-07-11 | End: 2025-07-11 | Stop reason: HOSPADM

## 2025-07-11 RX ORDER — DROPERIDOL 2.5 MG/ML
0.62 INJECTION, SOLUTION INTRAMUSCULAR; INTRAVENOUS ONCE AS NEEDED
Status: DISCONTINUED | OUTPATIENT
Start: 2025-07-11 | End: 2025-07-11 | Stop reason: HOSPADM

## 2025-07-11 RX ORDER — KETOROLAC TROMETHAMINE 30 MG/ML
30 INJECTION, SOLUTION INTRAMUSCULAR; INTRAVENOUS EVERY 6 HOURS
Status: DISCONTINUED | OUTPATIENT
Start: 2025-07-11 | End: 2025-07-11

## 2025-07-11 RX ORDER — IBUPROFEN 800 MG/1
800 TABLET, FILM COATED ORAL EVERY 8 HOURS
Status: CANCELLED | OUTPATIENT
Start: 2025-07-11

## 2025-07-11 RX ADMIN — Medication 120 MCG: at 10:25

## 2025-07-11 RX ADMIN — Medication 120 MCG: at 08:47

## 2025-07-11 RX ADMIN — ROCURONIUM BROMIDE 20 MG: 10 INJECTION, SOLUTION INTRAVENOUS at 09:33

## 2025-07-11 RX ADMIN — EPHEDRINE SULFATE 5 MG: 50 INJECTION INTRAVENOUS at 09:45

## 2025-07-11 RX ADMIN — Medication 80 MCG: at 12:34

## 2025-07-11 RX ADMIN — INDOCYANINE GREEN AND WATER 1.5 MG: KIT at 10:52

## 2025-07-11 RX ADMIN — CEFAZOLIN 2 G: 1 INJECTION, POWDER, FOR SOLUTION INTRAMUSCULAR; INTRAVENOUS at 12:00

## 2025-07-11 RX ADMIN — Medication 80 MCG: at 09:27

## 2025-07-11 RX ADMIN — APREPITANT 40 MG: 40 CAPSULE ORAL at 06:21

## 2025-07-11 RX ADMIN — CEFAZOLIN SODIUM 2 G: 2 INJECTION, SOLUTION INTRAVENOUS at 22:29

## 2025-07-11 RX ADMIN — Medication 10 MG: at 17:01

## 2025-07-11 RX ADMIN — ROCURONIUM BROMIDE 10 MG: 10 INJECTION, SOLUTION INTRAVENOUS at 14:57

## 2025-07-11 RX ADMIN — LIDOCAINE HYDROCHLORIDE 80 MG: 20 INJECTION, SOLUTION INFILTRATION; PERINEURAL at 08:02

## 2025-07-11 RX ADMIN — MIDAZOLAM 2 MG: 1 INJECTION INTRAMUSCULAR; INTRAVENOUS at 07:47

## 2025-07-11 RX ADMIN — Medication 10 MG: at 14:06

## 2025-07-11 RX ADMIN — Medication 6 L/MIN: at 18:09

## 2025-07-11 RX ADMIN — GABAPENTIN 300 MG: 300 CAPSULE ORAL at 22:29

## 2025-07-11 RX ADMIN — ROCURONIUM BROMIDE 10 MG: 10 INJECTION, SOLUTION INTRAVENOUS at 12:23

## 2025-07-11 RX ADMIN — HEPARIN SODIUM 5000 UNITS: 5000 INJECTION INTRAVENOUS; SUBCUTANEOUS at 22:29

## 2025-07-11 RX ADMIN — ACETAMINOPHEN 1000 MG: 10 INJECTION, SOLUTION INTRAVENOUS at 09:17

## 2025-07-11 RX ADMIN — HEPARIN SODIUM 5000 UNITS: 5000 INJECTION INTRAVENOUS; SUBCUTANEOUS at 14:30

## 2025-07-11 RX ADMIN — ALBUMIN HUMAN 250 ML: 0.05 INJECTION, SOLUTION INTRAVENOUS at 15:12

## 2025-07-11 RX ADMIN — Medication 120 MCG: at 13:15

## 2025-07-11 RX ADMIN — ACETAMINOPHEN 1000 MG: 10 INJECTION, SOLUTION INTRAVENOUS at 17:01

## 2025-07-11 RX ADMIN — EPHEDRINE SULFATE 5 MG: 50 INJECTION INTRAVENOUS at 15:22

## 2025-07-11 RX ADMIN — FENTANYL CITRATE 50 MCG: 50 INJECTION, SOLUTION INTRAMUSCULAR; INTRAVENOUS at 18:21

## 2025-07-11 RX ADMIN — Medication 10 MG: at 16:01

## 2025-07-11 RX ADMIN — Medication 80 MCG: at 08:55

## 2025-07-11 RX ADMIN — Medication 10 MG: at 12:12

## 2025-07-11 RX ADMIN — EPHEDRINE SULFATE 5 MG: 50 INJECTION INTRAVENOUS at 15:04

## 2025-07-11 RX ADMIN — METHOCARBAMOL 500 MG: 500 TABLET ORAL at 23:28

## 2025-07-11 RX ADMIN — Medication 120 MCG: at 11:13

## 2025-07-11 RX ADMIN — ROCURONIUM BROMIDE 10 MG: 10 INJECTION, SOLUTION INTRAVENOUS at 14:24

## 2025-07-11 RX ADMIN — HEPARIN SODIUM 5000 UNITS: 5000 INJECTION, SOLUTION INTRAVENOUS; SUBCUTANEOUS at 06:21

## 2025-07-11 RX ADMIN — ALBUMIN HUMAN 250 ML: 0.05 INJECTION, SOLUTION INTRAVENOUS at 11:00

## 2025-07-11 RX ADMIN — CEFAZOLIN 2 G: 1 INJECTION, POWDER, FOR SOLUTION INTRAMUSCULAR; INTRAVENOUS at 08:13

## 2025-07-11 RX ADMIN — GLYCOPYRROLATE 0.2 MG: 0.2 INJECTION, SOLUTION INTRAMUSCULAR; INTRAVENOUS at 11:17

## 2025-07-11 RX ADMIN — ROCURONIUM BROMIDE 10 MG: 10 INJECTION, SOLUTION INTRAVENOUS at 15:58

## 2025-07-11 RX ADMIN — ROCURONIUM BROMIDE 10 MG: 10 INJECTION, SOLUTION INTRAVENOUS at 12:20

## 2025-07-11 RX ADMIN — ROCURONIUM BROMIDE 20 MG: 10 INJECTION, SOLUTION INTRAVENOUS at 13:25

## 2025-07-11 RX ADMIN — MAGNESIUM SULFATE HEPTAHYDRATE 2 G: 2 INJECTION, SOLUTION INTRAVENOUS at 15:05

## 2025-07-11 RX ADMIN — KETOROLAC TROMETHAMINE 30 MG: 30 INJECTION, SOLUTION INTRAMUSCULAR; INTRAVENOUS at 18:59

## 2025-07-11 RX ADMIN — Medication 10 MG: at 13:11

## 2025-07-11 RX ADMIN — ROCURONIUM BROMIDE 10 MG: 10 INJECTION, SOLUTION INTRAVENOUS at 16:28

## 2025-07-11 RX ADMIN — ROCURONIUM BROMIDE 10 MG: 10 INJECTION, SOLUTION INTRAVENOUS at 15:24

## 2025-07-11 RX ADMIN — ROCURONIUM BROMIDE 10 MG: 10 INJECTION, SOLUTION INTRAVENOUS at 10:05

## 2025-07-11 RX ADMIN — ROCURONIUM BROMIDE 20 MG: 10 INJECTION, SOLUTION INTRAVENOUS at 10:53

## 2025-07-11 RX ADMIN — Medication 80 MCG: at 09:37

## 2025-07-11 RX ADMIN — ROCURONIUM BROMIDE 10 MG: 10 INJECTION, SOLUTION INTRAVENOUS at 13:59

## 2025-07-11 RX ADMIN — Medication 10 MG: at 15:01

## 2025-07-11 RX ADMIN — ROCURONIUM BROMIDE 10 MG: 10 INJECTION, SOLUTION INTRAVENOUS at 17:00

## 2025-07-11 RX ADMIN — CEFAZOLIN 2 G: 1 INJECTION, POWDER, FOR SOLUTION INTRAMUSCULAR; INTRAVENOUS at 15:59

## 2025-07-11 RX ADMIN — ROCURONIUM BROMIDE 50 MG: 10 INJECTION, SOLUTION INTRAVENOUS at 08:02

## 2025-07-11 RX ADMIN — Medication 80 MCG: at 13:45

## 2025-07-11 RX ADMIN — ROCURONIUM BROMIDE 10 MG: 10 INJECTION, SOLUTION INTRAVENOUS at 11:25

## 2025-07-11 RX ADMIN — DROPERIDOL 1.25 MG: 2.5 INJECTION, SOLUTION INTRAMUSCULAR; INTRAVENOUS at 17:22

## 2025-07-11 RX ADMIN — SUGAMMADEX 200 MG: 100 INJECTION, SOLUTION INTRAVENOUS at 17:41

## 2025-07-11 RX ADMIN — ROCURONIUM BROMIDE 20 MG: 10 INJECTION, SOLUTION INTRAVENOUS at 09:15

## 2025-07-11 RX ADMIN — ONDANSETRON 4 MG: 2 INJECTION, SOLUTION INTRAMUSCULAR; INTRAVENOUS at 17:10

## 2025-07-11 RX ADMIN — GABAPENTIN 600 MG: 300 CAPSULE ORAL at 06:21

## 2025-07-11 RX ADMIN — DOCUSATE SODIUM 100 MG: 100 CAPSULE, LIQUID FILLED ORAL at 22:29

## 2025-07-11 RX ADMIN — SODIUM CHLORIDE, SODIUM LACTATE, POTASSIUM CHLORIDE, AND CALCIUM CHLORIDE: 600; 310; 30; 20 INJECTION, SOLUTION INTRAVENOUS at 13:55

## 2025-07-11 RX ADMIN — DEXAMETHASONE SODIUM PHOSPHATE 4 MG: 4 INJECTION INTRA-ARTICULAR; INTRALESIONAL; INTRAMUSCULAR; INTRAVENOUS; SOFT TISSUE at 09:18

## 2025-07-11 RX ADMIN — SODIUM CHLORIDE, SODIUM LACTATE, POTASSIUM CHLORIDE, AND CALCIUM CHLORIDE: 600; 310; 30; 20 INJECTION, SOLUTION INTRAVENOUS at 09:06

## 2025-07-11 RX ADMIN — FENTANYL CITRATE 50 MCG: 50 INJECTION, SOLUTION INTRAMUSCULAR; INTRAVENOUS at 08:02

## 2025-07-11 RX ADMIN — PROPOFOL 200 MG: 10 INJECTION, EMULSION INTRAVENOUS at 08:02

## 2025-07-11 RX ADMIN — Medication 120 MCG: at 10:50

## 2025-07-11 RX ADMIN — Medication 2 L/MIN: at 21:21

## 2025-07-11 SDOH — ECONOMIC STABILITY: FOOD INSECURITY: WITHIN THE PAST 12 MONTHS, THE FOOD YOU BOUGHT JUST DIDN'T LAST AND YOU DIDN'T HAVE MONEY TO GET MORE.: NEVER TRUE

## 2025-07-11 SDOH — ECONOMIC STABILITY: TRANSPORTATION INSECURITY: IN THE PAST 12 MONTHS, HAS LACK OF TRANSPORTATION KEPT YOU FROM MEDICAL APPOINTMENTS OR FROM GETTING MEDICATIONS?: NO

## 2025-07-11 SDOH — SOCIAL STABILITY: SOCIAL INSECURITY: HAVE YOU HAD ANY THOUGHTS OF HARMING ANYONE ELSE?: NO

## 2025-07-11 SDOH — ECONOMIC STABILITY: INCOME INSECURITY: IN THE PAST 12 MONTHS HAS THE ELECTRIC, GAS, OIL, OR WATER COMPANY THREATENED TO SHUT OFF SERVICES IN YOUR HOME?: NO

## 2025-07-11 SDOH — ECONOMIC STABILITY: HOUSING INSECURITY: IN THE LAST 12 MONTHS, WAS THERE A TIME WHEN YOU WERE NOT ABLE TO PAY THE MORTGAGE OR RENT ON TIME?: NO

## 2025-07-11 SDOH — ECONOMIC STABILITY: FOOD INSECURITY: HOW HARD IS IT FOR YOU TO PAY FOR THE VERY BASICS LIKE FOOD, HOUSING, MEDICAL CARE, AND HEATING?: NOT HARD AT ALL

## 2025-07-11 SDOH — SOCIAL STABILITY: SOCIAL INSECURITY
WITHIN THE LAST YEAR, HAVE YOU BEEN KICKED, HIT, SLAPPED, OR OTHERWISE PHYSICALLY HURT BY YOUR PARTNER OR EX-PARTNER?: NO

## 2025-07-11 SDOH — SOCIAL STABILITY: SOCIAL INSECURITY: ARE YOU OR HAVE YOU BEEN THREATENED OR ABUSED PHYSICALLY, EMOTIONALLY, OR SEXUALLY BY ANYONE?: NO

## 2025-07-11 SDOH — SOCIAL STABILITY: SOCIAL INSECURITY: WITHIN THE LAST YEAR, HAVE YOU BEEN AFRAID OF YOUR PARTNER OR EX-PARTNER?: NO

## 2025-07-11 SDOH — SOCIAL STABILITY: SOCIAL INSECURITY: WITHIN THE LAST YEAR, HAVE YOU BEEN HUMILIATED OR EMOTIONALLY ABUSED IN OTHER WAYS BY YOUR PARTNER OR EX-PARTNER?: NO

## 2025-07-11 SDOH — SOCIAL STABILITY: SOCIAL INSECURITY
WITHIN THE LAST YEAR, HAVE YOU BEEN RAPED OR FORCED TO HAVE ANY KIND OF SEXUAL ACTIVITY BY YOUR PARTNER OR EX-PARTNER?: NO

## 2025-07-11 SDOH — HEALTH STABILITY: PHYSICAL HEALTH: ON AVERAGE, HOW MANY MINUTES DO YOU ENGAGE IN EXERCISE AT THIS LEVEL?: 0 MIN

## 2025-07-11 SDOH — HEALTH STABILITY: PHYSICAL HEALTH: ON AVERAGE, HOW MANY DAYS PER WEEK DO YOU ENGAGE IN MODERATE TO STRENUOUS EXERCISE (LIKE A BRISK WALK)?: 0 DAYS

## 2025-07-11 SDOH — SOCIAL STABILITY: SOCIAL INSECURITY: ARE THERE ANY APPARENT SIGNS OF INJURIES/BEHAVIORS THAT COULD BE RELATED TO ABUSE/NEGLECT?: NO

## 2025-07-11 SDOH — ECONOMIC STABILITY: HOUSING INSECURITY: AT ANY TIME IN THE PAST 12 MONTHS, WERE YOU HOMELESS OR LIVING IN A SHELTER (INCLUDING NOW)?: NO

## 2025-07-11 SDOH — ECONOMIC STABILITY: FOOD INSECURITY: WITHIN THE PAST 12 MONTHS, YOU WORRIED THAT YOUR FOOD WOULD RUN OUT BEFORE YOU GOT THE MONEY TO BUY MORE.: NEVER TRUE

## 2025-07-11 SDOH — SOCIAL STABILITY: SOCIAL INSECURITY: ABUSE: ADULT

## 2025-07-11 SDOH — SOCIAL STABILITY: SOCIAL INSECURITY: HAS ANYONE EVER THREATENED TO HURT YOUR FAMILY OR YOUR PETS?: NO

## 2025-07-11 SDOH — SOCIAL STABILITY: SOCIAL INSECURITY: HAVE YOU HAD THOUGHTS OF HARMING ANYONE ELSE?: NO

## 2025-07-11 SDOH — SOCIAL STABILITY: SOCIAL INSECURITY: DO YOU FEEL UNSAFE GOING BACK TO THE PLACE WHERE YOU ARE LIVING?: NO

## 2025-07-11 SDOH — SOCIAL STABILITY: SOCIAL INSECURITY: DOES ANYONE TRY TO KEEP YOU FROM HAVING/CONTACTING OTHER FRIENDS OR DOING THINGS OUTSIDE YOUR HOME?: NO

## 2025-07-11 SDOH — SOCIAL STABILITY: SOCIAL INSECURITY: DO YOU FEEL ANYONE HAS EXPLOITED OR TAKEN ADVANTAGE OF YOU FINANCIALLY OR OF YOUR PERSONAL PROPERTY?: NO

## 2025-07-11 SDOH — SOCIAL STABILITY: SOCIAL INSECURITY: WERE YOU ABLE TO COMPLETE ALL THE BEHAVIORAL HEALTH SCREENINGS?: YES

## 2025-07-11 ASSESSMENT — PAIN - FUNCTIONAL ASSESSMENT
PAIN_FUNCTIONAL_ASSESSMENT: 0-10
PAIN_FUNCTIONAL_ASSESSMENT: 0-10
PAIN_FUNCTIONAL_ASSESSMENT: UNABLE TO SELF-REPORT
PAIN_FUNCTIONAL_ASSESSMENT: 0-10
PAIN_FUNCTIONAL_ASSESSMENT: 0-10
PAIN_FUNCTIONAL_ASSESSMENT: UNABLE TO SELF-REPORT
PAIN_FUNCTIONAL_ASSESSMENT: UNABLE TO SELF-REPORT

## 2025-07-11 ASSESSMENT — COGNITIVE AND FUNCTIONAL STATUS - GENERAL
DAILY ACTIVITIY SCORE: 14
STANDING UP FROM CHAIR USING ARMS: A LOT
DRESSING REGULAR UPPER BODY CLOTHING: A LOT
EATING MEALS: A LITTLE
TURNING FROM BACK TO SIDE WHILE IN FLAT BAD: A LOT
HELP NEEDED FOR BATHING: A LOT
PERSONAL GROOMING: A LITTLE
TOILETING: A LOT
DRESSING REGULAR LOWER BODY CLOTHING: A LOT
MOVING TO AND FROM BED TO CHAIR: A LOT
CLIMB 3 TO 5 STEPS WITH RAILING: A LOT
WALKING IN HOSPITAL ROOM: A LOT
MOBILITY SCORE: 12
MOVING FROM LYING ON BACK TO SITTING ON SIDE OF FLAT BED WITH BEDRAILS: A LOT

## 2025-07-11 ASSESSMENT — ACTIVITIES OF DAILY LIVING (ADL)
HEARING - LEFT EAR: FUNCTIONAL
JUDGMENT_ADEQUATE_SAFELY_COMPLETE_DAILY_ACTIVITIES: YES
ADEQUATE_TO_COMPLETE_ADL: YES
DRESSING YOURSELF: INDEPENDENT
GROOMING: INDEPENDENT
TOILETING: INDEPENDENT
FEEDING YOURSELF: INDEPENDENT
BATHING: INDEPENDENT
WALKS IN HOME: INDEPENDENT
LACK_OF_TRANSPORTATION: NO
PATIENT'S MEMORY ADEQUATE TO SAFELY COMPLETE DAILY ACTIVITIES?: YES
HEARING - RIGHT EAR: FUNCTIONAL

## 2025-07-11 ASSESSMENT — PATIENT HEALTH QUESTIONNAIRE - PHQ9
SUM OF ALL RESPONSES TO PHQ9 QUESTIONS 1 & 2: 0
2. FEELING DOWN, DEPRESSED OR HOPELESS: NOT AT ALL
1. LITTLE INTEREST OR PLEASURE IN DOING THINGS: NOT AT ALL

## 2025-07-11 ASSESSMENT — LIFESTYLE VARIABLES
AUDIT-C TOTAL SCORE: 0
HOW OFTEN DO YOU HAVE 6 OR MORE DRINKS ON ONE OCCASION: NEVER
SKIP TO QUESTIONS 9-10: 1
HOW MANY STANDARD DRINKS CONTAINING ALCOHOL DO YOU HAVE ON A TYPICAL DAY: PATIENT DOES NOT DRINK
HOW OFTEN DO YOU HAVE A DRINK CONTAINING ALCOHOL: NEVER
AUDIT-C TOTAL SCORE: 0

## 2025-07-11 ASSESSMENT — PAIN SCALES - GENERAL
PAINLEVEL_OUTOF10: 0 - NO PAIN
PAIN_LEVEL: 5
PAINLEVEL_OUTOF10: 3
PAINLEVEL_OUTOF10: 3
PAINLEVEL_OUTOF10: 4

## 2025-07-11 ASSESSMENT — COLUMBIA-SUICIDE SEVERITY RATING SCALE - C-SSRS
2. HAVE YOU ACTUALLY HAD ANY THOUGHTS OF KILLING YOURSELF?: NO
6. HAVE YOU EVER DONE ANYTHING, STARTED TO DO ANYTHING, OR PREPARED TO DO ANYTHING TO END YOUR LIFE?: NO
1. IN THE PAST MONTH, HAVE YOU WISHED YOU WERE DEAD OR WISHED YOU COULD GO TO SLEEP AND NOT WAKE UP?: NO

## 2025-07-11 NOTE — ANESTHESIA PREPROCEDURE EVALUATION
Patient: Etelvina Banks    Procedure Information       Date/Time: 07/11/25 0655    Procedures:       RECONSTRUCTION, BREAST, USING FREE FLAP (Left)      REMOVAL, TISSUE EXPANDER (Left)    Location: [facility] OR  / [facility]    Surgeons: Jaye Dewey MD            Relevant Problems   Cardiac   (+) Mixed hyperlipidemia   (+) Primary hypertension      Neuro   (+) Generalized anxiety disorder   (+) Opioid dependence on agonist therapy (Multi)      Endocrine   (+) Class 1 obesity in adult   (+) Type 2 diabetes mellitus without complication, without long-term current use of insulin      Musculoskeletal   (+) Fibromyalgia       Clinical information reviewed:   Tobacco  Allergies  Meds   Med Hx  Surg Hx  OB Status  Fam Hx  Soc   Hx        NPO Detail:  NPO/Void Status  Carbohydrate Drink Given Prior to Surgery? : N  Date of Last Liquid: 07/10/25  Time of Last Liquid: 2330  Date of Last Solid: 07/10/25  Time of Last Solid: 2333  Last Intake Type: Clear fluids  Time of Last Void: 0500         Physical Exam    Airway  Mallampati: III  TM distance: >3 FB  Neck ROM: full  Mouth opening: 3 or more finger widths     Cardiovascular    Dental    Pulmonary    Abdominal            Anesthesia Plan    History of general anesthesia?: yes  History of complications of general anesthesia?: no    ASA 2     general     Anesthetic plan and risks discussed with patient.  Use of blood products discussed with patient who consented to blood products.    Plan discussed with attending.

## 2025-07-11 NOTE — ANESTHESIA PROCEDURE NOTES
Airway  Date/Time: 7/11/2025 8:05 AM  Reason: elective    Airway not difficult    Staffing  Performed: resident   Authorized by: Can Li MD    Performed by: Filomena Hernadez MD  Patient location during procedure: OR    Patient Condition  Indications for airway management: anesthesia  Patient position: sniffing  Planned trial extubation  Sedation level: deep     Final Airway Details   Preoxygenated: yes  Final airway type: endotracheal airway  Successful airway: ETT  Cuffed: yes   Successful intubation technique: direct laryngoscopy  Adjuncts used in placement: intubating stylet  Endotracheal tube insertion site: oral  Blade: Emilia  Blade size: #3  ETT size (mm): 7.0  Cormack-Lehane Classification: grade IIa - partial view of glottis  Placement verified by: chest auscultation and capnometry   Inital cuff pressure (cm H2O): 5  Measured from: lips  ETT to lips (cm): 21  Number of attempts at approach: 1

## 2025-07-11 NOTE — H&P (VIEW-ONLY)
Expand All Collapse All                                                                  PLASTIC SURGERY CLINIC VISIT  POSTOP BREAST RECONSTRUCTION     Date: 1/7/25  Date of Surgery: 11/11/24  Surgical Procedure: Left Mastectomy with Tissue Expander        HPI:   Etelvina Banks 44 y.o. female is Post of  from Left Mastectomy (Dr. Ortiz) and TE insertion.      Interval changes as of this date:   11/19 Doing well overall. Prevena in place and functioning. ROSCOE drains with SS output Number 2 less than 30 ml per day for three consecutive days. But Number 1 remains with elevated output. Pain has now resolved.   11/26 Doing well overall. ROSCOE drain in place with SS output greater than 30 ml per day for three consecutive days  12/3 Doing well overall. ROSCOE drain in place with SS output greater than 30 ml per day for three consecutive days   12/10 Doing well overall. ROSCOE drain in place with SS output greater than 30 ml per day for three consecutive days  12/17 Doing well overall. ROSCOE drain in place with SS output greater than 30 ml per day   12/19 ROSCOE drain removed at this visit  1/7/25 Doing well overall, here for expansion   MEDICATIONS    Current Medications      Current Outpatient Medications:     buprenorphine-naloxone (Suboxone) 8-2 mg SL film, Place under the tongue. Take 1 film SL twice kurt, Disp: , Rfl:     cream base no.31, bulk, (Transdermal Pain Base) cream, once daily as needed., Disp: , Rfl:     gabapentin (Neurontin) 300 mg capsule, Take 1 capsule (300 mg) by mouth every 8 hours for 14 days., Disp: 42 capsule, Rfl: 0    hydroCHLOROthiazide (Microzide) 12.5 mg tablet, Take 1 tablet (12.5 mg) by mouth once daily as needed (pitting edema)., Disp: 30 tablet, Rfl: 2    losartan (Cozaar) 50 mg tablet, Take 1 tablet (50 mg) by mouth once daily., Disp: 90 tablet, Rfl: 3    metFORMIN XR (Glucophage-XR) 500 mg 24 hr tablet, Take 2 tablet PO daily, Disp: 180 tablet, Rfl: 1         OBJECTIVE [x]Expand by Default  There were no  vitals taken for this visit.           REVIEW OF SYSTEMS:    Constitutional: negative for fevers, chills, unintentional weight loss  HEENT: negative for changes in vision, headaches, changes in hearing, congestion, sore throat  Cardiovascular: negative for chest pain, palpitations  Respiratory: negative for cough, shortness of breath  Gastrointestinal: negative for nausea, vomiting, diarrhea  Genitourinary: negative for dysuria, hematuria  Musculoskeletal: negative for joint swelling or pain  Skin: negative for rashes or lesions  Psych: negative for depression, anxiety  Endocrine: negative for polyuria, polydipsia, cold/heat intolerance  Hem/Lymph: negative for bleeding disorder     PHYSICAL EXAM  General: alert and oriented, no apparent distress     Focused exam of the breasts:  Left: Incisions c/d/I. She has healed completely. No more partial mastectomy skin necrosis. Minor 3cm partial thickness area on triple point well healed     MENTOR ARTOURA PLUS SMOOTH ULTRA HIGH PROFILE BREAST TISSUE EXPANDER 535 CC   12/3 Aspirated all the air all, expanded by 250 ml (total 250ml)  12/10 Expanded by 75 ml (total 325 ml)   12/17 Expanded by 100  ml (total 425 ml)   1/7/25 Expanded by  55 ml (Total 480  ml)     ASSESSMENT/PLAN  Etelvina YOLANDA Banks 44 y.o. female who had Left Mastectomy with Tissue Expander on 11/11/24 who presents for POV.         Continue with increased protein  Continue activity restrictions.   3. Will return next week for final fill of 55cc  4. Etelvina will than return to one last visit in order to get approval for return to work  5. CT Angio of Abd and Pelvis ordered for scheduled Left MANUEL on 7/11/25  6. Pre procedure ERAS ordered (Hep, Gabapentin & Amend on morning of)   7. Unilateral MANUEL 7/11/25 with admission post op   It was a pleasure to see you          Kat Chiu PA-C

## 2025-07-11 NOTE — BRIEF OP NOTE
Date: 2025  OR Location: [facility] OR    Name: Etelvina Banks, : 1980, Age: 45 y.o., MRN: [id number], Sex: female    Diagnosis  Pre-op Diagnosis      * Ductal carcinoma in situ (DCIS) of left breast [D05.12] Post-op Diagnosis     * Ductal carcinoma in situ (DCIS) of left breast [D05.12]     Procedures  RECONSTRUCTION, BREAST, USING FREE FLAP  45104 - SC BREAST RECONSTRUCTION W/FREE FLAP    REMOVAL, TISSUE EXPANDER  68596 - SC REMOVAL TISSUE EXPANDER W/O INSERTION IMPLANT    RECONSTRUCTION, BREAST, USING FREE FLAP  63746 - SC BREAST RECONSTRUCTION W/FREE FLAP    REMOVAL, TISSUE EXPANDER  74378 - SC REMOVAL TISSUE EXPANDER W/O INSERTION IMPLANT      Surgeons   Panel 1:     * Jaye Dewey - Primary  Panel 2:     * Hui Lara - Primary    Resident/Fellow/Other Assistant:  Surgeons and Role:  Panel 1:     * Magdalena Allred, PA-C - [facility]IL First Assist  Kat Chiu [facility] First Assist    Staff:   Scrub Person:   Relief Circulator:   Circulator: Papito  Scrub Person: Meghan  Relief Circulator: Alba  Relief Scrub: Alba  Circulator: Darnlel  Relief Scrub: Eli    Anesthesia Staff: Anesthesiologist: Basil Steve MD; Can Li MD  Anesthesia Resident: Filomena Hernadez MD    Procedure Summary  Anesthesia: General  ASA: II  Estimated Blood Loss: 50 mL  Intra-op Medications:   Administrations occurring from 55 to 5 on 25:   Medication Name Total Dose   papaverine injection 180 mg   lidocaine (Xylocaine) 20 mg/mL (2 %) injection 50 mL   sodium chloride 0.9 % irrigation solution 3,000 mL   BUPivacaine-EPINEPHrine (Marcaine w/EPI) 53 mL in sodium chloride 0.9% 120 mL syringe 113 mL   nitroglycerin (Gavin-Bid) 2 % ointment 4 inch   BUPivacaine-EPINEPHrine (Marcaine w/EPI) 53 mL in sodium chloride 0.9% 120 mL syringe 40 mL   oxygen (O2) therapy 436 L   acetaminophen (Ofirmev) injection 2,000 mg   albumin human bottle 5% 500 mL   ceFAZolin (Ancef) vial 1 g 6 g   dexAMETHasone (Decadron) 4 mg/mL 4 mg    droperidol (Inapsine) injection 1.25 mg   ePHEDrine injection 15 mg   fentaNYL PF 0.05 mg/mL 100 mcg   glycopyrrolate (Robinul) injection 0.2 mg   heparin 5,000 units/mL 5,000 Units   indocyanine green (IC-Green) injection 25 mg 1.5 mg   ketamine injection 50 mg/ 5 mL (10 mg/mL) 60 mg   LR bolus Cannot be calculated   lidocaine (Xylocaine) injection 2 % 80 mg   magnesium sulfate IV 2 g/50 mL water (premix) 2 g   midazolam (Versed) 1 mg/1 mL 2 mg   ondansetron 2 mg/mL 4 mg   phenylephrine 40 mcg/mL syringe 10 mL 1,000 mcg   propofol (Diprivan) injection 10 mg/mL 200 mg   rocuronium (ZeMuron) 50 mg/5 mL injection 240 mg   sugammadex (Bridion) 200 mg/2 mL injection 200 mg              Anesthesia Record               Intraprocedure I/O Totals          Intake    Magnesium Sulfate 0.00 mL    The total shown is the total volume documented since Anesthesia Start was filed.    LR bolus 2000.00 mL    Total Intake 2000 mL       Output    Urine 954 mL    Est. Blood Loss 50 mL    Total Output 1004 mL       Net    Net Volume 996 mL          Specimen:   ID Type Source Tests Collected by Time   1 : LEFT BREAST CAPSULE Tissue BREAST CAPSULE LEFT SURGICAL PATHOLOGY EXAM Jaye Dewey MD 7/11/2025 0958   2 : LEFT INTERNAL MAMMARY LYMPH NODE Tissue LYMPH NODE BIOPSY SURGICAL PATHOLOGY EXAM Jaye Dewey MD 7/11/2025 140   3 : Left inferior mastectomy skin Tissue BREAST MASTECTOMY LEFT SURGICAL PATHOLOGY EXAM Jaye Dewey MD 7/11/2025 1724                  Findings: See Op Notge    Complications:  None; patient tolerated the procedure well.     Disposition: PACU - hemodynamically stable.  Condition: stable  Specimens Collected:   ID Type Source Tests Collected by Time   1 : LEFT BREAST CAPSULE Tissue BREAST CAPSULE LEFT SURGICAL PATHOLOGY EXAM Jaye Deewy MD 7/11/2025 0958   2 : LEFT INTERNAL MAMMARY LYMPH NODE Tissue LYMPH NODE BIOPSY SURGICAL PATHOLOGY EXAM Jaye Dewey MD 7/11/2025 1407   3 : Left inferior mastectomy skin  Tissue BREAST MASTECTOMY LEFT SURGICAL PATHOLOGY EXAM Jaye Dewey MD 7/11/2025 1531     Attending Attestation: A qualified resident physician was not available.    Jaye Dewey  Phone Number: [phone]

## 2025-07-11 NOTE — OP NOTE
RECONSTRUCTION, BREAST, USING FREE FLAP (L), REMOVAL, TISSUE EXPANDER (L) Operative Note     Date: 2025  OR Location: [facility] OR    Name: Etelvina Banks, : 1980, Age: 45 y.o., MRN: [id number], Sex: female    Diagnosis  Pre-op Diagnosis      * Ductal carcinoma in situ (DCIS) of left breast [D05.12] Post-op Diagnosis     * Ductal carcinoma in situ (DCIS) of left breast [D05.12]     Procedures  1)  Delayed left breast reconstruction with free Deep Inferior Epigastric  flap (MANUEL).  CPT code 76122.22  This is considered a complex procedure because of the  nature of dissection with involved dissected vessels that as less than a 1 mm in diameter in order to minimize donor site morbidity and improve patient outcomes.    2) Full implant capsulectomy 74332  3) Removal of Tissue expander without placement of implant 83620  3) Internal mammary lymph node biopsy - 34976    Surgeons      * Jaye Dewey - Primary    Resident/Fellow/Other Assistant:  Hui Lara - Assist for MANUEL and Internal mammary Lymph node biopsy  Kat Chiu PA-C. First Assist for total capsulectomy and removal of tissue expander    There was no skilled surgical resident assistance available.  The Surgical Assistant was necessary to assist due to the nature of the case and difficulty.  Specifically, the JIL was assisted with soft tissue handling, retraction, hemostasis and closure in order to successfully perform and complete the procedure.      Staff:   Scrub Person:   Relief Circulator:   Circulator: Papito  Scrub Person: Meghan John Circulator: Alba John Scrub: Alba  Circulator: Darnell John Scrub: Eli    Anesthesia Staff: Anesthesiologist: Basil Steve MD; Can Li MD  Anesthesia Resident: Filomena Hernadez MD    Procedure Summary  Anesthesia: General  ASA: II  Estimated Blood Loss: 50 mL  Intra-op Medications:   Administrations occurring from 655 to  on 25:   Medication Name Total  Dose   papaverine injection 180 mg   lidocaine (Xylocaine) 20 mg/mL (2 %) injection 50 mL   sodium chloride 0.9 % irrigation solution 3,000 mL   BUPivacaine-EPINEPHrine (Marcaine w/EPI) 53 mL in sodium chloride 0.9% 120 mL syringe 113 mL   nitroglycerin (Gavin-Bid) 2 % ointment 4 inch   BUPivacaine-EPINEPHrine (Marcaine w/EPI) 53 mL in sodium chloride 0.9% 120 mL syringe 40 mL   acetaminophen (Ofirmev) injection 2,000 mg   albumin human bottle 5% 500 mL   ceFAZolin (Ancef) vial 1 g 6 g   dexAMETHasone (Decadron) 4 mg/mL 4 mg   droperidol (Inapsine) injection 1.25 mg   ePHEDrine injection 15 mg   fentaNYL PF 0.05 mg/mL 100 mcg   glycopyrrolate (Robinul) injection 0.2 mg   heparin 5,000 units/mL 5,000 Units   indocyanine green (IC-Green) injection 25 mg 1.5 mg   ketamine injection 50 mg/ 5 mL (10 mg/mL) 60 mg   LR bolus Cannot be calculated   lidocaine (Xylocaine) injection 2 % 80 mg   magnesium sulfate IV 2 g/50 mL water (premix) 2 g   midazolam (Versed) 1 mg/1 mL 2 mg   ondansetron 2 mg/mL 4 mg   phenylephrine 40 mcg/mL syringe 10 mL 1,000 mcg   propofol (Diprivan) injection 10 mg/mL 200 mg   rocuronium (ZeMuron) 50 mg/5 mL injection 240 mg   sugammadex (Bridion) 200 mg/2 mL injection 200 mg              Anesthesia Record               Intraprocedure I/O Totals          Intake    Magnesium Sulfate 0.00 mL    The total shown is the total volume documented since Anesthesia Start was filed.    LR bolus 2000.00 mL    Total Intake 2000 mL       Output    Urine 954 mL    Est. Blood Loss 50 mL    Total Output 1004 mL       Net    Net Volume 996 mL          Specimen:   ID Type Source Tests Collected by Time   1 : LEFT BREAST CAPSULE Tissue BREAST CAPSULE LEFT SURGICAL PATHOLOGY EXAM Jaye Dewey MD 7/11/2025 4417   2 : LEFT INTERNAL MAMMARY LYMPH NODE Tissue LYMPH NODE BIOPSY SURGICAL PATHOLOGY EXAM Jaye Dewey MD 7/11/2025 0924   3 : Left inferior mastectomy skin Tissue BREAST MASTECTOMY LEFT SURGICAL PATHOLOGY EXAM  Jaye Dewey MD 7/11/2025 1539          Drains and/or Catheters:   Closed/Suction Drain RLQ Bulb 19 Fr. (Active)       Closed/Suction Drain LLQ Bulb 19 Fr. (Active)       Closed/Suction Drain LUQ Bulb 19 Fr. (Active)       Urethral Catheter Non-latex 16 Fr. (Active)     Findings:  Left breast: 2  MANUEL Flap 967.1 gm, Pedicle length 9.5 cm, Vein 1:1.5 size match end to end to IM  2.5 mm to 3 mm vein; Artery 1:1 size match 2.5 mm end to end, Ischemia time 33 min     Indications: Etelvina Banks is an 45 y.o. female who is having surgery for reconstruction of her left breast with autologous tissue from her abdomen.  She has a history of left breast DCIS treated with a left mastectomy and prepectoral tissue expander placement with a adjacent tissue rearrangement.  She now returns for removal of the left tissue expander and microsurgical reconstruction with autologous tissue from her abdomen.     The patient was seen in the preoperative area. The risks, benefits, complications, treatment options, non-operative alternatives, expected recovery and outcomes were discussed with the patient. The risks of the procedure were discussed with her prior to the surgery.  These include but are not limited to the risks of anesthesia, infection, bleeding, pain, need for further procedures, pneumothorax, abdominal bulge, partial or complete loss of flap, wound healing problems, and asymmetry.  The possibilities of reaction to medication, pulmonary aspiration, injury to surrounding structures, bleeding, recurrent infection, the need for additional procedures, failure to diagnose a condition, and creating a complication requiring transfusion or operation were discussed with the patient. The patient concurred with the proposed plan, giving informed consent.  The site of surgery was properly noted/marked if necessary per policy. The patient has been actively warmed in preoperative area. Preoperative antibiotics have been  ordered and given within 1 hours of incision. Venous thrombosis prophylaxis have been ordered including bilateral sequential compression devices and chemical prophylaxis     Procedure Details:   The patient was identified and marked in the upright and standing position.  She was taken to the operative room and placed in the supine position.  A preoperative time was performed identifying the patient, procedeure and laterality.  An atraumatic endotracheal intubation was performed by the anesthesia team.  Her arms were padded and carefully secured to the arm boards with egg crate foam and kerlix, abducted less than 90 degrees. She was prepped and draped in the usual sterile fashion.     I marked a a diagonal incision from the inferior medial border of her expanded left chest to the superior lateral corner.  I made this incision sharply and dissected down through the soft tissue to the anterior capsule of the implant.  The mastectomy skin flaps were raised completely off the anterior surface of the capsule and the capsule was then dissected off the pectoralis wall and the capsule and tissue expander were removed together.  The capsule was then sent to pathology for permanent specimen.  The mastectomy flap was raised superiorly and laterally to create the new footprint of the breast and allow for enough expansion in anticipation of postoperative swelling.  Kat Chiu PA-C provided first assistance for this portion of the case.      Next preparation of the recipient vessels was performed.  At this point Dr. Hui Lara assisted me for the microsurgical transfer of the tissue.  Specifically, his skilled assistance was necessary to aid in preparation of the recipient site and microsurgical anastomosis as well as closure of the donor site.  There was a large intercostal  at the third rib which was carefully dissected free.  The vein of this  was approximately 2.5 mm.  The artery was smaller.  The  internal mammary artery and vein were then identified and dissected free in a rib sparing manner between the 3rd and 4th ribs.  An internal mammary lymph node was encountered and sent to pathology.  It was decided to use the internal mammary artery and the vein of the internal mammary  for recipient vessels.   The vessels were then irrigated with 2% lidocaine, and covered it with an instrument wipe and wet lap pad.     I turned my attention to harvesting the flap.  I had evaluated her CT angio and ede the anticipated vessels and adjusted her skin island accordingly.  I incised the umbilicus, then made the superior incision, dissected down to the abdominal wall and raised the anterior abdominal flap to approximately 8 cm centrally and to the costal margins, just until had enough laxity for closure.  I wanted to minimize the dissection in order to decrease seroma risk postoperatively.  I assessed and confirmed my inferior incision.  I split the abdominal flap into two galina abdominal flaps.  I focused on the right side to reconstruct the left breast.  As I dissected the inferior incisions, I identified the superficial systems which were not significant..   I dissected the deep inferior epigastric perforators off the anterior abdominal fascia.  She had a a large  approximately 1.5 cm above and 3 cm lateral to her umbilicus that I fashioned the flap around.  I also preserved a smaller  lateral to this as well as a larger  approximately 6 cm lateral to this.   I incised the fascia and carefully and meticulously dissected the perforators in a retrograde manner through the rectus to the posterior sheathe.  The medial  traveled laterally approximately 4 cm just deep to the rectus before it dove down to the posterior rectus sheath.  I used the SPY and indocyanine green to assess perfusion with this main  and plus or minus the other 2 perforators.  I decided the best  perfusion was between the medial  and the lateralmost  which was the largest of the other two.  The lateral  was in continuity with this  however it was  by approximately 3 cm of full-thickness rectus muscle.  The separation happened along an inscription point.  Therefore I dissected the lateral  free and made the transverse myotomy using a 55 mm GOOD stapler in order to leave a staple line on the muscle edge in addition to the inscription.  I was able to then reflect the inferior edge of the rectus inferiorly and continued the lateral  dissection distally until it reached the pedicle with the medial .  I continued the dissection of the pedicle distally until the veins came together as a Y branch.       I clamped and clipped the recipient vessels.   Then, I harvested the flap and brought it to the chest wall.  Venous anastomosis was performed in end-to-end manner using interrupted nylon.  The arterial anastomosis was also performed in an end-to-end fashion using interrupted 9-0 nylon.  The flap was then inset and excess tissue around the lateral and superior edges  were excised.   A 19 Austrian Brian drain inferiorly and laterally.        The flap was inset, which included excising the inferior and lateral pole of her mastectomy skin flaps.  This was sharply incised and de-epithelialized and sent to pathology.  The buried portion of the flap was marked and the excess skin de-epithelialized.  The incisions were closed with 3-0 Monocryl as interrupted buried dermals followed by a running 4-0 Monocryl subcuticular.      Attention was now returned to the donor site.  I repaired the incised rectus muscle by grasping the edges carefully with an Allis clamp and reopposing the edges with interrupted 0 Vicryl figure-of-eight's.  I then reinforced this closure with a running locking of Vicryl figure-of-eight.  I closed the donor site by  re-approximating the rectus muscle with 0-Vicryl figure of eight in the inscriptions.  I closed the fascia with interrupted 0 Vicryl figure of eights followed by a running, locking 0 Prolene.       I diluted 53 cc of 0.5% Marcaine with epinephrine and 120 cc of injectable saline.  I used 133 cc of this dilution was used to placed a block on the donor site, targeting the inferior incision, lateral TAP blocks and the fascial incision and drain sites and the rest was used to place blocks around the left breast.        I placed 2 19 Chinese Brian drains, flexed the bed and closed then incision in the manner of an abdominoplasty with superior transposition of the umbilicus.  I closed the incision with multiple interrupted 2-0 Vicryl progressive tension sutures,  2-0 Vicryl in the Erica's layer, 3-0 Monocryl interrupted buried dermals and a running 4-0 Monocryl subcuticular.   I inset the umbilicus and secured it with 3-0 Monocryl as interrupted buried dermals followed by a running 3-0 STRATAFIX subcuticular.  The incisions were dressed with dermabond and steristrips.  The umbilicus was dressed with nitropaste, xeroform, and tegaderm.  A ViOptix sensor was placed on the flap with good oxygenation and signal strength.  The patient tolerated the procedure well.  She was extubated, placed in a surgical support bra, and sent to the recovery room in good condition for flap monitoring.        Evidence of Infection: No   Complications:  None; patient tolerated the procedure well.    Disposition: PACU - hemodynamically stable.  Condition: stable         Attending Attestation: I performed the procedure.     Jaey Dewey  Phone Number: [phone]

## 2025-07-11 NOTE — PROGRESS NOTES
"  Division of Plastic and Reconstructive Surgery  Progress Note    Patient Name: Etelvina Banks  MRN: [id number]  Date:  07/11/25     Subjective    Patient to the OR today with plastic surgery for left breast tissue expander removal with capsulectomy and left breast reconstruction with free Deep Inferior Epigastric  flap (MANUEL). Patient admitted to the nursing floor under the plastic surgery service postoperatively. Denies acute concerns, surgical site pain controlled. Remains on bedrest and NPO postoperatively. Voiding per indwelling hong catheter.     Objective    Vital Signs  /58   Pulse 89   Temp 37.4 °C (99.3 °F) (Temporal)   Resp (P) 10   Ht 1.575 m (5' 2\")   Wt 88.4 kg (194 lb 14.2 oz)   LMP 07/11/2025 (Exact Date)   SpO2 97%   BMI 35.65 kg/m²      Physical Exam  Constitutional: A&Ox3, calm and cooperative, NAD.  Eyes: EOMI, clear sclera.   ENMT: Moist mucous membranes, no apparent injuries or lesions.  Head/Neck: NC/AT.   Cardiovascular: RRR.   Respiratory/Thorax: Regular respirations on 2L supplemental O2 via NC. Good symmetric chest expansion.   Gastrointestinal: Abdomen soft, appropriately tender postoperatively, no peritoneal signs, lower transverse abdominal incision dressed with dermabond and steri-strips. ROSCOE drain x 2 with bloody serous output to each side of the b/l lower abdomen. Aquacel AG covering umbilicus covered by Tegaderm.  Genitourinary: Voiding via indwelling hong catheter with pale yellow urinary output.   Extremities: No peripheral edema. Moving all 4 extremities actively.   Neurological: A&Ox3.   Psychological: Appropriate mood and behavior.    Breast: MANUEL free flap recipient sites to lower aspect of left breast. Flaps is warm to touch, appropriate tissue turgor. Intact biphasic pulse signal at marked suture site at central aspect of free flap. Cap refill < 3 sec. Incisions well approximated with sutures, dressed with dermabond and steri strips, no incisional " dehiscence or drainage. ROSCOE drain x1 to lateral inferior aspect of left breast which is patent, maintaining self suction with bloody serous output postoperatively. Vioptix monitor lead intact currently reading mid 80s% saturation with 90+ signal quality.      Current Medications  Scheduled medications  Scheduled Medications[1]  Continuous medications  Continuous Medications[2]  PRN medications  PRN Medications[3]     Assessment   Etelvina Banks is a 45 y.o. 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 Ortiz) with insertion of TE (per plastic surgery, Dr. Dewey). Patient is now s/p OR with plastic surgery (Dr. Dewey and Dr. Lara) on 7/11/2025 for left breast tissue expander removal with capsulectomy and left breast reconstruction with free Deep Inferior Epigastric  flap (MANUEL). Patient admitted to the nursing floor under the plastic surgery service postoperatively for pain control and postoperative flap monitoring.     Plan:  # S/p L breast removal of TE, capsulectomy and reconstruction via MANUEL free flap   - Flap surveillance:   Continue serial flap assessments q1h per nursing with interval checks per plastic surgery team  Please assess Doppler pulse signal at marked site at central aspect of left breast flap plus flap general appearance (color, warmth, turgor)   Please additionally check Vioptix signal hourly and ensure saturation is >30% (If signal lost, abrupt change in signal or saturation <30% please immediately notify the plastic surgery team)    Please notify plastic surgery team immediately if there are any acute changes (Including decreased Doppler signal, pallor, temperature change, bleeding, etc.)  - Flap Warming/Rashmi Hugger:   Use of Rashmi hugger to L breast flap recipient site and generalized upper body to passively warm flap and promote perfusion  postoperatively overnight on POD0  Settings: medium heat (38*C), high continuous fan   Avoid direct contact of warmer blanket with skin/flap sites   Anticipate DC of Rashmi Hugger on POD1   - Dressings/wound care:   Dermabond and Steri-strips to remain intact over breast flap and lower transverse abdominal incision lines, keep C/D/I   Maintain loose pink surgical bra to b/l breasts with window for flap inspection/exams    Maintain Aquacel Ag covered by Tegaderm dressing at umbilical closure site     - Drains:   Continue drain care to x3 ROSCOE drains present at the b/l lower abdomen and L breast (nursing orders placed)  Maintain CHG drain dressings over drain insertion sites at the skin   Please ensure that drains are compressed flat and plugged to maintain self suction at all times aside from when emptying   Nursing to strip drain tubing at least q4h and PRN to avoid drain/tubing obstruction   Nursing to please also empty and record output quantities at least TID and PRN if drains are full   Please notify the plastics team if drain outputs exceed >30 ml in 1 hr or >200 in 24 hrs  Drains to be removed per plastics team once 24 hr outputs remain <30 ml total x2 consecutive days  If drains do not meet output criteria for removal at time of medical clearance for DC, will plan for removal per plastics upon outpatient follow up   - Positioning:   Patient to maintain beach chair positioning when lying in bed and sitting in chair postoperatively (head of bed elevated, hips flexed at least 30 degrees, knees bent and elevated on pillows)  Avoid positioning that would apply pressure to flap region or incisions   When cleared to be OOB and stand, patient to keep hips slightly flexed forward to avoid tension on lower abdominal incisions. Do not lay completely flat or stand completely erect upright postoperatively.  - Weight bearing:   Patient to maintain strict bedrest postoperatively on POD0, anticipate being OOB POD1 AM  <5 lbs  lifting restriction to BUE postoperatively  No significant upper extremity abduction, extension or shoulder retraction to prevent tension on breast surgical site and incisions   - Other postoperative parameters/care:   IV Ancef x3 doses postoperatively for surgical infection ppx   Maintain Landa while on bedrest overnight on POD0 with anticipated removal on POD1 AM  NPO until re-assessment on POD #1 AM, anticipate advancement to CLD on POD1 AM if stable overnight   LR mIVF @ 100 mL/hr while NPO  Nursing to record patient I&O, urine output goal of >1 mL/kg/hr  SQ Heparin q8h scheduled to start postoperatively, continue while inpatient    VS monitoring q4h  Encouraged use of IS (10x/hr, each hr while awake)   Ensure perioperative scopolamine patch removal 72hrs postoperatively (if placed prior to surgery)     # Acute postoperative pain, hx of opiate dependence on agonist therapy   - Nursing to assess patient pain scales at least q4h and PRN  - Continue home Suboxone 8-2 mg SL tablet PO QID (home medication dosage confirmed via  pharmacy medication history review note placed on 7/10/25)  - Continue additional regimen (ERAS protocol) as follows:   Scheduled Tylenol 975mg PO q8h   Scheduled Gabapentin 300mg PO q8h   Scheduled Robaxin 500 mg PO q6h   IV Toradol 30 mg q6h overnight on POD0  DC Toradol and start PO Celebrex 100mg BID on POD1 AM     # Hx T2DM   - Last HBA1C 5.8 on labs obtained 10/17/24  - Home Metformin 500 mg PO daily held postoperatively and while NPO   - Start SSI overnight on POD0 with accuchecks   - Consider diabetic diet once clear for PO     # Hx HTN  - Home Losartan 50 mg PO daily held postoperatively, resume as BP permits with hold parameters to avoid hypotension and flap hypoperfusion   - BP stable postoperatively (110s/50s)  - Monitor VS q4h     # Hx peripheral edema   - Home hydrochlorothiazide held postoperatively, resume as BP permits and as needed for edema   - Extremity elevation while  patient in bed     F: LR @ 100ml/hr while NPO overnight on POD0  E: Monitor and replete PRN  N: NPO overnight on POD#0, anticipate transition to CLD with Jamarcus supplements BID on POD1 AM if flap exams stable overnight   GI: Continue bowel regimen with Colace BID and Milk of mag PRN        Zofran (first line) and Phenergen (second line) PRN for nausea        GI ppx postoperatively and while inpatient with Protonix      DVT ppx:  - Risk score assessed  - SQ heparin q8h while inpatient   - Use of SCDs while in bed   - Anticipate early ambulation once off bedrest on POD1       Disposition: Continue care on RNF. Will remain inpatient for continued flap monitoring and vac/drain surveillance postoperatively.     Patient and plan discussed with Dr. Dewey.    Unique Floyd PA-C  Plastic and Reconstructive Surgery   [place]  Pager #[phone]  Team phone: [phone]        [1] gabapentin, 600 mg, oral, q8h ZEINAB  ketorolac, 30 mg, intravenous, q6h  lidocaine, 0.1 mL, subcutaneous, Once  oxygen, , inhalation, Continuous - Inhalation  [2] lactated Ringer's, 100 mL/hr, Last Rate: 100 mL/hr (07/11/25 2770)  [3] PRN medications: acetaminophen, droperidol, HYDROmorphone, HYDROmorphone, oxyCODONE, oxyCODONE, promethazine

## 2025-07-11 NOTE — ANESTHESIA POSTPROCEDURE EVALUATION
Patient: Etelvina Banks    Procedure Summary       Date: 07/11/25 Room / Location: [facility] OR 06 / [facility] OR    Anesthesia Start: 0749 Anesthesia Stop: 1810    Procedures:       RECONSTRUCTION, BREAST, USING FREE FLAP (Left)      REMOVAL, TISSUE EXPANDER (Left)      RECONSTRUCTION, BREAST, USING FREE FLAP (Left)      REMOVAL, TISSUE EXPANDER (Left) Diagnosis:       Ductal carcinoma in situ (DCIS) of left breast      (Ductal carcinoma in situ (DCIS) of left breast [D05.12])    Surgeons: Jaye Dewey MD; Hui Lara MD Responsible Provider: Basil Steve MD    Anesthesia Type: general ASA Status: 2            Anesthesia Type: general    Vitals Value Taken Time   /63 07/11/25 18:15   Temp 36.3 07/11/25 18:29   Pulse 105 07/11/25 18:24   Resp 14 07/11/25 18:29   SpO2 98 % 07/11/25 18:24   Vitals shown include unfiled device data.    Anesthesia Post Evaluation    Patient location during evaluation: PACU  Patient participation: complete - patient participated  Level of consciousness: awake and alert  Pain score: 5 (pain meds given)  Pain management: adequate  Multimodal analgesia pain management approach  Airway patency: patent  Cardiovascular status: acceptable, blood pressure returned to baseline and hemodynamically stable  Respiratory status: acceptable  Hydration status: acceptable  Postoperative Nausea and Vomiting: none        No notable events documented.

## 2025-07-12 LAB
ALBUMIN SERPL BCP-MCNC: 3.6 G/DL (ref 3.4–5)
ALP SERPL-CCNC: 58 U/L (ref 33–110)
ALT SERPL W P-5'-P-CCNC: 13 U/L (ref 7–45)
ANION GAP SERPL CALC-SCNC: 11 MMOL/L (ref 10–20)
AST SERPL W P-5'-P-CCNC: 17 U/L (ref 9–39)
BASOPHILS # BLD AUTO: 0.05 X10*3/UL (ref 0–0.1)
BASOPHILS NFR BLD AUTO: 0.5 %
BILIRUB SERPL-MCNC: 0.6 MG/DL (ref 0–1.2)
BUN SERPL-MCNC: 13 MG/DL (ref 6–23)
CALCIUM SERPL-MCNC: 8.3 MG/DL (ref 8.6–10.6)
CHLORIDE SERPL-SCNC: 104 MMOL/L (ref 98–107)
CO2 SERPL-SCNC: 28 MMOL/L (ref 21–32)
CREAT SERPL-MCNC: 0.86 MG/DL (ref 0.5–1.05)
EGFRCR SERPLBLD CKD-EPI 2021: 84 ML/MIN/1.73M*2
EOSINOPHIL # BLD AUTO: 0.17 X10*3/UL (ref 0–0.7)
EOSINOPHIL NFR BLD AUTO: 1.8 %
ERYTHROCYTE [DISTWIDTH] IN BLOOD BY AUTOMATED COUNT: 13.1 % (ref 11.5–14.5)
GLUCOSE BLD MANUAL STRIP-MCNC: 103 MG/DL (ref 74–99)
GLUCOSE BLD MANUAL STRIP-MCNC: 125 MG/DL (ref 74–99)
GLUCOSE BLD MANUAL STRIP-MCNC: 127 MG/DL (ref 74–99)
GLUCOSE BLD MANUAL STRIP-MCNC: 135 MG/DL (ref 74–99)
GLUCOSE BLD MANUAL STRIP-MCNC: 157 MG/DL (ref 74–99)
GLUCOSE SERPL-MCNC: 130 MG/DL (ref 74–99)
HCT VFR BLD AUTO: 29.2 % (ref 36–46)
HGB BLD-MCNC: 9.4 G/DL (ref 12–16)
IMM GRANULOCYTES # BLD AUTO: 0.04 X10*3/UL (ref 0–0.7)
IMM GRANULOCYTES NFR BLD AUTO: 0.4 % (ref 0–0.9)
LYMPHOCYTES # BLD AUTO: 2.2 X10*3/UL (ref 1.2–4.8)
LYMPHOCYTES NFR BLD AUTO: 23.1 %
MAGNESIUM SERPL-MCNC: 2.21 MG/DL (ref 1.6–2.4)
MCH RBC QN AUTO: 29.2 PG (ref 26–34)
MCHC RBC AUTO-ENTMCNC: 32.2 G/DL (ref 32–36)
MCV RBC AUTO: 91 FL (ref 80–100)
MONOCYTES # BLD AUTO: 0.55 X10*3/UL (ref 0.1–1)
MONOCYTES NFR BLD AUTO: 5.8 %
NEUTROPHILS # BLD AUTO: 6.52 X10*3/UL (ref 1.2–7.7)
NEUTROPHILS NFR BLD AUTO: 68.4 %
NRBC BLD-RTO: 0 /100 WBCS (ref 0–0)
PLATELET # BLD AUTO: 219 X10*3/UL (ref 150–450)
POTASSIUM SERPL-SCNC: 3.8 MMOL/L (ref 3.5–5.3)
PROT SERPL-MCNC: 5.3 G/DL (ref 6.4–8.2)
RBC # BLD AUTO: 3.22 X10*6/UL (ref 4–5.2)
SODIUM SERPL-SCNC: 139 MMOL/L (ref 136–145)
WBC # BLD AUTO: 9.5 X10*3/UL (ref 4.4–11.3)

## 2025-07-12 PROCEDURE — 2500000001 HC RX 250 WO HCPCS SELF ADMINISTERED DRUGS (ALT 637 FOR MEDICARE OP): Mod: SE | Performed by: PHYSICIAN ASSISTANT

## 2025-07-12 PROCEDURE — 99232 SBSQ HOSP IP/OBS MODERATE 35: CPT

## 2025-07-12 PROCEDURE — 80053 COMPREHEN METABOLIC PANEL: CPT | Performed by: PHYSICIAN ASSISTANT

## 2025-07-12 PROCEDURE — 82947 ASSAY GLUCOSE BLOOD QUANT: CPT

## 2025-07-12 PROCEDURE — 2500000004 HC RX 250 GENERAL PHARMACY W/ HCPCS (ALT 636 FOR OP/ED): Mod: JZ,SE | Performed by: PHYSICIAN ASSISTANT

## 2025-07-12 PROCEDURE — 2500000004 HC RX 250 GENERAL PHARMACY W/ HCPCS (ALT 636 FOR OP/ED): Mod: SE | Performed by: PHYSICIAN ASSISTANT

## 2025-07-12 PROCEDURE — 83735 ASSAY OF MAGNESIUM: CPT | Performed by: PHYSICIAN ASSISTANT

## 2025-07-12 PROCEDURE — 2500000002 HC RX 250 W HCPCS SELF ADMINISTERED DRUGS (ALT 637 FOR MEDICARE OP, ALT 636 FOR OP/ED): Mod: SE | Performed by: PHYSICIAN ASSISTANT

## 2025-07-12 PROCEDURE — 36415 COLL VENOUS BLD VENIPUNCTURE: CPT | Performed by: PHYSICIAN ASSISTANT

## 2025-07-12 PROCEDURE — 85025 COMPLETE CBC W/AUTO DIFF WBC: CPT | Performed by: PHYSICIAN ASSISTANT

## 2025-07-12 PROCEDURE — 2500000001 HC RX 250 WO HCPCS SELF ADMINISTERED DRUGS (ALT 637 FOR MEDICARE OP): Mod: SE

## 2025-07-12 PROCEDURE — 2060000001 HC INTERMEDIATE ICU ROOM DAILY

## 2025-07-12 RX ORDER — CALCIUM CARBONATE 200(500)MG
1000 TABLET,CHEWABLE ORAL ONCE
Status: COMPLETED | OUTPATIENT
Start: 2025-07-12 | End: 2025-07-12

## 2025-07-12 RX ADMIN — METHOCARBAMOL 500 MG: 500 TABLET ORAL at 17:56

## 2025-07-12 RX ADMIN — BUPRENORPHINE AND NALOXONE 1 TABLET: 8; 2 TABLET SUBLINGUAL at 06:31

## 2025-07-12 RX ADMIN — ACETAMINOPHEN 975 MG: 325 TABLET ORAL at 17:56

## 2025-07-12 RX ADMIN — PANTOPRAZOLE SODIUM 40 MG: 40 TABLET, DELAYED RELEASE ORAL at 06:31

## 2025-07-12 RX ADMIN — CELECOXIB 100 MG: 100 CAPSULE ORAL at 21:17

## 2025-07-12 RX ADMIN — CELECOXIB 100 MG: 100 CAPSULE ORAL at 08:53

## 2025-07-12 RX ADMIN — DOCUSATE SODIUM 100 MG: 100 CAPSULE, LIQUID FILLED ORAL at 08:53

## 2025-07-12 RX ADMIN — HEPARIN SODIUM 5000 UNITS: 5000 INJECTION INTRAVENOUS; SUBCUTANEOUS at 21:16

## 2025-07-12 RX ADMIN — CEFAZOLIN SODIUM 2 G: 2 INJECTION, SOLUTION INTRAVENOUS at 05:03

## 2025-07-12 RX ADMIN — GABAPENTIN 300 MG: 300 CAPSULE ORAL at 05:03

## 2025-07-12 RX ADMIN — HEPARIN SODIUM 5000 UNITS: 5000 INJECTION INTRAVENOUS; SUBCUTANEOUS at 05:03

## 2025-07-12 RX ADMIN — BUPRENORPHINE AND NALOXONE 1 TABLET: 8; 2 TABLET SUBLINGUAL at 12:57

## 2025-07-12 RX ADMIN — METHOCARBAMOL 500 MG: 500 TABLET ORAL at 12:57

## 2025-07-12 RX ADMIN — ANTACID TABLETS 2 TABLET: 500 TABLET, CHEWABLE ORAL at 16:02

## 2025-07-12 RX ADMIN — DOCUSATE SODIUM 100 MG: 100 CAPSULE, LIQUID FILLED ORAL at 21:16

## 2025-07-12 RX ADMIN — ACETAMINOPHEN 975 MG: 325 TABLET ORAL at 00:27

## 2025-07-12 RX ADMIN — HEPARIN SODIUM 5000 UNITS: 5000 INJECTION INTRAVENOUS; SUBCUTANEOUS at 12:57

## 2025-07-12 RX ADMIN — ACETAMINOPHEN 975 MG: 325 TABLET ORAL at 08:53

## 2025-07-12 RX ADMIN — METHOCARBAMOL 500 MG: 500 TABLET ORAL at 06:31

## 2025-07-12 RX ADMIN — GABAPENTIN 300 MG: 300 CAPSULE ORAL at 21:16

## 2025-07-12 RX ADMIN — BUPRENORPHINE AND NALOXONE 1 TABLET: 8; 2 TABLET SUBLINGUAL at 17:56

## 2025-07-12 RX ADMIN — CEFAZOLIN SODIUM 2 G: 2 INJECTION, SOLUTION INTRAVENOUS at 12:57

## 2025-07-12 RX ADMIN — BUPRENORPHINE AND NALOXONE 1 TABLET: 8; 2 TABLET SUBLINGUAL at 21:16

## 2025-07-12 RX ADMIN — GABAPENTIN 300 MG: 300 CAPSULE ORAL at 12:57

## 2025-07-12 RX ADMIN — INSULIN HUMAN 1 UNITS: 100 INJECTION, SOLUTION PARENTERAL at 15:14

## 2025-07-12 RX ADMIN — KETOROLAC TROMETHAMINE 30 MG: 30 INJECTION, SOLUTION INTRAMUSCULAR; INTRAVENOUS at 02:17

## 2025-07-12 SDOH — ECONOMIC STABILITY: FOOD INSECURITY: HOW HARD IS IT FOR YOU TO PAY FOR THE VERY BASICS LIKE FOOD, HOUSING, MEDICAL CARE, AND HEATING?: NOT HARD AT ALL

## 2025-07-12 SDOH — ECONOMIC STABILITY: HOUSING INSECURITY: IN THE LAST 12 MONTHS, WAS THERE A TIME WHEN YOU WERE NOT ABLE TO PAY THE MORTGAGE OR RENT ON TIME?: NO

## 2025-07-12 SDOH — ECONOMIC STABILITY: HOUSING INSECURITY: IN THE PAST 12 MONTHS, HOW MANY TIMES HAVE YOU MOVED WHERE YOU WERE LIVING?: 0

## 2025-07-12 SDOH — ECONOMIC STABILITY: HOUSING INSECURITY: AT ANY TIME IN THE PAST 12 MONTHS, WERE YOU HOMELESS OR LIVING IN A SHELTER (INCLUDING NOW)?: NO

## 2025-07-12 SDOH — ECONOMIC STABILITY: TRANSPORTATION INSECURITY: IN THE PAST 12 MONTHS, HAS LACK OF TRANSPORTATION KEPT YOU FROM MEDICAL APPOINTMENTS OR FROM GETTING MEDICATIONS?: NO

## 2025-07-12 ASSESSMENT — COGNITIVE AND FUNCTIONAL STATUS - GENERAL
DRESSING REGULAR LOWER BODY CLOTHING: A LITTLE
TOILETING: A LITTLE
DAILY ACTIVITIY SCORE: 18
MOVING TO AND FROM BED TO CHAIR: A LITTLE
CLIMB 3 TO 5 STEPS WITH RAILING: A LITTLE
DRESSING REGULAR LOWER BODY CLOTHING: A LOT
MOVING TO AND FROM BED TO CHAIR: A LITTLE
TURNING FROM BACK TO SIDE WHILE IN FLAT BAD: A LITTLE
HELP NEEDED FOR BATHING: A LITTLE
DRESSING REGULAR UPPER BODY CLOTHING: A LITTLE
DAILY ACTIVITIY SCORE: 18
MOBILITY SCORE: 18
TOILETING: A LITTLE
PERSONAL GROOMING: A LITTLE
WALKING IN HOSPITAL ROOM: A LITTLE
WALKING IN HOSPITAL ROOM: A LITTLE
MOVING FROM LYING ON BACK TO SITTING ON SIDE OF FLAT BED WITH BEDRAILS: A LITTLE
STANDING UP FROM CHAIR USING ARMS: A LITTLE
MOVING FROM LYING ON BACK TO SITTING ON SIDE OF FLAT BED WITH BEDRAILS: A LITTLE
HELP NEEDED FOR BATHING: A LITTLE
DRESSING REGULAR LOWER BODY CLOTHING: A LOT
CLIMB 3 TO 5 STEPS WITH RAILING: A LITTLE
TOILETING: A LITTLE
DRESSING REGULAR UPPER BODY CLOTHING: A LITTLE
TURNING FROM BACK TO SIDE WHILE IN FLAT BAD: A LITTLE
HELP NEEDED FOR BATHING: A LITTLE
WALKING IN HOSPITAL ROOM: A LITTLE
MOBILITY SCORE: 18
EATING MEALS: A LITTLE
TURNING FROM BACK TO SIDE WHILE IN FLAT BAD: A LITTLE
DAILY ACTIVITIY SCORE: 18
PERSONAL GROOMING: A LITTLE
MOVING FROM LYING ON BACK TO SITTING ON SIDE OF FLAT BED WITH BEDRAILS: A LITTLE
PERSONAL GROOMING: A LITTLE
DRESSING REGULAR UPPER BODY CLOTHING: A LITTLE
PATIENT BASELINE BEDBOUND: NO
MOBILITY SCORE: 18
STANDING UP FROM CHAIR USING ARMS: A LITTLE
STANDING UP FROM CHAIR USING ARMS: A LITTLE
MOVING TO AND FROM BED TO CHAIR: A LITTLE
CLIMB 3 TO 5 STEPS WITH RAILING: A LITTLE

## 2025-07-12 ASSESSMENT — PAIN SCALES - GENERAL: PAINLEVEL_OUTOF10: 3

## 2025-07-12 ASSESSMENT — PAIN - FUNCTIONAL ASSESSMENT: PAIN_FUNCTIONAL_ASSESSMENT: 0-10

## 2025-07-12 NOTE — DISCHARGE INSTRUCTIONS
Plastic Surgery Post Discharge Instructions  You were admitted to [facility] for postoperative monitoring and control of pain following left breast tissue expander removal with capsulectomy and left breast reconstruction with free Deep Inferior Epigastric  flap (MANUEL) on 7/11/2025 with Dr. Dewey of plastic surgery. Included below are post-discharge instructions and details regarding follow-up.     Thank you for allowing us to participate in your care and we wish you the best!    Best Regards,  [facility]  Department of Plastic and Reconstructive Surgery    Activity:  Activity as tolerated ***. No pushing, pulling or lifting objects greater than 5 pounds ***. Continue to maintain non-weight bearing status to ***. Ensure no compression is applied to the *** or free flap sites. Continue to elevate your *** to alleviate postoperative edema. Please do not apply ice or heat directly to free flap without barrier in place ***.     You may locally bathe following discharge. Avoid soaking or submerging surgical incisions/sites or wetting wound vac dressing or device ***.      Surgical Site/Wound Care:    Local wound care instructions ***. Avoid application of creams, lotions or ointments to surgical site, no soaking or scrubbing of surgical sites.     *** Continue to monitor flap for changes in general appearance, color, temperature and turgor. Please additionally monitor for any developing signs of infection which may include increased redness, swelling, fever/chills, green/yellow drainage, or foul odor from surgical sites or wounds. If any signs of infection or changes in flap appearance are to occur, please immediately contact the plastic surgery office.     Drain care:  You are being discharged with ***. To empty the drain, open the cap, tip into cup and squeeze to empty. Squeeze drain flat then replace the cap.  Please empty the drain and record its output 3  times a day and bring these numbers to your follow up appointment.  The drain output should decrease and the color of the drainage should become lighter (red to pink to yellow).  This drain is sutured into place.  Keep the area around the drain clean and dry.  You may use mild soap and water to cleanse around the drain.  It is ok to shower; do not soak in a tub. Change the gauze around the drain as needed.  Call the office if you notice drastic changes in drain output, bloody drain output, or redness/drainage around insertion site.    Nutrition:  You may resume a regular diet following surgery with increased protein. Ensure that you are drinking an adequate amount of fluids to maintain hydration as well as consuming a diet high in protein and low in sugar. You may consider increasing your fiber intake to avoid constipation.    Do not smoke, as smoking delays healing and increases the risk of complications. Also be sure you are not around people that smoke for at least 6 weeks after surgery.  Second hand smoke is just as harmful as if you were to smoke.    Medication Instructions:  You may resume use of your home prescribed mediations as previously directed following discharge from the hospital. If you were taking medications prior to your surgery and they are not listed on your discharge homegoing instructions medications list, consult your MD before you resume these medications.    Some postoperative pain is not unusual. This is usually relieved by taking prescribed or over the counter Acetaminophen/Tylenol, Motrin/ibuprofen. In cases of severe pain, you may use prescribed *** as directed. Severe pain despite administration of pain medication must be reported to your physician.    Remember when taking Acetaminophen, do NOT exceed more than 1000 milligrams (mg) per dose or more than 4000 mg total per day. Taking too much Acetaminophen at one time can damage your liver. The maximum amount of ibuprofen in adults is  800 mg per dose or 3200 mg per day. Call your MD if you have any questions about your medications. To prevent constipation while taking narcotic pain medications, please utilize your prescribed bowel regimen, ensure that you drink plenty of water, eat fiber rich foods (a good source is fruit) and increase activity progressively.    DO NOT drive a car while utilizing narcotic pain medications and until cleared by MD at follow-up appointment. Driving or operating heavy machinery, lawnmowers or power tools while taking opiod/narcotic pain medications may impair your judgement.    Call Physician If:  Contact the plastic surgery office for any questions and/or concerns regarding the surgical incision/site.  1. [phone] if Monday-Friday (8 a.m. - 4:30 p.m.)  2. [phone] and ask for the Plastic Surgery team on call provider if after hours or on weekends  3. Email [email or web address] for any non-urgent concerns and when relaying weekly progress photos of flap sites. ***    Call your MD or seek immediate medical attention if you experience any of the following symptoms:  1. Fever of 101.5 (38.5 C) or greater  2. Pain not controlled with prescribed pain medications  3. Uncontrolled nausea and/or vomiting  4. Drainage or swelling around your incisions and/or surgical sites   5. Separation of incisions, or tearing of the incision line  6. Large fluid collection under or around the incision or flap sites   7. Flap discoloration (including darkened appearance)  8. Difficulty breathing  9. Swelling, pain, heat and/or redness in your legs and/or calves  10. Inability to tolerate diet/fluid intake    Additional Notes:  ***    Follow-up/Post Discharge Appointments:  Follow-up care is a key part of your treatment and safety. It is very important that you maintain follow-up care as directed so that your surgical site heals properly and does not lead to problems. Always carry a current medication list with you and  bring it to ALL healthcare Provider visits. Be sure to maintain follow up with plastic surgery at your scheduled appointment. If you are unable to keep your appointment, or need to reschedule please contact our office at [phone].

## 2025-07-12 NOTE — CARE PLAN
The patient's goals for the shift include      The clinical goals for the shift include pt will remain hds throughout shift      Problem: Pain - Adult  Goal: Verbalizes/displays adequate comfort level or baseline comfort level  Outcome: Progressing     Problem: Safety - Adult  Goal: Free from fall injury  Outcome: Progressing     Problem: Discharge Planning  Goal: Discharge to home or other facility with appropriate resources  Outcome: Progressing     Problem: Chronic Conditions and Co-morbidities  Goal: Patient's chronic conditions and co-morbidity symptoms are monitored and maintained or improved  Outcome: Progressing     Problem: Nutrition  Goal: Nutrient intake appropriate for maintaining nutritional needs  Outcome: Progressing     Problem: Skin  Goal: Decreased wound size/increased tissue granulation at next dressing change  Outcome: Progressing  Flowsheets (Taken 7/11/2025 2316)  Decreased wound size/increased tissue granulation at next dressing change: Protective dressings over bony prominences  Goal: Participates in plan/prevention/treatment measures  Outcome: Progressing  Flowsheets (Taken 7/11/2025 2316)  Participates in plan/prevention/treatment measures: Elevate heels  Goal: Prevent/manage excess moisture  Outcome: Progressing  Flowsheets (Taken 7/11/2025 2316)  Prevent/manage excess moisture: Moisturize dry skin  Goal: Prevent/minimize sheer/friction injuries  Outcome: Progressing  Flowsheets (Taken 7/11/2025 2316)  Prevent/minimize sheer/friction injuries: Use pull sheet  Goal: Promote/optimize nutrition  Outcome: Progressing  Flowsheets (Taken 7/11/2025 2316)  Promote/optimize nutrition: Discuss with provider if NPO > 2 days  Goal: Promote skin healing  Outcome: Progressing  Flowsheets (Taken 7/11/2025 2316)  Promote skin healing: Protective dressings over bony prominences

## 2025-07-12 NOTE — PROGRESS NOTES
"  Division of Plastic and Reconstructive Surgery  Progress Note    Patient Name: Etelvina Banks  MRN: [id number]  Date:  07/12/25     Subjective    Patient resting comfortably in bed this AM, pain well-controlled. She denies passing any gas yet. Landa catheter discontinued this morning. Patient now able to be OOB with assistance, reviewed positioning with hip flexion during ambulation. Advanced diet to CLD and encouraged incentive spirometer use. She denies any night sweats, CP, SOB, palpitations, nausea, vomiting.    Objective    Vital Signs  BP 97/62 (BP Location: Right arm, Patient Position: Lying)   Pulse 89   Temp 36.4 °C (97.5 °F) (Temporal)   Resp 18   Ht 1.575 m (5' 2\")   Wt 88.5 kg (195 lb)   LMP 07/11/2025 (Exact Date)   SpO2 97%   BMI 35.67 kg/m²      Physical Exam  Constitutional: A&Ox3, calm and cooperative, NAD.  Eyes: EOMI, clear sclera.   ENMT: Moist mucous membranes, no apparent injuries or lesions.  Head/Neck: NC/AT.   Cardiovascular: RRR.   Respiratory/Thorax: Regular respirations on RA. Good symmetric chest expansion.   Gastrointestinal: Abdomen soft, appropriately tender postoperatively, no peritoneal signs, lower transverse abdominal incision dressed with dermabond and steri-strips. ROSCOE drain x 2 with bloody serous output to each side of the b/l lower abdomen. Aquacel AG covering umbilicus covered by Tegaderm, replaced on exam.  Genitourinary: Voiding independently  Extremities: No peripheral edema. Moving all 4 extremities actively.   Neurological: A&Ox3.   Psychological: Appropriate mood and behavior.    Breast: MANUEL free flap recipient sites to lower aspect of left breast. Flap is warm to touch, appropriate tissue turgor. Intact biphasic pulse signal at marked suture site at central aspect of free flap. Cap refill < 3 sec. Incisions well approximated with sutures, dressed with dermabond and steri strips, no incisional dehiscence or drainage. ROSCOE drain x1 to lateral inferior aspect of " left breast which is patent, maintaining self suction with bloody serous output postoperatively. Vioptix monitor lead intact currently reading  77% saturation with 90+ signal quality.      Current Medications  Scheduled medications  Scheduled Medications[1]  Continuous medications  Continuous Medications[2]  PRN medications  PRN Medications[3]     Assessment   Etelvina Banks is a 45 y.o. 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 Ortiz) with insertion of TE (per plastic surgery, Dr. Dewey). Patient is now s/p OR with plastic surgery (Dr. Dewey and Dr. Lara) on 7/11/2025 for left breast tissue expander removal with capsulectomy and left breast reconstruction with free Deep Inferior Epigastric  flap (MANUEL). Patient admitted to the nursing floor under the plastic surgery service postoperatively for pain control and postoperative flap monitoring.     Plan:  # S/p L breast removal of TE, capsulectomy and reconstruction via MANUEL free flap   - Flap surveillance:   Continue serial flap assessments q1h per nursing with interval checks per plastic surgery team  Please assess Doppler pulse signal at marked site at central aspect of left breast flap plus flap general appearance (color, warmth, turgor)   Please additionally check Vioptix signal hourly and ensure saturation is >30% (If signal lost, abrupt change in signal or saturation <30% please immediately notify the plastic surgery team)    Please notify plastic surgery team immediately if there are any acute changes (Including decreased Doppler signal, pallor, temperature change, bleeding, etc.)  - Flap Warming/Rashmi Hugger:   Use of Rashmi hugger to L breast flap recipient site and generalized upper body to passively warm flap and promote perfusion postoperatively overnight on POD0  Settings: medium heat (38*C), high  continuous fan   Avoid direct contact of warmer blanket with skin/flap sites   DC of Rashmi Kyle on POD1   - Dressings/wound care:   Dermabond and Steri-strips to remain intact over breast flap and lower transverse abdominal incision lines, keep C/D/I   Maintain loose pink surgical bra to b/l breasts with window for flap inspection/exams    Maintain Aquacel Ag covered by Tegaderm dressing at umbilical closure site     - Drains:   Continue drain care to x3 ROSCOE drains present at the b/l lower abdomen and L breast (nursing orders placed)  Maintain CHG drain dressings over drain insertion sites at the skin   Please ensure that drains are compressed flat and plugged to maintain self suction at all times aside from when emptying   Nursing to strip drain tubing at least q4h and PRN to avoid drain/tubing obstruction   Nursing to please also empty and record output quantities at least TID and PRN if drains are full   Please notify the plastics team if drain outputs exceed >30 ml in 1 hr or >200 in 24 hrs  Drains to be removed per plastics team once 24 hr outputs remain <30 ml total x2 consecutive days  If drains do not meet output criteria for removal at time of medical clearance for DC, will plan for removal per plastics upon outpatient follow up   - Positioning:   Patient to maintain beach chair positioning when lying in bed and sitting in chair postoperatively (head of bed elevated, hips flexed at least 30 degrees, knees bent and elevated on pillows)  Avoid positioning that would apply pressure to flap region or incisions   Patient to keep hips slightly flexed forward to avoid tension on lower abdominal incisions. Do not lay completely flat or stand completely erect upright postoperatively.  - Weight bearing:   OK to be OOB with assistance POD1 AM  <5 lbs lifting restriction to BUE postoperatively  No significant upper extremity abduction, extension or shoulder retraction to prevent tension on breast surgical site and  incisions   - Other postoperative parameters/care:   IV Ancef x3 doses postoperatively for surgical infection ppx   Landa removal on POD1 AM  CLD on POD1 AM  LR mIVF @ 100 mL/hr while NPO  Nursing to record patient I&O, urine output goal of >1 mL/kg/hr  SQ Heparin q8h scheduled to start postoperatively, continue while inpatient    VS monitoring q4h  Encouraged use of IS (10x/hr, each hr while awake)   Ensure perioperative scopolamine patch removal 72hrs postoperatively (if placed prior to surgery)     # Acute postoperative pain, hx of opiate dependence on agonist therapy   - Nursing to assess patient pain scales at least q4h and PRN  - Continue home Suboxone 8-2 mg SL tablet PO QID (home medication dosage confirmed via  pharmacy medication history review note placed on 7/10/25)  - Continue additional regimen (ERAS protocol) as follows:   Scheduled Tylenol 975mg PO q8h   Scheduled Gabapentin 300mg PO q8h   Scheduled Robaxin 500 mg PO q6h   Scheduled Celebrex 100mg PO BID     # Hx T2DM   - Last HBA1C 5.8 on labs obtained 10/17/24  - Home Metformin 500 mg PO daily held postoperatively and while NPO   - Start SSI overnight on POD0 with accuchecks   - Consider diabetic diet once clear for PO     # Hx HTN  - Home Losartan 50 mg PO daily held postoperatively, resume as BP permits with hold parameters to avoid hypotension and flap hypoperfusion   - BP stable postoperatively (110s/50s)  - Monitor VS q4h     # Hx peripheral edema   - Home hydrochlorothiazide held postoperatively, resume as BP permits and as needed for edema   - Extremity elevation while patient in bed     F: LR @ 100ml/hr while NPO   E: Monitor and replete PRN  N: CLD with Jamarcus supplements BID on POD1    GI: Continue bowel regimen with Colace BID and Milk of mag PRN        Zofran (first line) and Phenergen (second line) PRN for nausea        GI ppx postoperatively and while inpatient with Protonix      DVT ppx:  - Risk score assessed  - SQ heparin q8h  while inpatient   - Use of SCDs while in bed   - Early ambulation on POD1       Disposition: Continue care on RNF. Will remain inpatient for continued flap monitoring and vac/drain surveillance postoperatively.     Patient and plan discussed with Dr. Dewey.    Anushka Valadez PA-C  Plastic and Reconstructive Surgery   [place]  Pager #[phone]  Team phone: [phone]          [1] acetaminophen, 975 mg, oral, q8h  buprenorphine-naloxone, 1 tablet, sublingual, 4x daily  ceFAZolin, 2 g, intravenous, q8h  celecoxib, 100 mg, oral, BID  docusate sodium, 100 mg, oral, BID  gabapentin, 300 mg, oral, q8h  heparin (porcine), 5,000 Units, subcutaneous, q8h  insulin regular, 0-5 Units, subcutaneous, q4h  [Held by provider] losartan, 50 mg, oral, Daily  [Held by provider] metFORMIN XR, 500 mg, oral, Daily with evening meal  methocarbamol, 500 mg, oral, q6h ZEINAB  pantoprazole, 40 mg, oral, Daily before breakfast   Or  pantoprazole, 40 mg, intravenous, Daily before breakfast     [2] lactated Ringer's, 100 mL/hr, Last Rate: 100 mL/hr (07/12/25 0522)     [3] PRN medications: [Held by provider] hydroCHLOROthiazide, magnesium hydroxide, ondansetron **OR** ondansetron, promethazine

## 2025-07-12 NOTE — HOSPITAL COURSE
BRIEF HISTORY:    Etelvina Banks is a 45 y.o. 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 Ortiz) with insertion of TE (per plastic surgery, Dr. Dewey). Patient is now s/p OR with plastic surgery (Dr. Dewey and Dr. Lara) on 7/11/2025 for left breast tissue expander removal with capsulectomy and left breast reconstruction with free Deep Inferior Epigastric  flap (MANUEL). Patient admitted to the nursing floor under the plastic surgery service postoperatively for pain control and postoperative flap monitoring.     HOSPITAL COURSE:    The patient was admitted following left breast tissue expander removal with capsulectomy and left breast reconstruction with free Deep Inferior Epigastric  flap (MANUEL) on 7/11/25. Postoperatively, she was monitored closely in the surgical unit with routine flap checks, including Doppler and Vioptix signal assessment, capillary refill, and skin paddle appearance, all of which remained stable throughout her admission. Surgical ROSCOE drains x3 remained intact with appropriate outputs.     She received multimodal pain management, including scheduled acetaminophen, robaxin, Celebrex and gabapentin. Prophylactic anticoagulation was initiated postoperatively per institutional protocol.     The patient was advanced to a regular diet without issue and ambulated with assistance on postoperative day ***, progressing to independent mobility by POD ***.      Her incisions were monitored and remained well-approximated without signs of infection or dehiscence.     DAY OF DISCHARGE:    The patient met all discharge criteria, including stable vital signs, pain control with oral medications, and independent mobility. She was discharged home in stable condition with instructions for drain care, wound vac management, activity restrictions, and  scheduled follow-up in the outpatient clinic.

## 2025-07-12 NOTE — CARE PLAN
The patient's goals for the shift include      The clinical goals for the shift include pt will remain HDS throughout shift and ambulate x4      Problem: Pain - Adult  Goal: Verbalizes/displays adequate comfort level or baseline comfort level  Outcome: Progressing     Problem: Safety - Adult  Goal: Free from fall injury  Outcome: Progressing     Problem: Discharge Planning  Goal: Discharge to home or other facility with appropriate resources  Outcome: Progressing     Problem: Chronic Conditions and Co-morbidities  Goal: Patient's chronic conditions and co-morbidity symptoms are monitored and maintained or improved  Outcome: Progressing     Problem: Nutrition  Goal: Nutrient intake appropriate for maintaining nutritional needs  Outcome: Progressing     Problem: Skin  Goal: Decreased wound size/increased tissue granulation at next dressing change  Outcome: Progressing  Flowsheets (Taken 7/12/2025 1631)  Decreased wound size/increased tissue granulation at next dressing change: Promote sleep for wound healing  Goal: Participates in plan/prevention/treatment measures  Outcome: Progressing  Flowsheets (Taken 7/12/2025 1631)  Participates in plan/prevention/treatment measures:   Elevate heels   Discuss with provider PT/OT consult  Goal: Prevent/manage excess moisture  Outcome: Progressing  Flowsheets (Taken 7/12/2025 1631)  Prevent/manage excess moisture:   Moisturize dry skin   Monitor for/manage infection if present  Goal: Prevent/minimize sheer/friction injuries  Outcome: Progressing  Flowsheets (Taken 7/12/2025 1631)  Prevent/minimize sheer/friction injuries:   Use pull sheet   Increase activity/out of bed for meals   HOB 30 degrees or less  Goal: Promote/optimize nutrition  Outcome: Progressing  Flowsheets (Taken 7/12/2025 1631)  Promote/optimize nutrition:   Offer water/supplements/favorite foods   Monitor/record intake including meals  Goal: Promote skin healing  Outcome: Progressing  Flowsheets (Taken 7/12/2025  1631)  Promote skin healing: Protective dressings over bony prominences

## 2025-07-13 LAB
GLUCOSE BLD MANUAL STRIP-MCNC: 106 MG/DL (ref 74–99)
GLUCOSE BLD MANUAL STRIP-MCNC: 136 MG/DL (ref 74–99)
GLUCOSE BLD MANUAL STRIP-MCNC: 90 MG/DL (ref 74–99)
GLUCOSE BLD MANUAL STRIP-MCNC: 95 MG/DL (ref 74–99)

## 2025-07-13 PROCEDURE — 82947 ASSAY GLUCOSE BLOOD QUANT: CPT

## 2025-07-13 PROCEDURE — 2060000001 HC INTERMEDIATE ICU ROOM DAILY

## 2025-07-13 PROCEDURE — 2500000001 HC RX 250 WO HCPCS SELF ADMINISTERED DRUGS (ALT 637 FOR MEDICARE OP): Mod: SE | Performed by: PHYSICIAN ASSISTANT

## 2025-07-13 PROCEDURE — 2500000002 HC RX 250 W HCPCS SELF ADMINISTERED DRUGS (ALT 637 FOR MEDICARE OP, ALT 636 FOR OP/ED): Mod: SE | Performed by: PHYSICIAN ASSISTANT

## 2025-07-13 PROCEDURE — 99232 SBSQ HOSP IP/OBS MODERATE 35: CPT

## 2025-07-13 PROCEDURE — RXMED WILLOW AMBULATORY MEDICATION CHARGE

## 2025-07-13 PROCEDURE — 2500000001 HC RX 250 WO HCPCS SELF ADMINISTERED DRUGS (ALT 637 FOR MEDICARE OP): Mod: SE

## 2025-07-13 PROCEDURE — 2500000004 HC RX 250 GENERAL PHARMACY W/ HCPCS (ALT 636 FOR OP/ED): Mod: JZ,SE | Performed by: PHYSICIAN ASSISTANT

## 2025-07-13 RX ORDER — DOCUSATE SODIUM 100 MG/1
100 CAPSULE, LIQUID FILLED ORAL 2 TIMES DAILY PRN
Qty: 14 CAPSULE | Refills: 0 | Status: SHIPPED | OUTPATIENT
Start: 2025-07-13 | End: 2025-07-25 | Stop reason: HOSPADM

## 2025-07-13 RX ORDER — GABAPENTIN 300 MG/1
300 CAPSULE ORAL ONCE
Status: COMPLETED | OUTPATIENT
Start: 2025-07-13 | End: 2025-07-13

## 2025-07-13 RX ORDER — BISACODYL 10 MG/1
10 SUPPOSITORY RECTAL ONCE
Status: COMPLETED | OUTPATIENT
Start: 2025-07-13 | End: 2025-07-13

## 2025-07-13 RX ORDER — METFORMIN HYDROCHLORIDE 500 MG/1
500 TABLET, EXTENDED RELEASE ORAL
Status: DISCONTINUED | OUTPATIENT
Start: 2025-07-13 | End: 2025-07-14 | Stop reason: HOSPADM

## 2025-07-13 RX ORDER — METHOCARBAMOL 500 MG/1
500 TABLET, FILM COATED ORAL EVERY 6 HOURS PRN
Qty: 28 TABLET | Refills: 0 | Status: SHIPPED | OUTPATIENT
Start: 2025-07-13 | End: 2025-07-21

## 2025-07-13 RX ADMIN — DOCUSATE SODIUM 100 MG: 100 CAPSULE, LIQUID FILLED ORAL at 21:26

## 2025-07-13 RX ADMIN — HEPARIN SODIUM 5000 UNITS: 5000 INJECTION INTRAVENOUS; SUBCUTANEOUS at 21:26

## 2025-07-13 RX ADMIN — METHOCARBAMOL 500 MG: 500 TABLET ORAL at 08:46

## 2025-07-13 RX ADMIN — METHOCARBAMOL 500 MG: 500 TABLET ORAL at 01:40

## 2025-07-13 RX ADMIN — BUPRENORPHINE AND NALOXONE 1 TABLET: 8; 2 TABLET SUBLINGUAL at 06:59

## 2025-07-13 RX ADMIN — HEPARIN SODIUM 5000 UNITS: 5000 INJECTION INTRAVENOUS; SUBCUTANEOUS at 05:30

## 2025-07-13 RX ADMIN — ACETAMINOPHEN 975 MG: 325 TABLET ORAL at 08:46

## 2025-07-13 RX ADMIN — BUPRENORPHINE AND NALOXONE 1 TABLET: 8; 2 TABLET SUBLINGUAL at 21:26

## 2025-07-13 RX ADMIN — METHOCARBAMOL 500 MG: 500 TABLET ORAL at 13:56

## 2025-07-13 RX ADMIN — ACETAMINOPHEN 975 MG: 325 TABLET ORAL at 01:41

## 2025-07-13 RX ADMIN — GABAPENTIN 300 MG: 300 CAPSULE ORAL at 03:30

## 2025-07-13 RX ADMIN — BUPRENORPHINE AND NALOXONE 1 TABLET: 8; 2 TABLET SUBLINGUAL at 17:55

## 2025-07-13 RX ADMIN — GABAPENTIN 300 MG: 300 CAPSULE ORAL at 05:30

## 2025-07-13 RX ADMIN — CELECOXIB 100 MG: 100 CAPSULE ORAL at 08:46

## 2025-07-13 RX ADMIN — HEPARIN SODIUM 5000 UNITS: 5000 INJECTION INTRAVENOUS; SUBCUTANEOUS at 12:49

## 2025-07-13 RX ADMIN — GABAPENTIN 300 MG: 300 CAPSULE ORAL at 12:49

## 2025-07-13 RX ADMIN — GABAPENTIN 300 MG: 300 CAPSULE ORAL at 21:26

## 2025-07-13 RX ADMIN — METHOCARBAMOL 500 MG: 500 TABLET ORAL at 19:44

## 2025-07-13 RX ADMIN — ACETAMINOPHEN 975 MG: 325 TABLET ORAL at 16:41

## 2025-07-13 RX ADMIN — DOCUSATE SODIUM 100 MG: 100 CAPSULE, LIQUID FILLED ORAL at 08:46

## 2025-07-13 RX ADMIN — CELECOXIB 100 MG: 100 CAPSULE ORAL at 21:26

## 2025-07-13 RX ADMIN — BUPRENORPHINE AND NALOXONE 1 TABLET: 8; 2 TABLET SUBLINGUAL at 13:56

## 2025-07-13 RX ADMIN — BISACODYL 10 MG: 10 SUPPOSITORY RECTAL at 16:42

## 2025-07-13 ASSESSMENT — COGNITIVE AND FUNCTIONAL STATUS - GENERAL
HELP NEEDED FOR BATHING: A LITTLE
MOBILITY SCORE: 18
TURNING FROM BACK TO SIDE WHILE IN FLAT BAD: A LITTLE
TOILETING: A LITTLE
TURNING FROM BACK TO SIDE WHILE IN FLAT BAD: A LITTLE
DRESSING REGULAR LOWER BODY CLOTHING: A LITTLE
DRESSING REGULAR UPPER BODY CLOTHING: A LITTLE
DRESSING REGULAR UPPER BODY CLOTHING: A LITTLE
WALKING IN HOSPITAL ROOM: A LITTLE
MOVING TO AND FROM BED TO CHAIR: A LITTLE
CLIMB 3 TO 5 STEPS WITH RAILING: A LITTLE
MOVING FROM LYING ON BACK TO SITTING ON SIDE OF FLAT BED WITH BEDRAILS: A LITTLE
CLIMB 3 TO 5 STEPS WITH RAILING: A LITTLE
MOVING FROM LYING ON BACK TO SITTING ON SIDE OF FLAT BED WITH BEDRAILS: A LITTLE
STANDING UP FROM CHAIR USING ARMS: A LITTLE
DAILY ACTIVITIY SCORE: 19
PERSONAL GROOMING: A LITTLE
DAILY ACTIVITIY SCORE: 21
DRESSING REGULAR LOWER BODY CLOTHING: A LITTLE
HELP NEEDED FOR BATHING: A LITTLE
MOBILITY SCORE: 21

## 2025-07-13 NOTE — SIGNIFICANT EVENT
"  Division of Plastic and Reconstructive Surgery  PM Patient Reassessment    Patient Name: Etelvina Banks  MRN: [id number]  Date:  07/13/25     Subjective    No acute concerns reported this evening, patient found resting asleep in bed upon PM reassessment.     Objective    Vital Signs  /84 (BP Location: Right arm, Patient Position: Lying)   Pulse 88   Temp 37.1 °C (98.8 °F) (Temporal)   Resp 16   Ht 1.575 m (5' 2\")   Wt 88.5 kg (195 lb)   LMP 07/11/2025 (Exact Date)   SpO2 95%   BMI 35.67 kg/m²      Physical Exam  Constitutional: A&Ox3, calm and cooperative, NAD.  Eyes: EOMI, clear sclera.   ENMT: Moist mucous membranes, no apparent injuries or lesions.  Head/Neck: NC/AT.   Cardiovascular: RRR.   Respiratory/Thorax: Regular respirations on RA. Good symmetric chest expansion.   Gastrointestinal: Abdomen soft, appropriately tender postoperatively, no peritoneal signs, lower transverse abdominal incision dressed with dermabond and steri-strips. ROSCOE drain x 2 with bloody serous output to each side of the b/l lower abdomen. Aquacel AG covering umbilicus covered by Tegaderm.  Genitourinary: Voiding independently.  Extremities: No peripheral edema. Moving all 4 extremities actively.   Neurological: A&Ox3.   Psychological: Appropriate mood and behavior.    Breast: MANUEL free flap recipient sites to lower aspect of left breast. Flap is warm to touch, appropriate tissue turgor. Intact biphasic pulse signal at marked suture site at central aspect of free flap. Cap refill < 3 sec. Incisions well approximated with sutures, dressed with dermabond and steri strips, no incisional dehiscence or drainage. ROSCOE drain x1 to lateral inferior aspect of left breast which is patent, maintaining self suction with bloody serous output postoperatively. Vioptix monitor lead intact currently reading 71% saturation with 99 signal quality.    Current Medications  Scheduled medications  Scheduled Medications[1]  Continuous " medications  Continuous Medications[2]  PRN medications  PRN Medications[3]     Assessment   Etelvina Banks is a 45 y.o. 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 Ortiz) with insertion of TE (per plastic surgery, Dr. Dewey). Patient is now s/p OR with plastic surgery (Dr. Dewey and Dr. Lara) on 7/11/2025 for left breast tissue expander removal with capsulectomy and left breast reconstruction with free Deep Inferior Epigastric  flap (MANUEL). Patient admitted to the nursing floor under the plastic surgery service postoperatively for pain control and postoperative flap monitoring.     PM Updates:  - Stable PM re-assessment. Given stable flap exams throughout the day today, patient is cleared this evening to have q4h flap checks via vioptix only from approximately 10pm-6am plus q8h VS overnight in order to promote rest, nursing orders updated   - Nursing to resume q4h flap checks including physical flap assessment and Doppler pulse checks at 6am on 7/13 and continue throughout daytime hours   - Anticipate further advancement of diet tomorrow and restart of home Metformin   - Patient OK to utilize Rashmi Hugger while in bed PRN   - Remaining plan as stated in daily progress note     Unique Floyd PA-C  Plastic and Reconstructive Surgery   [place]  Pager #[phone]  Team phone: [phone]          [1] acetaminophen, 975 mg, oral, q8h  buprenorphine-naloxone, 1 tablet, sublingual, 4x daily  celecoxib, 100 mg, oral, BID  docusate sodium, 100 mg, oral, BID  gabapentin, 300 mg, oral, q8h  gabapentin, 300 mg, oral, Once  heparin (porcine), 5,000 Units, subcutaneous, q8h  insulin regular, 0-5 Units, subcutaneous, q4h  [Held by provider] losartan, 50 mg, oral, Daily  [Held by provider] metFORMIN XR, 500 mg, oral, Daily with evening meal  methocarbamol, 500 mg, oral, q6h  ZEINAB  [2]    [3] PRN medications: [Held by provider] hydroCHLOROthiazide, magnesium hydroxide, ondansetron **OR** ondansetron, promethazine

## 2025-07-13 NOTE — PROGRESS NOTES
"  Division of Plastic and Reconstructive Surgery  Progress Note    Patient Name: Etelvina Banks  MRN: [id number]  Date:  07/13/25     Subjective    Patient resting comfortably in bedside chair this AM, pain well-controlled. She reports passing gas, denies BM. Reports having some constipation at baseline. Denies any nausea/vomiting. Patient ambulating well, voiding independently, and tolerating full liquid diet.    Objective    Vital Signs  /74   Pulse 89   Temp 36.7 °C (98 °F) (Temporal)   Resp 16   Ht 1.575 m (5' 2\")   Wt 88.5 kg (195 lb 1.7 oz)   LMP 07/11/2025 (Exact Date)   SpO2 96%   BMI 35.69 kg/m²      Physical Exam  Constitutional: A&Ox3, calm and cooperative, NAD.  Eyes: EOMI, clear sclera.   ENMT: Moist mucous membranes, no apparent injuries or lesions.  Head/Neck: NC/AT.   Cardiovascular: RRR.   Respiratory/Thorax: Regular respirations on RA. Good symmetric chest expansion.   Gastrointestinal: Abdomen soft, distended, appropriately tender postoperatively, no peritoneal signs, lower transverse abdominal incision dressed with dermabond and steri-strips. ROSCOE drain x 2 with bloody serous output to each side of the b/l lower abdomen. Aquacel AG covering umbilicus covered by Tegaderm  Genitourinary: Voiding independently  Extremities: No peripheral edema. Moving all 4 extremities actively.   Neurological: A&Ox3.   Psychological: Appropriate mood and behavior.    Breast: MANUEL free flap recipient sites to lower aspect of left breast. Flap is warm to touch, appropriate tissue turgor. Intact biphasic pulse signal at marked suture site at central aspect of free flap. Cap refill < 3 sec. Incisions well approximated with sutures, dressed with dermabond and steri strips, no incisional dehiscence or drainage. ROSCOE drain x1 to lateral inferior aspect of left breast which is patent, maintaining self suction with bloody serous output postoperatively. Vioptix monitor lead intact currently reading 73% saturation " with 90+ signal quality.      Current Medications  Scheduled medications  Scheduled Medications[1]  Continuous medications  Continuous Medications[2]  PRN medications  PRN Medications[3]     Assessment   Etelvina Banks is a 45 y.o. 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 Ortiz) with insertion of TE (per plastic surgery, Dr. Dewey). Patient is now s/p OR with plastic surgery (Dr. Dewey and Dr. Lara) on 7/11/2025 for left breast tissue expander removal with capsulectomy and left breast reconstruction with free Deep Inferior Epigastric  flap (MANUEL). Patient admitted to the nursing floor under the plastic surgery service postoperatively for pain control and postoperative flap monitoring.     Plan:  # S/p L breast removal of TE, capsulectomy and reconstruction via MANUEL free flap   - Flap surveillance:   Continue serial flap assessments q4h per nursing with interval checks per plastic surgery team  Please assess Doppler pulse signal at marked site at central aspect of left breast flap plus flap general appearance (color, warmth, turgor)   Please additionally check Vioptix signal hourly and ensure saturation is >30% (If signal lost, abrupt change in signal or saturation <30% please immediately notify the plastic surgery team)    Please notify plastic surgery team immediately if there are any acute changes (Including decreased Doppler signal, pallor, temperature change, bleeding, etc.)  - Flap Warming/Rashmi Hugger:   Use of Rashmi hugger to L breast flap recipient site and generalized upper body to passively warm flap and promote perfusion postoperatively overnight on POD0  Settings: medium heat (38*C), high continuous fan   Avoid direct contact of warmer blanket with skin/flap sites   DC of Rashmi Hugger on POD1   - Dressings/wound care:   Dermabond and  Steri-strips to remain intact over breast flap and lower transverse abdominal incision lines, keep C/D/I   Maintain loose pink surgical bra to b/l breasts with window for flap inspection/exams    Maintain Aquacel Ag covered by Tegaderm dressing at umbilical closure site     - Drains:   Continue drain care to x3 ROSCOE drains present at the b/l lower abdomen and L breast (nursing orders placed)  Maintain CHG drain dressings over drain insertion sites at the skin   Please ensure that drains are compressed flat and plugged to maintain self suction at all times aside from when emptying   Nursing to strip drain tubing at least q4h and PRN to avoid drain/tubing obstruction   Nursing to please also empty and record output quantities at least TID and PRN if drains are full   Please notify the plastics team if drain outputs exceed >30 ml in 1 hr or >200 in 24 hrs  Drains to be removed per plastics team once 24 hr outputs remain <30 ml total x2 consecutive days  If drains do not meet output criteria for removal at time of medical clearance for DC, will plan for removal per plastics upon outpatient follow up   - Positioning:   Patient to maintain beach chair positioning when lying in bed and sitting in chair postoperatively (head of bed elevated, hips flexed at least 30 degrees, knees bent and elevated on pillows)  Avoid positioning that would apply pressure to flap region or incisions   Patient to keep hips slightly flexed forward to avoid tension on lower abdominal incisions. Do not lay completely flat or stand completely erect upright postoperatively.  - Weight bearing:   OK to be OOB with assistance POD1 AM  <5 lbs lifting restriction to BUE postoperatively  No significant upper extremity abduction, extension or shoulder retraction to prevent tension on breast surgical site and incisions   - Other postoperative parameters/care:   IV Ancef x3 doses postoperatively for surgical infection ppx   Landa removal on POD1 AM  CLD on  POD1 AM, ADAT  Nursing to record patient I&O, urine output goal of >1 mL/kg/hr  SQ Heparin q8h scheduled to start postoperatively, continue while inpatient    VS monitoring q4h  Encouraged use of IS (10x/hr, each hr while awake)   Ensure perioperative scopolamine patch removal 72hrs postoperatively (if placed prior to surgery)     # Acute postoperative pain, hx of opiate dependence on agonist therapy   - Nursing to assess patient pain scales at least q4h and PRN  - Continue home Suboxone 8-2 mg SL tablet PO QID (home medication dosage confirmed via  pharmacy medication history review note placed on 7/10/25)  - Continue additional regimen (ERAS protocol) as follows:   Scheduled Tylenol 975mg PO q8h   Scheduled Gabapentin 300mg PO q8h   Scheduled Robaxin 500 mg PO q6h   Scheduled Celebrex 100mg PO BID     # Hx T2DM   - Last HBA1C 5.8 on labs obtained 10/17/24  - Home Metformin 500 mg PO daily held postoperatively and while NPO   - Start SSI overnight on POD0 with accuchecks   - Diabetic diet once clear for PO     # Hx HTN  - Home Losartan 50 mg PO daily held postoperatively, resume as BP permits with hold parameters to avoid hypotension and flap hypoperfusion   - BP stable postoperatively (110s/70s)  - Monitor VS q4h     # Hx peripheral edema   - Home hydrochlorothiazide held postoperatively, resume as BP permits and as needed for edema   - Extremity elevation while patient in bed     F: LR @ 100ml/hr while NPO   E: Monitor and replete PRN  N: FLD with Jamarcus supplements BID on POD2    GI: Continue bowel regimen with Colace BID and Milk of mag PRN        Zofran (first line) and Phenergen (second line) PRN for nausea        GI ppx postoperatively and while inpatient with Protonix      DVT ppx:  - Risk score assessed  - SQ heparin q8h while inpatient   - Use of SCDs while in bed   - Early ambulation on POD1       Disposition: Continue care on RNF. Will remain inpatient for continued flap monitoring and vac/drain  surveillance postoperatively.     Patient and plan discussed with Dr. Dewey.    Anushka Valadez PA-C  Plastic and Reconstructive Surgery   [place]  Pager #[phone]  Team phone: [phone]        [1] acetaminophen, 975 mg, oral, q8h  buprenorphine-naloxone, 1 tablet, sublingual, 4x daily  celecoxib, 100 mg, oral, BID  docusate sodium, 100 mg, oral, BID  gabapentin, 300 mg, oral, q8h  heparin (porcine), 5,000 Units, subcutaneous, q8h  insulin regular, 0-5 Units, subcutaneous, q4h  [Held by provider] losartan, 50 mg, oral, Daily  [Held by provider] metFORMIN XR, 500 mg, oral, Daily with evening meal  methocarbamol, 500 mg, oral, q6h ZEINAB     [2]    [3] PRN medications: [Held by provider] hydroCHLOROthiazide, magnesium hydroxide, ondansetron **OR** ondansetron, promethazine

## 2025-07-13 NOTE — CARE PLAN
The patient's goals for the shift include      The clinical goals for the shift include pt will remain HDS throughout shift and ambulate x4      Problem: Pain - Adult  Goal: Verbalizes/displays adequate comfort level or baseline comfort level  Outcome: Progressing     Problem: Safety - Adult  Goal: Free from fall injury  Outcome: Progressing     Problem: Discharge Planning  Goal: Discharge to home or other facility with appropriate resources  Outcome: Progressing     Problem: Chronic Conditions and Co-morbidities  Goal: Patient's chronic conditions and co-morbidity symptoms are monitored and maintained or improved  Outcome: Progressing     Problem: Nutrition  Goal: Nutrient intake appropriate for maintaining nutritional needs  Outcome: Progressing     Problem: Skin  Goal: Decreased wound size/increased tissue granulation at next dressing change  Outcome: Progressing  Goal: Participates in plan/prevention/treatment measures  Outcome: Progressing  Goal: Prevent/manage excess moisture  Outcome: Progressing  Goal: Prevent/minimize sheer/friction injuries  Outcome: Progressing  Goal: Promote/optimize nutrition  Outcome: Progressing  Goal: Promote skin healing  Outcome: Progressing

## 2025-07-14 ENCOUNTER — PHARMACY VISIT (OUTPATIENT)
Dept: PHARMACY | Facility: CLINIC | Age: 45
End: 2025-07-14
Payer: MEDICAID

## 2025-07-14 VITALS
HEART RATE: 96 BPM | WEIGHT: 195.11 LBS | SYSTOLIC BLOOD PRESSURE: 108 MMHG | BODY MASS INDEX: 35.9 KG/M2 | HEIGHT: 62 IN | DIASTOLIC BLOOD PRESSURE: 73 MMHG | OXYGEN SATURATION: 96 % | TEMPERATURE: 98 F | RESPIRATION RATE: 16 BRPM

## 2025-07-14 VITALS
HEIGHT: 62 IN | WEIGHT: 199.08 LBS | RESPIRATION RATE: 16 BRPM | HEART RATE: 92 BPM | OXYGEN SATURATION: 98 % | BODY MASS INDEX: 36.63 KG/M2 | DIASTOLIC BLOOD PRESSURE: 83 MMHG | TEMPERATURE: 99.9 F | SYSTOLIC BLOOD PRESSURE: 121 MMHG

## 2025-07-14 LAB — GLUCOSE BLD MANUAL STRIP-MCNC: 96 MG/DL (ref 74–99)

## 2025-07-14 PROCEDURE — 1800000001 HC LEAVE OF ABSENSE - GENERAL CLASSIFICATION

## 2025-07-14 PROCEDURE — 82947 ASSAY GLUCOSE BLOOD QUANT: CPT

## 2025-07-14 PROCEDURE — 2500000001 HC RX 250 WO HCPCS SELF ADMINISTERED DRUGS (ALT 637 FOR MEDICARE OP): Mod: SE | Performed by: PHYSICIAN ASSISTANT

## 2025-07-14 PROCEDURE — 2500000002 HC RX 250 W HCPCS SELF ADMINISTERED DRUGS (ALT 637 FOR MEDICARE OP, ALT 636 FOR OP/ED): Mod: SE | Performed by: PHYSICIAN ASSISTANT

## 2025-07-14 PROCEDURE — 2500000004 HC RX 250 GENERAL PHARMACY W/ HCPCS (ALT 636 FOR OP/ED): Mod: SE | Performed by: PHYSICIAN ASSISTANT

## 2025-07-14 RX ADMIN — METFORMIN ER 500 MG 500 MG: 500 TABLET ORAL at 01:28

## 2025-07-14 RX ADMIN — ACETAMINOPHEN 975 MG: 325 TABLET ORAL at 08:58

## 2025-07-14 RX ADMIN — METHOCARBAMOL 500 MG: 500 TABLET ORAL at 01:28

## 2025-07-14 RX ADMIN — BUPRENORPHINE AND NALOXONE 1 TABLET: 8; 2 TABLET SUBLINGUAL at 05:18

## 2025-07-14 RX ADMIN — CELECOXIB 100 MG: 100 CAPSULE ORAL at 08:58

## 2025-07-14 RX ADMIN — HEPARIN SODIUM 5000 UNITS: 5000 INJECTION INTRAVENOUS; SUBCUTANEOUS at 05:18

## 2025-07-14 RX ADMIN — ACETAMINOPHEN 975 MG: 325 TABLET ORAL at 01:27

## 2025-07-14 RX ADMIN — GABAPENTIN 300 MG: 300 CAPSULE ORAL at 05:18

## 2025-07-14 RX ADMIN — METHOCARBAMOL 500 MG: 500 TABLET ORAL at 08:58

## 2025-07-14 ASSESSMENT — COGNITIVE AND FUNCTIONAL STATUS - GENERAL
DAILY ACTIVITIY SCORE: 24
MOBILITY SCORE: 24

## 2025-07-14 ASSESSMENT — PAIN - FUNCTIONAL ASSESSMENT: PAIN_FUNCTIONAL_ASSESSMENT: 0-10

## 2025-07-14 ASSESSMENT — PAIN SCALES - GENERAL: PAINLEVEL_OUTOF10: 0 - NO PAIN

## 2025-07-14 NOTE — SIGNIFICANT EVENT
"  Division of Plastic and Reconstructive Surgery  PM Patient Reassessment    Patient Name: Etelvina Banks  MRN: [id number]  Date:  07/13/25     Subjective    No acute concerns reported this evening, left breast pain well-controlled.  She is experiencing minimal abdominal surgical site pain which is primarily present upon movement when she is getting out of bed.  She denies any minimal pain when she is exiting the chair.  We discussed elevating the height of her bed slightly to attempt to alleviate any extra physical demand with standing from a lower seated position.  She is otherwise tolerating a carb controlled diet without difficulty.  She is voiding independently, passing gas and had a bowel movement following provision of a suppository earlier this evening.  She is ambulating independently and has completed 6 full walks around the nursing unit so far today.  She plans to take an additional 1-2 walks prior to bed this evening.  She is aware of the anticipated plan for discharge tomorrow.  We discussed the remaining expectations for her postoperative course and address any questions that she had.    Objective    Vital Signs  /81 (BP Location: Right arm, Patient Position: Sitting)   Pulse 91   Temp 36.7 °C (98 °F) (Temporal)   Resp 20   Ht 1.575 m (5' 2\")   Wt 88.5 kg (195 lb 1.7 oz)   LMP 07/11/2025 (Exact Date)   SpO2 97%   BMI 35.69 kg/m²      Physical Exam  Constitutional: A&Ox3, calm and cooperative, NAD.  Eyes: EOMI, clear sclera.   ENMT: Moist mucous membranes, no apparent injuries or lesions.  Head/Neck: NC/AT.   Cardiovascular: RRR.   Respiratory/Thorax: Regular respirations on RA. Good symmetric chest expansion.   Gastrointestinal: Abdomen soft, appropriately tender postoperatively, no peritoneal signs, lower transverse abdominal incision dressed with dermabond and steri-strips. ROSCOE drain x 2 with SS output to each side of the b/l lower abdomen. Aquacel AG covering umbilicus covered by " Tegaderm.  Genitourinary: Voiding independently.  Extremities: No peripheral edema. Moving all 4 extremities actively.   Neurological: A&Ox3.   Psychological: Appropriate mood and behavior.    Breast: MANUEL free flap recipient sites to lower aspect of left breast. Flap is warm to touch, appropriate tissue turgor. Intact biphasic pulse signal at marked suture site at central aspect of free flap. Cap refill < 3 sec. Incisions well approximated with sutures, dressed with dermabond and steri strips, no incisional dehiscence or drainage. ROSCOE drain x1 to lateral inferior aspect of left breast which is patent, maintaining self suction with SS output postoperatively. Vioptix monitor lead intact currently reading 71% saturation with 99 signal quality.    Current Medications  Scheduled medications  Scheduled Medications[1]  Continuous medications  Continuous Medications[2]  PRN medications  PRN Medications[3]     Assessment   Etelvina Banks is a 45 y.o. 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 Ortiz) with insertion of TE (per plastic surgery, Dr. Dewey). Patient is now s/p OR with plastic surgery (Dr. Dewey and Dr. Lara) on 7/11/2025 for left breast tissue expander removal with capsulectomy and left breast reconstruction with free Deep Inferior Epigastric  flap (MANUEL). Patient admitted to the nursing floor under the plastic surgery service postoperatively for pain control and postoperative flap monitoring.     PM Updates:  - Stable PM re-assessment. Given stable flap exams throughout the day today, patient is cleared this evening to have q4h flap checks via vioptix only from approximately 10pm-6am plus q8h VS overnight in order to promote rest, nursing orders in place for clustered care overnight.   - Nursing to resume q4h flap checks including physical flap  assessment and Doppler pulse checks at 6am on 7/13 and continue throughout daytime hours   - Continue carb controlled diet and restart home Metformin this PM  - Discussed possibly restarting her home losartan dosing tomorrow if her systolic blood pressures remain permissible overnight tonight  - Patient reports rarely taking her home hydrochlorothiazide, states that her last administered dosing was approximately 7 to 8 months ago.  She notes that she only takes this medication when she feels that she has peripheral edema. We discussed continuing to hold for now while she is admitted and restarting dosing on a as needed basis following hospital discharge.  - Patient OK to utilize Rashmi Hugger while in bed PRN   - Anticipate likely discharge to home tomorrow if patient continues to have stable night overnight tonight  - Remaining plan as stated in daily progress note     Patient and plan discussed with Dr. Dewey.    Unique Floyd PA-C  Plastic and Reconstructive Surgery   [place]  Pager #[phone]  Team phone: [phone]        [1] acetaminophen, 975 mg, oral, q8h  buprenorphine-naloxone, 1 tablet, sublingual, 4x daily  celecoxib, 100 mg, oral, BID  docusate sodium, 100 mg, oral, BID  gabapentin, 300 mg, oral, q8h  heparin (porcine), 5,000 Units, subcutaneous, q8h  [START ON 7/14/2025] insulin regular, 0-5 Units, subcutaneous, TID AC  [Held by provider] losartan, 50 mg, oral, Daily  [Held by provider] metFORMIN XR, 500 mg, oral, Daily with evening meal  methocarbamol, 500 mg, oral, q6h ZEINAB     [2]    [3] PRN medications: [Held by provider] hydroCHLOROthiazide, magnesium hydroxide, ondansetron **OR** ondansetron, promethazine

## 2025-07-14 NOTE — DISCHARGE SUMMARY
Discharge Diagnosis  Ductal carcinoma in situ (DCIS) of left breast    Issues Requiring Follow-Up  ROSCOE drain removal x3  Post-op follow up    Test Results Pending At Discharge  Pending Labs       Order Current Status    Surgical Pathology Exam In process            Hospital Course  BRIEF HISTORY:    Etelvina Banks is a 45 y.o. 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 Ortiz) with insertion of TE (per plastic surgery, Dr. Dewey). Patient is now s/p OR with plastic surgery (Dr. Dewey and Dr. Lara) on 7/11/2025 for left breast tissue expander removal with capsulectomy and left breast reconstruction with free Deep Inferior Epigastric  flap (MANUEL). Patient admitted to the nursing floor under the plastic surgery service postoperatively for pain control and postoperative flap monitoring.     HOSPITAL COURSE:    The patient was admitted following left breast tissue expander removal with capsulectomy and left breast reconstruction with free Deep Inferior Epigastric  flap (MANUEL) on 7/11/25. Postoperatively, she was monitored closely in the surgical unit with routine flap checks, including Doppler and Vioptix signal assessment, capillary refill, and skin paddle appearance, all of which remained stable throughout her admission. Surgical ROSCOE drains x3 remained intact with appropriate outputs.     She received multimodal pain management, including scheduled acetaminophen, robaxin, Celebrex and gabapentin. Prophylactic anticoagulation was initiated postoperatively per institutional protocol.     The patient was advanced to a regular diet without issue and ambulated with assistance on postoperative day 1, progressing to independent mobility by POD 2.      Her incisions were monitored and remained well-approximated without signs of infection or dehiscence.     DAY  OF DISCHARGE:    The patient met all discharge criteria, including stable vital signs, pain control with oral medications, and independent mobility. She was discharged home in stable condition with instructions for drain care,  activity restrictions, and scheduled follow-up in the outpatient clinic.     Visit Vitals  /83 (BP Location: Right arm, Patient Position: Lying)   Pulse 92   Temp 37.7 °C (99.9 °F) (Temporal)   Resp 16     Vitals:    07/14/25 0524   Weight: 90.3 kg (199 lb 1.2 oz)       Immunization History   Administered Date(s) Administered    Tdap vaccine, age 7 year and older (BOOSTRIX, ADACEL) 03/24/2021       Results        Pertinent Physical Exam At Time of Discharge  Physical Exam  Constitutional: A&Ox3, calm and cooperative, NAD.  Eyes: EOMI, clear sclera.   ENMT: Moist mucous membranes, no apparent injuries or lesions.  Head/Neck: NC/AT.   Cardiovascular: RRR.   Respiratory/Thorax: Regular respirations on RA. Good symmetric chest expansion.   Gastrointestinal: Abdomen soft, not distended, appropriately tender postoperatively, no peritoneal signs, lower transverse abdominal incision dressed with dermabond and steri-strips. ROSCOE drain x 2 with bloody serous output to each side of the b/l lower abdomen. Aquacel AG covering umbilicus covered by Tegaderm  Genitourinary: Voiding independently  Extremities: No peripheral edema. Moving all 4 extremities actively.   Neurological: A&Ox3.   Psychological: Appropriate mood and behavior.    Breast: MANUEL free flap recipient sites to lower aspect of left breast. Flap is warm to touch, appropriate tissue turgor. Intact biphasic pulse signal at marked suture site at central aspect of free flap. Cap refill < 3 sec. Incisions well approximated with sutures, dressed with dermabond and steri strips, no incisional dehiscence or drainage. ROSCOE drain x1 to lateral inferior aspect of left breast which is patent, maintaining self suction with bloody serous output  postoperatively.   Home Medications     Medication List      START taking these medications     docusate sodium 100 mg capsule; Commonly known as: Colace; Take 1   capsule (100 mg) by mouth 2 times a day as needed for constipation for up   to 7 days.   methocarbamol 500 mg tablet; Commonly known as: Robaxin; Take 1 tablet   (500 mg) by mouth every 6 hours if needed for muscle spasms for up to 7   days.     CONTINUE taking these medications     buprenorphine-naloxone 8-2 mg SL tablet; Commonly known as: Suboxone   gabapentin 300 mg capsule; Commonly known as: Neurontin; Take 1 capsule   (300 mg) by mouth every 8 hours for 14 days.   hydroCHLOROthiazide 12.5 mg tablet; Commonly known as: Microzide; Take 1   tablet (12.5 mg) by mouth once daily as needed (pitting edema).   ibuprofen 800 mg tablet; Take 1 tablet (800 mg) by mouth every 8 hours   if needed for mild pain (1 - 3).   losartan 50 mg tablet; Commonly known as: Cozaar; Take 1 tablet (50 mg)   by mouth once daily.   metFORMIN  mg 24 hr tablet; Commonly known as: Glucophage-XR; Take   2 tablet PO dailyTake 2 tablet PO daily   multivitamin tablet   Transdermal Pain Base cream; Generic drug: cream base no.31 (bulk)       Outpatient Follow-Up  Future Appointments   Date Time Provider Department Center   7/22/2025  8:40 AM Jaye Dewey MD [id number] [facility]   9/4/2025  7:20 AM Judith Baeza DO [id number] [facility]       Maren Merida PA-C

## 2025-07-14 NOTE — NURSING NOTE
Etelvina Banks discharged at 10:02 AM  and 07/14/25   Patient discharged home via family .    Discharge education and teaching completed with Etelvina Banks .  RN signature: Geeta Donovan

## 2025-07-14 NOTE — CARE PLAN
Problem: Pain - Adult  Goal: Verbalizes/displays adequate comfort level or baseline comfort level  Outcome: Progressing     Problem: Safety - Adult  Goal: Free from fall injury  Outcome: Progressing     Problem: Discharge Planning  Goal: Discharge to home or other facility with appropriate resources  Outcome: Progressing     Problem: Chronic Conditions and Co-morbidities  Goal: Patient's chronic conditions and co-morbidity symptoms are monitored and maintained or improved  Outcome: Progressing     Problem: Nutrition  Goal: Nutrient intake appropriate for maintaining nutritional needs  Outcome: Progressing     Problem: Skin  Goal: Decreased wound size/increased tissue granulation at next dressing change  Outcome: Progressing  Goal: Participates in plan/prevention/treatment measures  Outcome: Progressing  Goal: Prevent/manage excess moisture  Outcome: Progressing  Goal: Prevent/minimize sheer/friction injuries  Outcome: Progressing  Goal: Promote/optimize nutrition  Outcome: Progressing  Goal: Promote skin healing  Outcome: Progressing

## 2025-07-15 ENCOUNTER — HOSPITAL ENCOUNTER (INPATIENT)
Facility: HOSPITAL | Age: 45
End: 2025-07-15
Attending: EMERGENCY MEDICINE | Admitting: NURSE PRACTITIONER
Payer: COMMERCIAL

## 2025-07-15 ENCOUNTER — PREP FOR PROCEDURE (OUTPATIENT)
Dept: PLASTIC SURGERY | Facility: HOSPITAL | Age: 45
End: 2025-07-15
Payer: COMMERCIAL

## 2025-07-15 ENCOUNTER — ANESTHESIA (OUTPATIENT)
Dept: OPERATING ROOM | Facility: HOSPITAL | Age: 45
End: 2025-07-15
Payer: COMMERCIAL

## 2025-07-15 ENCOUNTER — ANESTHESIA EVENT (OUTPATIENT)
Dept: OPERATING ROOM | Facility: HOSPITAL | Age: 45
End: 2025-07-15
Payer: COMMERCIAL

## 2025-07-15 DIAGNOSIS — S20.02XA CONTUSION OF LEFT BREAST, INITIAL ENCOUNTER: Primary | ICD-10-CM

## 2025-07-15 DIAGNOSIS — T86.821 FAILED FLAP: ICD-10-CM

## 2025-07-15 DIAGNOSIS — D05.12 DUCTAL CARCINOMA IN SITU (DCIS) OF LEFT BREAST: ICD-10-CM

## 2025-07-15 DIAGNOSIS — T86.821 FAILURE OF FLAP GRAFT: ICD-10-CM

## 2025-07-15 DIAGNOSIS — Z90.12 ACQUIRED ABSENCE OF LEFT BREAST AND NIPPLE: ICD-10-CM

## 2025-07-15 DIAGNOSIS — Z48.89 ENCOUNTER FOR FOLLOW-UP CARE INVOLVING PLASTIC SURGERY OF BREAST: ICD-10-CM

## 2025-07-15 LAB
ABO GROUP (TYPE) IN BLOOD: NORMAL
ANION GAP SERPL CALC-SCNC: 14 MMOL/L (ref 10–20)
ANTIBODY SCREEN: NORMAL
APTT PPP: 30 SECONDS (ref 26–36)
BASOPHILS # BLD AUTO: 0.05 X10*3/UL (ref 0–0.1)
BASOPHILS NFR BLD AUTO: 0.7 %
BUN SERPL-MCNC: 14 MG/DL (ref 6–23)
CALCIUM SERPL-MCNC: 9.7 MG/DL (ref 8.6–10.6)
CHLORIDE SERPL-SCNC: 103 MMOL/L (ref 98–107)
CO2 SERPL-SCNC: 28 MMOL/L (ref 21–32)
CREAT SERPL-MCNC: 0.65 MG/DL (ref 0.5–1.05)
EGFRCR SERPLBLD CKD-EPI 2021: >90 ML/MIN/1.73M*2
EOSINOPHIL # BLD AUTO: 0.27 X10*3/UL (ref 0–0.7)
EOSINOPHIL NFR BLD AUTO: 3.8 %
ERYTHROCYTE [DISTWIDTH] IN BLOOD BY AUTOMATED COUNT: 12.6 % (ref 11.5–14.5)
GLUCOSE SERPL-MCNC: 115 MG/DL (ref 74–99)
HCT VFR BLD AUTO: 30.7 % (ref 36–46)
HGB BLD-MCNC: 10.3 G/DL (ref 12–16)
IMM GRANULOCYTES # BLD AUTO: 0.03 X10*3/UL (ref 0–0.7)
IMM GRANULOCYTES NFR BLD AUTO: 0.4 % (ref 0–0.9)
INR PPP: 1 (ref 0.9–1.1)
LYMPHOCYTES # BLD AUTO: 2.45 X10*3/UL (ref 1.2–4.8)
LYMPHOCYTES NFR BLD AUTO: 34.9 %
MCH RBC QN AUTO: 28.1 PG (ref 26–34)
MCHC RBC AUTO-ENTMCNC: 33.6 G/DL (ref 32–36)
MCV RBC AUTO: 84 FL (ref 80–100)
MONOCYTES # BLD AUTO: 0.33 X10*3/UL (ref 0.1–1)
MONOCYTES NFR BLD AUTO: 4.7 %
NEUTROPHILS # BLD AUTO: 3.9 X10*3/UL (ref 1.2–7.7)
NEUTROPHILS NFR BLD AUTO: 55.5 %
NRBC BLD-RTO: 0 /100 WBCS (ref 0–0)
PLATELET # BLD AUTO: 360 X10*3/UL (ref 150–450)
POTASSIUM SERPL-SCNC: 3.8 MMOL/L (ref 3.5–5.3)
PROTHROMBIN TIME: 10.6 SECONDS (ref 9.8–12.4)
RBC # BLD AUTO: 3.67 X10*6/UL (ref 4–5.2)
RH FACTOR (ANTIGEN D): NORMAL
SODIUM SERPL-SCNC: 141 MMOL/L (ref 136–145)
WBC # BLD AUTO: 7 X10*3/UL (ref 4.4–11.3)

## 2025-07-15 PROCEDURE — C1757 CATH, THROMBECTOMY/EMBOLECT: HCPCS | Performed by: SURGERY

## 2025-07-15 PROCEDURE — 2500000005 HC RX 250 GENERAL PHARMACY W/O HCPCS: Mod: SE

## 2025-07-15 PROCEDURE — 2500000004 HC RX 250 GENERAL PHARMACY W/ HCPCS (ALT 636 FOR OP/ED): Mod: JZ,SE

## 2025-07-15 PROCEDURE — 7100000001 HC RECOVERY ROOM TIME - INITIAL BASE CHARGE: Performed by: SURGERY

## 2025-07-15 PROCEDURE — 99222 1ST HOSP IP/OBS MODERATE 55: CPT | Performed by: NURSE PRACTITIONER

## 2025-07-15 PROCEDURE — 96372 THER/PROPH/DIAG INJ SC/IM: CPT

## 2025-07-15 PROCEDURE — 80048 BASIC METABOLIC PNL TOTAL CA: CPT | Performed by: EMERGENCY MEDICINE

## 2025-07-15 PROCEDURE — 99285 EMERGENCY DEPT VISIT HI MDM: CPT | Performed by: EMERGENCY MEDICINE

## 2025-07-15 PROCEDURE — 2500000005 HC RX 250 GENERAL PHARMACY W/O HCPCS: Mod: SE | Performed by: SURGERY

## 2025-07-15 PROCEDURE — 0JB60ZZ EXCISION OF CHEST SUBCUTANEOUS TISSUE AND FASCIA, OPEN APPROACH: ICD-10-PCS

## 2025-07-15 PROCEDURE — 15002 WOUND PREP TRK/ARM/LEG: CPT

## 2025-07-15 PROCEDURE — 2720000007 HC OR 272 NO HCPCS: Performed by: SURGERY

## 2025-07-15 PROCEDURE — 15860 IV NJX TST VASC FLO FLAP/GRF: CPT

## 2025-07-15 PROCEDURE — 3700000001 HC GENERAL ANESTHESIA TIME - INITIAL BASE CHARGE: Performed by: SURGERY

## 2025-07-15 PROCEDURE — 86923 COMPATIBILITY TEST ELECTRIC: CPT

## 2025-07-15 PROCEDURE — 7100000002 HC RECOVERY ROOM TIME - EACH INCREMENTAL 1 MINUTE: Performed by: SURGERY

## 2025-07-15 PROCEDURE — 2500000004 HC RX 250 GENERAL PHARMACY W/ HCPCS (ALT 636 FOR OP/ED): Mod: SE | Performed by: STUDENT IN AN ORGANIZED HEALTH CARE EDUCATION/TRAINING PROGRAM

## 2025-07-15 PROCEDURE — 85610 PROTHROMBIN TIME: CPT | Performed by: NURSE PRACTITIONER

## 2025-07-15 PROCEDURE — 35216 RPR BLVSL DIR NTRTHRC WO BYP: CPT | Performed by: SURGERY

## 2025-07-15 PROCEDURE — 2500000004 HC RX 250 GENERAL PHARMACY W/ HCPCS (ALT 636 FOR OP/ED): Mod: SE

## 2025-07-15 PROCEDURE — 3E05317 INTRODUCTION OF OTHER THROMBOLYTIC INTO PERIPHERAL ARTERY, PERCUTANEOUS APPROACH: ICD-10-PCS

## 2025-07-15 PROCEDURE — 85025 COMPLETE CBC W/AUTO DIFF WBC: CPT | Performed by: EMERGENCY MEDICINE

## 2025-07-15 PROCEDURE — 11045 DBRDMT SUBQ TISS EACH ADDL: CPT

## 2025-07-15 PROCEDURE — 36415 COLL VENOUS BLD VENIPUNCTURE: CPT | Performed by: STUDENT IN AN ORGANIZED HEALTH CARE EDUCATION/TRAINING PROGRAM

## 2025-07-15 PROCEDURE — 36415 COLL VENOUS BLD VENIPUNCTURE: CPT | Performed by: EMERGENCY MEDICINE

## 2025-07-15 PROCEDURE — 11042 DBRDMT SUBQ TIS 1ST 20SQCM/<: CPT

## 2025-07-15 PROCEDURE — 3700000002 HC GENERAL ANESTHESIA TIME - EACH INCREMENTAL 1 MINUTE: Performed by: SURGERY

## 2025-07-15 PROCEDURE — 37187 VENOUS MECH THROMBECTOMY: CPT

## 2025-07-15 PROCEDURE — 2500000004 HC RX 250 GENERAL PHARMACY W/ HCPCS (ALT 636 FOR OP/ED): Mod: JZ,SE | Performed by: SURGERY

## 2025-07-15 PROCEDURE — 35216 RPR BLVSL DIR NTRTHRC WO BYP: CPT

## 2025-07-15 PROCEDURE — 3600000009 HC OR TIME - EACH INCREMENTAL 1 MINUTE - PROCEDURE LEVEL FOUR: Performed by: SURGERY

## 2025-07-15 PROCEDURE — 3600000004 HC OR TIME - INITIAL BASE CHARGE - PROCEDURE LEVEL FOUR: Performed by: SURGERY

## 2025-07-15 PROCEDURE — 37184 PRIM ART M-THRMBC 1ST VSL: CPT

## 2025-07-15 PROCEDURE — 86901 BLOOD TYPING SEROLOGIC RH(D): CPT | Performed by: EMERGENCY MEDICINE

## 2025-07-15 PROCEDURE — 2060000001 HC INTERMEDIATE ICU ROOM DAILY

## 2025-07-15 RX ORDER — HEPARIN SODIUM 1000 [USP'U]/ML
INJECTION, SOLUTION INTRAVENOUS; SUBCUTANEOUS AS NEEDED
Status: DISCONTINUED | OUTPATIENT
Start: 2025-07-15 | End: 2025-07-16

## 2025-07-15 RX ORDER — FENTANYL CITRATE 50 UG/ML
INJECTION, SOLUTION INTRAMUSCULAR; INTRAVENOUS AS NEEDED
Status: DISCONTINUED | OUTPATIENT
Start: 2025-07-15 | End: 2025-07-16

## 2025-07-15 RX ORDER — PROPOFOL 10 MG/ML
INJECTION, EMULSION INTRAVENOUS AS NEEDED
Status: DISCONTINUED | OUTPATIENT
Start: 2025-07-15 | End: 2025-07-16

## 2025-07-15 RX ORDER — SODIUM CHLORIDE 0.9 G/100ML
INJECTION, SOLUTION IRRIGATION AS NEEDED
Status: DISCONTINUED | OUTPATIENT
Start: 2025-07-15 | End: 2025-07-16 | Stop reason: HOSPADM

## 2025-07-15 RX ORDER — HEPARIN SODIUM 10000 [USP'U]/100ML
INJECTION, SOLUTION INTRAVENOUS CONTINUOUS PRN
Status: DISCONTINUED | OUTPATIENT
Start: 2025-07-15 | End: 2025-07-16

## 2025-07-15 RX ORDER — HEPARIN SODIUM 5000 [USP'U]/ML
5000 INJECTION, SOLUTION INTRAVENOUS; SUBCUTANEOUS EVERY 8 HOURS
Status: CANCELLED | OUTPATIENT
Start: 2025-07-15

## 2025-07-15 RX ORDER — ASPIRIN 300 MG/1
300 SUPPOSITORY RECTAL ONCE
Status: DISCONTINUED | OUTPATIENT
Start: 2025-07-15 | End: 2025-07-19

## 2025-07-15 RX ORDER — LIDOCAINE HYDROCHLORIDE 20 MG/ML
INJECTION, SOLUTION INFILTRATION; PERINEURAL AS NEEDED
Status: DISCONTINUED | OUTPATIENT
Start: 2025-07-15 | End: 2025-07-16

## 2025-07-15 RX ORDER — HEPARIN SODIUM 5000 [USP'U]/ML
INJECTION, SOLUTION INTRAVENOUS; SUBCUTANEOUS
Status: COMPLETED
Start: 2025-07-15 | End: 2025-07-15

## 2025-07-15 RX ORDER — MIDAZOLAM HYDROCHLORIDE 2 MG/2ML
INJECTION, SOLUTION INTRAMUSCULAR; INTRAVENOUS AS NEEDED
Status: DISCONTINUED | OUTPATIENT
Start: 2025-07-15 | End: 2025-07-16

## 2025-07-15 RX ORDER — CEFAZOLIN 1 G/1
INJECTION, POWDER, FOR SOLUTION INTRAVENOUS AS NEEDED
Status: DISCONTINUED | OUTPATIENT
Start: 2025-07-15 | End: 2025-07-16

## 2025-07-15 RX ORDER — ROCURONIUM BROMIDE 10 MG/ML
INJECTION, SOLUTION INTRAVENOUS AS NEEDED
Status: DISCONTINUED | OUTPATIENT
Start: 2025-07-15 | End: 2025-07-16

## 2025-07-15 RX ORDER — HEPARIN SODIUM 5000 [USP'U]/ML
5000 INJECTION, SOLUTION INTRAVENOUS; SUBCUTANEOUS ONCE
Status: COMPLETED | OUTPATIENT
Start: 2025-07-15 | End: 2025-07-15

## 2025-07-15 RX ORDER — HEPARIN SODIUM 5000 [USP'U]/ML
5000 INJECTION, SOLUTION INTRAVENOUS; SUBCUTANEOUS EVERY 8 HOURS
Status: DISCONTINUED | OUTPATIENT
Start: 2025-07-16 | End: 2025-07-16

## 2025-07-15 RX ORDER — SUCCINYLCHOLINE CHLORIDE 100 MG/5ML
SYRINGE (ML) INTRAVENOUS AS NEEDED
Status: DISCONTINUED | OUTPATIENT
Start: 2025-07-15 | End: 2025-07-16

## 2025-07-15 RX ADMIN — ROCURONIUM BROMIDE 10 MG: 10 INJECTION, SOLUTION INTRAVENOUS at 22:50

## 2025-07-15 RX ADMIN — HEPARIN SODIUM 5000 UNITS: 5000 INJECTION, SOLUTION INTRAVENOUS; SUBCUTANEOUS at 19:04

## 2025-07-15 RX ADMIN — CEFAZOLIN 2 G: 1 INJECTION, POWDER, FOR SOLUTION INTRAMUSCULAR; INTRAVENOUS at 20:42

## 2025-07-15 RX ADMIN — ROCURONIUM BROMIDE 10 MG: 10 INJECTION, SOLUTION INTRAVENOUS at 21:48

## 2025-07-15 RX ADMIN — FENTANYL CITRATE 50 MCG: 50 INJECTION, SOLUTION INTRAMUSCULAR; INTRAVENOUS at 20:28

## 2025-07-15 RX ADMIN — Medication 160 MG: at 20:28

## 2025-07-15 RX ADMIN — Medication 10 MG: at 22:50

## 2025-07-15 RX ADMIN — FENTANYL CITRATE 25 MCG: 50 INJECTION, SOLUTION INTRAMUSCULAR; INTRAVENOUS at 23:45

## 2025-07-15 RX ADMIN — Medication 10 MG: at 21:48

## 2025-07-15 RX ADMIN — ROCURONIUM BROMIDE 10 MG: 10 INJECTION, SOLUTION INTRAVENOUS at 23:23

## 2025-07-15 RX ADMIN — ROCURONIUM BROMIDE 10 MG: 10 INJECTION, SOLUTION INTRAVENOUS at 22:21

## 2025-07-15 RX ADMIN — Medication 10 MG: at 23:48

## 2025-07-15 RX ADMIN — Medication 10 MG: at 20:28

## 2025-07-15 RX ADMIN — HEPARIN SODIUM AND DEXTROSE 5.8 UNITS/KG/HR: 10000; 5 INJECTION INTRAVENOUS at 22:18

## 2025-07-15 RX ADMIN — LIDOCAINE HYDROCHLORIDE 100 MG: 20 INJECTION, SOLUTION INFILTRATION; PERINEURAL at 20:28

## 2025-07-15 RX ADMIN — SODIUM CHLORIDE, SODIUM LACTATE, POTASSIUM CHLORIDE, AND CALCIUM CHLORIDE: 600; 310; 30; 20 INJECTION, SOLUTION INTRAVENOUS at 20:25

## 2025-07-15 RX ADMIN — PROPOFOL 150 MG: 10 INJECTION, EMULSION INTRAVENOUS at 20:28

## 2025-07-15 RX ADMIN — DEXAMETHASONE SODIUM PHOSPHATE 4 MG: 4 INJECTION INTRA-ARTICULAR; INTRALESIONAL; INTRAMUSCULAR; INTRAVENOUS; SOFT TISSUE at 21:30

## 2025-07-15 RX ADMIN — ROCURONIUM BROMIDE 10 MG: 10 INJECTION, SOLUTION INTRAVENOUS at 23:38

## 2025-07-15 RX ADMIN — ROCURONIUM BROMIDE 10 MG: 10 INJECTION, SOLUTION INTRAVENOUS at 21:04

## 2025-07-15 RX ADMIN — ROCURONIUM BROMIDE 30 MG: 10 INJECTION, SOLUTION INTRAVENOUS at 20:41

## 2025-07-15 RX ADMIN — HEPARIN SODIUM 5000 UNITS: 1000 INJECTION INTRAVENOUS; SUBCUTANEOUS at 22:15

## 2025-07-15 RX ADMIN — MIDAZOLAM HYDROCHLORIDE 2 MG: 2 INJECTION, SOLUTION INTRAMUSCULAR; INTRAVENOUS at 20:26

## 2025-07-15 SDOH — HEALTH STABILITY: MENTAL HEALTH: CURRENT SMOKER: 0

## 2025-07-15 ASSESSMENT — PAIN SCALES - GENERAL: PAINLEVEL_OUTOF10: 0 - NO PAIN

## 2025-07-15 ASSESSMENT — PAIN - FUNCTIONAL ASSESSMENT: PAIN_FUNCTIONAL_ASSESSMENT: 0-10

## 2025-07-15 NOTE — H&P
History Of Present Illness  Etelvina Banks is a 45 y.o. 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 Ortiz) with insertion of TE (per plastic surgery, Dr. Dewey) on 11/11/2024. Patient is now s/p OR with plastic surgery (Dr. Dewey and Dr. Lara) on 7/11/2025 for left breast tissue expander removal with capsulectomy and left breast reconstruction with free Deep Inferior Epigastric  flap (MANUEL). Did well post operatively and was discharged on 7/14/25. Patient is 4 days post op and has been doing well at home, pain well controlled, tolerating diet, voiding and had a BM since discharge. Reports waking up this morning and noticed discoloration of the left breast and it felt cool to touch to the upper breast flap with some increase in edema which she messaged the Plastic Surgery office and sent pictures and was instructed to present to ED immediately for evaluation and concern for flap perfusion compromise. Pain well controlled at this time and denies pain. Ambulating without difficulty. Denies any fever, chills, night sweats, CP, SOB, palpitations, nausea, vomiting, diarrhea, constipation, dysuria, hematuria, hematochezia, hematemesis, flank pain. Due to high volumes in the ED and emergent need to see patient, patient was seen in triage room and after assessment concern for flap tissue perfusion compromise and decision to take patient emergently to the OR for Level 1 flap take back. Preop labs drawn in ED, immediately taken by OR team. Admit to Plastic surgery post operatively for flap monitoring and pain control.      Past Medical History  Medical History[1]    Surgical History  Surgical History[2]     Social History  She reports that she has quit smoking. Her smoking use included cigarettes. She has been exposed to tobacco smoke. She has never used smokeless tobacco. She reports  "that she does not currently use alcohol. She reports that she does not use drugs.    Family History  Family History[3]     Allergies  Patient has no known allergies.    Review of Systems  ROS: All 10 systems were reviewed and are unremarkable except for those mentioned in HPI.     Physical Exam  Constitutional: A&Ox3, calm and cooperative, NAD. Daughter at bedside  Eyes: EOMI, clear sclera.   ENMT: Moist mucous membranes, no apparent injuries or lesions.  Head/Neck: NC/AT.   Cardiovascular: RRR.   Respiratory/Thorax: Regular respirations on RA. Good symmetric chest expansion.   Gastrointestinal: Abdomen soft, not distended, appropriately tender, no peritoneal signs, +BS x 4, +BM, lower transverse abdominal incision dressed with dermabond and steri-strips. ROSCOE drain x 2 with serous output to each side of the b/l lower abdomen. Aquacel AG covering umbilicus covered by Tegaderm  Genitourinary: Voiding independently  Extremities: No peripheral edema. Moving all 4 extremities actively.   Neurological: A&Ox3.   Psychological: Appropriate mood and behavior.    Breast: MANUEL free flap recipient sites to lower aspect of left breast. Flap is warm to touch, ecchymotic and purplish in color to medial aspect of flap, scratch test performed and no blood return noted to medial aspect in purplish/ecchymotic areas, lateral aspect scratched with blood return < 3 sec, Arterial doppler signal at marked suture site at central aspect of free flap. Incisions well approximated with sutures, dressed with dermabond and steri strips, no incisional dehiscence or drainage. ROSCOE drain x1 to lateral inferior aspect of left breast which is patent, maintaining self suction with serous output. (See below picture)       Last Recorded Vitals  Blood pressure (!) 150/91, pulse 98, temperature 36.6 °C (97.9 °F), temperature source Temporal, resp. rate 16, height 1.575 m (5' 2\"), weight 86.2 kg (190 lb), SpO2 99%.    Relevant Results  Scheduled " medications  Scheduled Medications[4]  Continuous medications  Continuous Medications[5]  PRN medications  PRN Medications[6]    Results for orders placed or performed during the hospital encounter of 07/15/25 (from the past 24 hours)   CBC and Auto Differential   Result Value Ref Range    WBC 7.0 4.4 - 11.3 x10*3/uL    nRBC 0.0 0.0 - 0.0 /100 WBCs    RBC 3.67 (L) 4.00 - 5.20 x10*6/uL    Hemoglobin 10.3 (L) 12.0 - 16.0 g/dL    Hematocrit 30.7 (L) 36.0 - 46.0 %    MCV 84 80 - 100 fL    MCH 28.1 26.0 - 34.0 pg    MCHC 33.6 32.0 - 36.0 g/dL    RDW 12.6 11.5 - 14.5 %    Platelets 360 150 - 450 x10*3/uL    Neutrophils % 55.5 40.0 - 80.0 %    Immature Granulocytes %, Automated 0.4 0.0 - 0.9 %    Lymphocytes % 34.9 13.0 - 44.0 %    Monocytes % 4.7 2.0 - 10.0 %    Eosinophils % 3.8 0.0 - 6.0 %    Basophils % 0.7 0.0 - 2.0 %    Neutrophils Absolute 3.90 1.20 - 7.70 x10*3/uL    Immature Granulocytes Absolute, Automated 0.03 0.00 - 0.70 x10*3/uL    Lymphocytes Absolute 2.45 1.20 - 4.80 x10*3/uL    Monocytes Absolute 0.33 0.10 - 1.00 x10*3/uL    Eosinophils Absolute 0.27 0.00 - 0.70 x10*3/uL    Basophils Absolute 0.05 0.00 - 0.10 x10*3/uL   Basic metabolic panel   Result Value Ref Range    Glucose 115 (H) 74 - 99 mg/dL    Sodium 141 136 - 145 mmol/L    Potassium 3.8 3.5 - 5.3 mmol/L    Chloride 103 98 - 107 mmol/L    Bicarbonate 28 21 - 32 mmol/L    Anion Gap 14 10 - 20 mmol/L    Urea Nitrogen 14 6 - 23 mg/dL    Creatinine 0.65 0.50 - 1.05 mg/dL    eGFR >90 >60 mL/min/1.73m*2    Calcium 9.7 8.6 - 10.6 mg/dL   Type And Screen   Result Value Ref Range    ABO TYPE O     Rh TYPE POS     ANTIBODY SCREEN NEG    Coagulation Screen   Result Value Ref Range    Protime 10.6 9.8 - 12.4 seconds    INR 1.0 0.9 - 1.1    aPTT 30 26 - 36 seconds       No results found.  Assessment & Plan  Failed flap    Assessment:   45 y.o. female with PMH of HTN, T2DM, peripheral edema, opioid dependence on agonist therapy, and DCIS of the left breast s/p L  breast skin sparing mastectomy (per breast surgery, Dr. Ortiz) with insertion of TE (per plastic surgery, Dr. Dewey) on 11/11/2024. Patient is now s/p OR with plastic surgery (Dr. Dewey and Dr. Lara) on 7/11/2025 for left breast tissue expander removal with capsulectomy and left breast reconstruction with free Deep Inferior Epigastric  flap (MANUEL). Did well post operatively and was discharged on 7/14/25. Patient is 4 days post op and has been doing well at home, pain well controlled, tolerating diet, voiding and had a BM since discharge. Reports waking up this morning and noticed discoloration of the left breast and it felt cool to touch to the upper breast flap with some increase in edema which she messaged the Plastic Surgery office and sent pictures and was instructed to present to ED immediately for evaluation and concern for flap perfusion compromise. Due to high volumes in the ED and emergent need to see patient, patient was seen in triage room and after assessment concern for flap tissue perfusion compromise and decision to take patient emergently to the OR for Level 1 flap take back. Preop labs drawn in ED, immediately taken by OR team. Admit to Plastic surgery post operatively for flap monitoring and pain control.     Plan:  Flap failure and tissue perfusion compromise:   - added to OR emergently for exploration of flap with possible vein graft, consent obtained  - Preop labs (CBC, BMP, Coags, T&S STAT)  - admit post operatively to Plastics for flap monitoring  - VSQ4  - Heparin 5,000 units injected subcutaneously to left breast flap ecchymotic areas prior to OR at 1904  - monitor ROSCOE drain output and record output, strip drain Q4  - prn pain management  - Discussed with patient and patient's daughter, Janet, all questions answered and wish to proceed with surgery  - Discussed with Dr. Dewey and Dr. Lara    # Acute postoperative pain, hx of opiate dependence on agonist therapy   - Nursing to  assess patient pain scales at least q4h and PRN  - Continue home Suboxone 8-2 mg SL tablet PO QID (home medication dosage confirmed via  pharmacy medication history review note placed on 7/10/25)  - Continue additional regimen ([facility] protocol) as follows:   Scheduled Tylenol 975mg PO q8h   Scheduled Gabapentin 300mg PO q8h   Scheduled Robaxin 500 mg PO q6h   Scheduled Celebrex 100mg PO BID      # Hx T2DM   - Last HBA1C 5.8 on labs obtained 10/17/24  - Home Metformin 500 mg PO daily hold postoperatively and while NPO   - Start SSI overnight on POD0 with accuchecks   - Diabetic diet once clear for PO      # Hx HTN  - Home Losartan 50 mg PO daily hold postoperatively, resume as BP permits with hold parameters to avoid hypotension and flap hypoperfusion   - Monitor VS q4h      # Hx peripheral edema   - Home hydrochlorothiazide and hold postoperatively, resume as BP permits and as needed for edema   - Extremity elevation while patient in bed      F: LR @ 100ml/hr while NPO   E: Monitor and replete PRN  N: NPO for OR  GI: Continue bowel regimen with Colace BID and Milk of mag PRN        Zofran (first line) and Phenergen (second line) PRN for nausea        GI ppx postoperatively and while inpatient with Protonix      DVT ppx:  - Risk score assessed  - SQ heparin q8h while inpatient post operatively  - Use of SCDs while in bed   - Early ambulation       Disposition: Take to OR for emergent exploration and admit to RNF post operatively for Q1 hour flap checks and bedrest. Will remain inpatient for 3-4 days for continued flap monitoring and drain surveillance postoperatively.     KATIANA Gaytan-CNP  Plastic and Reconstructive Surgery   Available via [employer]  Pager #[phone]  Team phone: [phone]            [1]   Past Medical History:  Diagnosis Date    Anxiety     Ar times of high stress    Arthritis 2015    Atypical ductal hyperplasia, breast about a year    Breast cancer less than a year    COVID-19     Had in the past     Depression, major, single episode, moderate (Multi) 02/28/2023    Diabetes mellitus (Multi) 4-01-24    Headache, unspecified     Bad headache    Hypertension     Type 2 diabetes mellitus One year ago    Vision loss     I have glasses    Wears dentures     upper only   [2]   Past Surgical History:  Procedure Laterality Date    BREAST BIOPSY Left 06/07/2024    BREAST BIOPSY Left 09/12/2024    Procedure: Left breast Magseed lumpectomy x 3;  Surgeon: Mirtha Dimas MD;  Location: [facility];  Service: General Surgery;  Laterality: Left;  8 AM, 2 hours    BREAST LUMPECTOMY Left 09/12/2024    Left breast Magseed lumpectomy x 3 ([facility])    FOOT SURGERY      Surgery on my right foot. Plate and pins    MASTECTOMY  11-11-24    OTHER SURGICAL HISTORY  03/11/2019    Ovarian cystectomy    OTHER SURGICAL HISTORY  10/14/2022    Tubal ligation    OTHER SURGICAL HISTORY Right 03/22/2021    arthritis metal plate and screw   [3]   Family History  Problem Relation Name Age of Onset    Breast cancer Mother Wanda     Arthritis Mother Wanda     Hypertension Mother Wanda     Asthma Brother Zenon     Diabetes Other Wanda     Cancer Other aunt    [4] ascorbic acid, 150 mg, intravenous, Daily  aspirin, 300 mg, rectal, Once  heparin (porcine), 5,000 Units, subcutaneous, q8h  lidocaine, 0.1 mL, subcutaneous, Once  [5]    [6] PRN medications: acetaminophen, HYDROmorphone, HYDROmorphone, metoclopramide, ondansetron, oxygen

## 2025-07-15 NOTE — ED TRIAGE NOTES
Pt presents to ED for breast problem. Pt states she noticed increased bruising and swelling to L breast. Pt denies fever/chills at this time. Pt denies pain as well. Pt had breast reconstruction this past Friday.

## 2025-07-15 NOTE — ED PROVIDER NOTES
Emergency Department Provider Note        History of Present Illness     History provided by: Patient  Limitations to History: None  External Records Reviewed with Brief Summary: Progress note from 7/13  Surgical history: DCIS of the left breast s/p L breast skin sparing mastectomy (per breast surgery, Dr. Ortiz) with insertion of TE (per plastic surgery, Dr. Dewey). Patient is now s/p OR with plastic surgery (Dr. Dewey and Dr. Lara) on 7/11/2025 for left breast tissue expander removal with capsulectomy and left breast reconstruction with free Deep Inferior Epigastric  flap (MANUEL). Patient admitted to the nursing floor under the plastic surgery service postoperatively for pain control and postoperative flap monitoring.     HPI:  Etelvina Banks is a 45 y.o. female presenting with discoloration of her left breast following recent breast reconstruction and MANUEL. Patient is 4 days post-op and states that she noticed bluish, dusky discoloration of the left breast today. Otherwise states she feels that she has been recovering well. No change in pain. No systemic symptoms. Having the same amount of serous drainage in her bilateral drains. She called her surgeon, who instructed her to present for evaluation given concern for vascular compromise of the flap. She otherwise denies all concerns at this time.    Due to high current ED volumes, patient seen in triage room.    Physical Exam   Triage vitals:  T 36.6 °C (97.9 °F)  HR 98  BP (!) 150/91  RR 16  O2 99 % None (Room air)    Physical Exam  Constitutional:       General: She is not in acute distress.  HENT:      Head: Normocephalic and atraumatic.      Right Ear: External ear normal.      Left Ear: External ear normal.      Nose: Nose normal.      Mouth/Throat:      Mouth: Mucous membranes are moist.      Pharynx: Oropharynx is clear.   Eyes:      Extraocular Movements: Extraocular movements intact.      Conjunctiva/sclera: Conjunctivae normal.    Cardiovascular:      Rate and Rhythm: Normal rate and regular rhythm.   Pulmonary:      Effort: Pulmonary effort is normal. No respiratory distress.   Abdominal:      General: There is no distension.      Palpations: Abdomen is soft.   Musculoskeletal:         General: No swelling or deformity.      Cervical back: Normal range of motion and neck supple.   Skin:     General: Skin is warm and dry.   Neurological:      General: No focal deficit present.      Mental Status: She is alert and oriented to person, place, and time.   Psychiatric:      Comments: Calm and cooperative.      Breast exam deferred as patient was seen alone in triage room and already evaluated by plastic surgery. Please see Plastics note for detailed breast exam and images.     Medical Decision Making & ED Course   Medical Decision Makin y.o. female presenting as above with concern for complication of her surgical breast flap. Patient met in ED by plastic surgery, with plan to take urgently to OR. Vitals as noted. Please see Plastics note for detailed breast exam and images. Patient otherwise without complaints, and is nontoxic appearing with appropriate drainage in her surgical drains. Heparin was injected into flap by Plastics, labs obtained, and patient transferred to OR.  ----      Differential diagnoses considered include but are not limited to: ecchymosis, hematoma, vascular compromise, tissue necrosis, cellulitis, abscess     Social Determinants of Health which Significantly Impact Care: None identified     EKG Independent Interpretation: EKG interpreted by myself. Please see ED Course for full interpretation.    Independent Result Review and Interpretation: Relevant laboratory and radiographic results were reviewed and independently interpreted by myself.  As necessary, they are commented on in the ED Course.    Chronic conditions affecting the patient's care: As documented above in MDM    The patient was discussed with the  following consultants/services: On call plastic surgery team    Care Considerations: As documented above in [facility]    ED Course:  Diagnoses as of 07/15/25 1907   Contusion of left breast, initial encounter   Encounter for follow-up care involving plastic surgery of breast     Disposition   Transfer to OR with plastic surgery    Procedures   Procedures    Patient was seen independently    Juan Ronquillo MD  Emergency Medicine     Juan Ronquillo MD  07/15/25 1931       Juan Ronquillo MD  07/15/25 1933

## 2025-07-16 PROBLEM — S20.02XA CONTUSION OF LEFT BREAST, INITIAL ENCOUNTER: Status: RESOLVED | Noted: 2025-07-15 | Resolved: 2025-07-16

## 2025-07-16 LAB
ANION GAP SERPL CALC-SCNC: 12 MMOL/L (ref 10–20)
ANION GAP SERPL CALC-SCNC: 14 MMOL/L (ref 10–20)
APTT PPP: 26 SECONDS (ref 26–36)
BUN SERPL-MCNC: 10 MG/DL (ref 6–23)
BUN SERPL-MCNC: 13 MG/DL (ref 6–23)
CALCIUM SERPL-MCNC: 8 MG/DL (ref 8.6–10.6)
CALCIUM SERPL-MCNC: 8.2 MG/DL (ref 8.6–10.6)
CHLORIDE SERPL-SCNC: 105 MMOL/L (ref 98–107)
CHLORIDE SERPL-SCNC: 107 MMOL/L (ref 98–107)
CO2 SERPL-SCNC: 24 MMOL/L (ref 21–32)
CO2 SERPL-SCNC: 27 MMOL/L (ref 21–32)
CREAT SERPL-MCNC: 0.77 MG/DL (ref 0.5–1.05)
CREAT SERPL-MCNC: 0.77 MG/DL (ref 0.5–1.05)
EGFRCR SERPLBLD CKD-EPI 2021: >90 ML/MIN/1.73M*2
EGFRCR SERPLBLD CKD-EPI 2021: >90 ML/MIN/1.73M*2
ERYTHROCYTE [DISTWIDTH] IN BLOOD BY AUTOMATED COUNT: 12.7 % (ref 11.5–14.5)
ERYTHROCYTE [DISTWIDTH] IN BLOOD BY AUTOMATED COUNT: 12.8 % (ref 11.5–14.5)
GLUCOSE BLD MANUAL STRIP-MCNC: 118 MG/DL (ref 74–99)
GLUCOSE BLD MANUAL STRIP-MCNC: 133 MG/DL (ref 74–99)
GLUCOSE BLD MANUAL STRIP-MCNC: 149 MG/DL (ref 74–99)
GLUCOSE BLD MANUAL STRIP-MCNC: 154 MG/DL (ref 74–99)
GLUCOSE BLD MANUAL STRIP-MCNC: 164 MG/DL (ref 74–99)
GLUCOSE BLD MANUAL STRIP-MCNC: 201 MG/DL (ref 74–99)
GLUCOSE SERPL-MCNC: 192 MG/DL (ref 74–99)
GLUCOSE SERPL-MCNC: 215 MG/DL (ref 74–99)
HCT VFR BLD AUTO: 26.7 % (ref 36–46)
HCT VFR BLD AUTO: 27.3 % (ref 36–46)
HGB BLD-MCNC: 8.4 G/DL (ref 12–16)
HGB BLD-MCNC: 9 G/DL (ref 12–16)
INR PPP: 1.1 (ref 0.9–1.1)
MAGNESIUM SERPL-MCNC: 1.69 MG/DL (ref 1.6–2.4)
MAGNESIUM SERPL-MCNC: 2.2 MG/DL (ref 1.6–2.4)
MCH RBC QN AUTO: 29 PG (ref 26–34)
MCH RBC QN AUTO: 29.9 PG (ref 26–34)
MCHC RBC AUTO-ENTMCNC: 30.8 G/DL (ref 32–36)
MCHC RBC AUTO-ENTMCNC: 33.7 G/DL (ref 32–36)
MCV RBC AUTO: 89 FL (ref 80–100)
MCV RBC AUTO: 94 FL (ref 80–100)
NRBC BLD-RTO: 0 /100 WBCS (ref 0–0)
NRBC BLD-RTO: 0 /100 WBCS (ref 0–0)
PLATELET # BLD AUTO: 339 X10*3/UL (ref 150–450)
PLATELET # BLD AUTO: 349 X10*3/UL (ref 150–450)
POTASSIUM SERPL-SCNC: 4.1 MMOL/L (ref 3.5–5.3)
POTASSIUM SERPL-SCNC: 4.1 MMOL/L (ref 3.5–5.3)
PROTHROMBIN TIME: 12 SECONDS (ref 9.8–12.4)
RBC # BLD AUTO: 2.9 X10*6/UL (ref 4–5.2)
RBC # BLD AUTO: 3.01 X10*6/UL (ref 4–5.2)
SODIUM SERPL-SCNC: 139 MMOL/L (ref 136–145)
SODIUM SERPL-SCNC: 142 MMOL/L (ref 136–145)
WBC # BLD AUTO: 10.8 X10*3/UL (ref 4.4–11.3)
WBC # BLD AUTO: 8.3 X10*3/UL (ref 4.4–11.3)

## 2025-07-16 PROCEDURE — 36415 COLL VENOUS BLD VENIPUNCTURE: CPT | Performed by: NURSE PRACTITIONER

## 2025-07-16 PROCEDURE — 2500000004 HC RX 250 GENERAL PHARMACY W/ HCPCS (ALT 636 FOR OP/ED): Mod: JZ,SE | Performed by: STUDENT IN AN ORGANIZED HEALTH CARE EDUCATION/TRAINING PROGRAM

## 2025-07-16 PROCEDURE — 83735 ASSAY OF MAGNESIUM: CPT | Performed by: NURSE PRACTITIONER

## 2025-07-16 PROCEDURE — 88304 TISSUE EXAM BY PATHOLOGIST: CPT | Performed by: STUDENT IN AN ORGANIZED HEALTH CARE EDUCATION/TRAINING PROGRAM

## 2025-07-16 PROCEDURE — 85027 COMPLETE CBC AUTOMATED: CPT | Performed by: NURSE PRACTITIONER

## 2025-07-16 PROCEDURE — 2500000001 HC RX 250 WO HCPCS SELF ADMINISTERED DRUGS (ALT 637 FOR MEDICARE OP): Mod: SE | Performed by: PHYSICIAN ASSISTANT

## 2025-07-16 PROCEDURE — 36430 TRANSFUSION BLD/BLD COMPNT: CPT

## 2025-07-16 PROCEDURE — 2500000002 HC RX 250 W HCPCS SELF ADMINISTERED DRUGS (ALT 637 FOR MEDICARE OP, ALT 636 FOR OP/ED): Mod: SE | Performed by: NURSE PRACTITIONER

## 2025-07-16 PROCEDURE — 2500000004 HC RX 250 GENERAL PHARMACY W/ HCPCS (ALT 636 FOR OP/ED): Mod: SE | Performed by: NURSE PRACTITIONER

## 2025-07-16 PROCEDURE — 82947 ASSAY GLUCOSE BLOOD QUANT: CPT

## 2025-07-16 PROCEDURE — 2500000004 HC RX 250 GENERAL PHARMACY W/ HCPCS (ALT 636 FOR OP/ED): Mod: SE | Performed by: PHYSICIAN ASSISTANT

## 2025-07-16 PROCEDURE — 2500000005 HC RX 250 GENERAL PHARMACY W/O HCPCS: Mod: SE | Performed by: STUDENT IN AN ORGANIZED HEALTH CARE EDUCATION/TRAINING PROGRAM

## 2025-07-16 PROCEDURE — 2500000005 HC RX 250 GENERAL PHARMACY W/O HCPCS: Mod: SE | Performed by: NURSE PRACTITIONER

## 2025-07-16 PROCEDURE — 88312 SPECIAL STAINS GROUP 1: CPT | Performed by: STUDENT IN AN ORGANIZED HEALTH CARE EDUCATION/TRAINING PROGRAM

## 2025-07-16 PROCEDURE — P9016 RBC LEUKOCYTES REDUCED: HCPCS

## 2025-07-16 PROCEDURE — 99231 SBSQ HOSP IP/OBS SF/LOW 25: CPT | Performed by: PHYSICIAN ASSISTANT

## 2025-07-16 PROCEDURE — 2500000001 HC RX 250 WO HCPCS SELF ADMINISTERED DRUGS (ALT 637 FOR MEDICARE OP): Mod: SE | Performed by: NURSE PRACTITIONER

## 2025-07-16 PROCEDURE — 2500000005 HC RX 250 GENERAL PHARMACY W/O HCPCS: Mod: SE | Performed by: SURGERY

## 2025-07-16 PROCEDURE — 80048 BASIC METABOLIC PNL TOTAL CA: CPT | Performed by: NURSE PRACTITIONER

## 2025-07-16 PROCEDURE — 85610 PROTHROMBIN TIME: CPT | Performed by: NURSE PRACTITIONER

## 2025-07-16 PROCEDURE — 2500000004 HC RX 250 GENERAL PHARMACY W/ HCPCS (ALT 636 FOR OP/ED): Mod: SE

## 2025-07-16 PROCEDURE — 88304 TISSUE EXAM BY PATHOLOGIST: CPT | Mod: TC,SUR | Performed by: SURGERY

## 2025-07-16 PROCEDURE — 2060000001 HC INTERMEDIATE ICU ROOM DAILY

## 2025-07-16 PROCEDURE — 2500000004 HC RX 250 GENERAL PHARMACY W/ HCPCS (ALT 636 FOR OP/ED): Mod: JZ,SE

## 2025-07-16 RX ORDER — METHOCARBAMOL 500 MG/1
500 TABLET, FILM COATED ORAL EVERY 6 HOURS PRN
Status: DISCONTINUED | OUTPATIENT
Start: 2025-07-16 | End: 2025-07-21

## 2025-07-16 RX ORDER — ASPIRIN 81 MG/1
81 TABLET ORAL DAILY
Status: DISCONTINUED | OUTPATIENT
Start: 2025-07-16 | End: 2025-07-24

## 2025-07-16 RX ORDER — MAGNESIUM SULFATE HEPTAHYDRATE 40 MG/ML
2 INJECTION, SOLUTION INTRAVENOUS ONCE
Status: DISCONTINUED | OUTPATIENT
Start: 2025-07-16 | End: 2025-07-16

## 2025-07-16 RX ORDER — BUPRENORPHINE HYDROCHLORIDE AND NALOXONE HYDROCHLORIDE DIHYDRATE 8; 2 MG/1; MG/1
1 TABLET SUBLINGUAL 4 TIMES DAILY
Status: DISCONTINUED | OUTPATIENT
Start: 2025-07-16 | End: 2025-07-21

## 2025-07-16 RX ORDER — HEPARIN SODIUM 5000 [USP'U]/ML
5000 INJECTION, SOLUTION INTRAVENOUS; SUBCUTANEOUS EVERY 8 HOURS
Status: DISCONTINUED | OUTPATIENT
Start: 2025-07-16 | End: 2025-07-21

## 2025-07-16 RX ORDER — FUROSEMIDE 10 MG/ML
10 INJECTION INTRAMUSCULAR; INTRAVENOUS ONCE
Status: COMPLETED | OUTPATIENT
Start: 2025-07-16 | End: 2025-07-16

## 2025-07-16 RX ORDER — ASPIRIN 81 MG/1
81 TABLET ORAL DAILY
Status: DISCONTINUED | OUTPATIENT
Start: 2025-07-16 | End: 2025-07-16

## 2025-07-16 RX ORDER — SODIUM CHLORIDE, SODIUM LACTATE, POTASSIUM CHLORIDE, CALCIUM CHLORIDE 600; 310; 30; 20 MG/100ML; MG/100ML; MG/100ML; MG/100ML
100 INJECTION, SOLUTION INTRAVENOUS CONTINUOUS
Status: DISCONTINUED | OUTPATIENT
Start: 2025-07-16 | End: 2025-07-17

## 2025-07-16 RX ORDER — METHOCARBAMOL 500 MG/1
500 TABLET, FILM COATED ORAL EVERY 6 HOURS SCHEDULED
Status: DISCONTINUED | OUTPATIENT
Start: 2025-07-16 | End: 2025-07-16

## 2025-07-16 RX ORDER — INSULIN LISPRO 100 [IU]/ML
0-5 INJECTION, SOLUTION INTRAVENOUS; SUBCUTANEOUS EVERY 4 HOURS
Status: DISCONTINUED | OUTPATIENT
Start: 2025-07-16 | End: 2025-07-17

## 2025-07-16 RX ORDER — CELECOXIB 100 MG/1
100 CAPSULE ORAL 2 TIMES DAILY
Status: DISCONTINUED | OUTPATIENT
Start: 2025-07-16 | End: 2025-07-21

## 2025-07-16 RX ORDER — BISACODYL 10 MG/1
10 SUPPOSITORY RECTAL DAILY PRN
Status: DISCONTINUED | OUTPATIENT
Start: 2025-07-16 | End: 2025-07-21

## 2025-07-16 RX ORDER — ACETAMINOPHEN 10 MG/ML
INJECTION, SOLUTION INTRAVENOUS AS NEEDED
Status: DISCONTINUED | OUTPATIENT
Start: 2025-07-16 | End: 2025-07-16

## 2025-07-16 RX ORDER — METHOCARBAMOL 100 MG/ML
500 INJECTION, SOLUTION INTRAMUSCULAR; INTRAVENOUS ONCE
Status: COMPLETED | OUTPATIENT
Start: 2025-07-16 | End: 2025-07-16

## 2025-07-16 RX ORDER — INDOCYANINE GREEN AND WATER 25 MG
KIT INJECTION AS NEEDED
Status: DISCONTINUED | OUTPATIENT
Start: 2025-07-16 | End: 2025-07-16

## 2025-07-16 RX ORDER — ACETAMINOPHEN 325 MG/1
975 TABLET ORAL EVERY 8 HOURS
Status: DISCONTINUED | OUTPATIENT
Start: 2025-07-16 | End: 2025-07-21

## 2025-07-16 RX ORDER — CEFAZOLIN SODIUM 2 G/100ML
2 INJECTION, SOLUTION INTRAVENOUS EVERY 8 HOURS
Status: DISCONTINUED | OUTPATIENT
Start: 2025-07-16 | End: 2025-07-17

## 2025-07-16 RX ORDER — NITROGLYCERIN 20 MG/G
OINTMENT TOPICAL AS NEEDED
Status: DISCONTINUED | OUTPATIENT
Start: 2025-07-16 | End: 2025-07-16 | Stop reason: HOSPADM

## 2025-07-16 RX ORDER — NEOSTIGMINE METHYLSULFATE 1 MG/ML
INJECTION INTRAVENOUS AS NEEDED
Status: DISCONTINUED | OUTPATIENT
Start: 2025-07-16 | End: 2025-07-16

## 2025-07-16 RX ORDER — ONDANSETRON HYDROCHLORIDE 2 MG/ML
4 INJECTION, SOLUTION INTRAVENOUS ONCE AS NEEDED
Status: DISCONTINUED | OUTPATIENT
Start: 2025-07-16 | End: 2025-07-16 | Stop reason: HOSPADM

## 2025-07-16 RX ORDER — DEXTROSE 50 % IN WATER (D50W) INTRAVENOUS SYRINGE
12.5
Status: DISCONTINUED | OUTPATIENT
Start: 2025-07-16 | End: 2025-07-25 | Stop reason: HOSPADM

## 2025-07-16 RX ORDER — HEPARIN SODIUM 5000 [USP'U]/ML
5000 INJECTION, SOLUTION INTRAVENOUS; SUBCUTANEOUS EVERY 8 HOURS
Status: DISCONTINUED | OUTPATIENT
Start: 2025-07-16 | End: 2025-07-16

## 2025-07-16 RX ORDER — METFORMIN HYDROCHLORIDE 500 MG/1
500 TABLET, EXTENDED RELEASE ORAL
Status: DISCONTINUED | OUTPATIENT
Start: 2025-07-16 | End: 2025-07-20

## 2025-07-16 RX ORDER — METOCLOPRAMIDE HYDROCHLORIDE 5 MG/ML
10 INJECTION INTRAMUSCULAR; INTRAVENOUS ONCE AS NEEDED
Status: DISCONTINUED | OUTPATIENT
Start: 2025-07-16 | End: 2025-07-16 | Stop reason: HOSPADM

## 2025-07-16 RX ORDER — GLYCOPYRROLATE 0.2 MG/ML
INJECTION INTRAMUSCULAR; INTRAVENOUS AS NEEDED
Status: DISCONTINUED | OUTPATIENT
Start: 2025-07-16 | End: 2025-07-16

## 2025-07-16 RX ORDER — LIDOCAINE HYDROCHLORIDE 10 MG/ML
0.1 INJECTION, SOLUTION INFILTRATION; PERINEURAL ONCE
Status: DISCONTINUED | OUTPATIENT
Start: 2025-07-16 | End: 2025-07-16 | Stop reason: HOSPADM

## 2025-07-16 RX ORDER — DOCUSATE SODIUM 100 MG/1
100 CAPSULE, LIQUID FILLED ORAL 2 TIMES DAILY
Status: DISCONTINUED | OUTPATIENT
Start: 2025-07-16 | End: 2025-07-16 | Stop reason: SDUPTHER

## 2025-07-16 RX ORDER — BUPIVACAINE HCL/EPINEPHRINE 0.5-1:200K
VIAL (ML) INJECTION AS NEEDED
Status: DISCONTINUED | OUTPATIENT
Start: 2025-07-16 | End: 2025-07-16 | Stop reason: HOSPADM

## 2025-07-16 RX ORDER — PANTOPRAZOLE SODIUM 40 MG/1
40 TABLET, DELAYED RELEASE ORAL
Status: DISCONTINUED | OUTPATIENT
Start: 2025-07-16 | End: 2025-07-21

## 2025-07-16 RX ORDER — ACETAMINOPHEN 325 MG/1
650 TABLET ORAL EVERY 4 HOURS PRN
Status: DISCONTINUED | OUTPATIENT
Start: 2025-07-16 | End: 2025-07-16 | Stop reason: HOSPADM

## 2025-07-16 RX ORDER — GABAPENTIN 300 MG/1
300 CAPSULE ORAL EVERY 8 HOURS
Status: DISCONTINUED | OUTPATIENT
Start: 2025-07-16 | End: 2025-07-21

## 2025-07-16 RX ORDER — MAGNESIUM SULFATE HEPTAHYDRATE 40 MG/ML
2 INJECTION, SOLUTION INTRAVENOUS ONCE
Status: COMPLETED | OUTPATIENT
Start: 2025-07-16 | End: 2025-07-16

## 2025-07-16 RX ORDER — VERAPAMIL HYDROCHLORIDE 40 MG/1
40 TABLET ORAL EVERY 8 HOURS SCHEDULED
Status: DISCONTINUED | OUTPATIENT
Start: 2025-07-16 | End: 2025-07-18

## 2025-07-16 RX ORDER — HYDROMORPHONE HYDROCHLORIDE 0.2 MG/ML
0.2 INJECTION INTRAMUSCULAR; INTRAVENOUS; SUBCUTANEOUS EVERY 5 MIN PRN
Status: DISCONTINUED | OUTPATIENT
Start: 2025-07-16 | End: 2025-07-16 | Stop reason: HOSPADM

## 2025-07-16 RX ORDER — ONDANSETRON HYDROCHLORIDE 2 MG/ML
INJECTION, SOLUTION INTRAVENOUS AS NEEDED
Status: DISCONTINUED | OUTPATIENT
Start: 2025-07-16 | End: 2025-07-16

## 2025-07-16 RX ORDER — DEXTROSE 50 % IN WATER (D50W) INTRAVENOUS SYRINGE
25
Status: DISCONTINUED | OUTPATIENT
Start: 2025-07-16 | End: 2025-07-21

## 2025-07-16 RX ORDER — NITROGLYCERIN 20 MG/G
0.5 OINTMENT TOPICAL 2 TIMES DAILY
Status: DISCONTINUED | OUTPATIENT
Start: 2025-07-16 | End: 2025-07-21

## 2025-07-16 RX ORDER — HYDROMORPHONE HYDROCHLORIDE 1 MG/ML
INJECTION, SOLUTION INTRAMUSCULAR; INTRAVENOUS; SUBCUTANEOUS AS NEEDED
Status: DISCONTINUED | OUTPATIENT
Start: 2025-07-16 | End: 2025-07-16

## 2025-07-16 RX ORDER — DOCUSATE SODIUM 100 MG/1
100 CAPSULE, LIQUID FILLED ORAL 2 TIMES DAILY
Status: DISCONTINUED | OUTPATIENT
Start: 2025-07-16 | End: 2025-07-21

## 2025-07-16 RX ADMIN — SODIUM CHLORIDE 40 MCG: 9 INJECTION, SOLUTION INTRAVENOUS at 22:18

## 2025-07-16 RX ADMIN — METHOCARBAMOL 500 MG: 500 TABLET ORAL at 08:19

## 2025-07-16 RX ADMIN — METFORMIN ER 500 MG 500 MG: 500 TABLET ORAL at 20:23

## 2025-07-16 RX ADMIN — HEPARIN SODIUM 5000 UNITS: 5000 INJECTION, SOLUTION INTRAVENOUS; SUBCUTANEOUS at 20:23

## 2025-07-16 RX ADMIN — FENTANYL CITRATE 25 MCG: 50 INJECTION, SOLUTION INTRAMUSCULAR; INTRAVENOUS at 00:47

## 2025-07-16 RX ADMIN — GABAPENTIN 300 MG: 300 CAPSULE ORAL at 05:59

## 2025-07-16 RX ADMIN — HEPARIN SODIUM 5000 UNITS: 5000 INJECTION, SOLUTION INTRAVENOUS; SUBCUTANEOUS at 08:19

## 2025-07-16 RX ADMIN — MAGNESIUM SULFATE HEPTAHYDRATE 2 G: 40 INJECTION, SOLUTION INTRAVENOUS at 20:23

## 2025-07-16 RX ADMIN — DOCUSATE SODIUM 100 MG: 100 CAPSULE, LIQUID FILLED ORAL at 08:19

## 2025-07-16 RX ADMIN — CEFAZOLIN 2 G: 1 INJECTION, POWDER, FOR SOLUTION INTRAMUSCULAR; INTRAVENOUS at 00:42

## 2025-07-16 RX ADMIN — ACETAMINOPHEN 975 MG: 325 TABLET ORAL at 06:23

## 2025-07-16 RX ADMIN — INDOCYANINE GREEN AND WATER 3 ML: KIT at 00:14

## 2025-07-16 RX ADMIN — METHOCARBAMOL 500 MG: 100 INJECTION INTRAMUSCULAR; INTRAVENOUS at 02:02

## 2025-07-16 RX ADMIN — ACETAMINOPHEN 1000 MG: 10 INJECTION, SOLUTION INTRAVENOUS at 00:19

## 2025-07-16 RX ADMIN — ASPIRIN 81 MG: 81 TABLET, COATED ORAL at 20:23

## 2025-07-16 RX ADMIN — GABAPENTIN 300 MG: 300 CAPSULE ORAL at 13:00

## 2025-07-16 RX ADMIN — BUPRENORPHINE AND NALOXONE 1 TABLET: 8; 2 TABLET SUBLINGUAL at 12:56

## 2025-07-16 RX ADMIN — INSULIN LISPRO 1 UNITS: 100 INJECTION, SOLUTION INTRAVENOUS; SUBCUTANEOUS at 12:59

## 2025-07-16 RX ADMIN — HYDROMORPHONE HYDROCHLORIDE 0.3 MG: 1 INJECTION, SOLUTION INTRAMUSCULAR; INTRAVENOUS; SUBCUTANEOUS at 00:09

## 2025-07-16 RX ADMIN — NEOSTIGMINE METHYLSULFATE 5 MG: 1 INJECTION INTRAVENOUS at 01:15

## 2025-07-16 RX ADMIN — BUPRENORPHINE AND NALOXONE 1 TABLET: 8; 2 TABLET SUBLINGUAL at 20:23

## 2025-07-16 RX ADMIN — DOCUSATE SODIUM 100 MG: 100 CAPSULE, LIQUID FILLED ORAL at 20:23

## 2025-07-16 RX ADMIN — Medication 6 L/MIN: at 01:30

## 2025-07-16 RX ADMIN — ONDANSETRON 4 MG: 2 INJECTION, SOLUTION INTRAMUSCULAR; INTRAVENOUS at 00:36

## 2025-07-16 RX ADMIN — LIDOCAINE HYDROCHLORIDE 80 MG: 20 INJECTION, SOLUTION INFILTRATION; PERINEURAL at 00:39

## 2025-07-16 RX ADMIN — GABAPENTIN 300 MG: 300 CAPSULE ORAL at 22:19

## 2025-07-16 RX ADMIN — HYDROMORPHONE HYDROCHLORIDE 0.2 MG: 0.2 INJECTION, SOLUTION INTRAMUSCULAR; INTRAVENOUS; SUBCUTANEOUS at 03:29

## 2025-07-16 RX ADMIN — CEFAZOLIN SODIUM 2 G: 2 INJECTION, SOLUTION INTRAVENOUS at 08:18

## 2025-07-16 RX ADMIN — ACETAMINOPHEN 975 MG: 325 TABLET ORAL at 13:00

## 2025-07-16 RX ADMIN — SODIUM CHLORIDE, SODIUM LACTATE, POTASSIUM CHLORIDE, AND CALCIUM CHLORIDE 100 ML/HR: .6; .31; .03; .02 INJECTION, SOLUTION INTRAVENOUS at 05:45

## 2025-07-16 RX ADMIN — BUPRENORPHINE AND NALOXONE 1 TABLET: 8; 2 TABLET SUBLINGUAL at 06:23

## 2025-07-16 RX ADMIN — BUPRENORPHINE AND NALOXONE 1 TABLET: 8; 2 TABLET SUBLINGUAL at 17:09

## 2025-07-16 RX ADMIN — GLYCOPYRROLATE 0.8 MG: 0.2 SOLUTION INTRAMUSCULAR; INTRAVENOUS at 01:15

## 2025-07-16 RX ADMIN — MAGNESIUM SULFATE HEPTAHYDRATE 2 G: 40 INJECTION, SOLUTION INTRAVENOUS at 05:40

## 2025-07-16 RX ADMIN — HYDROMORPHONE HYDROCHLORIDE 0.5 MG: 1 INJECTION, SOLUTION INTRAMUSCULAR; INTRAVENOUS; SUBCUTANEOUS at 02:42

## 2025-07-16 RX ADMIN — CELECOXIB 100 MG: 100 CAPSULE ORAL at 12:55

## 2025-07-16 RX ADMIN — ROCURONIUM BROMIDE 10 MG: 10 INJECTION, SOLUTION INTRAVENOUS at 00:04

## 2025-07-16 RX ADMIN — PANTOPRAZOLE SODIUM 40 MG: 40 TABLET, DELAYED RELEASE ORAL at 08:19

## 2025-07-16 RX ADMIN — CEFAZOLIN SODIUM 2 G: 2 INJECTION, SOLUTION INTRAVENOUS at 16:25

## 2025-07-16 RX ADMIN — ASCORBIC ACID 150 MG: 500 INJECTION INTRAVENOUS at 03:23

## 2025-07-16 RX ADMIN — FUROSEMIDE 10 MG: 10 INJECTION, SOLUTION INTRAMUSCULAR; INTRAVENOUS at 16:25

## 2025-07-16 RX ADMIN — HYDROMORPHONE HYDROCHLORIDE 0.2 MG: 0.2 INJECTION, SOLUTION INTRAMUSCULAR; INTRAVENOUS; SUBCUTANEOUS at 02:54

## 2025-07-16 RX ADMIN — VERAPAMIL HYDROCHLORIDE 40 MG: 40 TABLET, FILM COATED ORAL at 22:19

## 2025-07-16 RX ADMIN — HYDROMORPHONE HYDROCHLORIDE 0.2 MG: 0.2 INJECTION, SOLUTION INTRAMUSCULAR; INTRAVENOUS; SUBCUTANEOUS at 03:54

## 2025-07-16 RX ADMIN — HYDROMORPHONE HYDROCHLORIDE 0.5 MG: 1 INJECTION, SOLUTION INTRAMUSCULAR; INTRAVENOUS; SUBCUTANEOUS at 02:00

## 2025-07-16 SDOH — SOCIAL STABILITY: SOCIAL INSECURITY: HAS ANYONE EVER THREATENED TO HURT YOUR FAMILY OR YOUR PETS?: NO

## 2025-07-16 SDOH — SOCIAL STABILITY: SOCIAL INSECURITY: ARE THERE ANY APPARENT SIGNS OF INJURIES/BEHAVIORS THAT COULD BE RELATED TO ABUSE/NEGLECT?: NO

## 2025-07-16 SDOH — HEALTH STABILITY: MENTAL HEALTH: HOW OFTEN DO YOU HAVE A DRINK CONTAINING ALCOHOL?: NEVER

## 2025-07-16 SDOH — SOCIAL STABILITY: SOCIAL INSECURITY: WITHIN THE LAST YEAR, HAVE YOU BEEN AFRAID OF YOUR PARTNER OR EX-PARTNER?: NO

## 2025-07-16 SDOH — ECONOMIC STABILITY: FOOD INSECURITY: WITHIN THE PAST 12 MONTHS, YOU WORRIED THAT YOUR FOOD WOULD RUN OUT BEFORE YOU GOT THE MONEY TO BUY MORE.: NEVER TRUE

## 2025-07-16 SDOH — SOCIAL STABILITY: SOCIAL INSECURITY: ABUSE: ADULT

## 2025-07-16 SDOH — SOCIAL STABILITY: SOCIAL INSECURITY: DO YOU FEEL ANYONE HAS EXPLOITED OR TAKEN ADVANTAGE OF YOU FINANCIALLY OR OF YOUR PERSONAL PROPERTY?: NO

## 2025-07-16 SDOH — SOCIAL STABILITY: SOCIAL INSECURITY: WITHIN THE LAST YEAR, HAVE YOU BEEN HUMILIATED OR EMOTIONALLY ABUSED IN OTHER WAYS BY YOUR PARTNER OR EX-PARTNER?: NO

## 2025-07-16 SDOH — SOCIAL STABILITY: SOCIAL INSECURITY: WERE YOU ABLE TO COMPLETE ALL THE BEHAVIORAL HEALTH SCREENINGS?: YES

## 2025-07-16 SDOH — ECONOMIC STABILITY: FOOD INSECURITY: WITHIN THE PAST 12 MONTHS, THE FOOD YOU BOUGHT JUST DIDN'T LAST AND YOU DIDN'T HAVE MONEY TO GET MORE.: NEVER TRUE

## 2025-07-16 SDOH — ECONOMIC STABILITY: INCOME INSECURITY: IN THE PAST 12 MONTHS HAS THE ELECTRIC, GAS, OIL, OR WATER COMPANY THREATENED TO SHUT OFF SERVICES IN YOUR HOME?: NO

## 2025-07-16 SDOH — HEALTH STABILITY: MENTAL HEALTH: HOW OFTEN DO YOU HAVE SIX OR MORE DRINKS ON ONE OCCASION?: NEVER

## 2025-07-16 SDOH — HEALTH STABILITY: MENTAL HEALTH: HOW MANY DRINKS CONTAINING ALCOHOL DO YOU HAVE ON A TYPICAL DAY WHEN YOU ARE DRINKING?: PATIENT DOES NOT DRINK

## 2025-07-16 SDOH — SOCIAL STABILITY: SOCIAL INSECURITY: HAVE YOU HAD THOUGHTS OF HARMING ANYONE ELSE?: NO

## 2025-07-16 SDOH — SOCIAL STABILITY: SOCIAL INSECURITY: DO YOU FEEL UNSAFE GOING BACK TO THE PLACE WHERE YOU ARE LIVING?: NO

## 2025-07-16 SDOH — SOCIAL STABILITY: SOCIAL INSECURITY: ARE YOU OR HAVE YOU BEEN THREATENED OR ABUSED PHYSICALLY, EMOTIONALLY, OR SEXUALLY BY ANYONE?: NO

## 2025-07-16 SDOH — SOCIAL STABILITY: SOCIAL INSECURITY: DOES ANYONE TRY TO KEEP YOU FROM HAVING/CONTACTING OTHER FRIENDS OR DOING THINGS OUTSIDE YOUR HOME?: NO

## 2025-07-16 ASSESSMENT — PAIN - FUNCTIONAL ASSESSMENT
PAIN_FUNCTIONAL_ASSESSMENT: 0-10

## 2025-07-16 ASSESSMENT — PAIN SCALES - GENERAL
PAINLEVEL_OUTOF10: 7
PAINLEVEL_OUTOF10: 4
PAINLEVEL_OUTOF10: 0 - NO PAIN
PAINLEVEL_OUTOF10: 3
PAINLEVEL_OUTOF10: 7
PAINLEVEL_OUTOF10: 5 - MODERATE PAIN
PAINLEVEL_OUTOF10: 4
PAINLEVEL_OUTOF10: 4
PAINLEVEL_OUTOF10: 3
PAINLEVEL_OUTOF10: 3
PAINLEVEL_OUTOF10: 2
PAINLEVEL_OUTOF10: 4
PAINLEVEL_OUTOF10: 3
PAINLEVEL_OUTOF10: 0 - NO PAIN

## 2025-07-16 ASSESSMENT — COGNITIVE AND FUNCTIONAL STATUS - GENERAL
MOVING FROM LYING ON BACK TO SITTING ON SIDE OF FLAT BED WITH BEDRAILS: A LITTLE
DRESSING REGULAR UPPER BODY CLOTHING: A LITTLE
TURNING FROM BACK TO SIDE WHILE IN FLAT BAD: A LITTLE
MOVING TO AND FROM BED TO CHAIR: A LITTLE
STANDING UP FROM CHAIR USING ARMS: A LITTLE
PERSONAL GROOMING: A LITTLE
TURNING FROM BACK TO SIDE WHILE IN FLAT BAD: A LITTLE
DRESSING REGULAR UPPER BODY CLOTHING: A LITTLE
MOVING FROM LYING ON BACK TO SITTING ON SIDE OF FLAT BED WITH BEDRAILS: A LITTLE
STANDING UP FROM CHAIR USING ARMS: A LITTLE
PATIENT BASELINE BEDBOUND: NO
DRESSING REGULAR LOWER BODY CLOTHING: A LITTLE
CLIMB 3 TO 5 STEPS WITH RAILING: A LITTLE
DRESSING REGULAR LOWER BODY CLOTHING: A LITTLE
WALKING IN HOSPITAL ROOM: A LITTLE
DRESSING REGULAR LOWER BODY CLOTHING: A LITTLE
MOBILITY SCORE: 18
MOVING FROM LYING ON BACK TO SITTING ON SIDE OF FLAT BED WITH BEDRAILS: A LITTLE
HELP NEEDED FOR BATHING: A LITTLE
MOBILITY SCORE: 18
STANDING UP FROM CHAIR USING ARMS: A LITTLE
DAILY ACTIVITIY SCORE: 19
DAILY ACTIVITIY SCORE: 20
MOVING TO AND FROM BED TO CHAIR: A LITTLE
DAILY ACTIVITIY SCORE: 21
TURNING FROM BACK TO SIDE WHILE IN FLAT BAD: A LITTLE
MOBILITY SCORE: 18
CLIMB 3 TO 5 STEPS WITH RAILING: A LITTLE
HELP NEEDED FOR BATHING: A LITTLE
WALKING IN HOSPITAL ROOM: A LITTLE
TOILETING: A LITTLE
TOILETING: A LITTLE
HELP NEEDED FOR BATHING: A LITTLE
WALKING IN HOSPITAL ROOM: A LITTLE
DRESSING REGULAR UPPER BODY CLOTHING: A LITTLE
MOVING TO AND FROM BED TO CHAIR: A LITTLE
CLIMB 3 TO 5 STEPS WITH RAILING: A LITTLE

## 2025-07-16 ASSESSMENT — ACTIVITIES OF DAILY LIVING (ADL)
HEARING - RIGHT EAR: FUNCTIONAL
GROOMING: INDEPENDENT
ASSISTIVE_DEVICE: DENTURES UPPER;EYEGLASSES
ADEQUATE_TO_COMPLETE_ADL: YES
LACK_OF_TRANSPORTATION: NO
JUDGMENT_ADEQUATE_SAFELY_COMPLETE_DAILY_ACTIVITIES: YES
TOILETING: INDEPENDENT
FEEDING YOURSELF: INDEPENDENT
PATIENT'S MEMORY ADEQUATE TO SAFELY COMPLETE DAILY ACTIVITIES?: YES
WALKS IN HOME: NEEDS ASSISTANCE
BATHING: NEEDS ASSISTANCE
DRESSING YOURSELF: INDEPENDENT
HEARING - LEFT EAR: FUNCTIONAL

## 2025-07-16 ASSESSMENT — PAIN DESCRIPTION - ORIENTATION
ORIENTATION: LEFT

## 2025-07-16 ASSESSMENT — PAIN DESCRIPTION - LOCATION
LOCATION: BREAST

## 2025-07-16 ASSESSMENT — PATIENT HEALTH QUESTIONNAIRE - PHQ9
1. LITTLE INTEREST OR PLEASURE IN DOING THINGS: NOT AT ALL
2. FEELING DOWN, DEPRESSED OR HOPELESS: NOT AT ALL
SUM OF ALL RESPONSES TO PHQ9 QUESTIONS 1 & 2: 0

## 2025-07-16 ASSESSMENT — LIFESTYLE VARIABLES
SKIP TO QUESTIONS 9-10: 1
AUDIT-C TOTAL SCORE: 0

## 2025-07-16 NOTE — BRIEF OP NOTE
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]     Procedures  REEXPLORATION of flap, repair of vessel, embolectomy, flap debridement  14400 - GA UNLISTED PROCEDURE VASCULAR SURGERY      Surgeons      * Jaye Dewey - Primary    Resident/Fellow/Other Assistant:  Surgeons and Role:     * Hui Lara MD - Assisting     * KATIANA Rodriguez-CNP - [facility] First Assist    Staff:   Circulator: Marcelina  Scrub Person: Sarah John Scrub: Tere John Circulator: David    Anesthesia Staff: Anesthesiologist: Can Li MD  Anesthesia Resident: Mela Gonzales MD; Chidi Olguin MD    Procedure Summary  Anesthesia: General  ASA: II  Estimated Blood Loss: 250 mL  Intra-op Medications:   Administrations occurring from 07/15/25 2000 to 25 0000:   Medication Name Total Dose   alteplase (Cathflo Activase) injection 4 mg   heparin (porcine) 50,000 Units in sodium chloride 0.9 % 250 mL irrigation 50,000 Units   sodium chloride 0.9 % irrigation solution 1,000 mL   ceFAZolin (Ancef) vial 1 g 2 g   dexAMETHasone (Decadron) 4 mg/mL 4 mg   fentaNYL (Sublimaze) injection 50 mcg/mL 75 mcg   heparin 1,000 units/mL 5,000 Units   heparin infusion 25,000 units/ 250 mL D5W (pre-mix) 849.93 Units   ketamine injection 50 mg/ 5 mL (10 mg/mL) 40 mg   LR bolus Cannot be calculated   lidocaine (Xylocaine) injection 2 % 100 mg   midazolam PF (Versed) injection 1 mg/mL 2 mg   propofol (Diprivan) injection 10 mg/mL 150 mg   rocuronium (ZeMuron) 50 mg/5 mL injection 90 mg   succinylcholine 100 mg/5 mL SYRINGE 160 mg              Anesthesia Record               Intraprocedure I/O Totals          Intake    LR bolus 1500.00 mL    Heparin Drip 0.00 mL    The total shown is the total volume documented since Anesthesia Start was  filed.    Total Intake 1500 mL          Specimen:   ID Type Source Tests Collected by Time   1 : Left Inferior Mastectomy Tissue Tissue BREAST MASTECTOMY LEFT SURGICAL PATHOLOGY EXAM Jaye Dewey MD 7/16/2025 0004                  Findings: 4 cm clot noted (see operative note for full details), artery and vein with compromised blood flow and perfusion to flap    Complications:  None; patient tolerated the procedure well.     Disposition: PACU - hemodynamically stable.  Condition: stable  Specimens Collected:   ID Type Source Tests Collected by Time   1 : Left Inferior Mastectomy Tissue Tissue BREAST MASTECTOMY LEFT SURGICAL PATHOLOGY EXAM Jaye Dewey MD 7/16/2025 0004     Attending Attestation: A qualified resident physician was not available.    Jaye Dewey  Phone Number: [phone]

## 2025-07-16 NOTE — ANESTHESIA PROCEDURE NOTES
Airway  Date/Time: 7/15/2025 8:29 PM  Reason: elective    Airway not difficult    Staffing  Performed: resident   Authorized by: Can Li MD    Performed by: Chidi Olguin MD  Patient location during procedure: OR    Patient Condition  Indications for airway management: anesthesia and airway protection  Patient position: sniffing  Planned trial extubation  Sedation level: deep     Final Airway Details   Preoxygenated: yes  Final airway type: endotracheal airway  Successful airway: ETT  Cuffed: yes   Successful intubation technique: video laryngoscopy  Adjuncts used in placement: intubating stylet  Endotracheal tube insertion site: oral  Blade: Emilia  Blade size: #3  ETT size (mm): 7.0  Cormack-Lehane Classification: grade I - full view of glottis  Placement verified by: capnometry   Measured from: lips  ETT to lips (cm): 20  Number of attempts at approach: 1    Additional Comments  RSI

## 2025-07-16 NOTE — ANESTHESIA POSTPROCEDURE EVALUATION
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])    Surgeons: Jaye Dewey MD Responsible Provider: Can Li MD    Anesthesia Type: general ASA Status: 2            Anesthesia Type: general    Vitals Value Taken Time   /78 07/16/25 01:46   Temp 36.2 07/16/25 01:46   Pulse 85 07/16/25 01:46   Resp 16 07/16/25 01:46   SpO2 100 07/16/25 01:46       Anesthesia Post Evaluation    Patient location during evaluation: PACU  Patient participation: complete - patient participated  Level of consciousness: awake  Pain management: adequate  Airway patency: patent  Cardiovascular status: acceptable  Respiratory status: acceptable and face mask  Hydration status: acceptable  Postoperative Nausea and Vomiting: none        No notable events documented.

## 2025-07-16 NOTE — PROGRESS NOTES
"  Department of Plastic and Reconstructive Surgery  Progress Note     Subjective:  Patient resting comfortably in bed. States that her pain is mostly controlled and she is comfortable at this time with current pain regimen. She feels exhausted from surgery and more recently after her robaxin dose. She has recently transitioned to clear liquids this AM without N/V. She is voiding via hong catheter with clear urine and no passing gas or -BM. Of note, she reports history of chronic constipation that is exacerbated after surgeries. She mentions a suppository was the method of relief in previous hospital admissions. She denies any current fever, chills, night sweats, CP, SOB, palpitations, nausea, vomiting, diarrhea, constipation, dysuria, hematuria, hematochezia, hematemesis, flank pain.     Objective:  BP 98/62 (BP Location: Right arm, Patient Position: Lying)   Pulse 86   Temp 35.8 °C (96.4 °F) (Temporal)   Resp 16   Ht 1.575 m (5' 2\")   Wt 87.1 kg (192 lb 0.3 oz)   SpO2 93%   BMI 35.12 kg/m²      Physical Exam  Constitutional: A&Ox3, calm and cooperative, NAD.  Eyes: EOMI, clear sclera.   ENMT: Moist mucous membranes, no apparent injuries or lesions.  Head/Neck: NC/AT.   Cardiovascular: RRR.   Respiratory/Thorax: Regular respirations on RA. Good symmetric chest expansion.   Gastrointestinal: Abdomen soft, not distended, appropriately tender, no peritoneal signs,  hypoactive BS x 4, +BM, lower transverse abdominal incision dressed with Mepliex AG. ROSCOE drain x 2 with serous output to each side of the b/l lower abdomen, patent and maintaining self suction, dressed with CHG dressing. Umbilicus well approximated with sutures, no drainage, erythema or dehiscence, VAN.  Genitourinary: indwelling hong in place with clear yellow output  Extremities: No peripheral edema. Moving all 4 extremities actively.   Neurological: A&Ox3.   Psychological: Appropriate mood and behavior.    Breast: MANUEL free flap recipient sites to " left breast. Flap is body temperature to touch, light pink in color compared to native tissue. Previous ecchymotic and purplish color to medial aspect of flap resolved, cap refill 2-3 seconds, biphasic doppler signal at marked suture site at medial and lateral aspect of free flap. Vioptix with 62% tissue oxygenation with 93% signal strength, Incisions well approximated with sutures, dressed with Aquacel Ag, telfa island and mepilex AG, no incisional dehiscence or drainage. ROSCOE drain x1 to lateral inferior aspect of left breast which is patent, maintaining self suction with increased sanguinous output, dressed with CHG dressing.      9:30 AM photo of flap.     Assessment:  45 y.o. 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 Ortiz) with insertion of TE (per plastic surgery, Dr. Dewey) on 11/11/2024. Patient is now s/p OR with plastic surgery (Dr. Dewey and Dr. Lara) on 7/11/2025 for left breast tissue expander removal with capsulectomy and left breast reconstruction with free Deep Inferior Epigastric  flap (MANUEL). Did well post operatively and was discharged on 7/14/25. Patient is 4 days post op and has been doing well at home, pain well controlled, tolerating diet, voiding and had a BM since discharge. Reports waking up this morning and noticed discoloration of the left breast and it felt cool to touch to the upper breast flap with some increase in edema which she messaged the Plastic Surgery office and sent pictures and was instructed to present to ED immediately for evaluation and concern for flap perfusion compromise. Pain well controlled at this time and denies pain. Ambulating without difficulty. Denies any fever, chills, night sweats, CP, SOB, palpitations, nausea, vomiting, diarrhea, constipation, dysuria, hematuria, hematochezia, hematemesis, flank pain. Due to high  volumes in the ED and emergent need to see patient, patient was seen in triage room and after assessment concern for flap tissue perfusion compromise and decision to take patient emergently to the OR for Level 1 flap take back. Preop labs drawn in ED, immediately taken by OR team. Admit to Plastic surgery post operatively for flap monitoring and pain control.     S/P Exploration of flap:   Plan:  - Flap surveillance:   Continue serial flap assessments q1h per nursing with interval checks per plastic surgery team  Please assess Doppler pulse signal at marked site at central aspect of left breast flap plus flap general appearance (color, warmth, turgor)   Please additionally check Vioptix signal hourly and ensure saturation is >30% (If signal lost, abrupt change in signal or saturation <30% please immediately notify the plastic surgery team)    Please notify plastic surgery team immediately if there are any acute changes (Including decreased Doppler signal, pallor, temperature change, bleeding, etc.)  - Flap Warming/Rashmi Hugger:   Use of Rashmi hugger to L breast flap recipient site and generalized upper body to passively warm flap and promote perfusion postoperatively overnight on POD0  Settings: medium heat (38*C), high continuous fan   Avoid direct contact of warmer blanket with skin/flap sites   Anticipate DC of Rashmi Hugger on POD1 or POD2  - Dressings/wound care:   Maintain telfa island and mepilex AG dressing to left breast and abdomen, keep C/D/I    Ok to leave umbilicus VAN     - Drains:   Continue drain care to x3 ROSCOE drains present at the b/l lower abdomen and L breast (nursing orders placed)  Maintain CHG drain dressings over drain insertion sites at the skin   Please ensure that drains are compressed flat and plugged to maintain self suction at all times aside from when emptying   Nursing to strip drain tubing at least q4h and PRN to avoid drain/tubing obstruction   Nursing to please also empty and record  output quantities at least TID and PRN if drains are full   Please notify the plastics team if drain outputs exceed >30 ml in 1 hr or >200 in 24 hrs  Drains to be removed per plastics team once 24 hr outputs remain <30 ml total x2 consecutive days  If drains do not meet output criteria for removal at time of medical clearance for DC, will plan for removal per plastics upon outpatient follow up   - Positioning:   Patient to maintain beach chair positioning when lying in bed and sitting in chair postoperatively (head of bed elevated, hips flexed at least 30 degrees, knees bent and elevated on pillows)  Avoid positioning that would apply pressure to flap region or incisions   When cleared to be OOB and stand, patient to keep hips slightly flexed forward to avoid tension on lower abdominal incisions. Do not lay completely flat or stand completely erect upright postoperatively.  - Weight bearing:   Patient to maintain strict bedrest postoperatively on POD0, anticipate being OOB POD1 AM based on AM exam  <5 lbs lifting restriction to BUE postoperatively  No significant upper extremity abduction, extension or shoulder retraction to prevent tension on breast surgical site and incisions, NO compression or telemetry leads to center of chest where marked no pressure  - Other postoperative parameters/care:   IV Ancef x3 days postoperatively for surgical infection ppx   Maintain Landa while on bedrest overnight on POD0 with anticipated removal on POD1 AM based on AM exam  NPO until re-assessment on POD #1 AM, anticipate advancement to CLD on POD1 AM if stable overnight   LR mIVF @ 100 mL/hr while NPO  Nursing to record patient I&O, urine output goal of >1 mL/kg/hr  SQ Heparin q8h scheduled to start postoperatively in the AM, continue while inpatient    VS monitoring q4h  Nitrobid BID to left breast tissue for perfusion  Encouraged use of IS (10x/hr, each hr while awake)    #Acute blood loss anemia 2/2 expected blood loss in OR,  heparin gtt during OR:  - H&H preop 10.3/30.7, post op 9.0/26.7  - Presently no S/S of bleeding, considered likely due to intraoperative blood loss   - Keep T&S UTD, last obtained 7/15  - Monitor for S/S of bleeding or symptomatic anemia   - Low transfusion threshold to ensure adequate flap perfusion  - Transfuse for HGB <8 per protocol   - Monitor on AM CBC      # Acute postoperative pain, hx of opiate dependence on agonist therapy   - Nursing to assess patient pain scales at least q4h and PRN  - Continue home Suboxone 8-2 mg SL tablet PO QID (home medication dosage confirmed via  pharmacy medication history review note placed on 7/10/25)  - Continue additional regimen (ERAS protocol) as follows:   Scheduled Tylenol 975mg PO q8h   Scheduled Gabapentin 300mg PO q8h   Scheduled Robaxin 500 mg PO PRN SQH   PO Celebrex 100mg BID     # Hx T2DM   - Last HBA1C 5.8 on labs obtained 10/17/24  - Home Metformin 500 mg PO daily held postoperatively and while NPO, resume on POD1 if able and tolerating PO  - Start SSI overnight on POD0 with accuchecks   - Consider diabetic diet once clear for PO, Plans for transition to Dm diet 7/16 PM     # Hx HTN  - Home Losartan 50 mg PO daily held postoperatively, resume as BP permits with hold parameters to avoid hypotension and flap hypoperfusion   - BP stable postoperatively (110s/50s)  - Monitor VS q4h      # Hx peripheral edema   - Home hydrochlorothiazide held postoperatively, resume as BP permits and as needed for edema   - Extremity elevation while patient in bed      F: LR @ 100ml/hr while NPO overnight on POD0  E: Monitor and replete PRN  - Hypomagnesemia- Mg- 1.63 post op, replaced with 2 g Mg, repeat in AM POD1  N: NPO overnight on POD#0, anticipate transition to CLD with Jamarcus supplements BID on POD1 AM if flap exams stable overnight   GI: Continue bowel regimen with Colace BID and Milk of mag PRN, Colace Suppository PRN        Zofran (first line) and Phenergen (second line)  PRN for nausea        GI ppx postoperatively and while inpatient with Protonix      DVT ppx:  - Risk score assessed  - SQ heparin q8h while inpatient and ASA 81 on POD1  - Use of SCDs while in bed   - Anticipate early ambulation once off bedrest on POD1  - Anticipate up to chair by noon on POD1       Disposition: Continue care on RNF. Will remain inpatient for 3-4 days for continued flap monitoring, drain surveillance postoperatively and plans to RTOR tentatively for left breast flap closure based on admission      Patient and plan discussed with Dr. Zuleima Covington PA-C   Plastic and Reconstructive Surgery   Available via [employer]  Pager #[phone]  Team phone: [phone]

## 2025-07-16 NOTE — SIGNIFICANT EVENT
Department of Plastic and Reconstructive Surgery  Significant Event      Subjective:  Patient resting comfortably in chair this afternoon. States that her pain is mostly controlled and she is comfortable at this time with current pain regimen. She is tolerating a Full liquid diet without N/V.  She is about to complete her 1st blood transfusion and is feeling less lethargic. During visit, doppler signal was lost. Reincorporated anam hugger for 15 minutes that improved the signal from the most medial portion of flap. Vioptix censor was changed and new dressing were applied to the breast incisions. New Vioptix signal finalized to 99% and 99% signal. ROSCOE drain from left breast has total of 600 thus far with SS output.     Physical Exam  Constitutional: A&Ox3, calm and cooperative, NAD.  Eyes: EOMI, clear sclera.   ENMT: Moist mucous membranes, no apparent injuries or lesions.  Head/Neck: NC/AT.   Cardiovascular: RRR.   Respiratory/Thorax: Regular respirations on RA. Good symmetric chest expansion.   Gastrointestinal: Abdomen soft, not distended, appropriately tender, no peritoneal signs,  hypoactive BS x 4, +BM, lower transverse abdominal incision dressed with Mepliex AG. ROSCOE drain x 2 with serous output to each side of the b/l lower abdomen, patent and maintaining self suction, dressed with CHG dressing. Umbilicus well approximated with sutures, no drainage, erythema or dehiscence, VAN.  Genitourinary: indwelling hong in place with clear yellow output  Extremities: No peripheral edema. Moving all 4 extremities actively.   Neurological: A&Ox3.   Psychological: Appropriate mood and behavior.    Breast: MANUEL free flap recipient sites to left breast. Flap is body temperature to touch, light pink in color compared to native tissue. Previous ecchymotic and purplish color to medial aspect of flap resolved, cap refill 2-3 seconds, biphasic doppler signal at marked suture site at medial and lateral aspect of free flap. Vioptix  with 62% tissue oxygenation with 93% signal strength, Incisions well approximated with sutures, dressed with Aquacel Ag, telfa island and mepilex AG, no incisional dehiscence or drainage. ROSCOE drain x1 to lateral inferior aspect of left breast which is patent, maintaining self suction with increased sanguinous output, dressed with CHG dressing.        Picture taken at 3:30p    Assessment:  45 y.o. 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 Ortiz) with insertion of TE (per plastic surgery, Dr. Dewey) on 11/11/2024. Patient is now s/p OR with plastic surgery (Dr. Dewey and Dr. Lara) on 7/11/2025 for left breast tissue expander removal with capsulectomy and left breast reconstruction with free Deep Inferior Epigastric  flap (MANUEL). Did well post operatively and was discharged on 7/14/25. Patient is 4 days post op and has been doing well at home, pain well controlled, tolerating diet, voiding and had a BM since discharge. Reports waking up this morning and noticed discoloration of the left breast and it felt cool to touch to the upper breast flap with some increase in edema which she messaged the Plastic Surgery office and sent pictures and was instructed to present to ED immediately for evaluation and concern for flap perfusion compromise. Pain well controlled at this time and denies pain. Ambulating without difficulty. Denies any fever, chills, night sweats, CP, SOB, palpitations, nausea, vomiting, diarrhea, constipation, dysuria, hematuria, hematochezia, hematemesis, flank pain. Due to high volumes in the ED and emergent need to see patient, patient was seen in triage room and after assessment concern for flap tissue perfusion compromise and decision to take patient emergently to the OR for Level 1 flap take back. Preop labs drawn in ED, immediately taken by OR team. Admit to  Plastic surgery post operatively for flap monitoring and pain control.      S/P Exploration of flap:   Plan:  - Post op  - Improving flap perfusion and healing course  - Changed sensor to show   - Plan for possible diuresis after 2nd blood transfusion  - Will consider re starting home hydrochlorothiazide  - Transitioning to DM diet with home metformin    - Will continue to monitor flap and left breast ROSCOE output   - Will continue to monitor BP post transfusion  - Please see daily progress note for all other care and details.     Ирина Covington PA-C   Plastic and Reconstructive Surgery   Available via [employer]  Pager #[phone]  Team phone: [phone]

## 2025-07-16 NOTE — ANESTHESIA PROCEDURE NOTES
Peripheral IV  Date/Time: 7/15/2025 8:52 PM      Placement  Needle size: 20 G  Laterality: right  Location: hand  Local anesthetic: none  Site prep: alcohol  Technique: anatomical landmarks  Attempts: 1

## 2025-07-16 NOTE — SIGNIFICANT EVENT
"  Department of Plastic and Reconstructive Surgery  Progress Note     Subjective:  Patient resting comfortably in bed. States that her pain is mostly controlled and she is comfortable at this time with current pain regimen. She feels exhausted from surgery and more recently after her robaxin dose. She has recently transitioned to clear liquids this AM without N/V. She is voiding via hong catheter with clear urine and no passing gas or -BM. Of note, she reports history of chronic constipation that is exacerbated after surgeries. She mentions a suppository was the method of relief in previous hospital admissions. She denies any current fever, chills, night sweats, CP, SOB, palpitations, nausea, vomiting, diarrhea, constipation, dysuria, hematuria, hematochezia, hematemesis, flank pain.     Objective:  BP 98/62 (BP Location: Right arm, Patient Position: Lying)   Pulse 86   Temp 35.8 °C (96.4 °F) (Temporal)   Resp 16   Ht 1.575 m (5' 2\")   Wt 87.1 kg (192 lb 0.3 oz)   SpO2 93%   BMI 35.12 kg/m²      Physical Exam  Constitutional: A&Ox3, calm and cooperative, NAD.  Eyes: EOMI, clear sclera.   ENMT: Moist mucous membranes, no apparent injuries or lesions.  Head/Neck: NC/AT.   Cardiovascular: RRR.   Respiratory/Thorax: Regular respirations on RA. Good symmetric chest expansion.   Gastrointestinal: Abdomen soft, not distended, appropriately tender, no peritoneal signs,  hypoactive BS x 4, +BM, lower transverse abdominal incision dressed with Mepliex AG. ROSCOE drain x 2 with serous output to each side of the b/l lower abdomen, patent and maintaining self suction, dressed with CHG dressing. Umbilicus well approximated with sutures, no drainage, erythema or dehiscence, VAN.  Genitourinary: indwelling hong in place with clear yellow output  Extremities: No peripheral edema. Moving all 4 extremities actively.   Neurological: A&Ox3.   Psychological: Appropriate mood and behavior.    Breast: MANUEL free flap recipient sites to " left breast. Flap is body temperature to touch, light pink in color compared to native tissue. Previous ecchymotic and purplish color to medial aspect of flap resolved, cap refill 2-3 seconds, biphasic doppler signal at marked suture site at medial and lateral aspect of free flap. Vioptix with 62% tissue oxygenation with 93% signal strength, Incisions well approximated with sutures, dressed with Aquacel Ag, telfa island and mepilex AG, no incisional dehiscence or drainage. ROSCOE drain x1 to lateral inferior aspect of left breast which is patent, maintaining self suction with increased sanguinous output, dressed with CHG dressing.      9:30 AM photo of flap.     Assessment:  45 y.o. 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 Ortiz) with insertion of TE (per plastic surgery, Dr. Dewey) on 11/11/2024. Patient is now s/p OR with plastic surgery (Dr. Dewey and Dr. Lara) on 7/11/2025 for left breast tissue expander removal with capsulectomy and left breast reconstruction with free Deep Inferior Epigastric  flap (MANUEL). Did well post operatively and was discharged on 7/14/25. Patient is 4 days post op and has been doing well at home, pain well controlled, tolerating diet, voiding and had a BM since discharge. Reports waking up this morning and noticed discoloration of the left breast and it felt cool to touch to the upper breast flap with some increase in edema which she messaged the Plastic Surgery office and sent pictures and was instructed to present to ED immediately for evaluation and concern for flap perfusion compromise. Pain well controlled at this time and denies pain. Ambulating without difficulty. Denies any fever, chills, night sweats, CP, SOB, palpitations, nausea, vomiting, diarrhea, constipation, dysuria, hematuria, hematochezia, hematemesis, flank pain. Due to high  volumes in the ED and emergent need to see patient, patient was seen in triage room and after assessment concern for flap tissue perfusion compromise and decision to take patient emergently to the OR for Level 1 flap take back. Preop labs drawn in ED, immediately taken by OR team. Admit to Plastic surgery post operatively for flap monitoring and pain control.     S/P Exploration of flap:   Plan:  - Flap surveillance:   Continue serial flap assessments q1h per nursing with interval checks per plastic surgery team  Please assess Doppler pulse signal at marked site at central aspect of left breast flap plus flap general appearance (color, warmth, turgor)   Please additionally check Vioptix signal hourly and ensure saturation is >30% (If signal lost, abrupt change in signal or saturation <30% please immediately notify the plastic surgery team)    Please notify plastic surgery team immediately if there are any acute changes (Including decreased Doppler signal, pallor, temperature change, bleeding, etc.)  - Flap Warming/Rashmi Hugger:   Use of Rashmi hugger to L breast flap recipient site and generalized upper body to passively warm flap and promote perfusion postoperatively overnight on POD0  Settings: medium heat (38*C), high continuous fan   Avoid direct contact of warmer blanket with skin/flap sites   Anticipate DC of Rashmi Hugger on POD1 or POD2  - Dressings/wound care:   Maintain telfa island and mepilex AG dressing to left breast and abdomen, keep C/D/I    Ok to leave umbilicus VAN     - Drains:   Continue drain care to x3 ROSCOE drains present at the b/l lower abdomen and L breast (nursing orders placed)  Maintain CHG drain dressings over drain insertion sites at the skin   Please ensure that drains are compressed flat and plugged to maintain self suction at all times aside from when emptying   Nursing to strip drain tubing at least q4h and PRN to avoid drain/tubing obstruction   Nursing to please also empty and record  output quantities at least TID and PRN if drains are full   Please notify the plastics team if drain outputs exceed >30 ml in 1 hr or >200 in 24 hrs  Drains to be removed per plastics team once 24 hr outputs remain <30 ml total x2 consecutive days  If drains do not meet output criteria for removal at time of medical clearance for DC, will plan for removal per plastics upon outpatient follow up   - Positioning:   Patient to maintain beach chair positioning when lying in bed and sitting in chair postoperatively (head of bed elevated, hips flexed at least 30 degrees, knees bent and elevated on pillows)  Avoid positioning that would apply pressure to flap region or incisions   When cleared to be OOB and stand, patient to keep hips slightly flexed forward to avoid tension on lower abdominal incisions. Do not lay completely flat or stand completely erect upright postoperatively.  - Weight bearing:   Patient to maintain strict bedrest postoperatively on POD0, anticipate being OOB POD1 AM based on AM exam  <5 lbs lifting restriction to BUE postoperatively  No significant upper extremity abduction, extension or shoulder retraction to prevent tension on breast surgical site and incisions, NO compression or telemetry leads to center of chest where marked no pressure  - Other postoperative parameters/care:   IV Ancef x3 days postoperatively for surgical infection ppx   Maintain Landa while on bedrest overnight on POD0 with anticipated removal on POD1 AM based on AM exam  NPO until re-assessment on POD #1 AM, anticipate advancement to CLD on POD1 AM if stable overnight   LR mIVF @ 100 mL/hr while NPO  Nursing to record patient I&O, urine output goal of >1 mL/kg/hr  SQ Heparin q8h scheduled to start postoperatively in the AM, continue while inpatient    VS monitoring q4h  Nitrobid BID to left breast tissue for perfusion  Encouraged use of IS (10x/hr, each hr while awake)    #Acute blood loss anemia 2/2 expected blood loss in OR,  heparin gtt during OR:  - H&H preop 10.3/30.7, post op 9.0/26.7  - Presently no S/S of bleeding, considered likely due to intraoperative blood loss   - Keep T&S UTD, last obtained 7/15  - Monitor for S/S of bleeding or symptomatic anemia   - Low transfusion threshold to ensure adequate flap perfusion  - Transfuse for HGB <8 per protocol   - Monitor on AM CBC      # Acute postoperative pain, hx of opiate dependence on agonist therapy   - Nursing to assess patient pain scales at least q4h and PRN  - Continue home Suboxone 8-2 mg SL tablet PO QID (home medication dosage confirmed via  pharmacy medication history review note placed on 7/10/25)  - Continue additional regimen (ERAS protocol) as follows:   Scheduled Tylenol 975mg PO q8h   Scheduled Gabapentin 300mg PO q8h   Scheduled Robaxin 500 mg PO PRN SQH   PO Celebrex 100mg BID     # Hx T2DM   - Last HBA1C 5.8 on labs obtained 10/17/24  - Home Metformin 500 mg PO daily held postoperatively and while NPO, resume on POD1 if able and tolerating PO  - Start SSI overnight on POD0 with accuchecks   - Consider diabetic diet once clear for PO, Plans for transition to Dm diet 7/16 PM     # Hx HTN  - Home Losartan 50 mg PO daily held postoperatively, resume as BP permits with hold parameters to avoid hypotension and flap hypoperfusion   - BP stable postoperatively (110s/50s)  - Monitor VS q4h      # Hx peripheral edema   - Home hydrochlorothiazide held postoperatively, resume as BP permits and as needed for edema   - Extremity elevation while patient in bed      F: LR @ 100ml/hr while NPO overnight on POD0  E: Monitor and replete PRN  - Hypomagnesemia- Mg- 1.63 post op, replaced with 2 g Mg, repeat in AM POD1  N: NPO overnight on POD#0, anticipate transition to CLD with Jamarcus supplements BID on POD1 AM if flap exams stable overnight   GI: Continue bowel regimen with Colace BID and Milk of mag PRN, Colace Suppository PRN        Zofran (first line) and Phenergen (second line)  PRN for nausea        GI ppx postoperatively and while inpatient with Protonix      DVT ppx:  - Risk score assessed  - SQ heparin q8h while inpatient and ASA 81 on POD1  - Use of SCDs while in bed   - Anticipate early ambulation once off bedrest on POD1  - Anticipate up to chair by noon on POD1       Disposition: Continue care on RNF. Will remain inpatient for 3-4 days for continued flap monitoring, drain surveillance postoperatively and plans to RTOR tentatively for left breast flap closure based on admission      Patient and plan discussed with Dr. Zuleima Covington PA-C   Plastic and Reconstructive Surgery   Available via [employer]  Pager #[phone]  Team phone: [phone]

## 2025-07-16 NOTE — SIGNIFICANT EVENT
"  Department of Plastic and Reconstructive Surgery  Post Op Check    Subjective:  Resting comfortably in PACU and on floor, pain well controlled, NPO, Denies any fever, chills, night sweats, CP, SOB, palpitations, nausea, vomiting, diarrhea, constipation, dysuria, hematuria, hematochezia, hematemesis, flank pain.     Objective:  /68   Pulse 83   Temp 36.2 °C (97.2 °F) (Temporal)   Resp 16   Ht 1.575 m (5' 2\")   Wt 86.2 kg (190 lb)   SpO2 95%   BMI 34.75 kg/m²      Physical Exam  Constitutional: A&Ox3, calm and cooperative, NAD.  Eyes: EOMI, clear sclera.   ENMT: Moist mucous membranes, no apparent injuries or lesions.  Head/Neck: NC/AT.   Cardiovascular: RRR.   Respiratory/Thorax: Regular respirations on RA. Good symmetric chest expansion.   Gastrointestinal: Abdomen soft, not distended, appropriately tender, no peritoneal signs, +BS x 4, +BM, lower transverse abdominal incision dressed with Mepliex AG. ROSCOE drain x 2 with serous output to each side of the b/l lower abdomen, patent and maintaining self suction, dressed with CHG dressing. Umbilicus well approximated with sutures, no drainage, erythema or dehiscence, VAN.  Genitourinary: indwelling hong in place with clear yellow output  Extremities: No peripheral edema. Moving all 4 extremities actively.   Neurological: A&Ox3.   Psychological: Appropriate mood and behavior.    Breast: MANUEL free flap recipient sites to left breast. Flap is cool to touch, pale in color compared to native tissue but ecchymotic and purplish color to medial aspect of flap resolved, cap refill < 3 seconds, biphasic doppler signal at marked suture site at medial and lateral aspect of free flap. Vioptix with 63% tissue oxygenation with 93% signal strength, Incisions well approximated with sutures, dressed with Aquacel Ag, telfa island and mepilex AG, no incisional dehiscence or drainage. ROSCOE drain x1 to lateral inferior aspect of left breast which is patent, maintaining self " suction with sanguinous output, dressed with CHG dressing.    Assessment:  45 y.o. 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 Ortiz) with insertion of TE (per plastic surgery, Dr. Dewey) on 11/11/2024. Patient is now s/p OR with plastic surgery (Dr. Dewey and Dr. Lara) on 7/11/2025 for left breast tissue expander removal with capsulectomy and left breast reconstruction with free Deep Inferior Epigastric  flap (MANUEL). Did well post operatively and was discharged on 7/14/25. Patient is 4 days post op and has been doing well at home, pain well controlled, tolerating diet, voiding and had a BM since discharge. Reports waking up this morning and noticed discoloration of the left breast and it felt cool to touch to the upper breast flap with some increase in edema which she messaged the Plastic Surgery office and sent pictures and was instructed to present to ED immediately for evaluation and concern for flap perfusion compromise. Pain well controlled at this time and denies pain. Ambulating without difficulty. Denies any fever, chills, night sweats, CP, SOB, palpitations, nausea, vomiting, diarrhea, constipation, dysuria, hematuria, hematochezia, hematemesis, flank pain. Due to high volumes in the ED and emergent need to see patient, patient was seen in triage room and after assessment concern for flap tissue perfusion compromise and decision to take patient emergently to the OR for Level 1 flap take back. Preop labs drawn in ED, immediately taken by OR team. Admit to Plastic surgery post operatively for flap monitoring and pain control.     S/P Exploration of flap:   Plan:  - Flap surveillance:   Continue serial flap assessments q1h per nursing with interval checks per plastic surgery team  Please assess Doppler pulse signal at marked site at central aspect of left breast flap  plus flap general appearance (color, warmth, turgor)   Please additionally check Vioptix signal hourly and ensure saturation is >30% (If signal lost, abrupt change in signal or saturation <30% please immediately notify the plastic surgery team)    Please notify plastic surgery team immediately if there are any acute changes (Including decreased Doppler signal, pallor, temperature change, bleeding, etc.)  - Flap Warming/Rashmi Hugger:   Use of Rashmi hugger to L breast flap recipient site and generalized upper body to passively warm flap and promote perfusion postoperatively overnight on POD0  Settings: medium heat (38*C), high continuous fan   Avoid direct contact of warmer blanket with skin/flap sites   Anticipate DC of Rashmi Hugger on POD1 or POD2  - Dressings/wound care:   Maintain telfa island and mepilex AG dressing to left breast and abdomen, keep C/D/I    Ok to leave umbilicus VAN     - Drains:   Continue drain care to x3 ROSCOE drains present at the b/l lower abdomen and L breast (nursing orders placed)  Maintain CHG drain dressings over drain insertion sites at the skin   Please ensure that drains are compressed flat and plugged to maintain self suction at all times aside from when emptying   Nursing to strip drain tubing at least q4h and PRN to avoid drain/tubing obstruction   Nursing to please also empty and record output quantities at least TID and PRN if drains are full   Please notify the plastics team if drain outputs exceed >30 ml in 1 hr or >200 in 24 hrs  Drains to be removed per plastics team once 24 hr outputs remain <30 ml total x2 consecutive days  If drains do not meet output criteria for removal at time of medical clearance for DC, will plan for removal per plastics upon outpatient follow up   - Positioning:   Patient to maintain beach chair positioning when lying in bed and sitting in chair postoperatively (head of bed elevated, hips flexed at least 30 degrees, knees bent and elevated on  pillows)  Avoid positioning that would apply pressure to flap region or incisions   When cleared to be OOB and stand, patient to keep hips slightly flexed forward to avoid tension on lower abdominal incisions. Do not lay completely flat or stand completely erect upright postoperatively.  - Weight bearing:   Patient to maintain strict bedrest postoperatively on POD0, anticipate being OOB POD1 AM based on AM exam  <5 lbs lifting restriction to BUE postoperatively  No significant upper extremity abduction, extension or shoulder retraction to prevent tension on breast surgical site and incisions, NO compression or telemetry leads to center of chest where marked no pressure  - Other postoperative parameters/care:   IV Ancef x3 days postoperatively for surgical infection ppx   Maintain Landa while on bedrest overnight on POD0 with anticipated removal on POD1 AM based on AM exam  NPO until re-assessment on POD #1 AM, anticipate advancement to CLD on POD1 AM if stable overnight   LR mIVF @ 100 mL/hr while NPO  Nursing to record patient I&O, urine output goal of >1 mL/kg/hr  SQ Heparin q8h scheduled to start postoperatively in the AM, continue while inpatient    VS monitoring q4h  Nitrobid BID to left breast tissue for perfusion  Encouraged use of IS (10x/hr, each hr while awake)    #Acute blood loss anemia 2/2 expected blood loss in OR, heparin gtt during OR:  - H&H preop 10.3/30.7, post op 9.0/26.7  - Presently no S/S of bleeding, considered likely due to intraoperative blood loss   - Keep T&S UTD, last obtained 7/15  - Monitor for S/S of bleeding or symptomatic anemia   - Low transfusion threshold to ensure adequate flap perfusion  - Transfuse for HGB <8 per protocol   - Monitor on AM CBC      # Acute postoperative pain, hx of opiate dependence on agonist therapy   - Nursing to assess patient pain scales at least q4h and PRN  - Continue home Suboxone 8-2 mg SL tablet PO QID (home medication dosage confirmed via   pharmacy medication history review note placed on 7/10/25)  - Continue additional regimen (ERAS protocol) as follows:   Scheduled Tylenol 975mg PO q8h   Scheduled Gabapentin 300mg PO q8h   Scheduled Robaxin 500 mg PO q6h   PO Celebrex 100mg BID     # Hx T2DM   - Last HBA1C 5.8 on labs obtained 10/17/24  - Home Metformin 500 mg PO daily held postoperatively and while NPO, resume on POD1 if able and tolerating PO  - Start SSI overnight on POD0 with accuchecks   - Consider diabetic diet once clear for PO      # Hx HTN  - Home Losartan 50 mg PO daily held postoperatively, resume as BP permits with hold parameters to avoid hypotension and flap hypoperfusion   - BP stable postoperatively (110s/50s)  - Monitor VS q4h      # Hx peripheral edema   - Home hydrochlorothiazide held postoperatively, resume as BP permits and as needed for edema   - Extremity elevation while patient in bed      F: LR @ 100ml/hr while NPO overnight on POD0  E: Monitor and replete PRN  - Hypomagnesemia- Mg- 1.63 post op, replaced with 2 g Mg, repeat in AM POD1  N: NPO overnight on POD#0, anticipate transition to CLD with Jamarcus supplements BID on POD1 AM if flap exams stable overnight   GI: Continue bowel regimen with Colace BID and Milk of mag PRN        Zofran (first line) and Phenergen (second line) PRN for nausea        GI ppx postoperatively and while inpatient with Protonix      DVT ppx:  - Risk score assessed  - SQ heparin q8h while inpatient   - Use of SCDs while in bed   - Anticipate early ambulation once off bedrest on POD1       Disposition: Continue care on RNF. Will remain inpatient for 3-4 days for continued flap monitoring, drain surveillance postoperatively and plans to RTOR tentatively on Thursday 7/18 for left breast flap closure.      Patient and plan discussed with Dr. Dewey and Dr. Lara.    KATIANA Gaytan-CNP  Plastic and Reconstructive Surgery   Available via [employer]  Pager #[phone]  Team phone: [phone]

## 2025-07-16 NOTE — ANESTHESIA PREPROCEDURE EVALUATION
Patient: Etelvina Banks    Procedure Information       Date/Time: 07/15/25 2000    Procedure: EXPLORATION, BLOOD VESSEL, TORSO (Left)    Location: [facility] OR 06 / Virtual [facility] OR    Surgeons: Jaye Dewey MD            Relevant Problems   Cardiac   (+) Mixed hyperlipidemia   (+) Primary hypertension      Neuro   (+) Generalized anxiety disorder   (+) Opioid dependence on agonist therapy (Multi)      Endocrine   (+) Class 1 obesity in adult   (+) Type 2 diabetes mellitus without complication, without long-term current use of insulin      Musculoskeletal   (+) Fibromyalgia       Clinical information reviewed:    Allergies                NPO Detail:  No data recorded     Physical Exam    Airway  Mallampati: II  TM distance: >3 FB  Mouth opening: 3 or more finger widths     Cardiovascular - normal exam   Dental     (+) upper dentures     Pulmonary - normal exam   Abdominal            Anesthesia Plan    History of general anesthesia?: yes  History of complications of general anesthesia?: no    ASA 2     general     The patient is not a current smoker.  Patient did not smoke on day of procedure.    intravenous induction   Postoperative pain plan includes opioids.  Trial extubation is planned.  Anesthetic plan and risks discussed with patient.  Use of blood products discussed with patient who consented to blood products.    Plan discussed with resident.

## 2025-07-16 NOTE — CARE PLAN
The patient's goals for the shift include        Problem: Skin  Goal: Decreased wound size/increased tissue granulation at next dressing change  Outcome: Progressing  Goal: Participates in plan/prevention/treatment measures  Outcome: Progressing  Goal: Prevent/manage excess moisture  Outcome: Progressing  Goal: Prevent/minimize sheer/friction injuries  Outcome: Progressing  Goal: Promote/optimize nutrition  Outcome: Progressing  Goal: Promote skin healing  Outcome: Progressing     Problem: Pain - Adult  Goal: Verbalizes/displays adequate comfort level or baseline comfort level  Outcome: Progressing     Problem: Chronic Conditions and Co-morbidities  Goal: Patient's chronic conditions and co-morbidity symptoms are monitored and maintained or improved  Outcome: Progressing

## 2025-07-16 NOTE — SIGNIFICANT EVENT
Department of Plastic and Reconstructive Surgery  Significant Event      Subjective:  Patient resting comfortably in bed. States that her pain is mostly controlled and she is comfortable at this time with current pain regimen. She is tolerating a Full liquid diet without N/V.  Morning labs have come back H/H is 8.4/27.3. Plan for 2 units of blood products this afternoon with 10 mg of lasix for diuresis between transfusions. VS to be monitored based on protocol. During transfusion, heparin and IVF will be held. ROSCOE drain from left breast has total of 390 thus far with SS output.     Physical Exam  Constitutional: A&Ox3, calm and cooperative, NAD.  Eyes: EOMI, clear sclera.   ENMT: Moist mucous membranes, no apparent injuries or lesions.  Head/Neck: NC/AT.   Cardiovascular: RRR.   Respiratory/Thorax: Regular respirations on RA. Good symmetric chest expansion.   Gastrointestinal: Abdomen soft, not distended, appropriately tender, no peritoneal signs,  hypoactive BS x 4, +BM, lower transverse abdominal incision dressed with Mepliex AG. ROSCOE drain x 2 with serous output to each side of the b/l lower abdomen, patent and maintaining self suction, dressed with CHG dressing. Umbilicus well approximated with sutures, no drainage, erythema or dehiscence, VAN.  Genitourinary: indwelling hong in place with clear yellow output  Extremities: No peripheral edema. Moving all 4 extremities actively.   Neurological: A&Ox3.   Psychological: Appropriate mood and behavior.    Breast: MANUEL free flap recipient sites to left breast. Flap is body temperature to touch, light pink in color compared to native tissue. Previous ecchymotic and purplish color to medial aspect of flap resolved, cap refill 2-3 seconds, biphasic doppler signal at marked suture site at medial and lateral aspect of free flap. Vioptix with 62% tissue oxygenation with 93% signal strength, Incisions well approximated with sutures, dressed with Aquacel Ag, telfa island and  mepilex AG, no incisional dehiscence or drainage. ROSCOE drain x1 to lateral inferior aspect of left breast which is patent, maintaining self suction with increased sanguinous output, dressed with CHG dressing.        Picture taken at 11:30a    Assessment:  45 y.o. 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 Ortiz) with insertion of TE (per plastic surgery, Dr. Dewey) on 11/11/2024. Patient is now s/p OR with plastic surgery (Dr. Dewey and Dr. Lara) on 7/11/2025 for left breast tissue expander removal with capsulectomy and left breast reconstruction with free Deep Inferior Epigastric  flap (MANUEL). Did well post operatively and was discharged on 7/14/25. Patient is 4 days post op and has been doing well at home, pain well controlled, tolerating diet, voiding and had a BM since discharge. Reports waking up this morning and noticed discoloration of the left breast and it felt cool to touch to the upper breast flap with some increase in edema which she messaged the Plastic Surgery office and sent pictures and was instructed to present to ED immediately for evaluation and concern for flap perfusion compromise. Pain well controlled at this time and denies pain. Ambulating without difficulty. Denies any fever, chills, night sweats, CP, SOB, palpitations, nausea, vomiting, diarrhea, constipation, dysuria, hematuria, hematochezia, hematemesis, flank pain. Due to high volumes in the ED and emergent need to see patient, patient was seen in triage room and after assessment concern for flap tissue perfusion compromise and decision to take patient emergently to the OR for Level 1 flap take back. Preop labs drawn in ED, immediately taken by OR team. Admit to Plastic surgery post operatively for flap monitoring and pain control.      S/P Exploration of flap:   Plan:  - Post op  - Improving flap  perfusion and healing course  - Plan for 2 units of blood transfusion with diuresis between infusions   - will continue to monitor flap and left breast ROSCOE output   - Please see daily progress note for all other care and details.     Ирина Covington PA-C   Plastic and Reconstructive Surgery   Available via [employer]  Pager #[phone]  Team phone: [phone]

## 2025-07-16 NOTE — HOSPITAL COURSE
BRIEF HISTORY:    Etelvina Banks is a 45 y.o. 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 Ortiz) with insertion of TE (per plastic surgery, Dr. Dewey) on 11/11/2024. Patient is now s/p OR with plastic surgery (Dr. Dewey and Dr. Lara) on 7/11/2025 for left breast tissue expander removal with capsulectomy and left breast reconstruction with free Deep Inferior Epigastric  flap (MANUEL). Did well post operatively and was discharged on 7/14/25. Patient is 4 days post op and has been doing well at home, pain well controlled, tolerating diet, voiding and had a BM since discharge. Reports waking up this morning and noticed discoloration of the left breast and it felt cool to touch to the upper breast flap with some increase in edema which she messaged the Plastic Surgery office and sent pictures and was instructed to present to ED immediately for evaluation and concern for flap perfusion compromise. Pain well controlled at this time and denies pain. Ambulating without difficulty. Due to high volumes in the ED and emergent need to see patient, patient was seen in triage room and after assessment concern for flap tissue perfusion compromise and decision to take patient emergently to the OR for Level 1 flap take back.     HOSPITAL COURSE:    Taken to OR emergently for exploration of flap with possible vein graft, admitted post operatively for flap monitoring with doppler checks and vioptix every 1 hour.     CONSULTATIONS:  *** consulted for *** and elected to ***    DAY OF DISCHARGE:    On the day of discharge, the patient was seen and evaluated by the Plastic Surgery team and deemed suitable for discharge to ***.  There were no significant events overnight. Vitals were reviewed and within normal limits. Labs were stable at discharge. On day of discharge the patient was tolerating a  diet, pain was controlled on PO pain medication, was ambulating well and voiding spontaneously. The patient was given detailed discharge instructions and were scheduled to follow up as an outpatient.

## 2025-07-16 NOTE — CARE PLAN
Problem: Skin  Goal: Decreased wound size/increased tissue granulation at next dressing change  7/16/2025 0652 by Crystal Godoy RN  Outcome: Progressing  7/16/2025 0652 by Crystal Godoy RN  Outcome: Progressing  Goal: Participates in plan/prevention/treatment measures  7/16/2025 0652 by Crystal Godoy RN  Outcome: Progressing  7/16/2025 0652 by Crystal Godoy RN  Outcome: Progressing  Goal: Prevent/manage excess moisture  7/16/2025 0652 by Crystal Godoy RN  Outcome: Progressing  7/16/2025 0652 by Crystal Godoy RN  Outcome: Progressing  Goal: Prevent/minimize sheer/friction injuries  7/16/2025 0652 by Crystal Godoy RN  Outcome: Progressing  7/16/2025 0652 by Crystal Godoy RN  Outcome: Progressing  Goal: Promote/optimize nutrition  7/16/2025 0652 by Crystal Godoy RN  Outcome: Progressing  7/16/2025 0652 by Crystal Godoy RN  Outcome: Progressing  Goal: Promote skin healing  7/16/2025 0652 by Crystal Godoy RN  Outcome: Progressing  7/16/2025 0652 by Crystal Godoy RN  Outcome: Progressing     Problem: Pain - Adult  Goal: Verbalizes/displays adequate comfort level or baseline comfort level  7/16/2025 0652 by Crystal Godoy RN  Outcome: Progressing  7/16/2025 0652 by Crystal Godoy RN  Outcome: Progressing     Problem: Safety - Adult  Goal: Free from fall injury  7/16/2025 0652 by Crystal Godoy RN  Outcome: Progressing  7/16/2025 0652 by Crystal Godoy RN  Outcome: Progressing     Problem: Discharge Planning  Goal: Discharge to home or other facility with appropriate resources  7/16/2025 0652 by Crystal Godoy RN  Outcome: Progressing  7/16/2025 0652 by Crystal Godoy RN  Outcome: Progressing     Problem: Chronic Conditions and Co-morbidities  Goal: Patient's chronic conditions and co-morbidity symptoms are monitored and maintained or improved  7/16/2025 0652 by Crystal Godoy RN  Outcome: Progressing  7/16/2025 0652 by Crystal Godoy RN  Outcome: Progressing     Problem: Nutrition  Goal: Nutrient intake appropriate for  maintaining nutritional needs  7/16/2025 0652 by Crystal Godoy, RN  Outcome: Progressing  7/16/2025 0652 by Crystal Godoy, RN  Outcome: Progressing

## 2025-07-16 NOTE — PROGRESS NOTES
"Pharmacy Medication History Review    Etelvina Banks is a 45 y.o. female admitted for Failed flap. Pharmacy reviewed the patient's viumn-ph-phgwtehgn medications and allergies for accuracy.      Medications CHANGES:  Metformin to \"2 tablets HS\"    The list below reflects the updated PTA list.   Prior to Admission Medications   Prescriptions Informant   buprenorphine-naloxone (Suboxone) 8-2 mg SL tablet Self   Sig: Place 1 tablet under the tongue 4 times a day.   cream base no.31, bulk, (Transdermal Pain Base) cream Self   Sig: once daily as needed.   docusate sodium (Colace) 100 mg capsule Self   Sig: Take 1 capsule (100 mg) by mouth 2 times a day as needed for constipation for up to 7 days.   gabapentin (Neurontin) 300 mg capsule    Sig: Take 1 capsule (300 mg) by mouth every 8 hours for 14 days.   hydroCHLOROthiazide (Microzide) 12.5 mg tablet    Sig: Take 1 tablet (12.5 mg) by mouth once daily as needed (pitting edema).   ibuprofen 800 mg tablet Self   Sig: Take 1 tablet (800 mg) by mouth every 8 hours if needed for mild pain (1 - 3).   losartan (Cozaar) 50 mg tablet Self   Sig: Take 1 tablet (50 mg) by mouth once daily.   metFORMIN XR (Glucophage-XR) 500 mg 24 hr tablet Self   Sig: Take 2 tablet PO dailyTake 2 tablet PO daily   Patient taking differently: Take 2 tablets (1,000 mg) by mouth once daily at bedtime. Take 2 tablet PO dailyTake 2 tablet PO daily   methocarbamol (Robaxin) 500 mg tablet Self   Sig: Take 1 tablet (500 mg) by mouth every 6 hours if needed for muscle spasms for up to 7 days.   multivitamin tablet Self   Sig: Take 1 tablet by mouth once daily.      Facility-Administered Medications: None        The list below reflects the updated allergy list. Please review each documented allergy for additional clarification and justification.  Allergies  Reviewed by Jey Cooper PharmD on 7/16/2025   No Known Allergies         Sources:   [facility] [[email or web address]]  Pharmacy dispense history  Patient Interview Good " "historian  Chart Review  Care Everywhere    Additional Comments:  Confirmed with patient taking Suboxone QID. Last dispensed on dispense hx as 7/15/2025 (no OARRS) and as 11/19/2024      Jey Cooper, PharmD  07/16/25     Secure Chat preferred   If no response call [id number] or Vocera \"Med Rec\"    "

## 2025-07-16 NOTE — DISCHARGE INSTRUCTIONS
Plastic Surgery Post Discharge Instructions  You were admitted to [facility] for postoperative monitoring and control of pain following return to the OR for flap exploration on 7/15/25 with Dr. Dewey and Dr. Lara of plastic surgery. Included below are post-discharge instructions and details regarding follow-up.     Thank you for allowing us to participate in your care and we wish you the best!    Best Regards,  [facility]  Department of Plastic and Reconstructive Surgery    Activity:  Activity as tolerated ***. No pushing, pulling or lifting objects greater than 5 pounds ***. Continue to maintain non-weight bearing status to ***. Ensure no compression is applied to the *** or free flap sites. Continue to elevate your *** to alleviate postoperative edema. Please do not apply ice or heat directly to free flap without barrier in place ***.     You may locally bathe following discharge. Avoid soaking or submerging surgical incisions/sites or wetting wound vac dressing or device ***.      Surgical Site/Wound Care:  Mepilex AG to abdominal incision    Local wound care instructions ***. Avoid application of creams, lotions or ointments to surgical site, no soaking or scrubbing of surgical sites.     Continue to monitor flap for changes in general appearance, color, temperature and turgor. Please additionally monitor for any developing signs of infection which may include increased redness, swelling, fever/chills, green/yellow drainage, or foul odor from surgical sites or wounds. If any signs of infection or changes in flap appearance are to occur, please immediately contact the plastic surgery office.     Drain care:  You are being discharged with 3 ROSCOE drains (1 to left breast and 1 to right and 1 to left side of abdomen). To empty the drain, open the cap, tip into cup and squeeze to empty. Squeeze drain flat then replace the cap.  Please empty the drain and record  its output 3 times a day and bring these numbers to your follow up appointment.  The drain output should decrease and the color of the drainage should become lighter (red to pink to yellow).  This drain is sutured into place.  Keep the area around the drain clean and dry.  You may use mild soap and water to cleanse around the drain.  It is ok to shower; do not soak in a tub. Change the gauze around the drain as needed.  Call the office if you notice drastic changes in drain output, bloody drain output, or redness/drainage around insertion site.    Nutrition:  You may resume a regular diet following surgery with increased protein. Ensure that you are drinking an adequate amount of fluids to maintain hydration as well as consuming a diet high in protein and low in sugar. You may consider increasing your fiber intake to avoid constipation.    Do not smoke, as smoking delays healing and increases the risk of complications. Also be sure you are not around people that smoke for at least 6 weeks after surgery.  Second hand smoke is just as harmful as if you were to smoke.    Medication Instructions:  You may resume use of your home prescribed mediations as previously directed following discharge from the hospital. If you were taking medications prior to your surgery and they are not listed on your discharge homegoing instructions medications list, consult your MD before you resume these medications.    Some postoperative pain is not unusual. This is usually relieved by taking prescribed or over the counter Acetaminophen/Tylenol, Motrin/ibuprofen. In cases of severe pain, you may use prescribed *** as directed. Severe pain despite administration of pain medication must be reported to your physician.    Remember when taking Acetaminophen, do NOT exceed more than 1000 milligrams (mg) per dose or more than 4000 mg total per day. Taking too much Acetaminophen at one time can damage your liver. The maximum amount of ibuprofen in  adults is 800 mg per dose or 3200 mg per day. Call your MD if you have any questions about your medications. To prevent constipation while taking narcotic pain medications, please utilize your prescribed bowel regimen, ensure that you drink plenty of water, eat fiber rich foods (a good source is fruit) and increase activity progressively.    DO NOT drive a car while utilizing narcotic pain medications and until cleared by MD at follow-up appointment. Driving or operating heavy machinery, lawnmowers or power tools while taking opiod/narcotic pain medications may impair your judgement.    Call Physician If:  Contact the plastic surgery office for any questions and/or concerns regarding the surgical incision/site.  1. [phone] if Monday-Friday (8 a.m. - 4:30 p.m.)  2. [phone] and ask for the Plastic Surgery team on call provider if after hours or on weekends  3. Email [email or web address] for any non-urgent concerns and when relaying weekly progress photos of flap sites. ***    Call your MD or seek immediate medical attention if you experience any of the following symptoms:  1. Fever of 101.5 (38.5 C) or greater  2. Pain not controlled with prescribed pain medications  3. Uncontrolled nausea and/or vomiting  4. Drainage or swelling around your incisions and/or surgical sites   5. Separation of incisions, or tearing of the incision line  6. Large fluid collection under or around the incision or flap sites   7. Flap discoloration (including darkened appearance)  8. Difficulty breathing  9. Swelling, pain, heat and/or redness in your legs and/or calves  10. Inability to tolerate diet/fluid intake    Additional Notes:  ***    Follow-up/Post Discharge Appointments:  Follow-up care is a key part of your treatment and safety. It is very important that you maintain follow-up care as directed so that your surgical site heals properly and does not lead to problems. Always carry a current medication list with  you and bring it to ALL healthcare Provider visits. Be sure to maintain follow up with plastic surgery at your scheduled appointment. If you are unable to keep your appointment, or need to reschedule please contact our office at [phone]. ***

## 2025-07-17 LAB
ANION GAP SERPL CALC-SCNC: 13 MMOL/L (ref 10–20)
BLOOD EXPIRATION DATE: NORMAL
BLOOD EXPIRATION DATE: NORMAL
BUN SERPL-MCNC: 14 MG/DL (ref 6–23)
CALCIUM SERPL-MCNC: 8.1 MG/DL (ref 8.6–10.6)
CHLORIDE SERPL-SCNC: 104 MMOL/L (ref 98–107)
CO2 SERPL-SCNC: 27 MMOL/L (ref 21–32)
CREAT SERPL-MCNC: 0.79 MG/DL (ref 0.5–1.05)
DISPENSE STATUS: NORMAL
DISPENSE STATUS: NORMAL
EGFRCR SERPLBLD CKD-EPI 2021: >90 ML/MIN/1.73M*2
ERYTHROCYTE [DISTWIDTH] IN BLOOD BY AUTOMATED COUNT: 14.7 % (ref 11.5–14.5)
GLUCOSE BLD MANUAL STRIP-MCNC: 111 MG/DL (ref 74–99)
GLUCOSE BLD MANUAL STRIP-MCNC: 113 MG/DL (ref 74–99)
GLUCOSE BLD MANUAL STRIP-MCNC: 124 MG/DL (ref 74–99)
GLUCOSE BLD MANUAL STRIP-MCNC: 127 MG/DL (ref 74–99)
GLUCOSE BLD MANUAL STRIP-MCNC: 149 MG/DL (ref 74–99)
GLUCOSE BLD MANUAL STRIP-MCNC: 149 MG/DL (ref 74–99)
GLUCOSE SERPL-MCNC: 108 MG/DL (ref 74–99)
HCT VFR BLD AUTO: 28.6 % (ref 36–46)
HGB BLD-MCNC: 9 G/DL (ref 12–16)
MAGNESIUM SERPL-MCNC: 1.89 MG/DL (ref 1.6–2.4)
MCH RBC QN AUTO: 28.3 PG (ref 26–34)
MCHC RBC AUTO-ENTMCNC: 31.5 G/DL (ref 32–36)
MCV RBC AUTO: 90 FL (ref 80–100)
NRBC BLD-RTO: 0.2 /100 WBCS (ref 0–0)
PHOSPHATE SERPL-MCNC: 3.5 MG/DL (ref 2.5–4.9)
PLATELET # BLD AUTO: 319 X10*3/UL (ref 150–450)
POTASSIUM SERPL-SCNC: 3.6 MMOL/L (ref 3.5–5.3)
PRODUCT BLOOD TYPE: 5100
PRODUCT BLOOD TYPE: 5100
PRODUCT CODE: NORMAL
PRODUCT CODE: NORMAL
RBC # BLD AUTO: 3.18 X10*6/UL (ref 4–5.2)
SODIUM SERPL-SCNC: 140 MMOL/L (ref 136–145)
UNIT ABO: NORMAL
UNIT ABO: NORMAL
UNIT NUMBER: NORMAL
UNIT NUMBER: NORMAL
UNIT RH: NORMAL
UNIT RH: NORMAL
UNIT VOLUME: 350
UNIT VOLUME: 350
WBC # BLD AUTO: 9 X10*3/UL (ref 4.4–11.3)
XM INTEP: NORMAL
XM INTEP: NORMAL

## 2025-07-17 PROCEDURE — 84100 ASSAY OF PHOSPHORUS: CPT | Performed by: PHYSICIAN ASSISTANT

## 2025-07-17 PROCEDURE — 36415 COLL VENOUS BLD VENIPUNCTURE: CPT | Performed by: NURSE PRACTITIONER

## 2025-07-17 PROCEDURE — 85027 COMPLETE CBC AUTOMATED: CPT | Performed by: NURSE PRACTITIONER

## 2025-07-17 PROCEDURE — 2500000005 HC RX 250 GENERAL PHARMACY W/O HCPCS: Mod: SE | Performed by: NURSE PRACTITIONER

## 2025-07-17 PROCEDURE — 80048 BASIC METABOLIC PNL TOTAL CA: CPT | Performed by: NURSE PRACTITIONER

## 2025-07-17 PROCEDURE — 2500000001 HC RX 250 WO HCPCS SELF ADMINISTERED DRUGS (ALT 637 FOR MEDICARE OP): Mod: SE | Performed by: PHYSICIAN ASSISTANT

## 2025-07-17 PROCEDURE — 82947 ASSAY GLUCOSE BLOOD QUANT: CPT

## 2025-07-17 PROCEDURE — 83735 ASSAY OF MAGNESIUM: CPT | Performed by: NURSE PRACTITIONER

## 2025-07-17 PROCEDURE — 2500000004 HC RX 250 GENERAL PHARMACY W/ HCPCS (ALT 636 FOR OP/ED): Mod: SE | Performed by: PHYSICIAN ASSISTANT

## 2025-07-17 PROCEDURE — 2060000001 HC INTERMEDIATE ICU ROOM DAILY

## 2025-07-17 PROCEDURE — 2500000002 HC RX 250 W HCPCS SELF ADMINISTERED DRUGS (ALT 637 FOR MEDICARE OP, ALT 636 FOR OP/ED): Mod: SE | Performed by: NURSE PRACTITIONER

## 2025-07-17 PROCEDURE — 2500000004 HC RX 250 GENERAL PHARMACY W/ HCPCS (ALT 636 FOR OP/ED): Mod: SE | Performed by: NURSE PRACTITIONER

## 2025-07-17 PROCEDURE — 2500000001 HC RX 250 WO HCPCS SELF ADMINISTERED DRUGS (ALT 637 FOR MEDICARE OP): Mod: SE | Performed by: NURSE PRACTITIONER

## 2025-07-17 PROCEDURE — 99233 SBSQ HOSP IP/OBS HIGH 50: CPT | Performed by: PHYSICIAN ASSISTANT

## 2025-07-17 RX ORDER — CEFAZOLIN SODIUM 2 G/100ML
2 INJECTION, SOLUTION INTRAVENOUS EVERY 8 HOURS
Status: DISCONTINUED | OUTPATIENT
Start: 2025-07-17 | End: 2025-07-21

## 2025-07-17 RX ORDER — INSULIN LISPRO 100 [IU]/ML
0-5 INJECTION, SOLUTION INTRAVENOUS; SUBCUTANEOUS
Status: DISCONTINUED | OUTPATIENT
Start: 2025-07-17 | End: 2025-07-20

## 2025-07-17 RX ADMIN — GABAPENTIN 300 MG: 300 CAPSULE ORAL at 13:00

## 2025-07-17 RX ADMIN — GABAPENTIN 300 MG: 300 CAPSULE ORAL at 21:13

## 2025-07-17 RX ADMIN — CEFAZOLIN SODIUM 2 G: 2 INJECTION, SOLUTION INTRAVENOUS at 16:20

## 2025-07-17 RX ADMIN — BUPRENORPHINE AND NALOXONE 1 TABLET: 8; 2 TABLET SUBLINGUAL at 08:18

## 2025-07-17 RX ADMIN — DOCUSATE SODIUM 100 MG: 100 CAPSULE, LIQUID FILLED ORAL at 08:18

## 2025-07-17 RX ADMIN — METHOCARBAMOL 500 MG: 500 TABLET ORAL at 23:45

## 2025-07-17 RX ADMIN — HEPARIN SODIUM 5000 UNITS: 5000 INJECTION, SOLUTION INTRAVENOUS; SUBCUTANEOUS at 04:21

## 2025-07-17 RX ADMIN — PANTOPRAZOLE SODIUM 40 MG: 40 TABLET, DELAYED RELEASE ORAL at 08:18

## 2025-07-17 RX ADMIN — BUPRENORPHINE AND NALOXONE 1 TABLET: 8; 2 TABLET SUBLINGUAL at 12:56

## 2025-07-17 RX ADMIN — ASPIRIN 81 MG: 81 TABLET, COATED ORAL at 08:18

## 2025-07-17 RX ADMIN — HEPARIN SODIUM 5000 UNITS: 5000 INJECTION, SOLUTION INTRAVENOUS; SUBCUTANEOUS at 21:13

## 2025-07-17 RX ADMIN — SODIUM CHLORIDE 40 MCG: 9 INJECTION, SOLUTION INTRAVENOUS at 21:13

## 2025-07-17 RX ADMIN — CEFAZOLIN SODIUM 2 G: 2 INJECTION, SOLUTION INTRAVENOUS at 08:19

## 2025-07-17 RX ADMIN — DOCUSATE SODIUM 100 MG: 100 CAPSULE, LIQUID FILLED ORAL at 21:13

## 2025-07-17 RX ADMIN — METFORMIN ER 500 MG 500 MG: 500 TABLET ORAL at 23:39

## 2025-07-17 RX ADMIN — VERAPAMIL HYDROCHLORIDE 40 MG: 40 TABLET, FILM COATED ORAL at 21:13

## 2025-07-17 RX ADMIN — BUPRENORPHINE AND NALOXONE 1 TABLET: 8; 2 TABLET SUBLINGUAL at 21:13

## 2025-07-17 RX ADMIN — ASCORBIC ACID 150 MG: 500 INJECTION INTRAVENOUS at 08:19

## 2025-07-17 RX ADMIN — BUPRENORPHINE AND NALOXONE 1 TABLET: 8; 2 TABLET SUBLINGUAL at 16:20

## 2025-07-17 RX ADMIN — CEFAZOLIN SODIUM 2 G: 2 INJECTION, SOLUTION INTRAVENOUS at 00:25

## 2025-07-17 RX ADMIN — GABAPENTIN 300 MG: 300 CAPSULE ORAL at 05:32

## 2025-07-17 RX ADMIN — NITROGLYCERIN 0.5 INCH: 20 OINTMENT TOPICAL at 08:18

## 2025-07-17 RX ADMIN — HEPARIN SODIUM 5000 UNITS: 5000 INJECTION, SOLUTION INTRAVENOUS; SUBCUTANEOUS at 12:56

## 2025-07-17 RX ADMIN — VERAPAMIL HYDROCHLORIDE 40 MG: 40 TABLET, FILM COATED ORAL at 05:32

## 2025-07-17 ASSESSMENT — COGNITIVE AND FUNCTIONAL STATUS - GENERAL
STANDING UP FROM CHAIR USING ARMS: A LITTLE
WALKING IN HOSPITAL ROOM: A LITTLE
MOVING FROM LYING ON BACK TO SITTING ON SIDE OF FLAT BED WITH BEDRAILS: A LITTLE
MOBILITY SCORE: 18
TOILETING: A LITTLE
TURNING FROM BACK TO SIDE WHILE IN FLAT BAD: A LITTLE
HELP NEEDED FOR BATHING: A LITTLE
DRESSING REGULAR LOWER BODY CLOTHING: A LITTLE
DAILY ACTIVITIY SCORE: 20
MOVING TO AND FROM BED TO CHAIR: A LITTLE
DRESSING REGULAR UPPER BODY CLOTHING: A LITTLE
CLIMB 3 TO 5 STEPS WITH RAILING: A LITTLE

## 2025-07-17 ASSESSMENT — PAIN SCALES - GENERAL
PAINLEVEL_OUTOF10: 5 - MODERATE PAIN
PAINLEVEL_OUTOF10: 3
PAINLEVEL_OUTOF10: 5 - MODERATE PAIN

## 2025-07-17 ASSESSMENT — PAIN - FUNCTIONAL ASSESSMENT: PAIN_FUNCTIONAL_ASSESSMENT: 0-10

## 2025-07-17 NOTE — SIGNIFICANT EVENT
Department of Plastic and Reconstructive Surgery  PM Flap Check/Loss of Doppler signal    18:15- Notified by RN, unable to obtain doppler signal to lateral and medial stitch, Vioptix at 86% with 95% signal strength, immediately assessed at bedside per Plastics, dressings removed from left breast, flap slightly warm to touch at medial aspect and cool to touch at lateral aspect, dusky/pale in appearance, unable to get doppler at marked stitch to lateral aspect and medial. Dr. Lara at bedside, scratch test performed and no blood return, Vioptix replaced, continue to monitor Vioptix Q1 hour. (See below picture)      21:30- Flap unchanged from prior assessment, no doppler signal, slightly warmer to touch to medial aspect, lateral aspect cool to touch, able to replace Vioptix with better signal, 49% tissue oxygenation with 94% signal, concern for vasospasm and received Verapamil 40 mg, starting Alprostadil IV at this time, placed on telemetry for monitoring, pain well controlled, updated on plan to continue to monitor flap closely. (See below picture). Updated Dr. Dewey and Dr. Lara.         KATIANA Gaytan-CNP  Plastic and Reconstructive Surgery   Available via [employer]  Pager #[phone]  Team phone: [phone]

## 2025-07-17 NOTE — CARE PLAN
The patient's goals for the shift include        Problem: Skin  Goal: Decreased wound size/increased tissue granulation at next dressing change  Outcome: Progressing  Goal: Participates in plan/prevention/treatment measures  Outcome: Progressing  Goal: Prevent/manage excess moisture  Outcome: Progressing  Goal: Prevent/minimize sheer/friction injuries  Outcome: Progressing  Goal: Promote/optimize nutrition  Outcome: Progressing  Goal: Promote skin healing  Outcome: Progressing     Problem: Pain - Adult  Goal: Verbalizes/displays adequate comfort level or baseline comfort level  Outcome: Progressing     Problem: Safety - Adult  Goal: Free from fall injury  Outcome: Progressing     Problem: Nutrition  Goal: Nutrient intake appropriate for maintaining nutritional needs  Outcome: Progressing

## 2025-07-17 NOTE — PROGRESS NOTES
07/17/25 1500   Discharge Planning   Living Arrangements Spouse/significant other   Support Systems Spouse/significant other   Assistance Needed none   Type of Residence Private residence   Who is requesting discharge planning? Provider   Home or Post Acute Services None   Expected Discharge Disposition Home   Does the patient need discharge transport arranged? No     Etelvina Banks  is a 45 y.o. 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 Ortiz) with insertion of TE (per plastic surgery, Dr. Dewey) on 11/11/2024. Patient is now s/p OR with plastic surgery (Dr. Dewey and Dr. Lara) on 7/11/2025 for left breast tissue expander removal with capsulectomy and left breast reconstruction with free Deep Inferior Epigastric  flap (MANUEL). She progressed well postoperatively and was discharged home on 7/14/25. Patient re-admitted following emergent OR on Tuesday 7/15 PM for re-exploration of left breast free flap, repair of vessel, embolectomy, and flap debridement after patient presented with pallor and purple discoloration of her free flap site. She remains admitted for further flap observation/management. ADOD 3-4 days, needs TBD.     Care Transitions Note 7/17/25 @ 3:12pm     Plan per Medical/Surgical Team: flap surveillance with plan to return to OR    Status: inpatient   Payor Source: [facility]   Discharge disposition: home   Expected date of discharge: 7/21/25  Barriers: none   PCP / Primary Oncologist: Judith Baeza DO   Preferred Pharmacy: [facility] [place]   Preferred home care agency: [facility]   DME: none   Dialysis: no  DM: yes     Met with patient at bedside, introduced self and role. Demographics and insurance verifed with patient. Patient lives in a 2-story home with her 3 children and boyfriend. No DME or home O2 requirements. Not active with home care. Family  will transport home at discharge. Patient reports no falls at home and that she feels safe. Will continue to follow for any discharge planning needs. Anushka Seymour RN TCC

## 2025-07-17 NOTE — CARE PLAN
The patient's goals for the shift include      The clinical goals for the shift include patient will remain HDS throughout shift.      Problem: Skin  Goal: Decreased wound size/increased tissue granulation at next dressing change  Outcome: Progressing  Goal: Participates in plan/prevention/treatment measures  Outcome: Progressing  Goal: Prevent/manage excess moisture  Outcome: Progressing  Goal: Prevent/minimize sheer/friction injuries  Outcome: Progressing  Goal: Promote/optimize nutrition  Outcome: Progressing  Goal: Promote skin healing  Outcome: Progressing     Problem: Pain - Adult  Goal: Verbalizes/displays adequate comfort level or baseline comfort level  Outcome: Progressing     Problem: Safety - Adult  Goal: Free from fall injury  Outcome: Progressing     Problem: Discharge Planning  Goal: Discharge to home or other facility with appropriate resources  Outcome: Progressing     Problem: Chronic Conditions and Co-morbidities  Goal: Patient's chronic conditions and co-morbidity symptoms are monitored and maintained or improved  Outcome: Progressing     Problem: Nutrition  Goal: Nutrient intake appropriate for maintaining nutritional needs  Outcome: Progressing

## 2025-07-17 NOTE — PROGRESS NOTES
Division of Plastic and Reconstructive Surgery  Progress Note    Subjective   Denies acute concerns this AM, reports being able to get some sleep overnight. Surgical site pain controlled. She is tolerating a carb controlled diet without nausea or vomiting. Off bedrest this AM. Voiding per indwelling hong catheter which is anticipated for removal today. Denies BM since OR.     Other additional details/overnight events:   - Doppler pulse signals not audible starting on PM exams yesterday evening around 1815. No doppler signal appreciated on AM assessment today throughout flap site. There is new darkening/discoloration with skin blistering to the lateral aspect of the flap. Vioptix signal intact with 49% saturation at 98 signal strength.   - L breast ROSCOE drain with 1020 ml output over the past day, bloody serous drainage character. HGB 9.0 this AM.   - Tylenol and celebrex on hold given ongoing prostin infusions.     Objective   Vitals:    07/17/25 0817   BP: 104/65   Pulse: 89   Resp: 16   Temp: 36 °C (96.8 °F)   SpO2: 95%     Physical Exam  Constitutional: A&Ox3, NAD, resting in bed.  Eyes: EOMI, clear sclera.   ENMT: Moist mucous membranes, no apparent injuries or lesions.  Head/Neck: NC/AT.   Cardiovascular: RRR.   Respiratory/Thorax: Regular respirations on RA. Good symmetric chest expansion.   Gastrointestinal: Abdomen soft, not distended, appropriately tender, no peritoneal signs, hypoactive BS x 4, +BM, lower transverse abdominal incision dressed with Mepliex AG. ROSCOE drain x 2 with serous output to each side of the b/l lower abdomen, patent and maintaining self suction, dressed with CHG dressing. Umbilicus well approximated with sutures, no drainage, erythema or dehiscence, VAN.  Genitourinary: Indwelling hong in place with clear yellow output.  Extremities: Non-pitting peripheral edema of the lower extremities. Moving all 4 extremities actively.   Neurological: A&Ox3.   Psychological: Appropriate mood and  behavior.    Breast: MANUEL free flap recipient site to left breast. Flap is body temperature to touch, soft to palpation, slightly more pale than surrounding native tissue. Purple tissue discoloration and darkening with skin blistering to the lateral aspect of the flap. There is additionally purple discoloration of the flap edge circumferentially, sutures intact and approximating flap without dehiscence or drainage. No audible Doppler pulse signal throughout the flap. Flap poke test with insulin syringe needle reveals no spontaneous blood show following multiple seconds of observation. Vioptix signal remains intact at 49% saturation at 98 signal strength. ROSCOE drain x1 to lateral inferior aspect of left breast which is patent, maintaining self suction with increased bloody sanguinous output, insert site dressed with CHG dressing.    Assessment   Etelvina Banks  is a 45 y.o. 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 Ortiz) with insertion of TE (per plastic surgery, Dr. Dewey) on 11/11/2024. Patient is now s/p OR with plastic surgery (Dr. Dewey and Dr. Lara) on 7/11/2025 for left breast tissue expander removal with capsulectomy and left breast reconstruction with free Deep Inferior Epigastric  flap (MANUEL). She progressed well postoperatively and was discharged home on 7/14/25. Patient re-admitted following emergent OR on Tuesday 7/15 PM for re-exploration of left breast free flap, repair of vessel, embolectomy, and flap debridement after patient presented with pallor and purple discoloration of her free flap site. She remains admitted for further flap observation/management.     Plan:   # S/p re-exploration of left breast free flap, repair of vessel, embolectomy, and flap debridement   - Concern for flap viability based on repeat assessments throughout the day today, will continue  to monitor for now. If flap fully declared non-viable, Dr. Dewey discussed potentially looking into debridement and breat reconstruction via either PAP or LAT flap potentially during this admission pending patient's clinical course and surgeon/OR availability.   - Flap surveillance:   OK to DC flap checks per nursing, intermittent flap checks to be completed per plastic surgery JIL team   Plastics JIL team to monitor Vioptix signal hourly and ensure saturation is >30% (If signal lost, abrupt change in signal or saturation <30%, nursing to please immediately notify the plastic surgery team)    Nursing should also notify plastic surgery team immediately if there are any other acute changes (including worsening pallor, temperature change, bleeding, etc.)  - Flap Warming/Rashmi Hugger:   Use of Rashmi hugger to L breast flap recipient site and generalized upper body to passively warm flap and promote perfusion - now PRN as patient desires for comfort   Settings: medium heat (38*C), high continuous fan   Avoid direct contact of warmer blanket with skin/flap sites   - Dressings/wound care:   Flap incision lines and open blistered areas of skin to be dressed with Xeroform changed per plastics team BID, please avoid xeroform contact with vioptix lead   Abdominal incision lines dressed with mepilex AG dressing, keep C/D/I    Ok to leave umbilicus VAN     - Drains:   Continue drain care to x3 ROSCOE drains present at the b/l lower abdomen and L breast (nursing orders placed)  Maintain CHG drain dressings over drain insertion sites at the skin   Please ensure that drains are compressed flat and plugged to maintain self suction at all times aside from when emptying   Nursing to strip drain tubing at least q4h and PRN to avoid drain/tubing obstruction   Nursing to please also empty and record output quantities at least TID and PRN if drains are full   Please notify the plastics team if drain outputs exceed >30 ml in 1 hr or >200 in 24  hrs  Drains to be removed per plastics team once 24 hr outputs remain <30 ml total x2 consecutive days  If drains do not meet output criteria for removal at time of medical clearance for DC, will plan for removal per plastics upon outpatient follow up   - Weight bearing:   Patient OK to be OOB today (7/17)  <5 lbs lifting restriction to BUE postoperatively  No significant upper extremity abduction, extension or shoulder retraction to prevent tension on breast surgical site and incisions, NO compression or telemetry leads to center of chest where marked no pressure  - Positioning:   Patient to maintain beach chair positioning when lying in bed and sitting in chair postoperatively (head of bed elevated, hips flexed at least 30 degrees, knees bent and elevated on pillows)  Avoid positioning that would apply pressure to flap region or incisions   Patient to keep hips slightly flexed forward to avoid tension on lower abdominal incisions when standing and OOB. Do not lay completely flat or stand completely erect upright postoperatively.  - Other postoperative parameters/care:   IV Ancef q8h continued during admission postoperatively for surgical infection ppx   DC Landa this AM 7/17, PRN bladder scan if patient has not voided 5 hrs post removal   Carb controlled diet with Jamarcus supplements BID  Nursing to record patient I&O, urine output goal of >1 mL/kg/hr  SQ Heparin q8h started postoperatively, continue while inpatient  Daily low dose ASA started postoperatively, continue for now    VS monitoring q4h  Nitrobid BID to left breast flap tissue to promote tissue perfusion  Encouraged use of IS (10x/hr, each hr while awake)  Continue prostaglandin infusions to promote flap viability  PO Verapamil 40 mg q8h added 7/16 PM for promotion of flap viability, plan to hold if patient hypotensive, hold parameters added to order   DC vitamin C infusions     # Acute blood loss anemia   - H&H preop 10.3/30.7, post op 9.0/26.7  - HGB  decreased to 8.4 on AM labs yesterday, patient transfused 2 unit PRBCs  - HGB stable at 9.0 on AM labs today (7/17)  - Presently no S/S of bleeding, considered likely due to intraoperative blood loss   - Keep T&S UTD, last obtained 7/15  - Monitor for S/S of bleeding or symptomatic anemia   - Low transfusion threshold to ensure adequate flap perfusion  - Transfuse for HGB <8 per protocol   - Monitor on AM CBC      # Acute postoperative pain, hx of opiate dependence on agonist therapy   - Nursing to assess patient pain scales at least q4h and PRN  - Continue home Suboxone 8-2 mg SL tablet PO QID (home medication dosage confirmed via  pharmacy medication history review note placed on 7/10/25)  - Continue additional regimen as follows:   Scheduled Tylenol 975mg PO q8h - ON HOLD currently given ongoing prostin infusions   Scheduled Gabapentin 300mg PO q8h   Robaxin 500 mg PO q6h PRN muscle spasms    PO Celebrex 100mg BID - ON HOLD currently given ongoing prostin infusions      # Hx T2DM   - Last HBA1C 5.8 on labs obtained 10/17/24  - Home Metformin 500 mg PO daily resumed upon advancement to full diet  - SSDI with accuchecks ACHS while inpatient   - Diabetic carb controlled diet      # Hx HTN  - Holding home Losartan 50 mg PO daily given marginal Bps and to avoid risk of hypotension and flap hypoperfusion   - BP stable postoperatively (110s/50s)  - 40 mg PO Verapamil q8h ordered starting 7/16 PM   - Monitor VS q4h      # Hx peripheral edema   - Home hydrochlorothiazide held postoperatively, continue holding for now, can considering resuming as BP permits and as needed for edema   - Patient reports that she does not utilize routinely when outpatient, last use had been approximately 7-8 months ago   - Extremity elevation while patient in bed      F: Encouraged adequate PO fluid intake   E: Monitor and replete PRN, currently stable, MG improved to 1.89 this AM  N: Diabetic carb controlled diet with Jamarcus supplements BID     GI: Continue bowel regimen with Colace BID and Duculax suppository PRN        GI ppx postoperatively and while inpatient with Protonix      DVT ppx:  - Risk score assessed  - SQ heparin q8h and ASA 81mg daily while inpatient   - Use of SCDs while in bed   - Anticipate early ambulation once off bedrest  - Anticipate up to chair by noon on POD1       Disposition: Continue care on RNF. Will remain inpatient for 3-4 days for continued flap monitoring, drain surveillance postoperatively and plans to RTOR tentatively for left breast flap closure based on progress throughout admission.      Patient and plan discussed with Dr. Zuleima Floyd PA-C  Plastic and Reconstructive Surgery   [place]  Pager #[phone]  Team phones: [phone]

## 2025-07-18 LAB
ABO GROUP (TYPE) IN BLOOD: NORMAL
ANTIBODY SCREEN: NORMAL
APTT PPP: 28 SECONDS (ref 26–36)
D DIMER PPP FEU-MCNC: 625 NG/ML FEU
ERYTHROCYTE [DISTWIDTH] IN BLOOD BY AUTOMATED COUNT: 14.7 % (ref 11.5–14.5)
GLUCOSE BLD MANUAL STRIP-MCNC: 108 MG/DL (ref 74–99)
GLUCOSE BLD MANUAL STRIP-MCNC: 109 MG/DL (ref 74–99)
GLUCOSE BLD MANUAL STRIP-MCNC: 141 MG/DL (ref 74–99)
GLUCOSE BLD MANUAL STRIP-MCNC: 90 MG/DL (ref 74–99)
HCT VFR BLD AUTO: 31.7 % (ref 36–46)
HGB BLD-MCNC: 9.6 G/DL (ref 12–16)
INR PPP: 1 (ref 0.9–1.1)
LABORATORY COMMENT REPORT: NORMAL
MCH RBC QN AUTO: 27.9 PG (ref 26–34)
MCHC RBC AUTO-ENTMCNC: 30.3 G/DL (ref 32–36)
MCV RBC AUTO: 92 FL (ref 80–100)
NRBC BLD-RTO: 0 /100 WBCS (ref 0–0)
PATH REPORT.FINAL DX SPEC: NORMAL
PATH REPORT.GROSS SPEC: NORMAL
PATH REPORT.RELEVANT HX SPEC: NORMAL
PATH REPORT.TOTAL CANCER: NORMAL
PLATELET # BLD AUTO: 337 X10*3/UL (ref 150–450)
PROTHROMBIN TIME: 10.7 SECONDS (ref 9.8–12.4)
RBC # BLD AUTO: 3.44 X10*6/UL (ref 4–5.2)
RH FACTOR (ANTIGEN D): NORMAL
WBC # BLD AUTO: 6.9 X10*3/UL (ref 4.4–11.3)

## 2025-07-18 PROCEDURE — 2500000002 HC RX 250 W HCPCS SELF ADMINISTERED DRUGS (ALT 637 FOR MEDICARE OP, ALT 636 FOR OP/ED): Mod: SE | Performed by: NURSE PRACTITIONER

## 2025-07-18 PROCEDURE — 82947 ASSAY GLUCOSE BLOOD QUANT: CPT

## 2025-07-18 PROCEDURE — 85306 CLOT INHIBIT PROT S FREE: CPT | Performed by: PHYSICIAN ASSISTANT

## 2025-07-18 PROCEDURE — 85670 THROMBIN TIME PLASMA: CPT | Performed by: PHYSICIAN ASSISTANT

## 2025-07-18 PROCEDURE — 36415 COLL VENOUS BLD VENIPUNCTURE: CPT | Performed by: PHYSICIAN ASSISTANT

## 2025-07-18 PROCEDURE — 2500000001 HC RX 250 WO HCPCS SELF ADMINISTERED DRUGS (ALT 637 FOR MEDICARE OP): Mod: SE | Performed by: PHYSICIAN ASSISTANT

## 2025-07-18 PROCEDURE — 2500000004 HC RX 250 GENERAL PHARMACY W/ HCPCS (ALT 636 FOR OP/ED): Mod: SE | Performed by: PHYSICIAN ASSISTANT

## 2025-07-18 PROCEDURE — 85379 FIBRIN DEGRADATION QUANT: CPT | Performed by: PHYSICIAN ASSISTANT

## 2025-07-18 PROCEDURE — 85362 FIBRIN DEGRADATION PRODUCTS: CPT | Performed by: PHYSICIAN ASSISTANT

## 2025-07-18 PROCEDURE — 85303 CLOT INHIBIT PROT C ACTIVITY: CPT | Performed by: PHYSICIAN ASSISTANT

## 2025-07-18 PROCEDURE — 86923 COMPATIBILITY TEST ELECTRIC: CPT

## 2025-07-18 PROCEDURE — 86901 BLOOD TYPING SEROLOGIC RH(D): CPT | Performed by: PHYSICIAN ASSISTANT

## 2025-07-18 PROCEDURE — 2500000001 HC RX 250 WO HCPCS SELF ADMINISTERED DRUGS (ALT 637 FOR MEDICARE OP): Mod: SE | Performed by: NURSE PRACTITIONER

## 2025-07-18 PROCEDURE — 99232 SBSQ HOSP IP/OBS MODERATE 35: CPT | Performed by: PHYSICIAN ASSISTANT

## 2025-07-18 PROCEDURE — 2500000004 HC RX 250 GENERAL PHARMACY W/ HCPCS (ALT 636 FOR OP/ED): Mod: SE | Performed by: NURSE PRACTITIONER

## 2025-07-18 PROCEDURE — 85301 ANTITHROMBIN III ANTIGEN: CPT | Performed by: PHYSICIAN ASSISTANT

## 2025-07-18 PROCEDURE — 85027 COMPLETE CBC AUTOMATED: CPT | Performed by: PHYSICIAN ASSISTANT

## 2025-07-18 PROCEDURE — 2060000001 HC INTERMEDIATE ICU ROOM DAILY

## 2025-07-18 PROCEDURE — 85610 PROTHROMBIN TIME: CPT | Performed by: PHYSICIAN ASSISTANT

## 2025-07-18 RX ORDER — POLYETHYLENE GLYCOL 3350 17 G/17G
17 POWDER, FOR SOLUTION ORAL DAILY PRN
Status: DISCONTINUED | OUTPATIENT
Start: 2025-07-18 | End: 2025-07-19

## 2025-07-18 RX ORDER — CELECOXIB 100 MG/1
100 CAPSULE ORAL 2 TIMES DAILY
Status: CANCELLED | OUTPATIENT
Start: 2025-07-18

## 2025-07-18 RX ORDER — BISACODYL 10 MG/1
10 SUPPOSITORY RECTAL ONCE
Status: COMPLETED | OUTPATIENT
Start: 2025-07-18 | End: 2025-07-18

## 2025-07-18 RX ADMIN — BUPRENORPHINE AND NALOXONE 1 TABLET: 8; 2 TABLET SUBLINGUAL at 16:49

## 2025-07-18 RX ADMIN — SODIUM CHLORIDE 40 MCG: 9 INJECTION, SOLUTION INTRAVENOUS at 08:50

## 2025-07-18 RX ADMIN — PANTOPRAZOLE SODIUM 40 MG: 40 TABLET, DELAYED RELEASE ORAL at 08:49

## 2025-07-18 RX ADMIN — DOCUSATE SODIUM 100 MG: 100 CAPSULE, LIQUID FILLED ORAL at 21:11

## 2025-07-18 RX ADMIN — DOCUSATE SODIUM 100 MG: 100 CAPSULE, LIQUID FILLED ORAL at 08:49

## 2025-07-18 RX ADMIN — CEFAZOLIN SODIUM 2 G: 2 INJECTION, SOLUTION INTRAVENOUS at 16:49

## 2025-07-18 RX ADMIN — CEFAZOLIN SODIUM 2 G: 2 INJECTION, SOLUTION INTRAVENOUS at 08:49

## 2025-07-18 RX ADMIN — HEPARIN SODIUM 5000 UNITS: 5000 INJECTION, SOLUTION INTRAVENOUS; SUBCUTANEOUS at 12:05

## 2025-07-18 RX ADMIN — METFORMIN ER 500 MG 500 MG: 500 TABLET ORAL at 16:49

## 2025-07-18 RX ADMIN — ASPIRIN 81 MG: 81 TABLET, COATED ORAL at 08:49

## 2025-07-18 RX ADMIN — HEPARIN SODIUM 5000 UNITS: 5000 INJECTION, SOLUTION INTRAVENOUS; SUBCUTANEOUS at 21:11

## 2025-07-18 RX ADMIN — POLYETHYLENE GLYCOL 3350 17 G: 17 POWDER, FOR SOLUTION ORAL at 16:56

## 2025-07-18 RX ADMIN — CEFAZOLIN SODIUM 2 G: 2 INJECTION, SOLUTION INTRAVENOUS at 01:01

## 2025-07-18 RX ADMIN — BISACODYL 10 MG: 10 SUPPOSITORY RECTAL at 08:52

## 2025-07-18 RX ADMIN — BUPRENORPHINE AND NALOXONE 1 TABLET: 8; 2 TABLET SUBLINGUAL at 08:49

## 2025-07-18 RX ADMIN — ACETAMINOPHEN 975 MG: 325 TABLET ORAL at 23:29

## 2025-07-18 RX ADMIN — CELECOXIB 100 MG: 100 CAPSULE ORAL at 21:11

## 2025-07-18 RX ADMIN — GABAPENTIN 300 MG: 300 CAPSULE ORAL at 13:24

## 2025-07-18 RX ADMIN — BUPRENORPHINE AND NALOXONE 1 TABLET: 8; 2 TABLET SUBLINGUAL at 12:05

## 2025-07-18 RX ADMIN — METHOCARBAMOL 500 MG: 500 TABLET ORAL at 08:49

## 2025-07-18 RX ADMIN — BUPRENORPHINE AND NALOXONE 1 TABLET: 8; 2 TABLET SUBLINGUAL at 21:11

## 2025-07-18 RX ADMIN — GABAPENTIN 300 MG: 300 CAPSULE ORAL at 05:30

## 2025-07-18 RX ADMIN — BISACODYL 10 MG: 10 SUPPOSITORY RECTAL at 08:54

## 2025-07-18 RX ADMIN — HEPARIN SODIUM 5000 UNITS: 5000 INJECTION, SOLUTION INTRAVENOUS; SUBCUTANEOUS at 05:30

## 2025-07-18 RX ADMIN — GABAPENTIN 300 MG: 300 CAPSULE ORAL at 23:29

## 2025-07-18 RX ADMIN — ACETAMINOPHEN 975 MG: 325 TABLET ORAL at 13:24

## 2025-07-18 ASSESSMENT — PAIN SCALES - GENERAL
PAINLEVEL_OUTOF10: 3
PAINLEVEL_OUTOF10: 3
PAINLEVEL_OUTOF10: 2
PAINLEVEL_OUTOF10: 2

## 2025-07-18 ASSESSMENT — PAIN - FUNCTIONAL ASSESSMENT
PAIN_FUNCTIONAL_ASSESSMENT: 0-10

## 2025-07-18 ASSESSMENT — COGNITIVE AND FUNCTIONAL STATUS - GENERAL
MOBILITY SCORE: 24
DAILY ACTIVITIY SCORE: 24

## 2025-07-18 NOTE — PROGRESS NOTES
Division of Plastic and Reconstructive Surgery  Progress Note    Subjective   Resting in bed comfortably this morning. Endorses some abdominal fullness stating hasn't had BM since admission, suppository ordered. Surgical site pain controlled. She is tolerating a carb controlled diet without nausea or vomiting. Darkening/discoloration with skin blistering to the lateral aspect of the flap. Vioptix can be removed. Discontinue verapamil and prostaglandin and resume tylenol and celebrex.    Objective   Vitals:    07/18/25 0533   BP: 107/72   Pulse: 74   Resp:    Temp:    SpO2:      Physical Exam  Constitutional: A&Ox3, NAD, resting in bed.  Eyes: EOMI, clear sclera.   ENMT: Moist mucous membranes, no apparent injuries or lesions.  Head/Neck: NC/AT.   Cardiovascular: RRR.   Respiratory/Thorax: Regular respirations on RA. Good symmetric chest expansion.   Gastrointestinal: Abdomen soft, slightly distended, appropriately tender, no peritoneal signs, hypoactive BS x 4, lower transverse abdominal incision dressed with Mepliex AG. ROSCOE drain x 2 with serous output to each side of the b/l lower abdomen, patent and maintaining self suction, dressed with CHG dressing. Umbilicus well approximated with sutures, no drainage, erythema or dehiscence, VAN.  Genitourinary: voiding spontaneously  Extremities: Non-pitting peripheral edema of the lower extremities. Moving all 4 extremities actively.   Neurological: A&Ox3.   Psychological: Appropriate mood and behavior.    Breast: MANUEL free flap recipient site to left breast. Flap is cool to touch, soft to palpation. Purple tissue discoloration of flap throughout and darkening with skin blistering to the lateral aspect of the flap. Sutures intact and approximating flap without dehiscence or drainage. Vioptix signal remains intact at 52% saturation at 84 signal strength. ROSCOE drain x1 to lateral inferior aspect of left breast which is patent, maintaining self suction with increased bloody  sanguinous output, insert site dressed with CHG dressing.    Assessment   Etelvina Banks  is a 45 y.o. 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 Ortiz) with insertion of TE (per plastic surgery, Dr. Dewey) on 11/11/2024. Patient is now s/p OR with plastic surgery (Dr. Dewey and Dr. Lara) on 7/11/2025 for left breast tissue expander removal with capsulectomy and left breast reconstruction with free Deep Inferior Epigastric  flap (MANUEL). She progressed well postoperatively and was discharged home on 7/14/25. Patient re-admitted following emergent OR on Tuesday 7/15 PM for re-exploration of left breast free flap, repair of vessel, embolectomy, and flap debridement after patient presented with pallor and purple discoloration of her free flap site. She remains admitted for further flap observation/management.     Plan:   # S/p re-exploration of left breast free flap, repair of vessel, embolectomy, and flap debridement   - Flap declared non-viable, Dr. Dewey discussed potentially looking into debridement and breast reconstruction via either PAP or LAT flap potentially during this admission pending patient's clinical course and surgeon/OR availability.   - Dressings/wound care:   Flap to be dressed with Xeroform and secured with ABD  Abdominal incision lines dressed with mepilex AG dressing, keep C/D/I    Ok to leave umbilicus VAN     - Drains:   Continue drain care to x3 ROSCOE drains present at the b/l lower abdomen and L breast (nursing orders placed)  Maintain CHG drain dressings over drain insertion sites at the skin   Please ensure that drains are compressed flat and plugged to maintain self suction at all times aside from when emptying   Nursing to strip drain tubing at least q4h and PRN to avoid drain/tubing obstruction   Nursing to please also empty and record output quantities  at least TID and PRN if drains are full   Please notify the plastics team if drain outputs exceed >30 ml in 1 hr or >200 in 24 hrs  Drains to be removed per plastics team once 24 hr outputs remain <30 ml total x2 consecutive days  If drains do not meet output criteria for removal at time of medical clearance for DC, will plan for removal per plastics upon outpatient follow up   - Weight bearing:   Patient OK to be OOB  <5 lbs lifting restriction to BUE postoperatively  No significant upper extremity abduction, extension or shoulder retraction to prevent tension on breast surgical site and incisions, NO compression or telemetry leads to center of chest where marked no pressure  - Positioning:   Patient to maintain beach chair positioning when lying in bed and sitting in chair postoperatively (head of bed elevated, hips flexed at least 30 degrees, knees bent and elevated on pillows)  Avoid positioning that would apply pressure to flap region or incisions   Patient to keep hips slightly flexed forward to avoid tension on lower abdominal incisions when standing and OOB. Do not lay completely flat or stand completely erect upright postoperatively.  - Other postoperative parameters/care:   IV Ancef q8h continued during admission postoperatively for surgical infection ppx   PRN bladder scan if patient has not voided 5 hrs post removal   Nursing to record patient I&O, urine output goal of >1 mL/kg/hr  Carb controlled diet with Jamarcus supplements BID  SQ Heparin q8h started postoperatively, continue while inpatient  Daily low dose ASA started postoperatively, continue for now    VS monitoring q4h  Encouraged use of IS (10x/hr, each hr while awake)    # Acute blood loss anemia   - Patient transfused 2 unit PRBCs 7/16  - HGB stable at 9.0 on AM labs today (7/17)  - Presently no S/S of bleeding, considered likely due to intraoperative blood loss   - Keep T&S UTD, last obtained 7/15  - Monitor for S/S of bleeding or symptomatic  anemia   - Low transfusion threshold to ensure adequate flap perfusion  - Transfuse for HGB <8 per protocol   - Monitor on AM CBC      # Acute postoperative pain, hx of opiate dependence on agonist therapy   - Nursing to assess patient pain scales at least q4h and PRN  - Continue home Suboxone 8-2 mg SL tablet PO QID (home medication dosage confirmed via  pharmacy medication history review note placed on 7/10/25)  - Continue additional regimen as follows:   Scheduled Tylenol 975mg PO q8h   Scheduled Gabapentin 300mg PO q8h   Robaxin 500 mg PO q6h PRN muscle spasms    PO Celebrex 100mg BID      # Hx T2DM   - Last HBA1C 5.8 on labs obtained 10/17/24  - Home Metformin 500 mg PO daily resumed upon advancement to full diet  - SSDI with accuchecks ACHS while inpatient   - Diabetic carb controlled diet      # Hx HTN  - Holding home Losartan 50 mg PO daily given marginal Bps and to avoid risk of hypotension and flap hypoperfusion   - BP stable postoperatively (110s/50s)  - 40 mg PO Verapamil q8h ordered starting 7/16 PM   - Monitor VS q4h      # Hx peripheral edema   - Home hydrochlorothiazide held postoperatively, continue holding for now, can considering resuming as BP permits and as needed for edema   - Patient reports that she does not utilize routinely when outpatient, last use had been approximately 7-8 months ago   - Extremity elevation while patient in bed      F: Encouraged adequate PO fluid intake   E: Monitor and replete PRN, currently stable, MG improved to 1.89 this AM  N: Diabetic carb controlled diet with Jamarcus supplements BID    GI: Continue bowel regimen with Colace BID and Duculax suppository PRN        GI ppx postoperatively and while inpatient with Protonix      DVT ppx:  - Risk score assessed  - SQ heparin q8h and ASA 81mg daily while inpatient   - Use of SCDs while in bed   - Ambulation multiple times a day       Disposition: Continue care on RNF. Will remain inpatient for 3-4 days for continued flap  monitoring, drain surveillance postoperatively and plans to RTOR tentatively for left breast flap closure based on progress throughout admission.      Patient and plan discussed with CRESENCIO Gallego-C  Plastic and Reconstructive Surgery   [place]  Pager #[phone]  Team phone: [phone]

## 2025-07-18 NOTE — CARE PLAN
The patient's goals for the shift include  pain management    The clinical goals for the shift include pt will remain HDS    Over the shift, the patient did make progress toward the following goals.         Problem: Skin  Goal: Decreased wound size/increased tissue granulation at next dressing change  Outcome: Progressing  Flowsheets (Taken 7/18/2025 0943)  Decreased wound size/increased tissue granulation at next dressing change: Promote sleep for wound healing  Goal: Participates in plan/prevention/treatment measures  Outcome: Progressing  Flowsheets (Taken 7/18/2025 0943)  Participates in plan/prevention/treatment measures: Elevate heels  Goal: Prevent/manage excess moisture  Outcome: Progressing  Flowsheets (Taken 7/18/2025 0943)  Prevent/manage excess moisture: Follow provider orders for dressing changes  Goal: Prevent/minimize sheer/friction injuries  Outcome: Progressing  Flowsheets (Taken 7/18/2025 0943)  Prevent/minimize sheer/friction injuries: Use pull sheet  Goal: Promote/optimize nutrition  Outcome: Progressing  Flowsheets (Taken 7/18/2025 0943)  Promote/optimize nutrition: Assist with feeding  Goal: Promote skin healing  Outcome: Progressing  Flowsheets (Taken 7/18/2025 0943)  Promote skin healing: Turn/reposition every 2 hours/use positioning/transfer devices     Problem: Pain - Adult  Goal: Verbalizes/displays adequate comfort level or baseline comfort level  Outcome: Progressing     Problem: Safety - Adult  Goal: Free from fall injury  Outcome: Progressing

## 2025-07-18 NOTE — CARE PLAN
The clinical goals for the shift include Pt will get adequate rest      Problem: Skin  Goal: Decreased wound size/increased tissue granulation at next dressing change  Outcome: Progressing  Goal: Participates in plan/prevention/treatment measures  Outcome: Progressing  Goal: Prevent/manage excess moisture  Outcome: Progressing  Goal: Prevent/minimize sheer/friction injuries  Outcome: Progressing  Goal: Promote/optimize nutrition  Outcome: Progressing  Goal: Promote skin healing  Outcome: Progressing     Problem: Pain - Adult  Goal: Verbalizes/displays adequate comfort level or baseline comfort level  Outcome: Progressing     Problem: Safety - Adult  Goal: Free from fall injury  Outcome: Progressing     Problem: Discharge Planning  Goal: Discharge to home or other facility with appropriate resources  Outcome: Progressing     Problem: Chronic Conditions and Co-morbidities  Goal: Patient's chronic conditions and co-morbidity symptoms are monitored and maintained or improved  Outcome: Progressing     Problem: Nutrition  Goal: Nutrient intake appropriate for maintaining nutritional needs  Outcome: Progressing

## 2025-07-18 NOTE — SIGNIFICANT EVENT
"  Department of Plastic and Reconstructive Surgery  PM Flap Check/Assessment    Subjective:  Resting comfortably in bed, ambulated up to chair and sat up for a majority of the afternoon, voided x 2 since hong removal, Tolerating diet with Jamarcus and increase protein intake and appetite, pain well controlled, doing ok given post operative complications, wishes to proceed with further surgery based on Dr. Dewey and Dr. Lara recommendations. Denies any fever, chills, night sweats, CP, SOB, palpitations, nausea, vomiting, diarrhea, constipation, dysuria, hematuria, hematochezia, hematemesis, flank pain.      Objective:  /78 (BP Location: Right arm, Patient Position: Lying)   Pulse 93   Temp 36.5 °C (97.7 °F) (Temporal)   Resp 18   Ht 1.575 m (5' 2\")   Wt 88.8 kg (195 lb 12.8 oz)   SpO2 97%   BMI 35.81 kg/m²       Physical Exam  Constitutional: A&Ox3, calm and cooperative, NAD.  Eyes: EOMI, clear sclera.   ENMT: Moist mucous membranes, no apparent injuries or lesions.  Head/Neck: NC/AT.   Cardiovascular: RRR.   Respiratory/Thorax: Regular respirations on RA. Good symmetric chest expansion.   Gastrointestinal: Abdomen soft, not distended, appropriately tender, no peritoneal signs, +BS x 4, no BM, lower transverse abdominal incision dressed with Mepliex AG. ROSCOE drain x 2 with serous output to each side of the b/l lower abdomen, patent and maintaining self suction, dressed with CHG dressing. Umbilicus well approximated with sutures, no drainage, erythema or dehiscence, VAN.  Genitourinary: independently voiding without difficulty  Extremities: Nnn-pitting edema to BLE. Moving all 4 extremities actively. RUE ecchymosis to posterior arm 2/2 heparin SQ  Neurological: A&Ox3.   Psychological: Appropriate mood and behavior.    Breast: MANUEL free flap recipient sites to left breast. Flap is cool to touch, soft to palpation, pale compared to native tissue with generalized purple/ecchymotic tissue discoloration and " darkening with skin blistering to the lateral aspect of the flap, purple discoloration of the flap edge circumferentially, sutures intact and approximating flap without dehiscence or drainage, no detectable audible doppler at marked suture sites to medial or lateral aspect of flap, Vioptix with 52% tissue oxygenation with 92% signal strength, ROSCOE drain x1 to lateral inferior aspect of left breast which is patent, maintaining self suction with ss output, dressed with CHG dressing. (See below picture)       Assessment:  45 y.o. 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 Ortiz) with insertion of TE (per plastic surgery, Dr. eDwey) on 11/11/2024. Patient is now s/p OR with plastic surgery (Dr. Dewey and Dr. Lara) on 7/11/2025 for left breast tissue expander removal with capsulectomy and left breast reconstruction with free Deep Inferior Epigastric  flap (MANUEL). Did well post operatively and was discharged on 7/14/25. Patient is 4 days post op and has been doing well at home, pain well controlled, tolerating diet, voiding and had a BM since discharge. Reports waking up this morning and noticed discoloration of the left breast and it felt cool to touch to the upper breast flap with some increase in edema which she messaged the Plastic Surgery office and sent pictures and was instructed to present to ED immediately for evaluation and concern for flap perfusion compromise. Pain well controlled at this time and denies pain. Ambulating without difficulty. Denies any fever, chills, night sweats, CP, SOB, palpitations, nausea, vomiting, diarrhea, constipation, dysuria, hematuria, hematochezia, hematemesis, flank pain. Due to high volumes in the ED and emergent need to see patient, patient was seen in triage room and after assessment concern for flap tissue perfusion compromise and  decision to take patient emergently to the OR for Level 1 flap take back. Preop labs drawn in ED, immediately taken by OR team. Admit to Plastic surgery post operatively for flap monitoring and pain control.      S/P Exploration of flap:   Plan:  -  Flap surveillance:   OK to DC flap checks per nursing, intermittent flap checks to be completed per plastic surgery JIL team   Plastics JIL team to monitor Vioptix signal hourly and ensure saturation is >30% (If signal lost, abrupt change in signal or saturation <30%, nursing to please immediately notify the plastic surgery team)    Nursing should also notify plastic surgery team immediately if there are any other acute changes (including worsening pallor, temperature change, bleeding, etc.)  - Xeroform changed to left breast covering lateral blister and incisions lines per Plastics BID  - added Glucerna shakes for additional protein per patient's request  - all other care per daily progress note  - Updated Dr. Dewey and KATIANA Perry-CNP  Plastic and Reconstructive Surgery   Available via [employer]  Pager #[phone]  Team phone: [phone]

## 2025-07-19 ENCOUNTER — PREP FOR PROCEDURE (OUTPATIENT)
Dept: PLASTIC SURGERY | Facility: HOSPITAL | Age: 45
End: 2025-07-19
Payer: COMMERCIAL

## 2025-07-19 DIAGNOSIS — T86.821 FAILED FLAP: ICD-10-CM

## 2025-07-19 DIAGNOSIS — D05.12 DUCTAL CARCINOMA IN SITU (DCIS) OF LEFT BREAST: Primary | ICD-10-CM

## 2025-07-19 DIAGNOSIS — Z90.12 ACQUIRED ABSENCE OF LEFT BREAST AND NIPPLE: ICD-10-CM

## 2025-07-19 LAB
ANION GAP SERPL CALC-SCNC: 13 MMOL/L (ref 10–20)
BUN SERPL-MCNC: 21 MG/DL (ref 6–23)
CALCIUM SERPL-MCNC: 8.6 MG/DL (ref 8.6–10.6)
CHLORIDE SERPL-SCNC: 102 MMOL/L (ref 98–107)
CO2 SERPL-SCNC: 28 MMOL/L (ref 21–32)
CREAT SERPL-MCNC: 0.72 MG/DL (ref 0.5–1.05)
EGFRCR SERPLBLD CKD-EPI 2021: >90 ML/MIN/1.73M*2
ERYTHROCYTE [DISTWIDTH] IN BLOOD BY AUTOMATED COUNT: 14.5 % (ref 11.5–14.5)
GLUCOSE BLD MANUAL STRIP-MCNC: 127 MG/DL (ref 74–99)
GLUCOSE BLD MANUAL STRIP-MCNC: 139 MG/DL (ref 74–99)
GLUCOSE BLD MANUAL STRIP-MCNC: 144 MG/DL (ref 74–99)
GLUCOSE SERPL-MCNC: 136 MG/DL (ref 74–99)
HCT VFR BLD AUTO: 29 % (ref 36–46)
HGB BLD-MCNC: 9.3 G/DL (ref 12–16)
MCH RBC QN AUTO: 29.6 PG (ref 26–34)
MCHC RBC AUTO-ENTMCNC: 32.1 G/DL (ref 32–36)
MCV RBC AUTO: 92 FL (ref 80–100)
NRBC BLD-RTO: 0 /100 WBCS (ref 0–0)
PLATELET # BLD AUTO: 349 X10*3/UL (ref 150–450)
POTASSIUM SERPL-SCNC: 3.7 MMOL/L (ref 3.5–5.3)
RBC # BLD AUTO: 3.14 X10*6/UL (ref 4–5.2)
SODIUM SERPL-SCNC: 139 MMOL/L (ref 136–145)
WBC # BLD AUTO: 7.9 X10*3/UL (ref 4.4–11.3)

## 2025-07-19 PROCEDURE — 2500000004 HC RX 250 GENERAL PHARMACY W/ HCPCS (ALT 636 FOR OP/ED): Mod: SE

## 2025-07-19 PROCEDURE — 36415 COLL VENOUS BLD VENIPUNCTURE: CPT | Performed by: PHYSICIAN ASSISTANT

## 2025-07-19 PROCEDURE — 2500000001 HC RX 250 WO HCPCS SELF ADMINISTERED DRUGS (ALT 637 FOR MEDICARE OP): Mod: SE | Performed by: PHYSICIAN ASSISTANT

## 2025-07-19 PROCEDURE — 82947 ASSAY GLUCOSE BLOOD QUANT: CPT

## 2025-07-19 PROCEDURE — 99232 SBSQ HOSP IP/OBS MODERATE 35: CPT | Performed by: PHYSICIAN ASSISTANT

## 2025-07-19 PROCEDURE — 80048 BASIC METABOLIC PNL TOTAL CA: CPT | Performed by: PHYSICIAN ASSISTANT

## 2025-07-19 PROCEDURE — 2500000002 HC RX 250 W HCPCS SELF ADMINISTERED DRUGS (ALT 637 FOR MEDICARE OP, ALT 636 FOR OP/ED): Mod: SE | Performed by: NURSE PRACTITIONER

## 2025-07-19 PROCEDURE — 2500000004 HC RX 250 GENERAL PHARMACY W/ HCPCS (ALT 636 FOR OP/ED): Mod: SE | Performed by: NURSE PRACTITIONER

## 2025-07-19 PROCEDURE — 2500000004 HC RX 250 GENERAL PHARMACY W/ HCPCS (ALT 636 FOR OP/ED): Mod: SE | Performed by: PHYSICIAN ASSISTANT

## 2025-07-19 PROCEDURE — 2500000001 HC RX 250 WO HCPCS SELF ADMINISTERED DRUGS (ALT 637 FOR MEDICARE OP): Mod: SE | Performed by: NURSE PRACTITIONER

## 2025-07-19 PROCEDURE — 85027 COMPLETE CBC AUTOMATED: CPT | Performed by: PHYSICIAN ASSISTANT

## 2025-07-19 PROCEDURE — 2060000001 HC INTERMEDIATE ICU ROOM DAILY

## 2025-07-19 RX ORDER — POLYETHYLENE GLYCOL 3350 17 G/17G
17 POWDER, FOR SOLUTION ORAL DAILY
Status: DISCONTINUED | OUTPATIENT
Start: 2025-07-19 | End: 2025-07-19

## 2025-07-19 RX ORDER — POLYETHYLENE GLYCOL 3350 17 G/17G
17 POWDER, FOR SOLUTION ORAL DAILY
Status: DISCONTINUED | OUTPATIENT
Start: 2025-07-19 | End: 2025-07-21

## 2025-07-19 RX ORDER — POLYETHYLENE GLYCOL 3350 17 G/17G
17 POWDER, FOR SOLUTION ORAL ONCE
Status: COMPLETED | OUTPATIENT
Start: 2025-07-19 | End: 2025-07-19

## 2025-07-19 RX ORDER — GABAPENTIN 100 MG/1
600 CAPSULE ORAL ONCE
OUTPATIENT
Start: 2025-07-19 | End: 2025-07-19

## 2025-07-19 RX ORDER — APREPITANT 40 MG/1
40 CAPSULE ORAL ONCE
OUTPATIENT
Start: 2025-07-19 | End: 2025-07-19

## 2025-07-19 RX ADMIN — PANTOPRAZOLE SODIUM 40 MG: 40 TABLET, DELAYED RELEASE ORAL at 07:23

## 2025-07-19 RX ADMIN — BUPRENORPHINE AND NALOXONE 1 TABLET: 8; 2 TABLET SUBLINGUAL at 22:28

## 2025-07-19 RX ADMIN — CEFAZOLIN SODIUM 2 G: 2 INJECTION, SOLUTION INTRAVENOUS at 03:00

## 2025-07-19 RX ADMIN — ACETAMINOPHEN 975 MG: 325 TABLET ORAL at 07:23

## 2025-07-19 RX ADMIN — CELECOXIB 100 MG: 100 CAPSULE ORAL at 08:28

## 2025-07-19 RX ADMIN — GABAPENTIN 300 MG: 300 CAPSULE ORAL at 07:23

## 2025-07-19 RX ADMIN — ASPIRIN 81 MG: 81 TABLET, COATED ORAL at 08:28

## 2025-07-19 RX ADMIN — ACETAMINOPHEN 975 MG: 325 TABLET ORAL at 22:27

## 2025-07-19 RX ADMIN — BUPRENORPHINE AND NALOXONE 1 TABLET: 8; 2 TABLET SUBLINGUAL at 16:58

## 2025-07-19 RX ADMIN — HEPARIN SODIUM 5000 UNITS: 5000 INJECTION, SOLUTION INTRAVENOUS; SUBCUTANEOUS at 07:23

## 2025-07-19 RX ADMIN — POLYETHYLENE GLYCOL 3350 17 G: 17 POWDER, FOR SOLUTION ORAL at 22:39

## 2025-07-19 RX ADMIN — DOCUSATE SODIUM 100 MG: 100 CAPSULE, LIQUID FILLED ORAL at 08:28

## 2025-07-19 RX ADMIN — BUPRENORPHINE AND NALOXONE 1 TABLET: 8; 2 TABLET SUBLINGUAL at 12:03

## 2025-07-19 RX ADMIN — HEPARIN SODIUM 5000 UNITS: 5000 INJECTION, SOLUTION INTRAVENOUS; SUBCUTANEOUS at 14:16

## 2025-07-19 RX ADMIN — ACETAMINOPHEN 975 MG: 325 TABLET ORAL at 14:17

## 2025-07-19 RX ADMIN — GABAPENTIN 300 MG: 300 CAPSULE ORAL at 14:17

## 2025-07-19 RX ADMIN — POLYETHYLENE GLYCOL 3350 17 G: 17 POWDER, FOR SOLUTION ORAL at 12:03

## 2025-07-19 RX ADMIN — GABAPENTIN 300 MG: 300 CAPSULE ORAL at 22:28

## 2025-07-19 RX ADMIN — CEFAZOLIN SODIUM 2 G: 2 INJECTION, SOLUTION INTRAVENOUS at 20:16

## 2025-07-19 RX ADMIN — CEFAZOLIN SODIUM 2 G: 2 INJECTION, SOLUTION INTRAVENOUS at 12:03

## 2025-07-19 RX ADMIN — METFORMIN ER 500 MG 500 MG: 500 TABLET ORAL at 16:58

## 2025-07-19 RX ADMIN — HEPARIN SODIUM 5000 UNITS: 5000 INJECTION, SOLUTION INTRAVENOUS; SUBCUTANEOUS at 22:28

## 2025-07-19 RX ADMIN — BUPRENORPHINE AND NALOXONE 1 TABLET: 8; 2 TABLET SUBLINGUAL at 07:23

## 2025-07-19 RX ADMIN — DOCUSATE SODIUM 100 MG: 100 CAPSULE, LIQUID FILLED ORAL at 20:16

## 2025-07-19 RX ADMIN — CELECOXIB 100 MG: 100 CAPSULE ORAL at 20:16

## 2025-07-19 ASSESSMENT — COGNITIVE AND FUNCTIONAL STATUS - GENERAL
MOBILITY SCORE: 24
DAILY ACTIVITIY SCORE: 24

## 2025-07-19 ASSESSMENT — PAIN - FUNCTIONAL ASSESSMENT
PAIN_FUNCTIONAL_ASSESSMENT: 0-10

## 2025-07-19 ASSESSMENT — PAIN SCALES - GENERAL
PAINLEVEL_OUTOF10: 3
PAINLEVEL_OUTOF10: 2
PAINLEVEL_OUTOF10: 2
PAINLEVEL_OUTOF10: 1

## 2025-07-19 NOTE — PROGRESS NOTES
Division of Plastic and Reconstructive Surgery  Progress Note    Subjective   Sitting in bedside chair comfortably this morning. States she had very small BM with some relief of abdominal fullness yesterday but going to take miralax this morning with breakfast. Walking around room and ambulating halls 3x yesterday, encouraged her to continue. Surgical site pain controlled. She is tolerating diet without nausea or vomiting. Darkening/discoloration with skin blistering to the flap, covered with xeroform and secured with ABD.     Objective   Vitals:    07/19/25 0548   BP: 99/67   Pulse: 82   Resp: 16   Temp: 36.8 °C (98.2 °F)   SpO2: 95%     Physical Exam  Constitutional: A&Ox3, NAD, resting in bed.  Eyes: EOMI, clear sclera.   ENMT: Moist mucous membranes, no apparent injuries or lesions.  Head/Neck: NC/AT.   Cardiovascular: RRR.   Respiratory/Thorax: Regular respirations on RA. Good symmetric chest expansion.   Gastrointestinal: Abdomen soft, slightly distended, appropriately tender, no peritoneal signs, hypoactive BS x 4, lower transverse abdominal incision dressed with Mepliex AG. ROSCOE drain x 2 with serous output to each side of the b/l lower abdomen, patent and maintaining self suction, dressed with CHG dressing. Umbilicus well approximated with sutures, no drainage, erythema or dehiscence, VAN.  Genitourinary: voiding spontaneously  Extremities: Non-pitting peripheral edema of the lower extremities. Moving all 4 extremities actively.   Neurological: A&Ox3.   Psychological: Appropriate mood and behavior.    Breast: MANUEL free flap recipient site to left breast. Flap is cool to touch, soft to palpation. Purple tissue discoloration of flap throughout and darkening with skin blistering to the flap. Sutures intact and approximating flap without dehiscence or drainage. ROSCOE drain x1 to lateral inferior aspect of left breast which is patent, maintaining self suction with serosanguinous output, insert site dressed with CHG  dressing.    Assessment   Etelvina Banks  is a 45 y.o. 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 Ortiz) with insertion of TE (per plastic surgery, Dr. Dewey) on 11/11/2024. Patient is now s/p OR with plastic surgery (Dr. Dewey and Dr. Lara) on 7/11/2025 for left breast tissue expander removal with capsulectomy and left breast reconstruction with free Deep Inferior Epigastric  flap (MANUEL). She progressed well postoperatively and was discharged home on 7/14/25. Patient re-admitted following emergent OR on Tuesday 7/15 PM for re-exploration of left breast free flap, repair of vessel, embolectomy, and flap debridement after patient presented with pallor and purple discoloration of her free flap site. She remains admitted for further flap observation/management.     Plan:   # S/p re-exploration of left breast free flap, repair of vessel, embolectomy, and flap debridement   - Flap declared non-viable, Dr. Dewey discussed potentially looking into debridement and breast reconstruction via either PAP or LAT flap potentially during this admission pending patient's clinical course and surgeon/OR availability.   - Dressings/wound care:   Flap to be dressed with Xeroform and secured with ABD  Abdominal incision lines dressed with mepilex AG dressing, keep C/D/I    Ok to leave umbilicus VAN     - Drains:   Continue drain care to x3 ROSCOE drains present at the b/l lower abdomen and L breast (nursing orders placed)  Maintain CHG drain dressings over drain insertion sites at the skin   Please ensure that drains are compressed flat and plugged to maintain self suction at all times aside from when emptying   Nursing to strip drain tubing at least q4h and PRN to avoid drain/tubing obstruction   Nursing to please also empty and record output quantities at least TID and PRN if drains are full   Please  notify the plastics team if drain outputs exceed >30 ml in 1 hr or >200 in 24 hrs  Drains to be removed per plastics team once 24 hr outputs remain <30 ml total x2 consecutive days  If drains do not meet output criteria for removal at time of medical clearance for DC, will plan for removal per plastics upon outpatient follow up   - Weight bearing:   Patient OK to be OOB  <5 lbs lifting restriction to BUE postoperatively  No significant upper extremity abduction, extension or shoulder retraction to prevent tension on breast surgical site and incisions, NO compression or telemetry leads to center of chest where marked no pressure  - Positioning:   Patient to maintain beach chair positioning when lying in bed and sitting in chair postoperatively (head of bed elevated, hips flexed at least 30 degrees, knees bent and elevated on pillows)  Avoid positioning that would apply pressure to flap region or incisions   Patient to keep hips slightly flexed forward to avoid tension on lower abdominal incisions when standing and OOB. Do not lay completely flat or stand completely erect upright postoperatively.  - Other postoperative parameters/care:   IV Ancef q8h continued during admission postoperatively for surgical infection ppx   Nursing to record patient I&O, urine output goal of >1 mL/kg/hr  Carb controlled diet with Jamarcus supplements BID  SQ Heparin q8h started postoperatively, continue while inpatient  Daily low dose ASA started postoperatively, continue for now    VS monitoring q4h  Encouraged use of IS (10x/hr, each hr while awake)    # Acute blood loss anemia   - Patient transfused 2 unit PRBCs 7/16  - HGB stable at 9.3 on AM labs today (7/19)  - Presently no S/S of bleeding, considered likely due to intraoperative blood loss   - Keep T&S UTD, last obtained 7/15  - Monitor for S/S of bleeding or symptomatic anemia   - Low transfusion threshold to ensure adequate flap perfusion  - Transfuse for HGB <8 per protocol   -  Monitor on AM CBC      # Acute postoperative pain, hx of opiate dependence on agonist therapy   - Nursing to assess patient pain scales at least q4h and PRN  - Continue home Suboxone 8-2 mg SL tablet PO QID (home medication dosage confirmed via  pharmacy medication history review note placed on 7/10/25)  - Continue additional regimen as follows:   Scheduled Tylenol 975mg PO q8h   Scheduled Gabapentin 300mg PO q8h   Robaxin 500 mg PO q6h PRN muscle spasms    PO Celebrex 100mg BID      # Hx T2DM   - Last HBA1C 5.8 on labs obtained 10/17/24  - Home Metformin 500 mg PO daily resumed upon advancement to full diet  - SSDI with accuchecks ACHS while inpatient   - Diabetic carb controlled diet      # Hx HTN  - Holding home Losartan 50 mg PO daily given marginal Bps and to avoid risk of hypotension and flap hypoperfusion   - BP stable postoperatively (110s/50s)  - 40 mg PO Verapamil q8h ordered starting 7/16 PM   - Monitor VS q4h      # Hx peripheral edema   - Home hydrochlorothiazide held postoperatively, continue holding for now, can considering resuming as BP permits and as needed for edema   - Patient reports that she does not utilize routinely when outpatient, last use had been approximately 7-8 months ago   - Extremity elevation while patient in bed      F: Encouraged adequate PO fluid intake   E: Monitor and replete PRN  N: Diabetic carb controlled diet with Jamarcus supplements BID    GI: Continue bowel regimen with Colace BID, miralax daily and Duculax suppository PRN        GI ppx postoperatively and while inpatient with Protonix      DVT ppx:  - Risk score assessed  - SQ heparin q8h and ASA 81mg daily while inpatient   - Use of SCDs while in bed   - Ambulation multiple times a day       Disposition: Continue care on RNF. Will remain inpatient for 3-4 days for continued flap monitoring, drain surveillance postoperatively and plans to RTOR tentatively for left breast flap closure based on progress throughout  admission.      Patient and plan discussed with CRESENCIO Gallego-C  Plastic and Reconstructive Surgery   [place]  Pager #[phone]  Team phone: [phone]

## 2025-07-19 NOTE — CARE PLAN
She can cut the augmentin.  Jose Carrillo MD  10:54 EDT  05/28/25       The patient's goals for the shift include  comfort    The clinical goals for the shift include pt will remain HDS    Over the shift, the patient did  make progress toward the following goals.       Problem: Skin  Goal: Decreased wound size/increased tissue granulation at next dressing change  Outcome: Progressing  Goal: Participates in plan/prevention/treatment measures  Outcome: Progressing  Goal: Prevent/manage excess moisture  Outcome: Progressing  Goal: Prevent/minimize sheer/friction injuries  Outcome: Progressing  Goal: Promote/optimize nutrition  Outcome: Progressing  Goal: Promote skin healing  Outcome: Progressing     Problem: Pain - Adult  Goal: Verbalizes/displays adequate comfort level or baseline comfort level  Outcome: Progressing     Problem: Safety - Adult  Goal: Free from fall injury  Outcome: Progressing

## 2025-07-20 ENCOUNTER — ANESTHESIA (OUTPATIENT)
Dept: OPERATING ROOM | Facility: HOSPITAL | Age: 45
End: 2025-07-20
Payer: COMMERCIAL

## 2025-07-20 ENCOUNTER — ANESTHESIA EVENT (OUTPATIENT)
Dept: OPERATING ROOM | Facility: HOSPITAL | Age: 45
End: 2025-07-20
Payer: COMMERCIAL

## 2025-07-20 VITALS
HEIGHT: 62 IN | DIASTOLIC BLOOD PRESSURE: 77 MMHG | WEIGHT: 204.5 LBS | OXYGEN SATURATION: 100 % | RESPIRATION RATE: 12 BRPM | HEART RATE: 89 BPM | SYSTOLIC BLOOD PRESSURE: 142 MMHG | BODY MASS INDEX: 37.63 KG/M2 | TEMPERATURE: 96.6 F

## 2025-07-20 LAB
ANION GAP SERPL CALC-SCNC: 11 MMOL/L (ref 10–20)
BUN SERPL-MCNC: 22 MG/DL (ref 6–23)
CALCIUM SERPL-MCNC: 8.7 MG/DL (ref 8.6–10.6)
CHLORIDE SERPL-SCNC: 103 MMOL/L (ref 98–107)
CO2 SERPL-SCNC: 30 MMOL/L (ref 21–32)
CREAT SERPL-MCNC: 0.8 MG/DL (ref 0.5–1.05)
EGFRCR SERPLBLD CKD-EPI 2021: >90 ML/MIN/1.73M*2
ERYTHROCYTE [DISTWIDTH] IN BLOOD BY AUTOMATED COUNT: 14.4 % (ref 11.5–14.5)
GLUCOSE BLD MANUAL STRIP-MCNC: 115 MG/DL (ref 74–99)
GLUCOSE SERPL-MCNC: 117 MG/DL (ref 74–99)
HCT VFR BLD AUTO: 29.4 % (ref 36–46)
HGB BLD-MCNC: 9.1 G/DL (ref 12–16)
MCH RBC QN AUTO: 29.2 PG (ref 26–34)
MCHC RBC AUTO-ENTMCNC: 31 G/DL (ref 32–36)
MCV RBC AUTO: 94 FL (ref 80–100)
NRBC BLD-RTO: 0 /100 WBCS (ref 0–0)
PLATELET # BLD AUTO: 371 X10*3/UL (ref 150–450)
POTASSIUM SERPL-SCNC: 4 MMOL/L (ref 3.5–5.3)
RBC # BLD AUTO: 3.12 X10*6/UL (ref 4–5.2)
SODIUM SERPL-SCNC: 140 MMOL/L (ref 136–145)
WBC # BLD AUTO: 7.6 X10*3/UL (ref 4.4–11.3)

## 2025-07-20 PROCEDURE — 3700000001 HC GENERAL ANESTHESIA TIME - INITIAL BASE CHARGE: Performed by: SURGERY

## 2025-07-20 PROCEDURE — 2500000005 HC RX 250 GENERAL PHARMACY W/O HCPCS: Mod: SE

## 2025-07-20 PROCEDURE — 2500000004 HC RX 250 GENERAL PHARMACY W/ HCPCS (ALT 636 FOR OP/ED): Mod: SE | Performed by: PHYSICIAN ASSISTANT

## 2025-07-20 PROCEDURE — 3700000002 HC GENERAL ANESTHESIA TIME - EACH INCREMENTAL 1 MINUTE: Performed by: SURGERY

## 2025-07-20 PROCEDURE — 82947 ASSAY GLUCOSE BLOOD QUANT: CPT

## 2025-07-20 PROCEDURE — 3600000008 HC OR TIME - EACH INCREMENTAL 1 MINUTE - PROCEDURE LEVEL THREE: Performed by: SURGERY

## 2025-07-20 PROCEDURE — 3600000003 HC OR TIME - INITIAL BASE CHARGE - PROCEDURE LEVEL THREE: Performed by: SURGERY

## 2025-07-20 PROCEDURE — 85027 COMPLETE CBC AUTOMATED: CPT | Performed by: PHYSICIAN ASSISTANT

## 2025-07-20 PROCEDURE — P9045 ALBUMIN (HUMAN), 5%, 250 ML: HCPCS | Mod: JZ,SE

## 2025-07-20 PROCEDURE — 99232 SBSQ HOSP IP/OBS MODERATE 35: CPT

## 2025-07-20 PROCEDURE — 37184 PRIM ART M-THRMBC 1ST VSL: CPT

## 2025-07-20 PROCEDURE — 2720000007 HC OR 272 NO HCPCS: Performed by: SURGERY

## 2025-07-20 PROCEDURE — 2500000005 HC RX 250 GENERAL PHARMACY W/O HCPCS: Mod: SE | Performed by: ANESTHESIOLOGY

## 2025-07-20 PROCEDURE — 7100000002 HC RECOVERY ROOM TIME - EACH INCREMENTAL 1 MINUTE: Performed by: SURGERY

## 2025-07-20 PROCEDURE — 2500000005 HC RX 250 GENERAL PHARMACY W/O HCPCS: Mod: SE | Performed by: SURGERY

## 2025-07-20 PROCEDURE — 7100000001 HC RECOVERY ROOM TIME - INITIAL BASE CHARGE: Performed by: SURGERY

## 2025-07-20 PROCEDURE — 19364 BRST RCNSTJ FREE FLAP: CPT

## 2025-07-20 PROCEDURE — A19364 PR BREAST RECONSTRUC W FREE FLAP: Performed by: ANESTHESIOLOGY

## 2025-07-20 PROCEDURE — 11045 DBRDMT SUBQ TISS EACH ADDL: CPT | Performed by: SURGERY

## 2025-07-20 PROCEDURE — 2060000001 HC INTERMEDIATE ICU ROOM DAILY

## 2025-07-20 PROCEDURE — 19364 BRST RCNSTJ FREE FLAP: CPT | Performed by: SURGERY

## 2025-07-20 PROCEDURE — 2500000004 HC RX 250 GENERAL PHARMACY W/ HCPCS (ALT 636 FOR OP/ED): Mod: JZ,SE

## 2025-07-20 PROCEDURE — 0JB60ZZ EXCISION OF CHEST SUBCUTANEOUS TISSUE AND FASCIA, OPEN APPROACH: ICD-10-PCS

## 2025-07-20 PROCEDURE — 15757 FREE SKIN FLAP MICROVASC: CPT

## 2025-07-20 PROCEDURE — 36415 COLL VENOUS BLD VENIPUNCTURE: CPT | Performed by: PHYSICIAN ASSISTANT

## 2025-07-20 PROCEDURE — 11042 DBRDMT SUBQ TIS 1ST 20SQCM/<: CPT | Performed by: SURGERY

## 2025-07-20 PROCEDURE — 2500000004 HC RX 250 GENERAL PHARMACY W/ HCPCS (ALT 636 FOR OP/ED): Mod: SE | Performed by: SURGERY

## 2025-07-20 PROCEDURE — 80048 BASIC METABOLIC PNL TOTAL CA: CPT | Performed by: PHYSICIAN ASSISTANT

## 2025-07-20 PROCEDURE — 0HRU077 REPLACEMENT OF LEFT BREAST USING DEEP INFERIOR EPIGASTRIC ARTERY PERFORATOR FLAP, OPEN APPROACH: ICD-10-PCS

## 2025-07-20 PROCEDURE — 15860 IV NJX TST VASC FLO FLAP/GRF: CPT

## 2025-07-20 PROCEDURE — 2500000004 HC RX 250 GENERAL PHARMACY W/ HCPCS (ALT 636 FOR OP/ED): Mod: SE | Performed by: ANESTHESIOLOGY

## 2025-07-20 RX ORDER — HYDROMORPHONE HYDROCHLORIDE 1 MG/ML
INJECTION, SOLUTION INTRAMUSCULAR; INTRAVENOUS; SUBCUTANEOUS AS NEEDED
Status: DISCONTINUED | OUTPATIENT
Start: 2025-07-20 | End: 2025-07-20

## 2025-07-20 RX ORDER — MAGNESIUM SULFATE HEPTAHYDRATE 500 MG/ML
INJECTION, SOLUTION INTRAMUSCULAR; INTRAVENOUS AS NEEDED
Status: DISCONTINUED | OUTPATIENT
Start: 2025-07-20 | End: 2025-07-20

## 2025-07-20 RX ORDER — OXYCODONE HYDROCHLORIDE 5 MG/1
5 TABLET ORAL EVERY 4 HOURS PRN
Refills: 0 | Status: CANCELLED | OUTPATIENT
Start: 2025-07-20

## 2025-07-20 RX ORDER — LIDOCAINE HYDROCHLORIDE 10 MG/ML
0.1 INJECTION, SOLUTION INFILTRATION; PERINEURAL ONCE
Status: DISCONTINUED | OUTPATIENT
Start: 2025-07-20 | End: 2025-07-21 | Stop reason: HOSPADM

## 2025-07-20 RX ORDER — MAGNESIUM SULFATE HEPTAHYDRATE 500 MG/ML
INJECTION, SOLUTION INTRAMUSCULAR; INTRAVENOUS AS NEEDED
Status: DISCONTINUED | OUTPATIENT
Start: 2025-07-20 | End: 2025-07-20 | Stop reason: HOSPADM

## 2025-07-20 RX ORDER — OXYCODONE HYDROCHLORIDE 5 MG/1
10 TABLET ORAL EVERY 4 HOURS PRN
Refills: 0 | Status: CANCELLED | OUTPATIENT
Start: 2025-07-20

## 2025-07-20 RX ORDER — HYDROMORPHONE HYDROCHLORIDE 0.2 MG/ML
0.2 INJECTION INTRAMUSCULAR; INTRAVENOUS; SUBCUTANEOUS EVERY 5 MIN PRN
Status: CANCELLED | OUTPATIENT
Start: 2025-07-20

## 2025-07-20 RX ORDER — LIDOCAINE HYDROCHLORIDE 20 MG/ML
INJECTION, SOLUTION INFILTRATION; PERINEURAL AS NEEDED
Status: DISCONTINUED | OUTPATIENT
Start: 2025-07-20 | End: 2025-07-20 | Stop reason: HOSPADM

## 2025-07-20 RX ORDER — SODIUM CHLORIDE, SODIUM LACTATE, POTASSIUM CHLORIDE, CALCIUM CHLORIDE 600; 310; 30; 20 MG/100ML; MG/100ML; MG/100ML; MG/100ML
100 INJECTION, SOLUTION INTRAVENOUS CONTINUOUS
Status: ACTIVE | OUTPATIENT
Start: 2025-07-20 | End: 2025-07-21

## 2025-07-20 RX ORDER — LIDOCAINE HYDROCHLORIDE 10 MG/ML
0.1 INJECTION, SOLUTION INFILTRATION; PERINEURAL ONCE
Status: CANCELLED | OUTPATIENT
Start: 2025-07-20 | End: 2025-07-20

## 2025-07-20 RX ORDER — KETOROLAC TROMETHAMINE 30 MG/ML
INJECTION, SOLUTION INTRAMUSCULAR; INTRAVENOUS AS NEEDED
Status: DISCONTINUED | OUTPATIENT
Start: 2025-07-20 | End: 2025-07-20

## 2025-07-20 RX ORDER — ONDANSETRON HYDROCHLORIDE 2 MG/ML
4 INJECTION, SOLUTION INTRAVENOUS ONCE AS NEEDED
Status: CANCELLED | OUTPATIENT
Start: 2025-07-20

## 2025-07-20 RX ORDER — PROPOFOL 10 MG/ML
INJECTION, EMULSION INTRAVENOUS AS NEEDED
Status: DISCONTINUED | OUTPATIENT
Start: 2025-07-20 | End: 2025-07-20

## 2025-07-20 RX ORDER — INSULIN LISPRO 100 [IU]/ML
0-5 INJECTION, SOLUTION INTRAVENOUS; SUBCUTANEOUS
Status: CANCELLED | OUTPATIENT
Start: 2025-07-21

## 2025-07-20 RX ORDER — POVIDONE-IODINE 10 %
SOLUTION, NON-ORAL TOPICAL AS NEEDED
Status: DISCONTINUED | OUTPATIENT
Start: 2025-07-20 | End: 2025-07-20 | Stop reason: HOSPADM

## 2025-07-20 RX ORDER — DROPERIDOL 2.5 MG/ML
0.62 INJECTION, SOLUTION INTRAMUSCULAR; INTRAVENOUS ONCE AS NEEDED
Status: CANCELLED | OUTPATIENT
Start: 2025-07-20

## 2025-07-20 RX ORDER — ACETAMINOPHEN 10 MG/ML
INJECTION, SOLUTION INTRAVENOUS AS NEEDED
Status: DISCONTINUED | OUTPATIENT
Start: 2025-07-20 | End: 2025-07-20

## 2025-07-20 RX ORDER — LABETALOL HYDROCHLORIDE 5 MG/ML
5 INJECTION, SOLUTION INTRAVENOUS ONCE AS NEEDED
Status: CANCELLED | OUTPATIENT
Start: 2025-07-20

## 2025-07-20 RX ORDER — FENTANYL CITRATE 50 UG/ML
INJECTION, SOLUTION INTRAMUSCULAR; INTRAVENOUS AS NEEDED
Status: DISCONTINUED | OUTPATIENT
Start: 2025-07-20 | End: 2025-07-20

## 2025-07-20 RX ORDER — ROCURONIUM BROMIDE 10 MG/ML
INJECTION, SOLUTION INTRAVENOUS AS NEEDED
Status: DISCONTINUED | OUTPATIENT
Start: 2025-07-20 | End: 2025-07-20

## 2025-07-20 RX ORDER — ONDANSETRON HYDROCHLORIDE 2 MG/ML
INJECTION, SOLUTION INTRAVENOUS AS NEEDED
Status: DISCONTINUED | OUTPATIENT
Start: 2025-07-20 | End: 2025-07-20

## 2025-07-20 RX ORDER — CEFAZOLIN 1 G/1
INJECTION, POWDER, FOR SOLUTION INTRAVENOUS AS NEEDED
Status: DISCONTINUED | OUTPATIENT
Start: 2025-07-20 | End: 2025-07-20

## 2025-07-20 RX ORDER — MIDAZOLAM HYDROCHLORIDE 2 MG/2ML
INJECTION, SOLUTION INTRAMUSCULAR; INTRAVENOUS AS NEEDED
Status: DISCONTINUED | OUTPATIENT
Start: 2025-07-20 | End: 2025-07-20

## 2025-07-20 RX ORDER — SODIUM CHLORIDE 9 MG/ML
INJECTION INTRAMUSCULAR; INTRAVENOUS; SUBCUTANEOUS AS NEEDED
Status: DISCONTINUED | OUTPATIENT
Start: 2025-07-20 | End: 2025-07-20 | Stop reason: HOSPADM

## 2025-07-20 RX ORDER — HYDROMORPHONE HYDROCHLORIDE 0.2 MG/ML
0.2 INJECTION INTRAMUSCULAR; INTRAVENOUS; SUBCUTANEOUS EVERY 5 MIN PRN
Status: DISCONTINUED | OUTPATIENT
Start: 2025-07-20 | End: 2025-07-21 | Stop reason: HOSPADM

## 2025-07-20 RX ORDER — MEPERIDINE HYDROCHLORIDE 25 MG/ML
12.5 INJECTION INTRAMUSCULAR; INTRAVENOUS; SUBCUTANEOUS EVERY 10 MIN PRN
Status: CANCELLED | OUTPATIENT
Start: 2025-07-20

## 2025-07-20 RX ORDER — OXYCODONE HYDROCHLORIDE 5 MG/1
5 TABLET ORAL EVERY 4 HOURS PRN
Status: DISCONTINUED | OUTPATIENT
Start: 2025-07-20 | End: 2025-07-21 | Stop reason: HOSPADM

## 2025-07-20 RX ORDER — BUPIVACAINE HCL/EPINEPHRINE 0.5-1:200K
VIAL (ML) INJECTION AS NEEDED
Status: DISCONTINUED | OUTPATIENT
Start: 2025-07-20 | End: 2025-07-20 | Stop reason: HOSPADM

## 2025-07-20 RX ORDER — SODIUM CHLORIDE, SODIUM LACTATE, POTASSIUM CHLORIDE, CALCIUM CHLORIDE 600; 310; 30; 20 MG/100ML; MG/100ML; MG/100ML; MG/100ML
100 INJECTION, SOLUTION INTRAVENOUS CONTINUOUS
Status: CANCELLED | OUTPATIENT
Start: 2025-07-20 | End: 2025-07-21

## 2025-07-20 RX ORDER — INDOCYANINE GREEN AND WATER 25 MG
KIT INJECTION AS NEEDED
Status: DISCONTINUED | OUTPATIENT
Start: 2025-07-20 | End: 2025-07-20

## 2025-07-20 RX ORDER — HEPARIN SODIUM 5000 [USP'U]/ML
INJECTION, SOLUTION INTRAVENOUS; SUBCUTANEOUS AS NEEDED
Status: DISCONTINUED | OUTPATIENT
Start: 2025-07-20 | End: 2025-07-20

## 2025-07-20 RX ORDER — ONDANSETRON HYDROCHLORIDE 2 MG/ML
4 INJECTION, SOLUTION INTRAVENOUS ONCE AS NEEDED
Status: DISCONTINUED | OUTPATIENT
Start: 2025-07-20 | End: 2025-07-21 | Stop reason: HOSPADM

## 2025-07-20 RX ORDER — METFORMIN HYDROCHLORIDE 500 MG/1
500 TABLET, EXTENDED RELEASE ORAL
Status: CANCELLED | OUTPATIENT
Start: 2025-07-21

## 2025-07-20 RX ORDER — SODIUM CHLORIDE 0.9 G/100ML
INJECTION, SOLUTION IRRIGATION AS NEEDED
Status: DISCONTINUED | OUTPATIENT
Start: 2025-07-20 | End: 2025-07-20 | Stop reason: HOSPADM

## 2025-07-20 RX ORDER — ALBUMIN HUMAN 50 G/1000ML
SOLUTION INTRAVENOUS AS NEEDED
Status: DISCONTINUED | OUTPATIENT
Start: 2025-07-20 | End: 2025-07-20

## 2025-07-20 RX ORDER — LIDOCAINE HYDROCHLORIDE 20 MG/ML
INJECTION, SOLUTION INFILTRATION; PERINEURAL AS NEEDED
Status: DISCONTINUED | OUTPATIENT
Start: 2025-07-20 | End: 2025-07-20

## 2025-07-20 RX ADMIN — ROCURONIUM BROMIDE 20 MG: 10 INJECTION INTRAVENOUS at 11:05

## 2025-07-20 RX ADMIN — ROCURONIUM BROMIDE 10 MG: 10 INJECTION INTRAVENOUS at 15:33

## 2025-07-20 RX ADMIN — HYDROMORPHONE HYDROCHLORIDE 1 MG: 1 INJECTION, SOLUTION INTRAMUSCULAR; INTRAVENOUS; SUBCUTANEOUS at 21:36

## 2025-07-20 RX ADMIN — Medication 10 MG: at 09:54

## 2025-07-20 RX ADMIN — LIDOCAINE HYDROCHLORIDE 100 MG: 20 INJECTION, SOLUTION INFILTRATION; PERINEURAL at 08:25

## 2025-07-20 RX ADMIN — ACETAMINOPHEN 1000 MG: 10 INJECTION, SOLUTION INTRAVENOUS at 09:31

## 2025-07-20 RX ADMIN — Medication 10 MG: at 20:33

## 2025-07-20 RX ADMIN — ROCURONIUM BROMIDE 20 MG: 10 INJECTION INTRAVENOUS at 10:11

## 2025-07-20 RX ADMIN — Medication 10 MG: at 12:49

## 2025-07-20 RX ADMIN — ROCURONIUM BROMIDE 10 MG: 10 INJECTION INTRAVENOUS at 18:21

## 2025-07-20 RX ADMIN — MAGNESIUM SULFATE HEPTAHYDRATE 2 G: 500 INJECTION, SOLUTION INTRAMUSCULAR; INTRAVENOUS at 11:06

## 2025-07-20 RX ADMIN — HYDROMORPHONE HYDROCHLORIDE 0.5 MG: 1 INJECTION, SOLUTION INTRAMUSCULAR; INTRAVENOUS; SUBCUTANEOUS at 22:32

## 2025-07-20 RX ADMIN — ROCURONIUM BROMIDE 20 MG: 10 INJECTION INTRAVENOUS at 09:32

## 2025-07-20 RX ADMIN — ROCURONIUM BROMIDE 10 MG: 10 INJECTION INTRAVENOUS at 14:44

## 2025-07-20 RX ADMIN — Medication 10 MG: at 16:47

## 2025-07-20 RX ADMIN — HYDROMORPHONE HYDROCHLORIDE 0.5 MG: 1 INJECTION, SOLUTION INTRAMUSCULAR; INTRAVENOUS; SUBCUTANEOUS at 22:47

## 2025-07-20 RX ADMIN — ALBUMIN HUMAN 250 ML: 0.05 INJECTION, SOLUTION INTRAVENOUS at 13:51

## 2025-07-20 RX ADMIN — CEFAZOLIN 2 G: 1 INJECTION, POWDER, FOR SOLUTION INTRAMUSCULAR; INTRAVENOUS at 09:30

## 2025-07-20 RX ADMIN — CEFAZOLIN 2 G: 1 INJECTION, POWDER, FOR SOLUTION INTRAMUSCULAR; INTRAVENOUS at 17:23

## 2025-07-20 RX ADMIN — Medication 10 MG: at 13:47

## 2025-07-20 RX ADMIN — Medication 30 MG: at 08:54

## 2025-07-20 RX ADMIN — Medication 10 MG: at 15:46

## 2025-07-20 RX ADMIN — CEFAZOLIN 2 G: 1 INJECTION, POWDER, FOR SOLUTION INTRAMUSCULAR; INTRAVENOUS at 21:23

## 2025-07-20 RX ADMIN — Medication 10 MG: at 10:51

## 2025-07-20 RX ADMIN — ROCURONIUM BROMIDE 10 MG: 10 INJECTION INTRAVENOUS at 11:44

## 2025-07-20 RX ADMIN — FENTANYL CITRATE 50 MCG: 50 INJECTION, SOLUTION INTRAMUSCULAR; INTRAVENOUS at 09:43

## 2025-07-20 RX ADMIN — PROPOFOL 20 MG: 10 INJECTION, EMULSION INTRAVENOUS at 09:50

## 2025-07-20 RX ADMIN — HYDROMORPHONE HYDROCHLORIDE 0.5 MG: 1 INJECTION, SOLUTION INTRAMUSCULAR; INTRAVENOUS; SUBCUTANEOUS at 22:20

## 2025-07-20 RX ADMIN — INDOCYANINE GREEN AND WATER 10 MG: KIT at 15:51

## 2025-07-20 RX ADMIN — HYDROMORPHONE HYDROCHLORIDE 0.2 MG: 0.2 INJECTION, SOLUTION INTRAMUSCULAR; INTRAVENOUS; SUBCUTANEOUS at 23:14

## 2025-07-20 RX ADMIN — ROCURONIUM BROMIDE 60 MG: 10 INJECTION INTRAVENOUS at 08:27

## 2025-07-20 RX ADMIN — ROCURONIUM BROMIDE 10 MG: 10 INJECTION INTRAVENOUS at 17:10

## 2025-07-20 RX ADMIN — Medication 10 MG: at 17:42

## 2025-07-20 RX ADMIN — Medication 8 L/MIN: at 22:08

## 2025-07-20 RX ADMIN — KETOROLAC TROMETHAMINE 30 MG: 30 INJECTION, SOLUTION INTRAMUSCULAR; INTRAVENOUS at 21:14

## 2025-07-20 RX ADMIN — MIDAZOLAM HYDROCHLORIDE 2 MG: 2 INJECTION, SOLUTION INTRAMUSCULAR; INTRAVENOUS at 08:18

## 2025-07-20 RX ADMIN — HYDROMORPHONE HYDROCHLORIDE 0.2 MG: 0.2 INJECTION, SOLUTION INTRAMUSCULAR; INTRAVENOUS; SUBCUTANEOUS at 23:41

## 2025-07-20 RX ADMIN — CEFAZOLIN SODIUM 2 G: 2 INJECTION, SOLUTION INTRAVENOUS at 04:13

## 2025-07-20 RX ADMIN — PROPOFOL 130 MG: 10 INJECTION, EMULSION INTRAVENOUS at 08:26

## 2025-07-20 RX ADMIN — INDOCYANINE GREEN AND WATER 7.5 MG: KIT at 20:03

## 2025-07-20 RX ADMIN — ROCURONIUM BROMIDE 10 MG: 10 INJECTION INTRAVENOUS at 12:36

## 2025-07-20 RX ADMIN — INDOCYANINE GREEN AND WATER 7.5 MG: KIT at 21:05

## 2025-07-20 RX ADMIN — SODIUM CHLORIDE, SODIUM LACTATE, POTASSIUM CHLORIDE, AND CALCIUM CHLORIDE 100 ML/HR: .6; .31; .03; .02 INJECTION, SOLUTION INTRAVENOUS at 22:31

## 2025-07-20 RX ADMIN — SODIUM CHLORIDE, SODIUM LACTATE, POTASSIUM CHLORIDE, AND CALCIUM CHLORIDE: 600; 310; 30; 20 INJECTION, SOLUTION INTRAVENOUS at 08:16

## 2025-07-20 RX ADMIN — ROCURONIUM BROMIDE 10 MG: 10 INJECTION INTRAVENOUS at 13:15

## 2025-07-20 RX ADMIN — DEXAMETHASONE SODIUM PHOSPHATE 4 MG: 4 INJECTION INTRA-ARTICULAR; INTRALESIONAL; INTRAMUSCULAR; INTRAVENOUS; SOFT TISSUE at 09:01

## 2025-07-20 RX ADMIN — ROCURONIUM BROMIDE 30 MG: 10 INJECTION INTRAVENOUS at 10:30

## 2025-07-20 RX ADMIN — ACETAMINOPHEN 1000 MG: 10 INJECTION, SOLUTION INTRAVENOUS at 17:25

## 2025-07-20 RX ADMIN — SUGAMMADEX 200 MG: 100 INJECTION, SOLUTION INTRAVENOUS at 21:54

## 2025-07-20 RX ADMIN — HEPARIN SODIUM 5000 UNITS: 5000 INJECTION, SOLUTION INTRAVENOUS; SUBCUTANEOUS at 08:32

## 2025-07-20 RX ADMIN — ALBUMIN HUMAN 250 ML: 0.05 INJECTION, SOLUTION INTRAVENOUS at 12:14

## 2025-07-20 RX ADMIN — CEFAZOLIN 2 G: 1 INJECTION, POWDER, FOR SOLUTION INTRAMUSCULAR; INTRAVENOUS at 13:26

## 2025-07-20 RX ADMIN — PROPOFOL 30 MG: 10 INJECTION, EMULSION INTRAVENOUS at 10:34

## 2025-07-20 RX ADMIN — Medication 10 MG: at 18:48

## 2025-07-20 RX ADMIN — INDOCYANINE GREEN AND WATER 7.5 MG: KIT at 20:18

## 2025-07-20 RX ADMIN — Medication 10 MG: at 11:51

## 2025-07-20 RX ADMIN — ROCURONIUM BROMIDE 10 MG: 10 INJECTION INTRAVENOUS at 19:19

## 2025-07-20 RX ADMIN — ALBUMIN HUMAN 250 ML: 0.05 INJECTION, SOLUTION INTRAVENOUS at 11:53

## 2025-07-20 RX ADMIN — Medication 10 MG: at 14:49

## 2025-07-20 RX ADMIN — ONDANSETRON 4 MG: 2 INJECTION, SOLUTION INTRAMUSCULAR; INTRAVENOUS at 21:07

## 2025-07-20 RX ADMIN — HEPARIN SODIUM 5000 UNITS: 5000 INJECTION, SOLUTION INTRAVENOUS; SUBCUTANEOUS at 16:21

## 2025-07-20 RX ADMIN — ROCURONIUM BROMIDE 10 MG: 10 INJECTION INTRAVENOUS at 13:56

## 2025-07-20 RX ADMIN — ROCURONIUM BROMIDE 10 MG: 10 INJECTION INTRAVENOUS at 19:57

## 2025-07-20 RX ADMIN — Medication 10 MG: at 19:47

## 2025-07-20 RX ADMIN — ROCURONIUM BROMIDE 10 MG: 10 INJECTION INTRAVENOUS at 16:19

## 2025-07-20 RX ADMIN — FENTANYL CITRATE 50 MCG: 50 INJECTION, SOLUTION INTRAMUSCULAR; INTRAVENOUS at 08:25

## 2025-07-20 RX ADMIN — PROPOFOL 20 MG: 10 INJECTION, EMULSION INTRAVENOUS at 10:15

## 2025-07-20 SDOH — HEALTH STABILITY: MENTAL HEALTH: CURRENT SMOKER: 0

## 2025-07-20 ASSESSMENT — PAIN - FUNCTIONAL ASSESSMENT
PAIN_FUNCTIONAL_ASSESSMENT: 0-10

## 2025-07-20 ASSESSMENT — PAIN DESCRIPTION - ORIENTATION
ORIENTATION: LEFT

## 2025-07-20 ASSESSMENT — PAIN SCALES - GENERAL
PAINLEVEL_OUTOF10: 4
PAINLEVEL_OUTOF10: 4
PAINLEVEL_OUTOF10: 0 - NO PAIN
PAINLEVEL_OUTOF10: 8
PAIN_LEVEL: 5
PAINLEVEL_OUTOF10: 8
PAINLEVEL_OUTOF10: 6
PAINLEVEL_OUTOF10: 8

## 2025-07-20 ASSESSMENT — PAIN DESCRIPTION - LOCATION
LOCATION: LEG
LOCATION: ARM
LOCATION: BREAST

## 2025-07-20 NOTE — ANESTHESIA PROCEDURE NOTES
Airway  Date/Time: 7/20/2025 8:29 AM  Reason: elective    Airway not difficult    Staffing  Performed: resident   Authorized by: Dean Gates MD    Performed by: Yifan Lujan DO  Patient location during procedure: OR    Patient Condition  Indications for airway management: anesthesia and airway protection  Patient position: sniffing  Planned trial extubation  Sedation level: deep     Final Airway Details   Preoxygenated: yes  Final airway type: endotracheal airway  Successful airway: ETT  Cuffed: yes   Successful intubation technique: direct laryngoscopy  Adjuncts used in placement: intubating stylet  Endotracheal tube insertion site: oral  Blade: Emilia  Blade size: #3  ETT size (mm): 7.0  Cormack-Lehane Classification: grade I - full view of glottis  Placement verified by: chest auscultation and capnometry   Measured from: lips  ETT to lips (cm): 21  Number of attempts at approach: 1

## 2025-07-20 NOTE — ANESTHESIA PROCEDURE NOTES
Peripheral IV  Date/Time: 7/20/2025 8:45 AM  Inserted by: Yifan Lujan DO    Placement  Needle size: 16 G  Laterality: right  Location: external jugular  Site prep: chlorhexidine  Technique: anatomical landmarks  Attempts: 1 (1 attempt at EJ, 2 attempts at R upper arm. All with ultrasound.)  Difficult Venous Access: Yes  Unsuccessful Attempt Location(s): right arm (R upper arm x2)

## 2025-07-20 NOTE — ANESTHESIA PREPROCEDURE EVALUATION
Patient: Etelvina Banks    Procedure Information       Anesthesia Start Date/Time: 07/20/25 0814    Procedures:       DEBRIDEMENT, THORAX (Left: Breast)      RECONSTRUCTION, BREAST, USING FREE FLAP (Left: Thigh - Leg Upper) - PAP flap - microsurgical breast reconstruction with tissue from upper thigh  needs split leg table or lithotomy    Location: [facility] OR 17 / [facility] OR    Surgeons: Jaye Dewey MD; Hui Lara MD            Relevant Problems   Cardiac   (+) Mixed hyperlipidemia   (+) Primary hypertension      Neuro   (+) Generalized anxiety disorder   (+) Opioid dependence on agonist therapy (Multi)      Endocrine   (+) Class 1 obesity in adult   (+) Type 2 diabetes mellitus without complication, without long-term current use of insulin      Musculoskeletal   (+) Fibromyalgia       Clinical information reviewed:   Tobacco  Allergies  Meds   Med Hx  Surg Hx  OB Status  Fam Hx  Soc   Hx        NPO Detail:  NPO/Void Status  Date of Last Liquid: 07/19/25  Time of Last Liquid: 2300  Date of Last Solid: 07/19/25  Time of Last Solid: 2300  Time of Last Void: 0600         Physical Exam    Airway  Mallampati: I  TM distance: >3 FB  Neck ROM: full     Cardiovascular - normal exam   Dental - normal exam     Pulmonary - normal exam   Abdominal - normal exam           Anesthesia Plan    History of general anesthesia?: yes  History of complications of general anesthesia?: no    ASA 2     general     The patient is not a current smoker.  Patient was not previously instructed to abstain from smoking on day of procedure.  Patient did not smoke on day of procedure.    intravenous induction   Postoperative pain plan includes opioids.  Anesthetic plan and risks discussed with patient.  Use of blood products discussed with patient who.    Plan discussed with resident.

## 2025-07-20 NOTE — OP NOTE
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  The Surgical Assistant was necessary to assist due to the nature of the case and difficulty.  Specifically, the JIL was assisted with soft tissue handling, retraction, hemostasis and closure in order to successfully perform and complete the procedure.    Staff:   Circulator: Lizz  Scrub Person: Saritha John Scrub: Merle John Circulator: Alessio    Anesthesia Staff: Anesthesiologist: Dean Gates MD  CRNA: KATIANA Del Rio-CRNA  Anesthesia Resident: Yifan Lujan DO    Procedure Summary  Anesthesia: General  ASA: II  Estimated Blood Loss: 10 mL  Intra-op Medications:   Administrations occurring from 0710 to 2230 on 25:   Medication Name Total Dose   heparin (porcine) 2,500 Units in sodium chloride 0.9 % 250 mL irrigation 2,500 Units   lidocaine (Xylocaine) 20 mg/mL (2 %) injection 34 mL   povidone-iodine (Betadine) 10 % topical solution 1 Application   BUPivacaine-EPINEPHrine (Marcaine w/EPI) 0.5 %-1:200,000 injection 50 mL   acetaminophen (Ofirmev) injection 1,000 mg   acetaminophen (Tylenol) tablet 975 mg Cannot be calculated   albumin human bottle 5% 750 mL   aspirin EC  tablet 81 mg Cannot be calculated   buprenorphine-naloxone (Suboxone) 8-2 mg per SL tablet 1 tablet Cannot be calculated   ceFAZolin (Ancef) 2 g in dextrose (iso)  mL Cannot be calculated   ceFAZolin (Ancef) vial 1 g 4 g   celecoxib (CeleBREX) capsule 100 mg Cannot be calculated   dexAMETHasone (Decadron) 4 mg/mL 4 mg   docusate sodium (Colace) capsule 100 mg Cannot be calculated   fentaNYL (Sublimaze) injection 50 mcg/mL 100 mcg   gabapentin (Neurontin) capsule 300 mg Cannot be calculated   heparin (porcine) injection 5,000 Units Cannot be calculated   heparin injection 5,000 units/mL 5,000 Units   insulin lispro injection 0-5 Units Cannot be calculated   ketamine injection 50 mg/ 5 mL (10 mg/mL) 80 mg   LR bolus Cannot be calculated   lidocaine (Xylocaine) injection 2 % 100 mg   magnesium sulfate 50% 2 g   metFORMIN XR (Glucophage-XR) 24 hr tablet 500 mg Cannot be calculated   midazolam PF (Versed) injection 1 mg/mL 2 mg   nitroglycerin (Gavin-Bid) 2 % ointment 0.5 inch Cannot be calculated   pantoprazole (ProtoNix) EC tablet 40 mg Cannot be calculated   propofol (Diprivan) injection 10 mg/mL 200 mg   rocuronium (ZeMuron) 50 mg/5 mL injection 190 mg              Anesthesia Record               Intraprocedure I/O Totals          Output    Urine 1245 mL    Total Output 1245 mL          Specimen: No specimens collected              Drains and/or Catheters:   Closed/Suction Drain 3 RLQ Bulb 19 Fr. (Active)   Present on Admission to Healthcare Facility [facility] 07/16/25 0637   Site Description Healing 07/19/25 0720   Dressing Status Clean;Dry;Occlusive 07/20/25 0654   Drainage Appearance Serosanguineous 07/20/25 0654   Status To bulb suction 07/20/25 0654   Output (mL) 0 mL 07/20/25 0519   Intake (ml) 0 ml 07/19/25 1300       Closed/Suction Drain 2 LLQ Bulb 19 Fr. (Active)   Present on Admission to Healthcare Facility Y 07/16/25 0637   Site Description Healing 07/20/25 0654   Dressing Status Clean;Dry;Occlusive 07/20/25  0654   Drainage Appearance Serosanguineous 07/20/25 0654   Status To bulb suction 07/20/25 0654   Output (mL) 3 mL 07/20/25 0519       Urethral Catheter 16 Fr. (Active)         Indications: Etelvina Banks is an 45 y.o. female who is having surgery for Ductal carcinoma in situ (DCIS) of left breast [D05.12]  Failed flap [T86.821]  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 [Z90.12].  She had a history of left DCIS treated with a mastectomy, tissue expander and adjacent tissue rearrangement.  She subsequently had a delayed MANUEL flap.  Unfortunately on postoperative day 4, one day at home she showed signs of venous insufficiency.  She underwent a exploration thrombectomy and reanastomosis.  Postoperatively, after about 24 hours she subsequently lost the arterial flap.  She comes to the OR now for debridement of the flap in preparation of the space for reconstruction by Dr. Lara with a PAP flap.    The patient was seen in the preoperative area. The risks, benefits, complications, treatment options, non-operative alternatives, expected recovery and outcomes were discussed with the patient.  Anesthesia, Infection, Bleeding, pain, need for further procedures, asymmetry, wound healing problems, hematoma, seroma   The possibilities of reaction to medication, pulmonary aspiration, injury to surrounding structures, bleeding, recurrent infection, the need for additional procedures, failure to diagnose a condition, and creating a complication requiring transfusion or operation were discussed with the patient. The patient concurred with the proposed plan, giving informed consent.  The site of surgery was properly noted/marked if necessary per policy. The patient has been actively warmed in preoperative area. Preoperative antibiotics have been ordered and given within 1 hours of incision. Venous thrombosis prophylaxis have been ordered including unilateral sequential compression device and chemical prophylaxis    Procedure  Details: The patient was identified and marked in the upright and standing position.  She was taken to the operative room and placed in the supine position.  A preoperative time was performed identifying the patient, procedeure and laterality.  An atraumatic endotracheal intubation was performed by the anesthesia team.  Her arms were padded and carefully secured to the arm boards, abducted less than 90 degrees. She was prepped and draped in the usual sterile fashion.    I sharply removed all of her previous sutures around the flap both the superficial and deep.  I then used carefully dissect the superior mastectomy flap off of the flap and dissected the flap away from the chest wall.  I placed medium clips across the thrombosed artery and vein and excised the pedicle using Metzenbaum scissors I then used my finger once more to gently dissect the pedicle off the chest wall.  I irrigated the pocket with 2 bottles of Irrisept.  An preparation of the space for a new free flap to her thoracodorsal recipient vessels, I used a combination of Bovie cautery, tenotomy scissors, and LigaSure to excise the edematous tissue and scar in her lateral chest wall to get down to softer tissue planes in preparation for dissection of her recipient vessels.  The tissues debrided were skin and subcutaneous tissues.  The anterior chest wall measured 15 centimeters x 19 cm and lateral chest wall measured 7 cm x 15 cm for total of 390 cm².  I then turned the rest of the case over to Dr. Hui Hand who was preparing her flap from her upper thigh at this time.  Please see a separately dictated operative note for details of his portion of the procedure.      Evidence of Infection: No   Complications:  None; patient tolerated the procedure well.    Disposition: PACU - hemodynamically stable.  Condition: stable         Attending Attestation: I performed the procedure.    Jaye Dewey  Phone Number: [phone]

## 2025-07-21 LAB
ANION GAP SERPL CALC-SCNC: 11 MMOL/L (ref 10–20)
BUN SERPL-MCNC: 15 MG/DL (ref 6–23)
CALCIUM SERPL-MCNC: 8.2 MG/DL (ref 8.6–10.6)
CHLORIDE SERPL-SCNC: 105 MMOL/L (ref 98–107)
CO2 SERPL-SCNC: 29 MMOL/L (ref 21–32)
CREAT SERPL-MCNC: 0.76 MG/DL (ref 0.5–1.05)
EGFRCR SERPLBLD CKD-EPI 2021: >90 ML/MIN/1.73M*2
ERYTHROCYTE [DISTWIDTH] IN BLOOD BY AUTOMATED COUNT: 14.6 % (ref 11.5–14.5)
GLUCOSE BLD MANUAL STRIP-MCNC: 108 MG/DL (ref 74–99)
GLUCOSE BLD MANUAL STRIP-MCNC: 110 MG/DL (ref 74–99)
GLUCOSE BLD MANUAL STRIP-MCNC: 129 MG/DL (ref 74–99)
GLUCOSE BLD MANUAL STRIP-MCNC: 139 MG/DL (ref 74–99)
GLUCOSE BLD MANUAL STRIP-MCNC: 165 MG/DL (ref 74–99)
GLUCOSE SERPL-MCNC: 110 MG/DL (ref 74–99)
HCT VFR BLD AUTO: 25.7 % (ref 36–46)
HGB BLD-MCNC: 8 G/DL (ref 12–16)
MCH RBC QN AUTO: 29.2 PG (ref 26–34)
MCHC RBC AUTO-ENTMCNC: 31.1 G/DL (ref 32–36)
MCV RBC AUTO: 94 FL (ref 80–100)
NRBC BLD-RTO: 0 /100 WBCS (ref 0–0)
PLATELET # BLD AUTO: 428 X10*3/UL (ref 150–450)
POTASSIUM SERPL-SCNC: 4.8 MMOL/L (ref 3.5–5.3)
RBC # BLD AUTO: 2.74 X10*6/UL (ref 4–5.2)
SODIUM SERPL-SCNC: 140 MMOL/L (ref 136–145)
THROMBIN TIME: 14 SECONDS (ref 10.3–16.6)
WBC # BLD AUTO: 12.6 X10*3/UL (ref 4.4–11.3)

## 2025-07-21 PROCEDURE — 2500000001 HC RX 250 WO HCPCS SELF ADMINISTERED DRUGS (ALT 637 FOR MEDICARE OP): Mod: SE

## 2025-07-21 PROCEDURE — 36430 TRANSFUSION BLD/BLD COMPNT: CPT

## 2025-07-21 PROCEDURE — 99232 SBSQ HOSP IP/OBS MODERATE 35: CPT

## 2025-07-21 PROCEDURE — 85027 COMPLETE CBC AUTOMATED: CPT | Performed by: PHYSICIAN ASSISTANT

## 2025-07-21 PROCEDURE — 1170000001 HC PRIVATE ONCOLOGY ROOM DAILY

## 2025-07-21 PROCEDURE — 82947 ASSAY GLUCOSE BLOOD QUANT: CPT

## 2025-07-21 PROCEDURE — P9016 RBC LEUKOCYTES REDUCED: HCPCS

## 2025-07-21 PROCEDURE — 36415 COLL VENOUS BLD VENIPUNCTURE: CPT | Performed by: PHYSICIAN ASSISTANT

## 2025-07-21 PROCEDURE — 2500000004 HC RX 250 GENERAL PHARMACY W/ HCPCS (ALT 636 FOR OP/ED): Mod: SE

## 2025-07-21 PROCEDURE — 2500000002 HC RX 250 W HCPCS SELF ADMINISTERED DRUGS (ALT 637 FOR MEDICARE OP, ALT 636 FOR OP/ED): Mod: SE

## 2025-07-21 PROCEDURE — 80048 BASIC METABOLIC PNL TOTAL CA: CPT | Performed by: PHYSICIAN ASSISTANT

## 2025-07-21 RX ORDER — METHOCARBAMOL 500 MG/1
500 TABLET, FILM COATED ORAL EVERY 6 HOURS PRN
Status: DISCONTINUED | OUTPATIENT
Start: 2025-07-21 | End: 2025-07-25 | Stop reason: HOSPADM

## 2025-07-21 RX ORDER — BISACODYL 10 MG/1
10 SUPPOSITORY RECTAL DAILY PRN
Status: DISCONTINUED | OUTPATIENT
Start: 2025-07-21 | End: 2025-07-25 | Stop reason: HOSPADM

## 2025-07-21 RX ORDER — HEPARIN SODIUM 5000 [USP'U]/ML
5000 INJECTION, SOLUTION INTRAVENOUS; SUBCUTANEOUS EVERY 8 HOURS
Status: DISCONTINUED | OUTPATIENT
Start: 2025-07-21 | End: 2025-07-24

## 2025-07-21 RX ORDER — ACETAMINOPHEN 325 MG/1
975 TABLET ORAL EVERY 8 HOURS
Status: DISCONTINUED | OUTPATIENT
Start: 2025-07-21 | End: 2025-07-25 | Stop reason: HOSPADM

## 2025-07-21 RX ORDER — GABAPENTIN 300 MG/1
300 CAPSULE ORAL EVERY 8 HOURS
Status: DISCONTINUED | OUTPATIENT
Start: 2025-07-21 | End: 2025-07-25 | Stop reason: HOSPADM

## 2025-07-21 RX ORDER — PANTOPRAZOLE SODIUM 40 MG/1
40 TABLET, DELAYED RELEASE ORAL
Status: DISCONTINUED | OUTPATIENT
Start: 2025-07-21 | End: 2025-07-25 | Stop reason: HOSPADM

## 2025-07-21 RX ORDER — POLYETHYLENE GLYCOL 3350 17 G/17G
17 POWDER, FOR SOLUTION ORAL DAILY
Status: DISCONTINUED | OUTPATIENT
Start: 2025-07-21 | End: 2025-07-25 | Stop reason: HOSPADM

## 2025-07-21 RX ORDER — DOCUSATE SODIUM 100 MG/1
100 CAPSULE, LIQUID FILLED ORAL 2 TIMES DAILY
Status: DISCONTINUED | OUTPATIENT
Start: 2025-07-21 | End: 2025-07-25 | Stop reason: HOSPADM

## 2025-07-21 RX ORDER — DEXTROSE 50 % IN WATER (D50W) INTRAVENOUS SYRINGE
25
Status: DISCONTINUED | OUTPATIENT
Start: 2025-07-21 | End: 2025-07-25 | Stop reason: HOSPADM

## 2025-07-21 RX ORDER — BUPRENORPHINE HYDROCHLORIDE AND NALOXONE HYDROCHLORIDE DIHYDRATE 8; 2 MG/1; MG/1
1 TABLET SUBLINGUAL 4 TIMES DAILY
Status: DISCONTINUED | OUTPATIENT
Start: 2025-07-21 | End: 2025-07-25 | Stop reason: HOSPADM

## 2025-07-21 RX ORDER — CELECOXIB 100 MG/1
100 CAPSULE ORAL 2 TIMES DAILY
Status: DISCONTINUED | OUTPATIENT
Start: 2025-07-21 | End: 2025-07-25 | Stop reason: HOSPADM

## 2025-07-21 RX ORDER — CEFAZOLIN SODIUM 2 G/100ML
2 INJECTION, SOLUTION INTRAVENOUS EVERY 8 HOURS
Status: DISCONTINUED | OUTPATIENT
Start: 2025-07-21 | End: 2025-07-25 | Stop reason: HOSPADM

## 2025-07-21 RX ORDER — SODIUM CHLORIDE, SODIUM LACTATE, POTASSIUM CHLORIDE, CALCIUM CHLORIDE 600; 310; 30; 20 MG/100ML; MG/100ML; MG/100ML; MG/100ML
42 INJECTION, SOLUTION INTRAVENOUS CONTINUOUS
Status: ACTIVE | OUTPATIENT
Start: 2025-07-21 | End: 2025-07-22

## 2025-07-21 RX ADMIN — CELECOXIB 100 MG: 100 CAPSULE ORAL at 21:19

## 2025-07-21 RX ADMIN — METHOCARBAMOL 500 MG: 500 TABLET ORAL at 04:10

## 2025-07-21 RX ADMIN — PANTOPRAZOLE SODIUM 40 MG: 40 TABLET, DELAYED RELEASE ORAL at 08:21

## 2025-07-21 RX ADMIN — ACETAMINOPHEN 975 MG: 325 TABLET ORAL at 13:24

## 2025-07-21 RX ADMIN — HEPARIN SODIUM 5000 UNITS: 5000 INJECTION, SOLUTION INTRAVENOUS; SUBCUTANEOUS at 07:38

## 2025-07-21 RX ADMIN — BUPRENORPHINE AND NALOXONE 1 TABLET: 8; 2 TABLET SUBLINGUAL at 07:38

## 2025-07-21 RX ADMIN — GABAPENTIN 300 MG: 300 CAPSULE ORAL at 05:59

## 2025-07-21 RX ADMIN — ACETAMINOPHEN 975 MG: 325 TABLET ORAL at 05:59

## 2025-07-21 RX ADMIN — CEFAZOLIN SODIUM 2 G: 2 INJECTION, SOLUTION INTRAVENOUS at 04:43

## 2025-07-21 RX ADMIN — CEFAZOLIN SODIUM 2 G: 2 INJECTION, SOLUTION INTRAVENOUS at 21:19

## 2025-07-21 RX ADMIN — BUPRENORPHINE AND NALOXONE 1 TABLET: 8; 2 TABLET SUBLINGUAL at 16:21

## 2025-07-21 RX ADMIN — HEPARIN SODIUM 5000 UNITS: 5000 INJECTION, SOLUTION INTRAVENOUS; SUBCUTANEOUS at 22:16

## 2025-07-21 RX ADMIN — CELECOXIB 100 MG: 100 CAPSULE ORAL at 08:26

## 2025-07-21 RX ADMIN — DOCUSATE SODIUM 100 MG: 100 CAPSULE, LIQUID FILLED ORAL at 21:19

## 2025-07-21 RX ADMIN — GABAPENTIN 300 MG: 300 CAPSULE ORAL at 22:16

## 2025-07-21 RX ADMIN — BUPRENORPHINE AND NALOXONE 1 TABLET: 8; 2 TABLET SUBLINGUAL at 13:24

## 2025-07-21 RX ADMIN — ACETAMINOPHEN 975 MG: 325 TABLET ORAL at 22:16

## 2025-07-21 RX ADMIN — GABAPENTIN 300 MG: 300 CAPSULE ORAL at 13:24

## 2025-07-21 RX ADMIN — HUMAN INSULIN 1 UNITS: 100 INJECTION, SOLUTION SUBCUTANEOUS at 16:21

## 2025-07-21 RX ADMIN — BUPRENORPHINE AND NALOXONE 1 TABLET: 8; 2 TABLET SUBLINGUAL at 21:19

## 2025-07-21 RX ADMIN — CEFAZOLIN SODIUM 2 G: 2 INJECTION, SOLUTION INTRAVENOUS at 13:24

## 2025-07-21 RX ADMIN — METHOCARBAMOL 500 MG: 500 TABLET ORAL at 20:30

## 2025-07-21 RX ADMIN — HEPARIN SODIUM 5000 UNITS: 5000 INJECTION, SOLUTION INTRAVENOUS; SUBCUTANEOUS at 15:10

## 2025-07-21 ASSESSMENT — PAIN - FUNCTIONAL ASSESSMENT
PAIN_FUNCTIONAL_ASSESSMENT: 0-10

## 2025-07-21 ASSESSMENT — COGNITIVE AND FUNCTIONAL STATUS - GENERAL
PERSONAL GROOMING: A LITTLE
MOBILITY SCORE: 19
TOILETING: A LITTLE
CLIMB 3 TO 5 STEPS WITH RAILING: A LITTLE
TURNING FROM BACK TO SIDE WHILE IN FLAT BAD: A LITTLE
DAILY ACTIVITIY SCORE: 17
DRESSING REGULAR UPPER BODY CLOTHING: A LITTLE
WALKING IN HOSPITAL ROOM: A LITTLE
DRESSING REGULAR LOWER BODY CLOTHING: TOTAL
MOVING TO AND FROM BED TO CHAIR: A LITTLE
STANDING UP FROM CHAIR USING ARMS: A LITTLE
HELP NEEDED FOR BATHING: A LITTLE

## 2025-07-21 ASSESSMENT — PAIN SCALES - GENERAL
PAINLEVEL_OUTOF10: 3
PAINLEVEL_OUTOF10: 7
PAINLEVEL_OUTOF10: 4
PAINLEVEL_OUTOF10: 4

## 2025-07-21 NOTE — PROGRESS NOTES
Division of Plastic and Reconstructive Surgery  Post Op Check    Subjective       Objective   Vitals:    07/20/25 2300   BP:    Pulse: 99   Resp: 13   Temp:    SpO2: 100%     Physical Exam  Constitutional: A&Ox3, NAD, resting in bed.  Eyes: EOMI, clear sclera.   ENMT: Moist mucous membranes, no apparent injuries or lesions.  Head/Neck: NC/AT.   Cardiovascular: RRR.   Respiratory/Thorax: Regular respirations on RA. Good symmetric chest expansion.   Gastrointestinal: Abdomen soft, slightly distended, appropriately tender, no peritoneal signs,  BS x 4, lower transverse abdominal incision dressed with Mepliex AG. ROSCOE drain x 2 with serous output to each side of the b/l lower abdomen, patent and maintaining self suction, dressed with CHG dressing. Umbilicus well approximated with sutures, no drainage, erythema or dehiscence, VAN.  Genitourinary: indwelling hong catheter   Extremities: Non-pitting peripheral edema of the lower extremities. Moving all 4 extremities actively.   Neurological: A&Ox3.   Psychological: Appropriate mood and behavior.      Breast: MANUEL free flap recipient site to left breast. Flap is warm to touch, appropriate tissue turgor. Intact biphasic pulse signal at marked suture site at central aspect of free flap. Cap refill < 3 sec. Incisions well approximated with sutures, dressed with dermabond and steri strips, no incisional dehiscence or drainage. ROSCOE drain x1 to lateral inferior aspect of left breast which is patent, maintaining self suction with bloody serous output postoperatively. Vioptix monitor lead intact currently reading mid 70s% saturation with 90+ signal quality.     Left thigh: Inner thigh incisions covered with dermabond, steri strips, and incisional prevena vac. Vac is to suction at -125mmHg with no alarms or leaks. Left leg wrapped in ACE bandages. Knee Immobilizer in place. DP +2. Cap refill < 2sec.      Assessment   Etelvina Banks  is a 45 y.o. female with PMH of HTN, T2DM,  peripheral edema, opioid dependence on agonist therapy, and DCIS of the left breast s/p L breast skin sparing mastectomy (per breast surgery, Dr. Ortiz) with insertion of TE (per plastic surgery, Dr. Dewey) on 11/11/2024. Patient is now s/p OR with plastic surgery (Dr. Dewey and Dr. Lara) on 7/11/2025 for left breast tissue expander removal with capsulectomy and left breast reconstruction with free Deep Inferior Epigastric  flap (MANUEL). She progressed well postoperatively and was discharged home on 7/14/25. Patient re-admitted following emergent OR on Tuesday 7/15 PM for re-exploration of left breast free flap, repair of vessel, embolectomy, and flap debridement after patient presented with pallor and purple discoloration of her free flap site. She remains admitted for further flap observation/management. Flap declared non-viable. RTOR on 7/20 for debridement and PAP flap to left breast with Dr. Dewey and Dr. Lara.     Plan:   # S/p left breast debridement and PAP flap to left breast   - Flap surveillance:   Continue serial flap assessments q1h per nursing with interval checks per plastic surgery team  Please assess Doppler pulse signal at marked site at central aspect of left breast flap plus flap general appearance (color, warmth, turgor)   Please additionally check Vioptix signal hourly and ensure saturation is >30% (If signal lost, abrupt change in signal or saturation <30% please immediately notify the plastic surgery team)    Please notify plastic surgery team immediately if there are any acute changes (Including decreased Doppler signal, pallor, temperature change, bleeding, etc.)  - Flap Warming/Rashmi Hugger:   Use of Rashmi hugger to L breast flap recipient site and generalized upper body to passively warm flap and promote perfusion postoperatively overnight on POD0  Settings: medium heat (38*C), high continuous fan   Avoid direct contact of warmer blanket with skin/flap sites   Anticipate DC of  Rashmi Kyle on POD1   - Dressings/wound care:   Dermabond and Steri-strips to remain intact over breast flap and lower transverse abdominal incision lines, keep C/D/I   - Maintain prevena incisional wound vac to left thigh x2 weeks postoperatively  Settings: -125mmHg, low, continuous suction   Nursing to monitor and record vac canister output at least q8h (please record 0 for no output)  For any issues or concerns with vac, please notify plastic surgery team at [phone] or pager #[phone]  Will transition to portable vac at time of DC if DC date is prior to projected vac removal date   - Drains:   Continue drain care to x4 ROSCOE drains present at the b/l lower abdomen, L breast and L thigh (nursing orders placed)  Maintain CHG drain dressings over drain insertion sites at the skin   Please ensure that drains are compressed flat and plugged to maintain self suction at all times aside from when emptying   Nursing to strip drain tubing at least q4h and PRN to avoid drain/tubing obstruction   Nursing to please also empty and record output quantities at least TID and PRN if drains are full   Please notify the plastics team if drain outputs exceed >30 ml in 1 hr or >200 in 24 hrs  Drains to be removed per plastics team once 24 hr outputs remain <30 ml total x2 consecutive days  If drains do not meet output criteria for removal at time of medical clearance for DC, will plan for removal per plastics upon outpatient follow up   - Positioning:   Patient may have head of bed elevated up to 45 degrees . No hip flexion, extension, abduction for 2 weeks.  Keep left arm at a 45 degree angle away from body at all times. Do not allow arm to be compressing left breast or be at a 90 degree angle.   Avoid positioning that would apply pressure to flap region or incisions     - Weight bearing:   Patient to maintain strict bedrest postoperatively for 48 hours.  <5 lbs lifting restriction to BUE postoperatively  No significant upper extremity  abduction, extension or shoulder retraction to prevent tension on breast surgical site and incisions  NWB to left leg for 2 weeks.  Keep knee immobilizer in place for 2 weeks.  Maintain ACE wrap to left leg for 5 days postop.   - Other postoperative parameters/care:   IV Ancef x3 doses postoperatively for surgical infection ppx   Maintain Landa while on bedrest   NPO until re-assessment on POD #1 AM, anticipate advancement to CLD on POD1 AM if stable overnight   LR mIVF @ 100 mL/hr while NPO  Nursing to record patient I&O, urine output goal of >1 mL/kg/hr  SQ Heparin q8h scheduled to start postoperatively, continue while inpatient    VS monitoring q4h  Encouraged use of IS (10x/hr, each hr while awake)   Ensure perioperative scopolamine patch removal 72hrs postoperatively (if placed prior to surgery)  Neurovascular checks to left leg q4 to be done on web in between first and second toe. Do not remove ACE wrap to perform the checks.      # Acute postoperative pain, hx of opiate dependence on agonist therapy   - Nursing to assess patient pain scales at least q4h and PRN  - Continue home Suboxone 8-2 mg SL tablet PO QID (home medication dosage confirmed via  pharmacy medication history review note placed on 7/10/25)  - Continue additional regimen (ERAS protocol) as follows:   Scheduled Tylenol 975mg PO q8h   Scheduled Gabapentin 300mg PO q8h   Scheduled Robaxin 500 mg PO q6h   PO Celebrex 100mg BID on POD1 AM      # Hx T2DM   - Last HBA1C 5.8 on labs obtained 10/17/24  - Home Metformin 500 mg PO daily held postoperatively and while NPO   - Start SSI overnight on POD0 with accuchecks   - Consider diabetic diet once clear for PO      # Hx HTN  - Home Losartan 50 mg PO daily held postoperatively, resume as BP permits with hold parameters to avoid hypotension and flap hypoperfusion   - BP stable postoperatively (110s/50s)  - Monitor VS q4h      # Hx peripheral edema   - Home hydrochlorothiazide held postoperatively,  resume as BP permits and as needed for edema   - Extremity elevation while patient in bed      F: LR @ 100ml/hr while NPO overnight on POD0  E: Monitor and replete PRN  N: NPO overnight on POD#0, anticipate transition to CLD with Jamarcus supplements BID on POD1 AM if flap exams stable overnight   GI: Continue bowel regimen with Colace BID and Milk of mag PRN        Zofran (first line) and Phenergen (second line) PRN for nausea        GI ppx postoperatively and while inpatient with Protonix      DVT ppx:  - Risk score assessed  - SQ heparin q8h while inpatient   - Use of SCDs while in bed   - Anticipate early ambulation once off bedrest on POD1       Disposition: Continue care on RNF. Will remain inpatient for continued flap monitoring and vac/drain surveillance postoperatively.      Patient and plan discussed with Dr. Zuleima Merida PA-C  Plastic and Reconstructive Surgery   Available via [employer]  Pager #[phone]  Team phone: [phone]

## 2025-07-21 NOTE — PROGRESS NOTES
Division of Plastic and Reconstructive Surgery  Post Op Check    Subjective   Patient resting in bed on exam. POD#1 S/p left breast debridement and PAP flap to left breast. Patient reported having aching pain to her L arm overnight, which has significantly improved this AM.  Discussed this was likely due to positioning intra-operatively. She also reports some dizziness, we discussed hemoglobin result of 8.0 this morning and plan for patient to receive 1U PRBC. She also reports the blurriness to her R eye has continued to improve compared to last night. Prevena to left thigh to suction with no alarms or leaks. Vioptix reading 71% with 91% signal quality. Denies any fever, chills, CP, SOB, palpitations, nausea, vomiting, diarrhea.      Objective   Vitals:    07/21/25 0910   BP: 103/63   Pulse: 100   Resp: 16   Temp: 36 °C (96.8 °F)   SpO2:      Physical Exam  Constitutional: A&Ox3, NAD, resting in bed.  Eyes: EOMI, clear sclera.   ENMT: Moist mucous membranes, no apparent injuries or lesions.  Head/Neck: NC/AT.   Cardiovascular: RRR.   Respiratory/Thorax: Regular respirations on RA. Good symmetric chest expansion.   Gastrointestinal: Abdomen soft, slightly distended, appropriately tender, no peritoneal signs, lower transverse abdominal incision dressed with Mepliex AG. ROSCOE drain x 2 with serous output to each side of the b/l lower abdomen, patent and maintaining self suction, dressed with CHG dressing. Umbilicus well approximated with sutures, no drainage, erythema or dehiscence, VAN.  Genitourinary: indwelling hong catheter   Extremities: Non-pitting peripheral edema of the lower extremities. Moving all 4 extremities actively.   Neurological: A&Ox3.   Psychological: Appropriate mood and behavior.      Breast: MANUEL free flap recipient site to left breast. Flap is warm to touch, appropriate tissue turgor. Intact biphasic pulse signal at marked suture sites at central aspect of free flap. Cap refill < 3 sec. Incisions  well approximated with sutures, dressed with dermabond and steri strips, no incisional dehiscence or drainage. ROSCOE drain x1 to lateral inferior aspect of left breast which is patent, maintaining self suction with bloody serous output postoperatively. Vioptix monitor lead intact currently reading mid 70s% saturation with 90+ signal quality.     Left thigh: Inner thigh incisions covered with dermabond, steri strips, and incisional prevena vac. Vac is to suction at -125mmHg with no alarms or leaks. Left leg wrapped in ACE bandage. Knee Immobilizer in place. DP +2. Able to wiggle toes. Cap refill < 2sec.      Assessment   Etelvina Banks  is a 45 y.o. 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 Ortiz) with insertion of TE (per plastic surgery, Dr. Dewey) on 11/11/2024. Patient is now s/p OR with plastic surgery (Dr. Dewey and Dr. Lara) on 7/11/2025 for left breast tissue expander removal with capsulectomy and left breast reconstruction with free Deep Inferior Epigastric  flap (MANUEL). She progressed well postoperatively and was discharged home on 7/14/25. Patient re-admitted following emergent OR on Tuesday 7/15 PM for re-exploration of left breast free flap, repair of vessel, embolectomy, and flap debridement after patient presented with pallor and purple discoloration of her free flap site. She remains admitted for further flap observation/management. Flap declared non-viable. RTOR on 7/20 for debridement and PAP flap to left breast with Dr. Dewey and Dr. Lara.     Plan:   # S/p left breast debridement and PAP flap to left breast   - Flap surveillance:   Continue serial flap assessments q1h per nursing with interval checks per plastic surgery team  Please assess Doppler pulse signal at marked site at central aspect of left breast flap plus flap general appearance (color, warmth,  turgor)   Please additionally check Vioptix signal hourly and ensure saturation is >30% (If signal lost, abrupt change in signal or saturation <30% please immediately notify the plastic surgery team)    Please notify plastic surgery team immediately if there are any acute changes (Including decreased Doppler signal, pallor, temperature change, bleeding, etc.)  - Flap Warming/Rashmi Hugger:   Use of Rashmi hugger to L breast flap recipient site and generalized upper body to passively warm flap and promote perfusion postoperatively overnight on POD0  Settings: medium heat (38*C), high continuous fan   Avoid direct contact of warmer blanket with skin/flap sites   Anticipate DC of Rashmi Hugger on POD1   - Dressings/wound care:   Dermabond and steri-strips to remain intact over breast flap incision, Mepliex AG to lower transverse abdominal incision, keep C/D/I   Maintain prevena incisional wound vac to left thigh x2 weeks postoperatively  Settings: -125mmHg, low, continuous suction   Nursing to monitor and record vac canister output at least q8h (please record 0 for no output)  For any issues or concerns with vac, please notify plastic surgery team at [phone] or pager #[phone]  Will transition to portable vac at time of DC if DC date is prior to projected vac removal date   - Drains:   Continue drain care to x4 ROSCOE drains present at the b/l lower abdomen, L breast and L thigh (nursing orders placed)  Maintain CHG drain dressings over drain insertion sites at the skin   Please ensure that drains are compressed flat and plugged to maintain self suction at all times aside from when emptying   Nursing to strip drain tubing at least q4h and PRN to avoid drain/tubing obstruction   Nursing to please also empty and record output quantities at least TID and PRN if drains are full   Please notify the plastics team if drain outputs exceed >30 ml in 1 hr or >200 in 24 hrs  Drains to be removed per plastics team once 24 hr outputs remain <30  ml total x2 consecutive days  If drains do not meet output criteria for removal at time of medical clearance for DC, will plan for removal per plastics upon outpatient follow up   - Positioning:   Patient may have head of bed elevated up to 45 degrees. No hip flexion, extension, abduction for 2 weeks.  Keep left arm at a 45 degree angle away from body at all times. Do not allow arm to be compressing left breast or be at a 90 degree angle.   Avoid positioning that would apply pressure to flap region or incisions   - Weight bearing:   Patient to maintain strict bedrest postoperatively for 48 hours.  <5 lbs lifting restriction to BUE postoperatively  No significant upper extremity abduction, extension or shoulder retraction to prevent tension on breast surgical site and incisions  NWB to left leg for 2 weeks.  Keep knee immobilizer in place for 2 weeks.  Maintain ACE wrap to left leg for 5 days postop.   - Other postoperative parameters/care:   IV Ancef while in-house  Maintain Landa while on bedrest   CLD on POD1 AM  LR mIVF @ 100 mL/hr while NPO  Nursing to record patient I&O, urine output goal of >1 mL/kg/hr  SQ Heparin q8h scheduled to start postoperatively, continue while inpatient    VS monitoring q4h  Encouraged use of IS (10x/hr, each hr while awake)   Ensure perioperative scopolamine patch removal 72hrs postoperatively (if placed prior to surgery)  Neurovascular checks to left leg q4 to be done on web in between first and second toe. Do not remove ACE wrap to perform checks.      # Acute postoperative pain, hx of opiate dependence on agonist therapy   - Nursing to assess patient pain scales at least q4h and PRN  - Continue home Suboxone 8-2 mg SL tablet PO QID (home medication dosage confirmed via  pharmacy medication history review note placed on 7/10/25)  - Continue additional regimen (ERAS protocol) as follows:   Scheduled Tylenol 975mg PO q8h   Scheduled Gabapentin 300mg PO q8h   Scheduled Robaxin 500 mg  PO q6h   PO Celebrex 100mg BID on POD1 AM     # Post-operative Anemia   - Postoperative labs at Hgb 8.0, patient received 1U PRBC 7/21  - Presently no S/S of bleeding, considered likely due to intraoperative blood loss   - Keep T&S UTD  - Monitor for S/S of bleeding or symptomatic anemia   - Monitor AM CBC     # Hx T2DM   - Last HBA1C 5.8 on labs obtained 10/17/24  - Home Metformin 500 mg PO daily held postoperatively, will resume once diet is advanced   - Start SSI overnight on POD0 with accuchecks   - Consider diabetic diet once clear for PO      # Hx HTN  - Home Losartan 50 mg PO daily held postoperatively, resume as BP permits with hold parameters to avoid hypotension and flap hypoperfusion   - Monitor VS q4h      # Hx peripheral edema   - Home hydrochlorothiazide held postoperatively, resume as BP permits and as needed for edema   - Extremity elevation while patient in bed      F: LR @ 100ml/hr while NPO overnight on POD0  E: Monitor and replete PRN  N: CLD with Jamarcus supplements BID on POD1 AM   GI: Continue bowel regimen with Colace BID and Milk of mag PRN        Zofran (first line) and Phenergen (second line) PRN for nausea        GI ppx postoperatively and while inpatient with Protonix      DVT ppx:  - Risk score assessed  - SQ heparin q8h while inpatient   - Use of SCD to R leg while in bed        Disposition: Continue care on RNF. Will remain inpatient for continued flap monitoring and vac/drain surveillance postoperatively.      Patient and plan discussed with Dr. Zuleima Valadez, PA-C  Plastic and Reconstructive Surgery   Available via [employer]  Pager #[phone]  Team phone: [phone]

## 2025-07-21 NOTE — PROGRESS NOTES
PLASTIC SURGERY CLINIC VISIT  POSTOP BREAST RECONSTRUCTION     Date: 7/29/25  Date of Surgery: 7/11/25  Surgical Procedure: Left MANUEL; Left PAP.         HPI:   Etelvina Banks 45 y.o. female is here for post-operative appointment for the above procedure(s).      Interval changes as of this date:   7/29 Pt discharged on 7/14/25. Pt noticed change in temperature of flap on 7/15; advised to go to ED for evaluation. Pt admitted; underwent a exploration thrombectomy and reanastomosis. Postoperatively, after about 24 hours she subsequently lost the arterial flap. She was subsequently taken to the OR where she was debrided and a PAP flap was created. Her pain is well controlled. ROSCOE drains with SS output.        MEDICATIONS  Current Medications[1]      OBJECTIVE [x]Expand by Default  There were no vitals taken for this visit.     REVIEW OF SYSTEMS:    Constitutional: negative for fevers, chills, unintentional weight loss  HEENT: negative for changes in vision, headaches, changes in hearing, congestion, sore throat  Cardiovascular: negative for chest pain, palpitations  Respiratory: negative for cough, shortness of breath  Gastrointestinal: negative for nausea, vomiting, diarrhea  Genitourinary: negative for dysuria, hematuria  Musculoskeletal: negative for joint swelling or pain  Skin: negative for rashes or lesions  Psych: negative for depression, anxiety  Endocrine: negative for polyuria, polydipsia, cold/heat intolerance  Hem/Lymph: negative for bleeding disorder     PHYSICAL EXAM  General: alert and oriented, no apparent distress    Focused exam of the breasts:  Left: ***      ASSESSMENT/PLAN  Etelvina Banks 45 y.o. female who had Left MANUEL on 7/11/25 with Left PAP on 7/20/25 who presents for POV    ROSCOE drain(s) in place. No erythema or edema surrounding the drain site. There is serous output from the drain. Patient recorded output of the drains showed 2 consecutive days of less than 30cc output at time of  removal. Patient was educated on purpose of surgical drains and informed of risk for seroma post drain removal.       ROSCOE drain(s) removed at this visit: ***     ***       RTC ***       [1] No current facility-administered medications for this visit.  No current outpatient medications on file.    Facility-Administered Medications Ordered in Other Visits:     acetaminophen (Tylenol) tablet 975 mg, 975 mg, oral, q8h, Maren Merida PA-C, 975 mg at 07/21/25 1324    [Transfer Hold] aspirin EC tablet 81 mg, 81 mg, oral, Daily, Ирина Covington PA-C, 81 mg at 07/19/25 0828    bisacodyl (Dulcolax) suppository 10 mg, 10 mg, rectal, Daily PRN, Maren Merida PA-C    buprenorphine-naloxone (Suboxone) 8-2 mg per SL tablet 1 tablet, 1 tablet, sublingual, 4x daily, Maren Merida PA-C, 1 tablet at 07/21/25 1324    ceFAZolin (Ancef) 2 g in dextrose (iso)  mL, 2 g, intravenous, q8h, Maren Merida PA-C, Stopped at 07/21/25 1343    celecoxib (CeleBREX) capsule 100 mg, 100 mg, oral, BID, Maren Merida PA-C, 100 mg at 07/21/25 0826    [Transfer Hold] dextrose 50 % injection 12.5 g, 12.5 g, intravenous, q15 min PRN, Anushka Valadez PA-C    dextrose 50 % injection 25 g, 25 g, intravenous, q15 min PRN, Maren Merida PA-C    docusate sodium (Colace) capsule 100 mg, 100 mg, oral, BID, Anushka Valadez PA-C    gabapentin (Neurontin) capsule 300 mg, 300 mg, oral, q8h, Maren Merida PA-C, 300 mg at 07/21/25 1324    glucagon (Glucagen) injection 1 mg, 1 mg, intramuscular, q15 min PRN, Maren Merida PA-C    heparin (porcine) injection 5,000 Units, 5,000 Units, subcutaneous, q8h, Maren Merida PA-C, 5,000 Units at 07/21/25 0738    insulin regular (HumuLIN R,NovoLIN R) injection 0-5 Units, 0-5 Units, subcutaneous, q4h, Maren Merida PA-C    lactated Ringer's infusion, 100 mL/hr, intravenous, Continuous, Maren Merida PA-C, Last Rate: 100 mL/hr at 07/21/25 0207, 100 mL/hr at 07/21/25 0207    methocarbamol (Robaxin) tablet  500 mg, 500 mg, oral, q6h PRN, Maren Merida PA-C, 500 mg at 07/21/25 0410    pantoprazole (ProtoNix) EC tablet 40 mg, 40 mg, oral, Daily before breakfast, aMren Merida PA-C, 40 mg at 07/21/25 0821    polyethylene glycol (Glycolax, Miralax) packet 17 g, 17 g, oral, Daily, Maren Merida PA-C

## 2025-07-21 NOTE — CARE PLAN
The patient's goals for the shift include      The clinical goals for the shift include pt will remain hds and positive flap checks      Problem: Skin  Goal: Decreased wound size/increased tissue granulation at next dressing change  Outcome: Progressing  Goal: Participates in plan/prevention/treatment measures  Outcome: Progressing  Goal: Prevent/manage excess moisture  Outcome: Progressing  Goal: Prevent/minimize sheer/friction injuries  Outcome: Progressing  Goal: Promote/optimize nutrition  Outcome: Progressing  Goal: Promote skin healing  Outcome: Progressing     Problem: Pain - Adult  Goal: Verbalizes/displays adequate comfort level or baseline comfort level  Outcome: Progressing     Problem: Safety - Adult  Goal: Free from fall injury  Outcome: Progressing     Problem: Discharge Planning  Goal: Discharge to home or other facility with appropriate resources  Outcome: Progressing     Problem: Chronic Conditions and Co-morbidities  Goal: Patient's chronic conditions and co-morbidity symptoms are monitored and maintained or improved  Outcome: Progressing

## 2025-07-21 NOTE — ANESTHESIA POSTPROCEDURE EVALUATION
Patient: Etelvina Banks    Procedure Summary       Date: 07/20/25 Room / Location: [facility] OR 17 / [facility] OR    Anesthesia Start: 0814 Anesthesia Stop: 2212    Procedures:       DEBRIDEMENT, THORAX (Left: Breast)      RECONSTRUCTION, BREAST, USING FREE FLAP (Left: Thigh - Leg Upper) Diagnosis:       Ductal carcinoma in situ (DCIS) of left breast      Failed flap      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      (Ductal carcinoma in situ (DCIS) of left breast [D05.12])      (Failed flap [T86.821])      (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 [Z90.12])    Surgeons: Jaye Dewey MD; Hui Lara MD Responsible Provider: Dodie Conrad MD MPH    Anesthesia Type: general ASA Status: 2            Anesthesia Type: general    Vitals Value Taken Time   /76 07/20/25 22:12   Temp 35.5 07/20/25 22:12   Pulse 108 07/20/25 22:12   Resp 13 07/20/25 22:12   SpO2 99 07/20/25 22:12       Anesthesia Post Evaluation    Patient location during evaluation: PACU  Patient participation: complete - patient participated  Level of consciousness: sleepy but conscious  Pain score: 5  Pain management: adequate  Multimodal analgesia pain management approach  Airway patency: patent  Cardiovascular status: acceptable, blood pressure returned to baseline and hemodynamically stable  Respiratory status: acceptable, spontaneous ventilation and face mask  Hydration status: acceptable  Postoperative Nausea and Vomiting: none        There were no known notable events for this encounter.

## 2025-07-21 NOTE — BRIEF OP NOTE
Date: 2025  OR Location: [facility] OR    Name: Etelvina Banks, : 1980, Age: 45 y.o., MRN: [id number], Sex: female    Diagnosis  Pre-op Diagnosis      * Ductal carcinoma in situ (DCIS) of left breast [D05.12]     * Failed flap [T86.821]     * 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 [Z90.12] Post-op Diagnosis     * Ductal carcinoma in situ (DCIS) of left breast [D05.12]     * Failed flap [T86.821]     * 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 [Z90.12]     Procedures  DEBRIDEMENT, THORAX  40789 - [place] DEBRIDEMENT SUBCUTANEOUS TISSUE 1ST 20 SQ CM/<    RECONSTRUCTION, BREAST, USING FREE FLAP  64335 - [place] BREAST RECONSTRUCTION W/FREE FLAP      Surgeons   Panel 1:     * Jaye Dewey - Primary  Panel 2:     * Hui Lara - Primary    Resident/Fellow/Other Assistant:  Surgeons and Role:  Panel 2:     * Jaye Dewey MD - Assisting    Staff:   Circulator: Lizz  Scrub Person: Saritha  Relief Scrub: Merle  Relief Circulator: Alessio  Relief Circulator: David  Relief Scrub: Srikanth    Anesthesia Staff: Anesthesiologist: Dodie Conrad MD MPH; Dean Gates MD  CRNA: KATIANA Del Rio-CRNA  Anesthesia Resident: Darnell Samuels MD; Yifan Lujan DO    Procedure Summary  Anesthesia: General  ASA: II  Estimated Blood Loss: 150mL  Intra-op Medications:   Administrations occurring from 0710 to 2230 on 25:   Medication Name Total Dose   heparin (porcine) 2,500 Units in sodium chloride 0.9 % 250 mL irrigation 2,500 Units   lidocaine (Xylocaine) 20 mg/mL (2 %) injection 50 mL   povidone-iodine (Betadine) 10 % topical solution 1 Application   BUPivacaine-EPINEPHrine (Marcaine w/EPI) 0.5 %-1:200,000 injection 184 mL   magnesium sulfate 500 mg/mL (50 %) injection 18 mL   sodium chloride 0.9 % irrigation solution 1,000 mL   BUPivacaine-EPINEPHrine (Marcaine w/EPI) 54 mL in sodium chloride 0.9% 130 mL syringe 184 mL   acetaminophen (Tylenol) tablet 975 mg Cannot be calculated   aspirin EC tablet 81  mg Cannot be calculated   buprenorphine-naloxone (Suboxone) 8-2 mg per SL tablet 1 tablet Cannot be calculated   ceFAZolin (Ancef) 2 g in dextrose (iso)  mL Cannot be calculated   celecoxib (CeleBREX) capsule 100 mg Cannot be calculated   docusate sodium (Colace) capsule 100 mg Cannot be calculated   gabapentin (Neurontin) capsule 300 mg Cannot be calculated   heparin (porcine) injection 5,000 Units Cannot be calculated   insulin lispro injection 0-5 Units Cannot be calculated   metFORMIN XR (Glucophage-XR) 24 hr tablet 500 mg Cannot be calculated   nitroglycerin (Gavin-Bid) 2 % ointment 0.5 inch Cannot be calculated   pantoprazole (ProtoNix) EC tablet 40 mg Cannot be calculated   acetaminophen (Ofirmev) injection 2,000 mg   albumin human bottle 5% 750 mL   ceFAZolin (Ancef) vial 1 g 8 g   dexAMETHasone (Decadron) 4 mg/mL 4 mg   fentaNYL (Sublimaze) injection 50 mcg/mL 100 mcg   heparin injection 5,000 units/mL 10,000 Units   HYDROmorphone (Dilaudid) injection 1 mg/mL 1 mg   indocyanine green (IC-Green) injection 25 mg 32.5 mg   ketamine injection 50 mg/ 5 mL (10 mg/mL) 150 mg   ketorolac (Toradol) 30 mg 30 mg   LR bolus Cannot be calculated   lidocaine (Xylocaine) injection 2 % 100 mg   magnesium sulfate 50% 2 g   midazolam PF (Versed) injection 1 mg/mL 2 mg   ondansetron 2 mg/mL 4 mg   propofol (Diprivan) injection 10 mg/mL 200 mg   rocuronium (ZeMuron) 50 mg/5 mL injection 260 mg   sugammadex (Bridion) 200 mg/2 mL injection 200 mg              Anesthesia Record               Intraprocedure I/O Totals          Intake    LR bolus 2750.00 mL    acetaminophen (Ofirmev) injection 200.00 mL    albumin human bottle 5% 750.00 mL    Total Intake 3700 mL       Output    Urine 4790 mL    Total Output 4790 mL       Net    Net Volume -1090 mL          Specimen: No specimens collected               Findings: See op note.     Complications:  None; patient tolerated the procedure well.     Disposition: PACU -  hemodynamically stable.  Condition: stable  Specimens Collected: No specimens collected  Attending Attestation: A qualified resident physician was not available.     Maren Merida PA-C  Plastic and Reconstructive Surgery   Available via [employer]  Pager #[phone]  Team phone: [phone]

## 2025-07-22 ENCOUNTER — APPOINTMENT (OUTPATIENT)
Dept: PLASTIC SURGERY | Facility: CLINIC | Age: 45
End: 2025-07-22
Payer: COMMERCIAL

## 2025-07-22 LAB
ABO GROUP (TYPE) IN BLOOD: NORMAL
ANTIBODY SCREEN: NORMAL
BLOOD EXPIRATION DATE: NORMAL
DISPENSE STATUS: NORMAL
ERYTHROCYTE [DISTWIDTH] IN BLOOD BY AUTOMATED COUNT: 15 % (ref 11.5–14.5)
HCT VFR BLD AUTO: 27.6 % (ref 36–46)
HGB BLD-MCNC: 8.7 G/DL (ref 12–16)
MCH RBC QN AUTO: 29.3 PG (ref 26–34)
MCHC RBC AUTO-ENTMCNC: 31.5 G/DL (ref 32–36)
MCV RBC AUTO: 93 FL (ref 80–100)
NRBC BLD-RTO: 0.2 /100 WBCS (ref 0–0)
PLATELET # BLD AUTO: 339 X10*3/UL (ref 150–450)
PRODUCT BLOOD TYPE: 5100
PRODUCT CODE: NORMAL
PROT C ACT/NOR PPP CHRO: >150 % ACTIVITY (ref 70–150)
PROT S ACT/NOR PPP: 116 % ACTIVITY (ref 64–150)
RBC # BLD AUTO: 2.97 X10*6/UL (ref 4–5.2)
RH FACTOR (ANTIGEN D): NORMAL
UNIT ABO: NORMAL
UNIT NUMBER: NORMAL
UNIT RH: NORMAL
UNIT VOLUME: 285
WBC # BLD AUTO: 9.9 X10*3/UL (ref 4.4–11.3)
XM INTEP: NORMAL

## 2025-07-22 PROCEDURE — 1170000001 HC PRIVATE ONCOLOGY ROOM DAILY

## 2025-07-22 PROCEDURE — 86901 BLOOD TYPING SEROLOGIC RH(D): CPT | Performed by: PHYSICIAN ASSISTANT

## 2025-07-22 PROCEDURE — 36415 COLL VENOUS BLD VENIPUNCTURE: CPT | Performed by: PHYSICIAN ASSISTANT

## 2025-07-22 PROCEDURE — 99231 SBSQ HOSP IP/OBS SF/LOW 25: CPT | Performed by: PHYSICIAN ASSISTANT

## 2025-07-22 PROCEDURE — 85027 COMPLETE CBC AUTOMATED: CPT | Performed by: NURSE PRACTITIONER

## 2025-07-22 PROCEDURE — 2500000001 HC RX 250 WO HCPCS SELF ADMINISTERED DRUGS (ALT 637 FOR MEDICARE OP): Mod: SE

## 2025-07-22 PROCEDURE — 51701 INSERT BLADDER CATHETER: CPT

## 2025-07-22 PROCEDURE — P9016 RBC LEUKOCYTES REDUCED: HCPCS

## 2025-07-22 PROCEDURE — 2500000001 HC RX 250 WO HCPCS SELF ADMINISTERED DRUGS (ALT 637 FOR MEDICARE OP): Mod: SE | Performed by: PHYSICIAN ASSISTANT

## 2025-07-22 PROCEDURE — 2500000002 HC RX 250 W HCPCS SELF ADMINISTERED DRUGS (ALT 637 FOR MEDICARE OP, ALT 636 FOR OP/ED): Mod: SE

## 2025-07-22 PROCEDURE — 86923 COMPATIBILITY TEST ELECTRIC: CPT

## 2025-07-22 PROCEDURE — 2500000004 HC RX 250 GENERAL PHARMACY W/ HCPCS (ALT 636 FOR OP/ED): Mod: SE

## 2025-07-22 PROCEDURE — 2500000005 HC RX 250 GENERAL PHARMACY W/O HCPCS: Mod: SE | Performed by: NURSE PRACTITIONER

## 2025-07-22 PROCEDURE — 36430 TRANSFUSION BLD/BLD COMPNT: CPT

## 2025-07-22 PROCEDURE — 36415 COLL VENOUS BLD VENIPUNCTURE: CPT | Performed by: NURSE PRACTITIONER

## 2025-07-22 RX ORDER — LIDOCAINE 560 MG/1
1 PATCH PERCUTANEOUS; TOPICAL; TRANSDERMAL DAILY
Status: DISCONTINUED | OUTPATIENT
Start: 2025-07-22 | End: 2025-07-25 | Stop reason: HOSPADM

## 2025-07-22 RX ORDER — METFORMIN HYDROCHLORIDE 500 MG/1
500 TABLET, EXTENDED RELEASE ORAL
Status: DISCONTINUED | OUTPATIENT
Start: 2025-07-22 | End: 2025-07-25 | Stop reason: HOSPADM

## 2025-07-22 RX ADMIN — BUPRENORPHINE AND NALOXONE 1 TABLET: 8; 2 TABLET SUBLINGUAL at 07:26

## 2025-07-22 RX ADMIN — GABAPENTIN 300 MG: 300 CAPSULE ORAL at 13:35

## 2025-07-22 RX ADMIN — ACETAMINOPHEN 975 MG: 325 TABLET ORAL at 06:03

## 2025-07-22 RX ADMIN — BUPRENORPHINE AND NALOXONE 1 TABLET: 8; 2 TABLET SUBLINGUAL at 17:41

## 2025-07-22 RX ADMIN — HEPARIN SODIUM 5000 UNITS: 5000 INJECTION, SOLUTION INTRAVENOUS; SUBCUTANEOUS at 06:03

## 2025-07-22 RX ADMIN — BUPRENORPHINE AND NALOXONE 1 TABLET: 8; 2 TABLET SUBLINGUAL at 13:27

## 2025-07-22 RX ADMIN — HEPARIN SODIUM 5000 UNITS: 5000 INJECTION, SOLUTION INTRAVENOUS; SUBCUTANEOUS at 23:05

## 2025-07-22 RX ADMIN — CEFAZOLIN SODIUM 2 G: 2 INJECTION, SOLUTION INTRAVENOUS at 13:27

## 2025-07-22 RX ADMIN — CEFAZOLIN SODIUM 2 G: 2 INJECTION, SOLUTION INTRAVENOUS at 06:04

## 2025-07-22 RX ADMIN — GABAPENTIN 300 MG: 300 CAPSULE ORAL at 06:04

## 2025-07-22 RX ADMIN — METHOCARBAMOL 500 MG: 500 TABLET ORAL at 03:03

## 2025-07-22 RX ADMIN — CEFAZOLIN SODIUM 2 G: 2 INJECTION, SOLUTION INTRAVENOUS at 20:30

## 2025-07-22 RX ADMIN — PANTOPRAZOLE SODIUM 40 MG: 40 TABLET, DELAYED RELEASE ORAL at 07:26

## 2025-07-22 RX ADMIN — ACETAMINOPHEN 975 MG: 325 TABLET ORAL at 21:38

## 2025-07-22 RX ADMIN — ACETAMINOPHEN 975 MG: 325 TABLET ORAL at 13:27

## 2025-07-22 RX ADMIN — METFORMIN ER 500 MG 500 MG: 500 TABLET ORAL at 17:41

## 2025-07-22 RX ADMIN — GABAPENTIN 300 MG: 300 CAPSULE ORAL at 21:38

## 2025-07-22 RX ADMIN — CELECOXIB 100 MG: 100 CAPSULE ORAL at 09:01

## 2025-07-22 RX ADMIN — BUPRENORPHINE AND NALOXONE 1 TABLET: 8; 2 TABLET SUBLINGUAL at 20:30

## 2025-07-22 RX ADMIN — LIDOCAINE 1 PATCH: 4 PATCH TOPICAL at 20:43

## 2025-07-22 RX ADMIN — HEPARIN SODIUM 5000 UNITS: 5000 INJECTION, SOLUTION INTRAVENOUS; SUBCUTANEOUS at 15:38

## 2025-07-22 RX ADMIN — CELECOXIB 100 MG: 100 CAPSULE ORAL at 20:30

## 2025-07-22 ASSESSMENT — PAIN SCALES - GENERAL
PAINLEVEL_OUTOF10: 3

## 2025-07-22 ASSESSMENT — COGNITIVE AND FUNCTIONAL STATUS - GENERAL
WALKING IN HOSPITAL ROOM: A LITTLE
MOBILITY SCORE: 19
WALKING IN HOSPITAL ROOM: A LITTLE
DRESSING REGULAR LOWER BODY CLOTHING: TOTAL
DAILY ACTIVITIY SCORE: 17
CLIMB 3 TO 5 STEPS WITH RAILING: A LITTLE
DAILY ACTIVITIY SCORE: 17
DRESSING REGULAR UPPER BODY CLOTHING: A LITTLE
TOILETING: A LITTLE
DRESSING REGULAR UPPER BODY CLOTHING: A LITTLE
MOVING TO AND FROM BED TO CHAIR: A LITTLE
HELP NEEDED FOR BATHING: A LITTLE
HELP NEEDED FOR BATHING: A LITTLE
TOILETING: A LITTLE
TURNING FROM BACK TO SIDE WHILE IN FLAT BAD: A LITTLE
MOVING TO AND FROM BED TO CHAIR: A LITTLE
TURNING FROM BACK TO SIDE WHILE IN FLAT BAD: A LITTLE
CLIMB 3 TO 5 STEPS WITH RAILING: A LITTLE
DRESSING REGULAR LOWER BODY CLOTHING: TOTAL
PERSONAL GROOMING: A LITTLE
PERSONAL GROOMING: A LITTLE
STANDING UP FROM CHAIR USING ARMS: A LITTLE
MOBILITY SCORE: 19
STANDING UP FROM CHAIR USING ARMS: A LITTLE

## 2025-07-22 ASSESSMENT — PAIN - FUNCTIONAL ASSESSMENT
PAIN_FUNCTIONAL_ASSESSMENT: 0-10

## 2025-07-22 NOTE — CARE PLAN
The clinical goals for the shift include Pt will remain HDS      Problem: Skin  Goal: Decreased wound size/increased tissue granulation at next dressing change  Outcome: Progressing  Goal: Participates in plan/prevention/treatment measures  Outcome: Progressing  Goal: Prevent/manage excess moisture  Outcome: Progressing  Goal: Prevent/minimize sheer/friction injuries  Outcome: Progressing  Goal: Promote/optimize nutrition  Outcome: Progressing  Goal: Promote skin healing  Outcome: Progressing     Problem: Pain - Adult  Goal: Verbalizes/displays adequate comfort level or baseline comfort level  Outcome: Progressing     Problem: Safety - Adult  Goal: Free from fall injury  Outcome: Progressing     Problem: Discharge Planning  Goal: Discharge to home or other facility with appropriate resources  Outcome: Progressing     Problem: Chronic Conditions and Co-morbidities  Goal: Patient's chronic conditions and co-morbidity symptoms are monitored and maintained or improved  Outcome: Progressing     Problem: Nutrition  Goal: Nutrient intake appropriate for maintaining nutritional needs  Outcome: Progressing

## 2025-07-22 NOTE — CARE PLAN
The patient's goals for the shift include      The clinical goals for the shift include pt will remain hds and pain controlled  Problem: Skin  Goal: Decreased wound size/increased tissue granulation at next dressing change  Outcome: Progressing  Goal: Participates in plan/prevention/treatment measures  Outcome: Progressing  Goal: Prevent/manage excess moisture  Outcome: Progressing  Goal: Prevent/minimize sheer/friction injuries  Outcome: Progressing  Goal: Promote/optimize nutrition  Outcome: Progressing  Goal: Promote skin healing  Outcome: Progressing     Problem: Pain - Adult  Goal: Verbalizes/displays adequate comfort level or baseline comfort level  Outcome: Progressing     Problem: Safety - Adult  Goal: Free from fall injury  Outcome: Progressing     Problem: Discharge Planning  Goal: Discharge to home or other facility with appropriate resources  Outcome: Progressing     Problem: Chronic Conditions and Co-morbidities  Goal: Patient's chronic conditions and co-morbidity symptoms are monitored and maintained or improved  Outcome: Progressing     Problem: Nutrition  Goal: Nutrient intake appropriate for maintaining nutritional needs  Outcome: Progressing

## 2025-07-22 NOTE — SIGNIFICANT EVENT
"  Department of Plastic and Reconstructive Surgery  PM Flap Check    Subjective: Resting in bed, c/o left arm pain that began earlier in the shift after drain stripping that radiated from axilla down to left arm, relieved with PO Robaxin, Tolerating full liquid diet and starting to feel hungry, no BM, +flatus, Denies any fever, chills, night sweats, CP, SOB, palpitations, nausea, vomiting, diarrhea, constipation, dysuria, hematuria, hematochezia, hematemesis, flank pain.     Objective:  /65 (BP Location: Right arm, Patient Position: Lying)   Pulse 92   Temp 36.5 °C (97.7 °F) (Temporal)   Resp 18   Ht 1.575 m (5' 2\")   Wt 92.8 kg (204 lb 8 oz)   SpO2 94%   BMI 37.40 kg/m²    PE:   Constitutional: A&Ox3, NAD, resting in bed.  Eyes: EOMI, clear sclera.   ENMT: Moist mucous membranes, no apparent injuries or lesions.  Head/Neck: NC/AT.   Cardiovascular: RRR.   Respiratory/Thorax: Regular respirations on RA. Good symmetric chest expansion.   Gastrointestinal: Abdomen soft, slightly distended, appropriately tender, no peritoneal signs, lower transverse abdominal incision dressed with Mepliex AG. ROSCOE drain x 2 with serous output to each side of the b/l lower abdomen, patent and maintaining self suction, dressed with CHG dressing. Umbilicus well approximated with sutures, no drainage, erythema or dehiscence, VAN.  Genitourinary: indwelling hong catheter in place with clear, yellow output  Extremities: Non-pitting peripheral edema of the lower extremities. Moving all 4 extremities actively.  Left leg wrapped in ACE wrap with wound vac in place, holding suction at -125mmHg with no alarms or leaks. Knee Immobilizer in place. ROSCOE drain to left thigh with ss output, BLE neurovascular intact, cap refill < 2 sec, +SILT, +df/pf, DP/PT/ radial pulses 2+, no drainage noted.   Neurological: A&Ox3.   Psychological: Appropriate mood and behavior.    Breast: MANUEL free flap recipient site to left breast. Flap is warm to touch, " appropriate tissue turgor. Intact biphasic pulse signal at marked suture sites at central aspect of free flap. Cap refill < 3 sec. Incisions well approximated with sutures, dressed with dermabond and steri strips, no incisional dehiscence or drainage. ROSCOE drain x1 to lateral inferior aspect of left breast which is patent, maintaining self suction with bloody serous output postoperatively. Vioptix monitor lead intact currently reading mid 70s% saturation with 90+ signal quality.     Assessment:  Etelvina Banks  is a 45 y.o. 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 Ortiz) with insertion of TE (per plastic surgery, Dr. Dewey) on 11/11/2024. Patient is now s/p OR with plastic surgery (Dr. Dewey and Dr. Lara) on 7/11/2025 for left breast tissue expander removal with capsulectomy and left breast reconstruction with free Deep Inferior Epigastric  flap (MANUEL). She progressed well postoperatively and was discharged home on 7/14/25. Patient re-admitted following emergent OR on Tuesday 7/15 PM for re-exploration of left breast free flap, repair of vessel, embolectomy, and flap debridement after patient presented with pallor and purple discoloration of her free flap site. She remains admitted for further flap observation/management. Flap declared non-viable. RTOR on 7/20 for debridement and PAP flap to left breast with Dr. Dewey and Dr. Lara.      Plan:   # S/p left breast debridement and PAP flap to left breast:  - Flap check stable, biphasic doppler signal, good strength, warm to touch, continue Q1 flap check and Vioptix check  - DC accuchecks and SSI given blood glucose < 150  - Decrease IVF to 42 ml/hr and HL when advanced to Carb control diet  - Lidocaine patch to left arm to start on 7/22  - continue all other care per daily progress note  - Discussed and seen at bedside with   KATIANA Roa-CNP  Plastic and Reconstructive Surgery   Available via [employer]  Pager #[phone]  Team phone: [phone]

## 2025-07-22 NOTE — PROGRESS NOTES
"  Department of Plastic and Reconstructive Surgery  Daily Progress Note    Patient Name: Etelvina Banks  MRN: [id number]  Date:  07/22/25     Subjective  Patient is resting comfortably in bed, mentioning 2-3/10 pain overall. Of note, she mentions two episodes of 9/10 sharp stabbing pain involving the left posterior upper arm that radiated to dorsal aspect of left forearm and  hand while stripping drain inferior to the left breast. This occurred one other time post operatively on [place] when the drain was stripped. She reports that the  pain immediately increased to 9/10 pain and resolved with medication over a 2 hour period. This pain has not occurred since this ROSCOE drain is being closely monitored but not stripped at this time. She also mentions no N/V/D, tolerating full liquid diet and voiding with hong catheter. She is + for flatus but, - for BM. Denies any fever, chills, night sweats, CP, SOB, palpitations, dysuria, hematuria, hematochezia, hematemesis, flank pain.      Overnight Events  None     Objective    Vital Signs  /76 (BP Location: Right arm, Patient Position: Lying)   Pulse 86   Temp 35.9 °C (96.6 °F) (Temporal)   Resp 14   Ht 1.575 m (5' 2\")   Wt 92.8 kg (204 lb 8 oz)   SpO2 95%   BMI 37.40 kg/m²      Physical Exam   Constitutional: A&Ox3, NAD, resting in bed.  Eyes: EOMI, clear sclera.   ENMT: Moist mucous membranes, no apparent injuries or lesions.  Head/Neck: NC/AT.   Cardiovascular: RRR.   Respiratory/Thorax: Regular respirations on RA. Good symmetric chest expansion.   Gastrointestinal: Abdomen soft, slightly distended, appropriately tender, no peritoneal signs, lower transverse abdominal incision dressed with Mepliex AG. ROSCOE drain x 2 with serous output to each side of the b/l lower abdomen, patent and maintaining self suction, dressed with CHG dressing. Umbilicus well approximated with sutures, no drainage, erythema or dehiscence, VAN.  Genitourinary: indwelling hong catheter in " place with clear, yellow output  Extremities: Non-pitting peripheral edema of the lower extremities. Moving all 4 extremities actively.  Left leg wrapped in ACE wrap with wound vac in place, holding suction at -125mmHg with no alarms or leaks. Knee Immobilizer in place. ROSCOE drain to left thigh with ss output, BLE neurovascular intact, cap refill < 2 sec, +SILT, +df/pf, DP/PT/ radial pulses 2+, no drainage noted.   Neurological: A&Ox3.   Psychological: Appropriate mood and behavior.    Breast: MANUEL free flap recipient site to left breast. Flap is warm to touch, appropriate tissue turgor. Intact biphasic pulse signal at marked suture sites at central aspect of free flap. Cap refill < 3 sec. Incisions well approximated with sutures, dressed with dermabond and steri strips, no incisional dehiscence or drainage. ROSCOE drain x1 to lateral inferior aspect of left breast which is patent, maintaining self suction with bloody serous output postoperatively. Vioptix monitor lead intact currently reading mid 70s% saturation with 90+ signal quality.     Diagnostics   Results for orders placed or performed during the hospital encounter of 07/15/25 (from the past 24 hours)   POCT GLUCOSE   Result Value Ref Range    POCT Glucose 139 (H) 74 - 99 mg/dL   POCT GLUCOSE   Result Value Ref Range    POCT Glucose 165 (H) 74 - 99 mg/dL   POCT GLUCOSE   Result Value Ref Range    POCT Glucose 108 (H) 74 - 99 mg/dL   CBC   Result Value Ref Range    WBC 9.9 4.4 - 11.3 x10*3/uL    nRBC 0.2 (H) 0.0 - 0.0 /100 WBCs    RBC 2.97 (L) 4.00 - 5.20 x10*6/uL    Hemoglobin 8.7 (L) 12.0 - 16.0 g/dL    Hematocrit 27.6 (L) 36.0 - 46.0 %    MCV 93 80 - 100 fL    MCH 29.3 26.0 - 34.0 pg    MCHC 31.5 (L) 32.0 - 36.0 g/dL    RDW 15.0 (H) 11.5 - 14.5 %    Platelets 339 150 - 450 x10*3/uL     Imaging  No results found.    Cardiology, Vascular, and Other Imaging  No other imaging results found for the past 7 days      Current Medications  Scheduled  medications  Scheduled Medications[1]  Continuous medications  Continuous Medications[2]  PRN medications  PRN Medications[3]     Assessment  Etelvina Banks is a 45 y.o. 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 Ortiz) with insertion of TE (per plastic surgery, Dr. Dewey) on 11/11/2024. Patient is now s/p OR with plastic surgery (Dr. Dewey and Dr. Lara) on 7/11/2025 for left breast tissue expander removal with capsulectomy and left breast reconstruction with free Deep Inferior Epigastric  flap (MANUEL). She progressed well postoperatively and was discharged home on 7/14/25. Patient re-admitted following emergent OR on Tuesday 7/15 PM for re-exploration of left breast free flap, repair of vessel, embolectomy, and flap debridement after patient presented with pallor and purple discoloration of her free flap site. She remains admitted for further flap observation/management. Flap declared non-viable. RTOR on 7/20 for debridement and PAP flap to left breast with Dr. Dewey and Dr. Lara.     Plan/Recommendations  # S/p left breast debridement and PAP flap to left breast   - Flap surveillance:   Continue serial flap assessments q1h per nursing with interval checks per plastic surgery team  Please assess Doppler pulse signal at marked site at central aspect of left breast flap plus flap general appearance (color, warmth, turgor)   Please additionally check Vioptix signal hourly and ensure saturation is >30% (If signal lost, abrupt change in signal or saturation <30% please immediately notify the plastic surgery team)    Please notify plastic surgery team immediately if there are any acute changes (Including decreased Doppler signal, pallor, temperature change, bleeding, etc.)  - Flap Warming/Rashmi Hugger:   Use of Rasmhi hugger to L breast flap recipient site and generalized upper body  to passively warm flap and promote perfusion postoperatively overnight on POD0  Settings: medium heat (38*C), high continuous fan   Avoid direct contact of warmer blanket with skin/flap sites   Anticipate DC of Rashmi Kyle on POD1   - Dressings/wound care:   Dermabond and steri-strips to remain intact over breast flap incision, Mepliex AG to lower transverse abdominal incision, keep C/D/I   Maintain prevena incisional wound vac to left thigh x2 weeks postoperatively  Settings: -125mmHg, low, continuous suction   Nursing to monitor and record vac canister output at least q8h (please record 0 for no output)  For any issues or concerns with vac, please notify plastic surgery team at [phone] or pager #[phone]  Will transition to portable vac at time of DC if DC date is prior to projected vac removal date   - Drains:   Continue drain care to x4 ROSCOE drains present at the b/l lower abdomen, L breast and L thigh (nursing orders placed)  Maintain CHG drain dressings over drain insertion sites at the skin   Please ensure that drains are compressed flat and plugged to maintain self suction at all times aside from when emptying   Nursing to strip drain tubing at least q4h and PRN to avoid drain/tubing obstruction   Nursing to please also empty and record output quantities at least TID and PRN if drains are full   Please notify the plastics team if drain outputs exceed >30 ml in 1 hr or >200 in 24 hrs  Drains to be removed per plastics team once 24 hr outputs remain <30 ml total x2 consecutive days  If drains do not meet output criteria for removal at time of medical clearance for DC, will plan for removal per plastics upon outpatient follow up   - Positioning:   Patient may have head of bed elevated up to 45 degrees. No hip flexion, extension, abduction for 2 weeks.  Keep left arm at a 45 degree angle away from body at all times. Do not allow arm to be compressing left breast or be at a 90 degree angle.   Avoid positioning that  would apply pressure to flap region or incisions   - Weight bearing:   Patient to maintain strict bedrest postoperatively for 48 hours.  <5 lbs lifting restriction to BUE postoperatively  No significant upper extremity abduction, extension or shoulder retraction to prevent tension on breast surgical site and incisions  NWB to left leg for 2 weeks.  Keep knee immobilizer in place for 2 weeks.  Maintain ACE wrap to left leg for 5 days postop.   - Other postoperative parameters/care:   IV Ancef while in-house  Maintain Landa while on bedrest   CLD on POD1 AM  LR mIVF @ 100 mL/hr while NPO  Nursing to record patient I&O, urine output goal of >1 mL/kg/hr  SQ Heparin q8h scheduled to start postoperatively, continue while inpatient    VS monitoring q4h  Encouraged use of IS (10x/hr, each hr while awake)   Ensure perioperative scopolamine patch removal 72hrs postoperatively (if placed prior to surgery)  Neurovascular checks to left leg q4 to be done on web in between first and second toe. Do not remove ACE wrap to perform checks.      # Acute postoperative pain, hx of opiate dependence on agonist therapy   - Nursing to assess patient pain scales at least q4h and PRN  - Continue home Suboxone 8-2 mg SL tablet PO QID (home medication dosage confirmed via  pharmacy medication history review note placed on 7/10/25)  - Continue additional regimen (ERAS protocol) as follows:   Scheduled Tylenol 975mg PO q8h   Scheduled Gabapentin 300mg PO q8h   Scheduled Robaxin 500 mg PO q6h   PO Celebrex 100mg BID on POD1 AM      # Post-operative Anemia   - Postoperative labs at Hgb 8.0, patient received 1U PRBC 7/21  - 7/21 Hgb 8.7. 7/22 Hgb pending   - Presently no S/S of bleeding, considered likely due to intraoperative blood loss   - Keep T&S UTD  - Monitor for S/S of bleeding or symptomatic anemia   - Monitor AM CBC      # Hx T2DM   - Last HBA1C 5.8 on labs obtained 10/17/24  - Home Metformin 500 mg PO daily held postoperatively, will  resume once diet is advanced   - Start SSI overnight on POD0 with accuchecks   - Consider diabetic diet once clear for PO      # Hx HTN  - Home Losartan 50 mg PO daily held postoperatively, resume as BP permits with hold parameters to avoid hypotension and flap hypoperfusion   - Monitor VS q4h      # Hx peripheral edema   - Home hydrochlorothiazide held postoperatively, resume as BP permits and as needed for edema   - Extremity elevation while patient in bed      F: LR @ 100ml/hr while NPO overnight on POD0  E: Monitor and replete PRN  N: CLD with Jamarcus supplements BID on POD1 AM   GI: Continue bowel regimen with Colace BID and Milk of mag PRN        Zofran (first line) and Phenergen (second line) PRN for nausea        GI ppx postoperatively and while inpatient with Protonix      DVT ppx:  - Risk score assessed  - SQ heparin q8h while inpatient   - Use of SCD to R leg while in bed        Disposition: Continue care on RNF. Will remain inpatient for continued flap monitoring and vac/drain surveillance postoperatively.        Patient and plan discussed with Dr Dewey and Dr Durbin PABHASKAR    Plastic and Reconstructive Surgery   [employer]  Pager #[phone]  Team phone: [phone]           [1] acetaminophen, 975 mg, oral, q8h  [Transfer Hold] aspirin, 81 mg, oral, Daily  buprenorphine-naloxone, 1 tablet, sublingual, 4x daily  ceFAZolin, 2 g, intravenous, q8h  celecoxib, 100 mg, oral, BID  docusate sodium, 100 mg, oral, BID  gabapentin, 300 mg, oral, q8h  heparin (porcine), 5,000 Units, subcutaneous, q8h  lidocaine, 1 patch, transdermal, Daily  pantoprazole, 40 mg, oral, Daily before breakfast  polyethylene glycol, 17 g, oral, Daily  [2]    [3] PRN medications: bisacodyl, [Transfer Hold] dextrose, dextrose, glucagon, methocarbamol

## 2025-07-23 LAB
AT III AG ACT/NOR PPP IA: 116 % (ref 82–136)
BLOOD EXPIRATION DATE: NORMAL
DISPENSE STATUS: NORMAL
FSP PPP-MCNC: < 5 UG/ML
LABORATORY COMMENT REPORT: NORMAL
PATH REPORT.COMMENTS IMP SPEC: NORMAL
PATH REPORT.FINAL DX SPEC: NORMAL
PATH REPORT.GROSS SPEC: NORMAL
PATH REPORT.RELEVANT HX SPEC: NORMAL
PATH REPORT.TOTAL CANCER: NORMAL
PRODUCT BLOOD TYPE: 5100
PRODUCT CODE: NORMAL
UNIT ABO: NORMAL
UNIT NUMBER: NORMAL
UNIT RH: NORMAL
UNIT VOLUME: 350
XM INTEP: NORMAL

## 2025-07-23 PROCEDURE — 2500000002 HC RX 250 W HCPCS SELF ADMINISTERED DRUGS (ALT 637 FOR MEDICARE OP, ALT 636 FOR OP/ED): Mod: SE

## 2025-07-23 PROCEDURE — 2500000004 HC RX 250 GENERAL PHARMACY W/ HCPCS (ALT 636 FOR OP/ED): Mod: SE

## 2025-07-23 PROCEDURE — 2500000005 HC RX 250 GENERAL PHARMACY W/O HCPCS: Mod: SE | Performed by: NURSE PRACTITIONER

## 2025-07-23 PROCEDURE — 99232 SBSQ HOSP IP/OBS MODERATE 35: CPT | Performed by: NURSE PRACTITIONER

## 2025-07-23 PROCEDURE — 2500000001 HC RX 250 WO HCPCS SELF ADMINISTERED DRUGS (ALT 637 FOR MEDICARE OP): Mod: SE

## 2025-07-23 PROCEDURE — 1170000001 HC PRIVATE ONCOLOGY ROOM DAILY

## 2025-07-23 PROCEDURE — 2500000001 HC RX 250 WO HCPCS SELF ADMINISTERED DRUGS (ALT 637 FOR MEDICARE OP): Mod: SE | Performed by: PHYSICIAN ASSISTANT

## 2025-07-23 RX ADMIN — CEFAZOLIN SODIUM 2 G: 2 INJECTION, SOLUTION INTRAVENOUS at 14:58

## 2025-07-23 RX ADMIN — ACETAMINOPHEN 975 MG: 325 TABLET ORAL at 14:58

## 2025-07-23 RX ADMIN — PANTOPRAZOLE SODIUM 40 MG: 40 TABLET, DELAYED RELEASE ORAL at 08:03

## 2025-07-23 RX ADMIN — CELECOXIB 100 MG: 100 CAPSULE ORAL at 21:39

## 2025-07-23 RX ADMIN — HEPARIN SODIUM 5000 UNITS: 5000 INJECTION, SOLUTION INTRAVENOUS; SUBCUTANEOUS at 15:01

## 2025-07-23 RX ADMIN — BUPRENORPHINE AND NALOXONE 1 TABLET: 8; 2 TABLET SUBLINGUAL at 21:39

## 2025-07-23 RX ADMIN — HEPARIN SODIUM 5000 UNITS: 5000 INJECTION, SOLUTION INTRAVENOUS; SUBCUTANEOUS at 08:03

## 2025-07-23 RX ADMIN — POLYETHYLENE GLYCOL 3350 17 G: 17 POWDER, FOR SOLUTION ORAL at 08:03

## 2025-07-23 RX ADMIN — HEPARIN SODIUM 5000 UNITS: 5000 INJECTION, SOLUTION INTRAVENOUS; SUBCUTANEOUS at 23:50

## 2025-07-23 RX ADMIN — GABAPENTIN 300 MG: 300 CAPSULE ORAL at 14:58

## 2025-07-23 RX ADMIN — BUPRENORPHINE AND NALOXONE 1 TABLET: 8; 2 TABLET SUBLINGUAL at 05:45

## 2025-07-23 RX ADMIN — BUPRENORPHINE AND NALOXONE 1 TABLET: 8; 2 TABLET SUBLINGUAL at 14:58

## 2025-07-23 RX ADMIN — CEFAZOLIN SODIUM 2 G: 2 INJECTION, SOLUTION INTRAVENOUS at 05:44

## 2025-07-23 RX ADMIN — ACETAMINOPHEN 975 MG: 325 TABLET ORAL at 23:49

## 2025-07-23 RX ADMIN — ACETAMINOPHEN 975 MG: 325 TABLET ORAL at 05:45

## 2025-07-23 RX ADMIN — CELECOXIB 100 MG: 100 CAPSULE ORAL at 08:03

## 2025-07-23 RX ADMIN — DOCUSATE SODIUM 100 MG: 100 CAPSULE, LIQUID FILLED ORAL at 21:39

## 2025-07-23 RX ADMIN — METFORMIN ER 500 MG 500 MG: 500 TABLET ORAL at 17:28

## 2025-07-23 RX ADMIN — LIDOCAINE 1 PATCH: 4 PATCH TOPICAL at 21:39

## 2025-07-23 RX ADMIN — GABAPENTIN 300 MG: 300 CAPSULE ORAL at 05:45

## 2025-07-23 RX ADMIN — DOCUSATE SODIUM 100 MG: 100 CAPSULE, LIQUID FILLED ORAL at 08:03

## 2025-07-23 RX ADMIN — CEFAZOLIN SODIUM 2 G: 2 INJECTION, SOLUTION INTRAVENOUS at 21:39

## 2025-07-23 RX ADMIN — GABAPENTIN 300 MG: 300 CAPSULE ORAL at 23:49

## 2025-07-23 RX ADMIN — METHOCARBAMOL 500 MG: 500 TABLET ORAL at 08:07

## 2025-07-23 ASSESSMENT — COGNITIVE AND FUNCTIONAL STATUS - GENERAL
MOVING TO AND FROM BED TO CHAIR: A LITTLE
TURNING FROM BACK TO SIDE WHILE IN FLAT BAD: A LITTLE
DRESSING REGULAR UPPER BODY CLOTHING: A LITTLE
MOVING FROM LYING ON BACK TO SITTING ON SIDE OF FLAT BED WITH BEDRAILS: A LITTLE
DRESSING REGULAR UPPER BODY CLOTHING: A LITTLE
PERSONAL GROOMING: A LITTLE
WALKING IN HOSPITAL ROOM: A LITTLE
DRESSING REGULAR LOWER BODY CLOTHING: A LOT
STANDING UP FROM CHAIR USING ARMS: A LOT
DRESSING REGULAR LOWER BODY CLOTHING: A LOT
MOBILITY SCORE: 14
HELP NEEDED FOR BATHING: A LITTLE
DAILY ACTIVITIY SCORE: 16
TOILETING: A LITTLE
STANDING UP FROM CHAIR USING ARMS: A LITTLE
MOVING FROM LYING ON BACK TO SITTING ON SIDE OF FLAT BED WITH BEDRAILS: A LITTLE
CLIMB 3 TO 5 STEPS WITH RAILING: A LITTLE
CLIMB 3 TO 5 STEPS WITH RAILING: A LOT
TURNING FROM BACK TO SIDE WHILE IN FLAT BAD: A LITTLE
DAILY ACTIVITIY SCORE: 18
WALKING IN HOSPITAL ROOM: A LOT
MOVING TO AND FROM BED TO CHAIR: A LOT
HELP NEEDED FOR BATHING: A LOT
PERSONAL GROOMING: A LITTLE
TOILETING: A LOT
MOBILITY SCORE: 18

## 2025-07-23 ASSESSMENT — PAIN - FUNCTIONAL ASSESSMENT
PAIN_FUNCTIONAL_ASSESSMENT: 0-10

## 2025-07-23 ASSESSMENT — PAIN SCALES - GENERAL
PAINLEVEL_OUTOF10: 3
PAINLEVEL_OUTOF10: 4
PAINLEVEL_OUTOF10: 3

## 2025-07-23 NOTE — SIGNIFICANT EVENT
"  Department of Plastic and Reconstructive Surgery  PM Flap Check    Subjective: Resting in bed, ready to get OOB tomorrow, tolerated blood transfusion without difficulty, c/o unable to void since hong removal, feels very distended in bladder and uncomfortable. Bladder scan indicates 1134 ml, straight cath ordered, able to void 200 ml but repeat bladder scan shows 961, straight cath placed by this NP with 1025 ml of clear, yellow output noted, left arm still with pain during drain stripping but otherwise reports pain well controlled, Tolerating carb controlled diet, Denies any fever, chills, night sweats, CP, SOB, palpitations, nausea, vomiting, diarrhea, constipation, dysuria, hematuria, hematochezia, hematemesis, flank pain.     Objective:  /80 (BP Location: Right arm, Patient Position: Lying)   Pulse 100   Temp 36 °C (96.8 °F) (Temporal)   Resp 18   Ht 1.575 m (5' 2\")   Wt 92.8 kg (204 lb 8 oz)   SpO2 95%   BMI 37.40 kg/m²    PE:   Constitutional: A&Ox3, NAD, resting in bed.  Eyes: EOMI, clear sclera.   ENMT: Moist mucous membranes, no apparent injuries or lesions.  Head/Neck: NC/AT.   Cardiovascular: RRR.   Respiratory/Thorax: Regular respirations on RA. Good symmetric chest expansion.   Gastrointestinal: Abdomen soft, slightly distended, appropriately tender, no peritoneal signs, lower transverse abdominal incision dressed with Mepliex AG. ROSCOE drain x 2 with serous output to each side of the b/l lower abdomen, patent and maintaining self suction, dressed with CHG dressing. Umbilicus well approximated with sutures, no drainage, erythema or dehiscence, VAN.  Genitourinary: unable to void post hong removal, straight cath  Extremities: Non-pitting peripheral edema of the lower extremities. Moving all 4 extremities actively.  Left leg wrapped in ACE wrap with wound vac in place, holding suction at -125mmHg with no alarms or leaks. Knee Immobilizer in place. ROSCOE drain to left thigh with ss output, BLE " neurovascular intact, cap refill < 2 sec, +SILT, +df/pf, DP/PT/ radial pulses 2+, no drainage noted.   Neurological: A&Ox3.   Psychological: Appropriate mood and behavior.    Breast: MANUEL free flap recipient site to left breast. Flap is warm to touch, appropriate tissue turgor. Intact biphasic pulse signal at marked suture sites at central aspect of free flap. Cap refill < 3 sec. Incisions well approximated with sutures, dressed with dermabond and steri strips, no incisional dehiscence or drainage. ROSCOE drain x1 to lateral inferior aspect of left breast which is patent, maintaining self suction with bloody serous output postoperatively. Vioptix monitor lead intact currently reading mid 70s% saturation with 90+ signal quality.     Assessment:  Etelvina Banks  is a 45 y.o. 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 Ortiz) with insertion of TE (per plastic surgery, Dr. Dewey) on 11/11/2024. Patient is now s/p OR with plastic surgery (Dr. Dewey and Dr. Lara) on 7/11/2025 for left breast tissue expander removal with capsulectomy and left breast reconstruction with free Deep Inferior Epigastric  flap (MANUEL). She progressed well postoperatively and was discharged home on 7/14/25. Patient re-admitted following emergent OR on Tuesday 7/15 PM for re-exploration of left breast free flap, repair of vessel, embolectomy, and flap debridement after patient presented with pallor and purple discoloration of her free flap site. She remains admitted for further flap observation/management. Flap declared non-viable. RTOR on 7/20 for debridement and PAP flap to left breast with Dr. Dewey and Dr. Lara.      Plan:   # S/p left breast debridement and PAP flap to left breast:  - Flap check stable, biphasic doppler signal, good strength, warm to touch, continue Q2 flap check and Vioptix check  -  straight cath now, bladder scan in 6 hours, if unable to void, replace hong and consider starting flomax and monitor urine output closely  - continue all other care per daily progress note  - Updated Dr. Dewey and KATIANA Perry-CNP  Plastic and Reconstructive Surgery   Available via [employer]  Pager #[phone]  Team phone: [phone]

## 2025-07-23 NOTE — CARE PLAN
The patient's goals for the shift include        Problem: Skin  Goal: Decreased wound size/increased tissue granulation at next dressing change  Outcome: Progressing  Goal: Participates in plan/prevention/treatment measures  Outcome: Progressing  Goal: Prevent/manage excess moisture  Outcome: Progressing  Goal: Prevent/minimize sheer/friction injuries  Outcome: Progressing  Goal: Promote/optimize nutrition  Outcome: Progressing  Goal: Promote skin healing  Outcome: Progressing     Problem: Pain - Adult  Goal: Verbalizes/displays adequate comfort level or baseline comfort level  Outcome: Progressing     Problem: Safety - Adult  Goal: Free from fall injury  Outcome: Progressing     Problem: Chronic Conditions and Co-morbidities  Goal: Patient's chronic conditions and co-morbidity symptoms are monitored and maintained or improved  Outcome: Progressing

## 2025-07-23 NOTE — PROGRESS NOTES
"  Department of Plastic and Reconstructive Surgery  Daily Progress Note    Patient Name: Etelvina Banks  MRN: [id number]  Date:  07/23/25     Subjective  Resting comfortably in bed, reports still having discomfort/pain to LUE while drain being stripped but has improved slightly with lidocaine patch. Looking forward to getting OOB today, Tolerating diet, pain well controlled, Denies any fever, chills, night sweats, CP, SOB, palpitations, nausea, vomiting, diarrhea, constipation, dysuria, hematuria, hematochezia, hematemesis, flank pain.       Overnight Events  Unable to void post hong removal requiring straight cath x 1 overnight with continued monitoring this morning with bladder scan at 0600 am with 403 ml but comfortable at this time.      Objective    Vital Signs  /85 (BP Location: Right arm, Patient Position: Lying)   Pulse 77   Temp 35.8 °C (96.4 °F) (Temporal)   Resp 18   Ht 1.575 m (5' 2\")   Wt 92.8 kg (204 lb 8 oz)   SpO2 98%   BMI 37.40 kg/m²      Physical Exam   Constitutional: A&Ox3, NAD, resting in bed.  Eyes: EOMI, clear sclera.   ENMT: Moist mucous membranes, no apparent injuries or lesions.  Head/Neck: NC/AT.   Cardiovascular: RRR.   Respiratory/Thorax: Regular respirations on RA. Good symmetric chest expansion.   Gastrointestinal: Abdomen soft, slightly distended, appropriately tender, no peritoneal signs, lower transverse abdominal incision dressed with Mepliex AG. ROSCOE drain x 2 with serous output to each side of the b/l lower abdomen, patent and maintaining self suction, dressed with CHG dressing. Umbilicus well approximated with sutures, no drainage, erythema or dehiscence, VAN.  Genitourinary: bladder scan, awaiting void  Extremities: Non-pitting peripheral edema of the lower extremities. Moving all 4 extremities actively.  Left leg wrapped in ACE wrap with wound vac in place, holding suction at -125mmHg with no alarms or leaks. Knee Immobilizer in place. ROSCOE drain to left thigh with " ss output, BLE neurovascular intact, cap refill < 2 sec, +SILT, +df/pf, DP/PT/ radial pulses 2+, no drainage noted.   Neurological: A&Ox3.   Psychological: Appropriate mood and behavior.    Breast: MANUEL free flap recipient site to left breast. Flap is warm to touch, appropriate tissue turgor. Intact biphasic pulse signal at marked suture sites at central aspect of free flap. Cap refill < 3 sec. Incisions well approximated with sutures, dressed with dermabond and steri strips, no incisional dehiscence or drainage. ROSCOE drain x1 to lateral inferior aspect of left breast which is patent, maintaining self suction with bloody serous output postoperatively. Vioptix monitor lead intact currently reading mid 70s% saturation with 90+ signal quality.     Diagnostics   Results for orders placed or performed during the hospital encounter of 07/15/25 (from the past 24 hours)   Prepare RBC: 1 Units   Result Value Ref Range    PRODUCT CODE S3788N78     Unit Number [id number]     Unit ABO O     Unit RH POS     XM INTEP COMP     Dispense Status TR     Blood Expiration Date 8/8/2025 11:59:00 PM EDT     PRODUCT BLOOD TYPE 5100     UNIT VOLUME 350    Type and screen   Result Value Ref Range    ABO TYPE O     Rh TYPE POS     ANTIBODY SCREEN NEG      Imaging  No results found.    Cardiology, Vascular, and Other Imaging  No other imaging results found for the past 7 days      Current Medications  Scheduled medications  Scheduled Medications[1]  Continuous medications  Continuous Medications[2]  PRN medications  PRN Medications[3]     Assessment  Etelvina Banks is a 45 y.o. 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 Ortiz) with insertion of TE (per plastic surgery, Dr. Dewey) on 11/11/2024. Patient is now s/p OR with plastic surgery (Dr. Dewey and Dr. Lara) on 7/11/2025 for left breast tissue expander  removal with capsulectomy and left breast reconstruction with free Deep Inferior Epigastric  flap (MANUEL). She progressed well postoperatively and was discharged home on 7/14/25. Patient re-admitted following emergent OR on Tuesday 7/15 PM for re-exploration of left breast free flap, repair of vessel, embolectomy, and flap debridement after patient presented with pallor and purple discoloration of her free flap site. She remains admitted for further flap observation/management. Flap declared non-viable. RTOR on 7/20 for debridement and PAP flap to left breast with Dr. Dewey and Dr. Lara.     Plan/Recommendations  # S/p left breast debridement and PAP flap to left breast   - Flap surveillance:   Continue serial flap assessments q2h per nursing with interval checks per plastic surgery team  Please assess Doppler pulse signal at marked site at central aspect of left breast flap plus flap general appearance (color, warmth, turgor)   Please additionally check Vioptix signal hourly and ensure saturation is >30% (If signal lost, abrupt change in signal or saturation <30% please immediately notify the plastic surgery team)    Please notify plastic surgery team immediately if there are any acute changes (Including decreased Doppler signal, pallor, temperature change, bleeding, etc.)  - Flap Warming/Rashmi Hugger:   Use of Rashmi hugger to L breast flap recipient site and generalized upper body to passively warm flap and promote perfusion   Settings: medium heat (38*C), high continuous fan   Avoid direct contact of warmer blanket with skin/flap sites   Rashmi hugger prn for comfort   - Dressings/wound care:   Dermabond and steri-strips to remain intact over breast flap incision, Mepliex AG to lower transverse abdominal incision, keep C/D/I   Maintain prevena incisional wound vac to left thigh x2 weeks postoperatively  Settings: -125mmHg, low, continuous suction   Nursing to monitor and record vac canister output at least  q8h (please record 0 for no output)  For any issues or concerns with vac, please notify plastic surgery team at [phone] or pager #[phone]  Will transition to portable vac at time of DC if DC date is prior to projected vac removal date   - Drains:   Continue drain care to x4 ROSCOE drains present at the b/l lower abdomen, L breast and L thigh (nursing orders placed)  Maintain CHG drain dressings over drain insertion sites at the skin   Please ensure that drains are compressed flat and plugged to maintain self suction at all times aside from when emptying   Nursing to strip drain tubing at least q4h and PRN to avoid drain/tubing obstruction   Nursing to please also empty and record output quantities at least TID and PRN if drains are full   Please notify the plastics team if drain outputs exceed >30 ml in 1 hr or >200 in 24 hrs  Drains to be removed per plastics team once 24 hr outputs remain <30 ml total x2 consecutive days  If drains do not meet output criteria for removal at time of medical clearance for DC, will plan for removal per plastics upon outpatient follow up   - Positioning:   Patient may have head of bed elevated up to 45 degrees. No hip flexion, extension, abduction for 2 weeks.  Keep left arm at a 45 degree angle away from body at all times. Do not allow arm to be compressing left breast or be at a 90 degree angle.   Avoid positioning that would apply pressure to flap region or incisions   - Weight bearing:   Patient to maintain strict bedrest postoperatively for 48 hours. Ok to get OOB on POD#3 7/23 with assistance, PT/OT consulted  <5 lbs lifting restriction to BUE postoperatively  No significant upper extremity abduction, extension or shoulder retraction to prevent tension on breast surgical site and incisions  NWB to left leg for 2 weeks.  Keep knee immobilizer in place for 2 weeks.  Maintain ACE wrap to left leg for 5 days postop.   - Other postoperative parameters/care:   IV Ancef while in-house  DC IVF  when tolerating PO  Nursing to record patient I&O, urine output goal of >1 mL/kg/hr  SQ Heparin q8h scheduled to start postoperatively, continue while inpatient,  transition to Eliquis 2.5 mg BID prior to discharge   VS monitoring q4h  Encouraged use of IS (10x/hr, each hr while awake)   Neurovascular checks to left leg q4 to be done on web in between first and second toe. Do not remove ACE wrap to perform checks.      # Acute postoperative pain, hx of opiate dependence on agonist therapy   - Nursing to assess patient pain scales at least q4h and PRN  - Continue home Suboxone 8-2 mg SL tablet PO QID (home medication dosage confirmed via  pharmacy medication history review note placed on 7/10/25)  - Continue additional regimen (ERAS protocol) as follows:   Scheduled Tylenol 975mg PO q8h   Scheduled Gabapentin 300mg PO q8h   Scheduled Robaxin 500 mg PO q6h   PO Celebrex 100mg BID on POD1 AM   Lidocaine patch to left arm    # Post-operative Anemia   - Postoperative labs at Hgb 8.0, patient received 1U PRBC 7/21  - 7/21 Hgb 8.7. 7/22, transfused 1 unit of PRBC on 7/22  - Presently no S/S of bleeding, considered likely due to intraoperative blood loss   - Keep T&S UTD  - Monitor for S/S of bleeding or symptomatic anemia   - Monitor CBC prn per Dr. Dewey     # Hx T2DM   - Last HBA1C 5.8 on labs obtained 10/17/24  - Continue Home Metformin 500 mg PO daily  - monitor blood glucose on AM labs  - Diabetic Diet, Glucerna diet     # Hx HTN  - Home Losartan 50 mg PO daily held postoperatively, resume as BP permits with hold parameters to avoid hypotension and flap hypoperfusion   - Monitor VS q4h      # Hx peripheral edema   - Home hydrochlorothiazide held postoperatively, resume as BP permits and as needed for edema   - Extremity elevation while patient in bed      F: LR @ 100ml/hr while NPO overnight on POD0, discontinued and tolerating PO, encourage PO intake  E: Monitor and replete PRN  N: Diabetic diet with Jamarcus  supplements BID  GI: Continue bowel regimen with Colace BID and dulcolax suppository        Zofran (first line)         GI ppx postoperatively and while inpatient with Protonix      DVT ppx:  - Risk score assessed  - SQ heparin q8h while inpatient, transition to Eliquis 2.5 mg PO BID prior to discharge  - Use of SCD to R leg while in bed        Disposition: Continue care on RNF. Will remain inpatient for continued flap monitoring and vac/drain surveillance postoperatively.      Patient and plan discussed with Dr Dewey and KATIANA Ferguson-CNP  Plastic and Reconstructive Surgery   Available via [employer]  Pager #[phone]  Team phone: [phone]       [1] acetaminophen, 975 mg, oral, q8h  [Transfer Hold] aspirin, 81 mg, oral, Daily  buprenorphine-naloxone, 1 tablet, sublingual, 4x daily  ceFAZolin, 2 g, intravenous, q8h  celecoxib, 100 mg, oral, BID  docusate sodium, 100 mg, oral, BID  gabapentin, 300 mg, oral, q8h  heparin (porcine), 5,000 Units, subcutaneous, q8h  lidocaine, 1 patch, transdermal, Daily  metFORMIN (MOD), 500 mg, oral, Daily with evening meal  pantoprazole, 40 mg, oral, Daily before breakfast  polyethylene glycol, 17 g, oral, Daily     [2]    [3] PRN medications: bisacodyl, [Transfer Hold] dextrose, dextrose, glucagon, methocarbamol

## 2025-07-24 ENCOUNTER — APPOINTMENT (OUTPATIENT)
Dept: PLASTIC SURGERY | Facility: CLINIC | Age: 45
End: 2025-07-24
Payer: COMMERCIAL

## 2025-07-24 PROCEDURE — 2500000001 HC RX 250 WO HCPCS SELF ADMINISTERED DRUGS (ALT 637 FOR MEDICARE OP): Mod: SE

## 2025-07-24 PROCEDURE — 2500000001 HC RX 250 WO HCPCS SELF ADMINISTERED DRUGS (ALT 637 FOR MEDICARE OP): Mod: SE | Performed by: PHYSICIAN ASSISTANT

## 2025-07-24 PROCEDURE — 97165 OT EVAL LOW COMPLEX 30 MIN: CPT | Mod: GO

## 2025-07-24 PROCEDURE — 1170000001 HC PRIVATE ONCOLOGY ROOM DAILY

## 2025-07-24 PROCEDURE — 2500000004 HC RX 250 GENERAL PHARMACY W/ HCPCS (ALT 636 FOR OP/ED): Mod: SE

## 2025-07-24 PROCEDURE — 2500000002 HC RX 250 W HCPCS SELF ADMINISTERED DRUGS (ALT 637 FOR MEDICARE OP, ALT 636 FOR OP/ED): Mod: SE

## 2025-07-24 PROCEDURE — 97535 SELF CARE MNGMENT TRAINING: CPT | Mod: GO

## 2025-07-24 PROCEDURE — 97161 PT EVAL LOW COMPLEX 20 MIN: CPT | Mod: GP

## 2025-07-24 PROCEDURE — 99232 SBSQ HOSP IP/OBS MODERATE 35: CPT

## 2025-07-24 PROCEDURE — RXMED WILLOW AMBULATORY MEDICATION CHARGE

## 2025-07-24 PROCEDURE — 2500000005 HC RX 250 GENERAL PHARMACY W/O HCPCS: Mod: SE | Performed by: NURSE PRACTITIONER

## 2025-07-24 RX ORDER — LOSARTAN POTASSIUM 25 MG/1
50 TABLET ORAL DAILY
Status: DISCONTINUED | OUTPATIENT
Start: 2025-07-24 | End: 2025-07-25 | Stop reason: HOSPADM

## 2025-07-24 RX ORDER — CEPHALEXIN 500 MG/1
500 CAPSULE ORAL 4 TIMES DAILY
Qty: 56 CAPSULE | Refills: 0 | Status: SHIPPED | OUTPATIENT
Start: 2025-07-24 | End: 2025-07-25

## 2025-07-24 RX ADMIN — GABAPENTIN 300 MG: 300 CAPSULE ORAL at 14:27

## 2025-07-24 RX ADMIN — GABAPENTIN 300 MG: 300 CAPSULE ORAL at 22:01

## 2025-07-24 RX ADMIN — DOCUSATE SODIUM 100 MG: 100 CAPSULE, LIQUID FILLED ORAL at 08:38

## 2025-07-24 RX ADMIN — ACETAMINOPHEN 975 MG: 325 TABLET ORAL at 06:52

## 2025-07-24 RX ADMIN — GABAPENTIN 300 MG: 300 CAPSULE ORAL at 06:52

## 2025-07-24 RX ADMIN — BUPRENORPHINE AND NALOXONE 1 TABLET: 8; 2 TABLET SUBLINGUAL at 17:21

## 2025-07-24 RX ADMIN — BUPRENORPHINE AND NALOXONE 1 TABLET: 8; 2 TABLET SUBLINGUAL at 01:50

## 2025-07-24 RX ADMIN — CELECOXIB 100 MG: 100 CAPSULE ORAL at 21:08

## 2025-07-24 RX ADMIN — POLYETHYLENE GLYCOL 3350 17 G: 17 POWDER, FOR SOLUTION ORAL at 08:39

## 2025-07-24 RX ADMIN — APIXABAN 2.5 MG: 2.5 TABLET, FILM COATED ORAL at 21:08

## 2025-07-24 RX ADMIN — HEPARIN SODIUM 5000 UNITS: 5000 INJECTION, SOLUTION INTRAVENOUS; SUBCUTANEOUS at 06:52

## 2025-07-24 RX ADMIN — BUPRENORPHINE AND NALOXONE 1 TABLET: 8; 2 TABLET SUBLINGUAL at 08:38

## 2025-07-24 RX ADMIN — ACETAMINOPHEN 975 MG: 325 TABLET ORAL at 22:01

## 2025-07-24 RX ADMIN — BUPRENORPHINE AND NALOXONE 1 TABLET: 8; 2 TABLET SUBLINGUAL at 21:08

## 2025-07-24 RX ADMIN — METFORMIN ER 500 MG 500 MG: 500 TABLET ORAL at 17:21

## 2025-07-24 RX ADMIN — DOCUSATE SODIUM 100 MG: 100 CAPSULE, LIQUID FILLED ORAL at 21:08

## 2025-07-24 RX ADMIN — CEFAZOLIN SODIUM 2 G: 2 INJECTION, SOLUTION INTRAVENOUS at 05:05

## 2025-07-24 RX ADMIN — CEFAZOLIN SODIUM 2 G: 2 INJECTION, SOLUTION INTRAVENOUS at 21:08

## 2025-07-24 RX ADMIN — CEFAZOLIN SODIUM 2 G: 2 INJECTION, SOLUTION INTRAVENOUS at 12:42

## 2025-07-24 RX ADMIN — LIDOCAINE 1 PATCH: 4 PATCH TOPICAL at 21:08

## 2025-07-24 RX ADMIN — ACETAMINOPHEN 975 MG: 325 TABLET ORAL at 14:27

## 2025-07-24 RX ADMIN — BUPRENORPHINE AND NALOXONE 1 TABLET: 8; 2 TABLET SUBLINGUAL at 12:42

## 2025-07-24 RX ADMIN — PANTOPRAZOLE SODIUM 40 MG: 40 TABLET, DELAYED RELEASE ORAL at 08:38

## 2025-07-24 RX ADMIN — CELECOXIB 100 MG: 100 CAPSULE ORAL at 08:38

## 2025-07-24 RX ADMIN — APIXABAN 2.5 MG: 2.5 TABLET, FILM COATED ORAL at 08:40

## 2025-07-24 ASSESSMENT — COGNITIVE AND FUNCTIONAL STATUS - GENERAL
MOVING TO AND FROM BED TO CHAIR: A LITTLE
CLIMB 3 TO 5 STEPS WITH RAILING: A LITTLE
WALKING IN HOSPITAL ROOM: A LITTLE
PERSONAL GROOMING: A LITTLE
DRESSING REGULAR UPPER BODY CLOTHING: A LITTLE
MOVING FROM LYING ON BACK TO SITTING ON SIDE OF FLAT BED WITH BEDRAILS: A LITTLE
TURNING FROM BACK TO SIDE WHILE IN FLAT BAD: A LITTLE
EATING MEALS: A LITTLE
DRESSING REGULAR UPPER BODY CLOTHING: A LITTLE
DAILY ACTIVITIY SCORE: 18
CLIMB 3 TO 5 STEPS WITH RAILING: TOTAL
MOVING FROM LYING ON BACK TO SITTING ON SIDE OF FLAT BED WITH BEDRAILS: A LITTLE
WALKING IN HOSPITAL ROOM: TOTAL
MOVING TO AND FROM BED TO CHAIR: A LITTLE
CLIMB 3 TO 5 STEPS WITH RAILING: A LOT
MOVING FROM LYING ON BACK TO SITTING ON SIDE OF FLAT BED WITH BEDRAILS: A LITTLE
MOBILITY SCORE: 18
DRESSING REGULAR LOWER BODY CLOTHING: A LITTLE
DAILY ACTIVITIY SCORE: 17
HELP NEEDED FOR BATHING: A LITTLE
TOILETING: A LITTLE
DAILY ACTIVITIY SCORE: 17
HELP NEEDED FOR BATHING: A LOT
STANDING UP FROM CHAIR USING ARMS: A LITTLE
PERSONAL GROOMING: A LITTLE
DRESSING REGULAR LOWER BODY CLOTHING: A LOT
DRESSING REGULAR LOWER BODY CLOTHING: A LITTLE
STANDING UP FROM CHAIR USING ARMS: A LITTLE
STANDING UP FROM CHAIR USING ARMS: A LITTLE
MOBILITY SCORE: 14
MOVING TO AND FROM BED TO CHAIR: A LITTLE
HELP NEEDED FOR BATHING: A LITTLE
TURNING FROM BACK TO SIDE WHILE IN FLAT BAD: A LITTLE
WALKING IN HOSPITAL ROOM: A LOT
DRESSING REGULAR UPPER BODY CLOTHING: A LOT
TOILETING: A LOT
TURNING FROM BACK TO SIDE WHILE IN FLAT BAD: A LITTLE
TOILETING: A LOT
MOBILITY SCORE: 16

## 2025-07-24 ASSESSMENT — PAIN SCALES - GENERAL
PAINLEVEL_OUTOF10: 2
PAINLEVEL_OUTOF10: 2
PAINLEVEL_OUTOF10: 1
PAINLEVEL_OUTOF10: 3

## 2025-07-24 ASSESSMENT — PAIN - FUNCTIONAL ASSESSMENT
PAIN_FUNCTIONAL_ASSESSMENT: 0-10

## 2025-07-24 ASSESSMENT — ACTIVITIES OF DAILY LIVING (ADL)
ADL_ASSISTANCE: INDEPENDENT
BATHING_ASSISTANCE: MODERATE
EFFECT OF PAIN ON DAILY ACTIVITIES: DISCOMFORT
HOME_MANAGEMENT_TIME_ENTRY: 9
ADL_ASSISTANCE: INDEPENDENT

## 2025-07-24 ASSESSMENT — PAIN DESCRIPTION - DESCRIPTORS: DESCRIPTORS: ACHING;DISCOMFORT

## 2025-07-24 NOTE — PROGRESS NOTES
07/24/25 1300   Discharge Planning   Living Arrangements Spouse/significant other   Support Systems Spouse/significant other   Assistance Needed none   Type of Residence Private residence   Who is requesting discharge planning? Provider   Home or Post Acute Services None   Expected Discharge Disposition Home   Does the patient need discharge transport arranged? No     Etelvina Banks is a 45 y.o. female re-admitted following emergent OR on Tuesday 7/15 PM for re-exploration of left breast free flap, repair of vessel, embolectomy, and flap debridement after patient presented with pallor and purple discoloration of her free flap site. She remains admitted for further flap observation/management. Flap declared non-viable. RTOR on 7/20 for debridement and PAP flap to left breast with Dr. Dewey and Dr. Lara. ADOD 7/25, [facility]-PT, OT, DME: BSC and WC.     TCC met with patient and significant other bedside. Discussed recommendation for home PT/OT and provided patient option for HHC of choice. Patient names [facility] as AOC. TCC discussed recommendation for BSC and WC, patient agreeable. Medical team updated on need for orders. Will submit and continue to follow for discharge planning. Anushka Seymour RN TCC

## 2025-07-24 NOTE — PROGRESS NOTES
"  Department of Plastic and Reconstructive Surgery  Daily Progress Note    Patient Name: Etelvina Banks  MRN: [id number]  Date:  07/24/25     Subjective  Resting comfortably in bed, reports mainly having discomfort to her abdomen, particularly when getting OOB. Otherwise pain well controlled, tolerating diet, patient has had BM and is voiding independently. Denies any fever, chills, night sweats, CP, SOB, palpitations, nausea, vomiting, diarrhea, constipation, dysuria.    Overnight Events  NAOE    Objective    Vital Signs  /72 (BP Location: Right arm, Patient Position: Lying)   Pulse 84   Temp 36.2 °C (97.2 °F) (Temporal)   Resp 18   Ht 1.575 m (5' 2\")   Wt 92.8 kg (204 lb 8 oz)   SpO2 97%   BMI 37.40 kg/m²      Physical Exam   Constitutional: A&Ox3, NAD, resting in bed.  Eyes: EOMI, clear sclera.   ENMT: Moist mucous membranes, no apparent injuries or lesions.  Head/Neck: NC/AT.   Cardiovascular: RRR.   Respiratory/Thorax: Regular respirations on RA. Good symmetric chest expansion.   Gastrointestinal: Abdomen soft, slightly distended, appropriately tender, no peritoneal signs, lower transverse abdominal incision dressed with Mepliex AG. ROSCOE drain x 2 with serous output to each side of the b/l lower abdomen, patent and maintaining self suction, dressed with CHG dressing. Umbilicus well approximated with sutures, no drainage, erythema or dehiscence, VAN.  Genitourinary: voiding independently  Extremities: Non-pitting peripheral edema of the lower extremities. Moving all 4 extremities actively.  Left leg wrapped in ACE wrap with wound vac in place, holding suction at -125mmHg with no alarms or leaks. Knee Immobilizer in place. ROSCOE drain to left thigh with ss output, BLE neurovascular intact, cap refill < 2 sec, +SILT, +df/pf, DP/PT/ radial pulses 2+, no drainage noted.   Neurological: A&Ox3.   Psychological: Appropriate mood and behavior.    Breast: MANUEL free flap recipient site to left breast. Flap is warm " to touch, appropriate tissue turgor, with mild ecchymosis to central portion of flap. Intact biphasic pulse signal at marked suture sites at central aspect of free flap. Cap refill < 3 sec. Incisions well approximated with sutures, dressed with dermabond and steri strips, no incisional dehiscence or drainage. ROSCOE drain x1 to lateral inferior aspect of left breast which is patent, maintaining self suction with bloody serous output postoperatively. Vioptix monitor lead intact currently reading mid 70s% saturation with 90+ signal quality.     Diagnostics   No results found for this or any previous visit (from the past 24 hours).    Imaging  No results found.    Cardiology, Vascular, and Other Imaging  No other imaging results found for the past 7 days      Current Medications  Scheduled medications  Scheduled Medications[1]  Continuous medications  Continuous Medications[2]  PRN medications  PRN Medications[3]     Assessment  Etelvina Banks is a 45 y.o. 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 Ortiz) with insertion of TE (per plastic surgery, Dr. Dewey) on 11/11/2024. Patient is now s/p OR with plastic surgery (Dr. Dewey and Dr. Lara) on 7/11/2025 for left breast tissue expander removal with capsulectomy and left breast reconstruction with free Deep Inferior Epigastric  flap (MANUEL). She progressed well postoperatively and was discharged home on 7/14/25. Patient re-admitted following emergent OR on Tuesday 7/15 PM for re-exploration of left breast free flap, repair of vessel, embolectomy, and flap debridement after patient presented with pallor and purple discoloration of her free flap site. She remains admitted for further flap observation/management. Flap declared non-viable. RTOR on 7/20 for debridement and PAP flap to left breast with Dr. Dewey and Dr. Lara.      Plan/Recommendations  # S/p left breast debridement and PAP flap to left breast   - Flap surveillance:   Continue serial flap assessments q2h per nursing with interval checks per plastic surgery team  Please assess Doppler pulse signal at marked site at central aspect of left breast flap plus flap general appearance (color, warmth, turgor)   Please additionally check Vioptix signal hourly and ensure saturation is >30% (If signal lost, abrupt change in signal or saturation <30% please immediately notify the plastic surgery team)    Please notify plastic surgery team immediately if there are any acute changes (Including decreased Doppler signal, pallor, temperature change, bleeding, etc.)  - Flap Warming/Rashmi Hugger:   Use of Rashmi hugger to L breast flap recipient site and generalized upper body to passively warm flap and promote perfusion   Settings: medium heat (38*C), high continuous fan   Avoid direct contact of warmer blanket with skin/flap sites   Rashmi hugger prn for comfort   - Dressings/wound care:   Dermabond and steri-strips to remain intact over breast flap incision, Mepliex AG to lower transverse abdominal incision, keep C/D/I   Maintain prevena incisional wound vac to left thigh x2 weeks postoperatively  Settings: -125mmHg, low, continuous suction   Nursing to monitor and record vac canister output at least q8h (please record 0 for no output)  For any issues or concerns with vac, please notify plastic surgery team at [phone] or pager #[phone]  Will transition to portable vac at time of DC if DC date is prior to projected vac removal date   - Drains:   Continue drain care to x4 ROSCOE drains present at the b/l lower abdomen, L breast and L thigh (nursing orders placed)  Maintain CHG drain dressings over drain insertion sites at the skin   Please ensure that drains are compressed flat and plugged to maintain self suction at all times aside from when emptying   Nursing to strip drain tubing at least q4h and PRN  to avoid drain/tubing obstruction   Nursing to please also empty and record output quantities at least TID and PRN if drains are full   Please notify the plastics team if drain outputs exceed >30 ml in 1 hr or >200 in 24 hrs  Drains to be removed per plastics team once 24 hr outputs remain <30 ml total x2 consecutive days  If drains do not meet output criteria for removal at time of medical clearance for DC, will plan for removal per plastics upon outpatient follow up   - Positioning:   Patient may have head of bed elevated up to 45 degrees. No hip flexion, extension, abduction for 2 weeks.  Keep left arm at a 45 degree angle away from body at all times. Do not allow arm to be compressing left breast or be at a 90 degree angle.   Avoid positioning that would apply pressure to flap region or incisions   - Weight bearing:   Patient to maintain strict bedrest postoperatively for 48 hours. Ok to get OOB on POD#3 7/23 with assistance, PT/OT consulted  <5 lbs lifting restriction to BUE postoperatively  No significant upper extremity abduction, extension or shoulder retraction to prevent tension on breast surgical site and incisions  OK for toe touch weight bearing to LLE for necessary movements/transfers only  Keep knee immobilizer in place for 2 weeks.  Maintain ACE wrap to left leg for 5 days postop.   - Other postoperative parameters/care:   IV Ancef while in-house  Nursing to record patient I&O, urine output goal of >1 mL/kg/hr  SQ Heparin q8h scheduled to start postoperatively, continue while inpatient, plan to transition to Eliquis 2.5 mg BID 7/24  VS monitoring q4h  Encouraged use of IS (10x/hr, each hr while awake)   Neurovascular checks to left leg q4 to be done on web in between first and second toe. Do not remove ACE wrap to perform checks.      # Acute postoperative pain, hx of opiate dependence on agonist therapy   - Nursing to assess patient pain scales at least q4h and PRN  - Continue home Suboxone 8-2 mg  SL tablet PO QID (home medication dosage confirmed via  pharmacy medication history review note placed on 7/10/25)  - Continue additional regimen (ERAS protocol) as follows:   Scheduled Tylenol 975mg PO q8h   Scheduled Gabapentin 300mg PO q8h   Scheduled Robaxin 500 mg PO q6h   PO Celebrex 100mg BID on POD1 AM   Lidocaine patch to left arm    # Post-operative Anemia   - Postoperative labs at Hgb 8.0, patient received 1U PRBC 7/21  - 7/21 Hgb 8.7. 7/22, transfused 1 unit of PRBC on 7/22  - Presently no S/S of bleeding, considered likely due to intraoperative blood loss   - Keep T&S UTD  - Monitor for S/S of bleeding or symptomatic anemia   - Monitor CBC prn per Dr. Dewey     # Hx T2DM   - Last HBA1C 5.8 on labs obtained 10/17/24  - Continue Home Metformin 500 mg PO daily  - monitor blood glucose on AM labs  - Diabetic Diet, Glucerna diet     # Hx HTN  - Home Losartan 50 mg PO daily held postoperatively, resume as BP permits with hold parameters to avoid hypotension and flap hypoperfusion   - Monitor VS q4h      # Hx peripheral edema   - Home hydrochlorothiazide held postoperatively, resume as BP permits and as needed for edema   - Extremity elevation while patient in bed      F: LR @ 100ml/hr while NPO overnight on POD0, discontinued and tolerating PO, encourage PO intake  E: Monitor and replete PRN  N: Diabetic diet with Jamarcus supplements BID  GI: Continue bowel regimen with Colace BID and dulcolax suppository        Zofran (first line)         GI ppx postoperatively and while inpatient with Protonix      DVT ppx:  - Risk score assessed  - SQ heparin q8h while inpatient, transition to Eliquis 2.5 mg PO BID prior to discharge  - Use of SCD to R leg while in bed        Disposition: Continue care on RNF. Will remain inpatient for continued flap monitoring and vac/drain surveillance postoperatively.      Patient and plan discussed with Dr Dewey and CRESENCIO Zuniga-C  Plastic and Reconstructive Surgery    Available via [employer]  Pager #[phone]  Team phone: [phone]         [1] acetaminophen, 975 mg, oral, q8h  apixaban, 2.5 mg, oral, q12h  buprenorphine-naloxone, 1 tablet, sublingual, 4x daily  ceFAZolin, 2 g, intravenous, q8h  celecoxib, 100 mg, oral, BID  docusate sodium, 100 mg, oral, BID  gabapentin, 300 mg, oral, q8h  lidocaine, 1 patch, transdermal, Daily  metFORMIN (MOD), 500 mg, oral, Daily with evening meal  pantoprazole, 40 mg, oral, Daily before breakfast  polyethylene glycol, 17 g, oral, Daily     [2]    [3] PRN medications: bisacodyl, [Transfer Hold] dextrose, dextrose, glucagon, methocarbamol

## 2025-07-24 NOTE — CARE PLAN
The clinical goals for the shift include pt will remain HDS throughout shift    Problem: Skin  Goal: Decreased wound size/increased tissue granulation at next dressing change  Outcome: Progressing  Flowsheets (Taken 7/23/2025 2322)  Decreased wound size/increased tissue granulation at next dressing change: Protective dressings over bony prominences  Goal: Participates in plan/prevention/treatment measures  Outcome: Progressing  Flowsheets (Taken 7/23/2025 2322)  Participates in plan/prevention/treatment measures: Elevate heels  Goal: Prevent/manage excess moisture  Outcome: Progressing  Flowsheets (Taken 7/23/2025 2322)  Prevent/manage excess moisture: Follow provider orders for dressing changes  Goal: Prevent/minimize sheer/friction injuries  Outcome: Progressing  Flowsheets (Taken 7/23/2025 2322)  Prevent/minimize sheer/friction injuries: Use pull sheet  Goal: Promote/optimize nutrition  Outcome: Progressing  Flowsheets (Taken 7/23/2025 2322)  Promote/optimize nutrition: Monitor/record intake including meals  Goal: Promote skin healing  Outcome: Progressing  Flowsheets (Taken 7/23/2025 2322)  Promote skin healing: Protective dressings over bony prominences     Problem: Skin  Goal: Participates in plan/prevention/treatment measures  Outcome: Progressing  Flowsheets (Taken 7/23/2025 2322)  Participates in plan/prevention/treatment measures: Elevate heels     Problem: Pain - Adult  Goal: Verbalizes/displays adequate comfort level or baseline comfort level  Outcome: Progressing     Problem: Safety - Adult  Goal: Free from fall injury  Outcome: Progressing     Problem: Discharge Planning  Goal: Discharge to home or other facility with appropriate resources  Outcome: Progressing     Problem: Chronic Conditions and Co-morbidities  Goal: Patient's chronic conditions and co-morbidity symptoms are monitored and maintained or improved  Outcome: Progressing     Problem: Nutrition  Goal: Nutrient intake appropriate for  maintaining nutritional needs  Outcome: Progressing

## 2025-07-24 NOTE — PROGRESS NOTES
Occupational Therapy    Evaluation and Treatment    Patient Name: Etelvina Banks  MRN: [id number]  Today's Date: 7/24/2025  Room: [address] [place], [place] [address]  Time Calculation  Start Time: 0958  Stop Time: 1022  Time Calculation (min): 24 min    Assessment  IP OT Assessment  OT Assessment: Pt's abilty to complete ADLs currently limited by TTWB L LE, NWB L UE, ROM restrictions, and deficits in functional strength, activity tolerance, and dynamic balance. The pt demos the ability to complete the majority of ADL tasks with max A - IND and performs functional transfers with Min A without a device. Pt will benefit from continued OT while admitted to increase IND and safety prior to d/c.  Prognosis: Good  Barriers to Discharge Home: No anticipated barriers  Evaluation/Treatment Tolerance: Patient tolerated treatment well  Medical Staff Made Aware: Yes  End of Session Communication: Bedside nurse  End of Session Patient Position: Up in chair, Alarm off, not on at start of session  Plan:  Inpatient Plan  Treatment Interventions: ADL retraining, Functional transfer training, Endurance training, Patient/family training, Equipment evaluation/education, Compensatory technique education  OT Frequency: 2 times per week  OT Discharge Recommendations: Low intensity level of continued care  Equipment Recommended upon Discharge: Bedside commode (Shower chair)  OT Recommended Transfer Status: Minimal assist, Assist of 1  OT - OK to Discharge: Yes  OT Assessment  OT Assessment Results: Decreased ADL status, Decreased endurance, Decreased functional mobility, Decreased IADLs  Prognosis: Good  Barriers to Discharge: None  Evaluation/Treatment Tolerance: Patient tolerated treatment well  Medical Staff Made Aware: Yes  Strengths: Attitude of self, Capable of completing ADLs semi/independent, Support of Caregivers, Housing layout, Insight into problems, Premorbid level of function  Barriers to Participation: Comorbidities    Subjective   Current  "Problem:  1. Contusion of left breast, initial encounter        2. Encounter for follow-up care involving plastic surgery of breast        3. Failure of flap graft  Case Request Operating Room: EXPLORATION, BLOOD VESSEL, TORSO    Case Request Operating Room: EXPLORATION, BLOOD VESSEL, TORSO    Surgical Pathology Exam    Surgical Pathology Exam      4. Ductal carcinoma in situ (DCIS) of left breast        5. Failed flap        6. 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          General:  Reason for Referral: left breast tissue expander removal with capsulectomy and left breast reconstruction with free Deep Inferior Epigastric  flap (MANUEL)  Past Medical History Relevant to Rehab: HTN, T2DM, peripheral edema, opioid dependence on agonist therapy, and DCIS of the left breast s/p L breast skin sparing mastectomy  Prior to Session Communication: Bedside nurse  Patient Position Received: Up in chair, Alarm off, not on at start of session  Family/Caregiver Present: No  General Comment: Pt very pleasant and agreeable to OT eval   Precautions:  UE Weight Bearing Status: Left Non-Weight Bearing (per orders - \"Keep left arm at a 45 degree angle away from body at all times. Do not allow arm to be compressing left breast or be at a 90 degree angle.\")  LE Weight Bearing Status: Left Toe-Touch Weight Bearing (Per orders - \"OK for toe touch weight bearing to LLE for necessary movements/transfers only\")  Medical Precautions: Fall precautions  Pain:  Pain Assessment  Pain Assessment: 0-10  0-10 (Numeric) Pain Score: 2  Pain Type: Surgical pain  Pain Location: Leg  Pain Orientation: Left  Pain Interventions: Repositioned  Response to Interventions: Content/relaxed  Lines/Tubes/Drains:  Closed/Suction Drain 3 RLQ Bulb 19 Fr. (Active)   Number of days: 12       Closed/Suction Drain 2 LLQ Bulb 19 Fr. (Active)   Number of days: 12       Closed/Suction Drain 4 Anterior;Left Thigh Bulb 19 Fr. (Active)   Number of days: 3     "   Closed/Suction Drain 1 Left Chest Bulb (Active)   Number of days: 3         Objective   Cognition:  Overall Cognitive Status: Within Functional Limits  Arousal/Alertness: Appropriate responses to stimuli  Orientation Level: Oriented X4  Following Commands: Follows all commands and directions without difficulty           Home Living:  Type of Home: House  Lives With: Significant other, Dependent children (Kids aged 18,17,15 - home for summer and can help, other family lives next door and can assist as needed - plenty of family support)  Home Adaptive Equipment: Walker rolling or standard (recliner chair to sleep in)  Home Layout: Two level, Full bath main level, Able to live on main level with bedroom/bathroom  Home Access: Stairs to enter with rails  Entrance Stairs-Number of Steps: 2  Bathroom Shower/Tub: Walk-in shower  Bathroom Toilet: Standard  Bathroom Equipment: Raised toilet seat with rails   Prior Function:  Level of [place]: Independent with ADLs and functional transfers, Independent with homemaking with ambulation  ADL Assistance: Independent  Homemaking Assistance: Independent  Ambulatory Assistance: Independent  Vocational: Full time employment  Leisure: Walk, read, puzzle, time with family  Hand Dominance: Right  Prior Function Comments: - falls  IADL History:  Homemaking Responsibilities: Yes  Meal Prep Responsibility: Primary  Laundry Responsibility: Primary  Cleaning Responsibility: Primary  Bill Paying/Finance Responsibility: Primary  Shopping Responsibility: Primary   Responsibility: Primary  Homemaking Comments: Shares with family  Current License: Yes  Mode of Transportation: Car  Occupation: Full time employment  Type of Occupation: Office work  Leisure and Hobbies: Walk, read, puzzle, time with family  ADL:  Eating Assistance: Independent (Anticipated)  Grooming Assistance: Independent (Anticipated - seated)  Bathing Assistance: Moderate (Anticipated)  UE Dressing Assistance:  Minimal (Anticipated)  LE Dressing Assistance: Maximal (Anticipated)  Toileting Assistance with Device: Minimal (Simulated)  Activity Tolerance:  Endurance: Tolerates 10 - 20 min exercise with multiple rests  Balance:  Dynamic Standing Balance  Dynamic Standing-Balance Support: Right upper extremity supported  Dynamic Standing-Level of Assistance: Minimum assistance  Static Sitting Balance  Static Sitting-Balance Support: No upper extremity supported, Feet supported  Static Sitting-Level of Assistance: Independent  Static Standing Balance  Static Standing-Balance Support: Right upper extremity supported  Static Standing-Level of Assistance: Contact guard  Bed Mobility/Transfers: Bed Mobility/Transfers:     and Transfers  Transfer: Yes  Transfer 1  Transfer From 1: Sit to, Stand to  Transfer to 1: Stand, Sit  Technique 1: Sit to stand, Stand to sit  Transfer Device 1:  (No device)  Transfer Level of Assistance 1: Minimum assistance, Minimal verbal cues  Trials/Comments 1: maintains precautions  Transfers 2  Transfer From 2: Chair with arms to  Transfer to 2: Commode-standard  Transfer Level of Assistance 2: Minimum assistance  Trials/Comments 2: Cues for technique, Min A for balance and lifting/standing  IADL's:   Homemaking Responsibilities: Yes  Meal Prep Responsibility: Primary  Laundry Responsibility: Primary  Cleaning Responsibility: Primary  Bill Paying/Finance Responsibility: Primary  Shopping Responsibility: Primary   Responsibility: Primary  Homemaking Comments: Shares with family  Current License: Yes  Mode of Transportation: Car  Occupation: Full time employment  Type of Occupation: Office work  Leisure and Hobbies: Walk, read, puzzle, time with family  Vision: Vision - Basic Assessment  Current Vision: Wears glasses only for reading   and    Sensation:  Light Touch: No apparent deficits  Strength:  Strength Comments: R UE WFL, L UE ROM restricted  Perception:  Inattention/Neglect: Appears  intact  Coordination:  Movements are Fluid and Coordinated: Yes   Hand Function:  Hand Function  Gross Grasp: Functional  Coordination: Functional  Extremities:   RUE   RUE : Within Functional Limits, LUE   LUE: Exceptions to WFL (ROM restrictions per plastics - apears to be WFL within restrictions),  , and    Outcome Measures: [facility] Daily Activity  Putting on and taking off regular lower body clothing: A lot  Bathing (including washing, rinsing, drying): A lot  Putting on and taking off regular upper body clothing: A little  Toileting, which includes using toilet, bedpan or urinal: A lot  Taking care of personal grooming such as brushing teeth: None  Eating Meals: None  Daily Activity - Total Score: 17         ,     OT Adult Other Outcome Measures  4AT: 4 AT -    Education Documentation  Body Mechanics, taught by Greg Shoemaker OT at 7/24/2025 11:44 AM.  Learner: Patient  Readiness: Acceptance  Method: Explanation, Demonstration  Response: Verbalizes Understanding, Demonstrated Understanding  Comment: ADLs and safety    Precautions, taught by Greg Shoemaker OT at 7/24/2025 11:44 AM.  Learner: Patient  Readiness: Acceptance  Method: Explanation, Demonstration  Response: Verbalizes Understanding, Demonstrated Understanding  Comment: ADLs and safety    ADL Training, taught by Greg Shoemaker OT at 7/24/2025 11:44 AM.  Learner: Patient  Readiness: Acceptance  Method: Explanation, Demonstration  Response: Verbalizes Understanding, Demonstrated Understanding  Comment: ADLs and safety    Education Comments  No comments found.    Goals:   Encounter Problems       Encounter Problems (Active)       ADLs       Patient with complete upper body dressing with independent level of assistance donning and doffing all UE clothes with no adaptive equipment while edge of bed  (Progressing)       Start:  07/24/25    Expected End:  08/14/25            Patient with complete lower body dressing with modified independent level of assistance  donning and doffing all LE clothes  with PRN adaptive equipment while edge of bed  (Progressing)       Start:  07/24/25    Expected End:  08/14/25            Patient will complete daily grooming tasks brushing teeth and washing face/hair with independent level of assistance and PRN adaptive equipment while edge of bed . (Progressing)       Start:  07/24/25    Expected End:  08/14/25            Patient will complete toileting including hygiene clothing management/hygiene with modified independent level of assistance and bedside commode. (Progressing)       Start:  07/24/25    Expected End:  08/14/25               BALANCE       Pt will maintain dynamic standing balance during ADL task with modified independent level of assistance in order to demonstrate decreased risk of falling and improved postural control. (Progressing)       Start:  07/24/25    Expected End:  08/14/25               TRANSFERS       Patient will complete functional transfers with least restrictive device with modified independent level of assistance. (Progressing)       Start:  07/24/25    Expected End:  08/14/25                   Treatment Completed on Evaluation  Activities of Daily Living: Toilet Transfers  Toilet Transfer From: Chair  Toilet Transfer Type: To and from  Toilet Transfer to: Standard bedside commode  Toilet Transfer Technique: Stand pivot  Toilet Transfers: Minimal assistance  Toilet Transfers Comments: Pt will require BSC for d/c 2/2 inability to walk to bathroom due to current precautions and w/c won't fit through bathroom doors. Education provided on home safety recommendations regarding toileting/toilet transfers with pt receptive.Min A at R UE for pt to complete Stand pivot to and from BSC, she is effective with maintaining TTWB L LE.   LE Dressing  LE Dressing:  (Education provided on LE dressing strategy including use of AE and sequencing task to increase ease. Pt receptive to education provided.)  Toileting  Toileting  Comments: Simulated toileting at [facility] to address pt's safety - Assistance required with clothing management, cues for increased overall safety  07/24/25 at 11:45 AM   Greg Shoemaker, OT   Rehab Office: [phone]

## 2025-07-24 NOTE — SIGNIFICANT EVENT
"  Department of Plastic and Reconstructive Surgery  PM Flap Check    Subjective: Sitting upright in chair all day, voiding without difficulty and had a BM, Tolerating Carb diet with Jamarcus and Glucerna drinks, pain well controlled, Denies any fever, chills, night sweats, CP, SOB, palpitations, nausea, vomiting, diarrhea, constipation, dysuria, hematuria, hematochezia, hematemesis, flank pain.     Objective:  /89 (BP Location: Right arm, Patient Position: Lying)   Pulse 98   Temp 36.2 °C (97.2 °F) (Temporal)   Resp 18   Ht 1.575 m (5' 2\")   Wt 92.8 kg (204 lb 8 oz)   SpO2 98%   BMI 37.40 kg/m²    PE:   Constitutional: A&Ox3, NAD, resting in bed.  Eyes: EOMI, clear sclera.   ENMT: Moist mucous membranes, no apparent injuries or lesions.  Head/Neck: NC/AT.   Cardiovascular: RRR.   Respiratory/Thorax: Regular respirations on RA. Good symmetric chest expansion.   Gastrointestinal: Abdomen soft, slightly distended, appropriately tender, no peritoneal signs, lower transverse abdominal incision dressed with Mepliex AG. ROSCOE drain x 2 with serous output to each side of the b/l lower abdomen, patent and maintaining self suction, dressed with CHG dressing. Umbilicus well approximated with sutures, no drainage, erythema or dehiscence, VAN.  Genitourinary: unable to void post hong removal, straight cath  Extremities: Non-pitting peripheral edema of the lower extremities. Moving all 4 extremities actively.  Left leg wrapped in ACE wrap with wound vac in place, holding suction at -125mmHg with no alarms or leaks. Knee Immobilizer in place. ROSCOE drain to left thigh with ss output, BLE neurovascular intact, cap refill < 2 sec, +SILT, +df/pf, DP/PT/ radial pulses 2+, no drainage noted.   Neurological: A&Ox3.   Psychological: Appropriate mood and behavior.    Breast: MANUEL free flap recipient site to left breast. Flap is warm to touch, appropriate tissue turgor. Intact biphasic pulse signal at marked suture sites at central " aspect of free flap. Cap refill < 3 sec. Incisions well approximated with sutures, dressed with dermabond and steri strips, no incisional dehiscence or drainage. ROSCOE drain x1 to lateral inferior aspect of left breast which is patent, maintaining self suction with bloody serous output postoperatively. Vioptix monitor lead intact currently reading mid 73% saturation with 90+ signal quality.     Assessment:  Etelvina Banks  is a 45 y.o. 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 Ortiz) with insertion of TE (per plastic surgery, Dr. Dewey) on 11/11/2024. Patient is now s/p OR with plastic surgery (Dr. Dewey and Dr. Lara) on 7/11/2025 for left breast tissue expander removal with capsulectomy and left breast reconstruction with free Deep Inferior Epigastric  flap (MANUEL). She progressed well postoperatively and was discharged home on 7/14/25. Patient re-admitted following emergent OR on Tuesday 7/15 PM for re-exploration of left breast free flap, repair of vessel, embolectomy, and flap debridement after patient presented with pallor and purple discoloration of her free flap site. She remains admitted for further flap observation/management. Flap declared non-viable. RTOR on 7/20 for debridement and PAP flap to left breast with Dr. Dewey and Dr. Lara.      Plan:   # S/p left breast debridement and PAP flap to left breast:  - Flap check stable, biphasic doppler signal, good strength, warm to touch, continue Q2 flap check and Vioptix check  - voiding without difficulty  - continue all other care per daily progress note  - Updated Dr. Dewey and KATIANA Perry-CNP  Plastic and Reconstructive Surgery   Available via [employer]  Pager #[phone]  Team phone: [phone]

## 2025-07-24 NOTE — PROGRESS NOTES
Physical Therapy    Physical Therapy Evaluation    Patient Name: Etelvina Banks  MRN: [id number]  Department: [facility]  Room: 6014/6014-A  Today's Date: 7/24/2025   Time Calculation  Start Time: 1220  Stop Time: 1251  Time Calculation (min): 31 min    Assessment/Plan   PT Assessment  PT Assessment Results: Decreased strength, Decreased range of motion, Decreased endurance, Impaired balance, Decreased mobility, Orthopedic restrictions, Pain  Rehab Prognosis: Good  Barriers to Discharge Home: No anticipated barriers  Evaluation/Treatment Tolerance: Patient tolerated treatment well  Medical Staff Made Aware: Yes  Strengths: Attitude of self, Coping skills, Physical health, Support of extended family/friends  Barriers to Participation: Comorbidities  End of Session Communication: Bedside nurse  Assessment Comment: Pt performed a steady and safe transfer with minimal assistance and HHA; pt safe and appropriate for low intensity level therapy and family assistance after d/c.  End of Session Patient Position: Up in chair, Alarm off, caregiver present  IP OR SWING BED PT PLAN  Inpatient or Swing Bed: Inpatient  PT Plan  Treatment/Interventions: Bed mobility, Transfer training, Balance training, Strengthening, Endurance training, Therapeutic exercise, Therapeutic activity, Home exercise program  PT Plan: Ongoing PT  PT Frequency: 3 times per week  PT Discharge Recommendations: Low intensity level of continued care  Equipment Recommended upon Discharge: Wheelchair, Bedside commode  PT Recommended Transfer Status: Assist x1  PT - OK to Discharge: Yes    Subjective     PT Visit Info:  PT Received On: 07/24/25  General Visit Information:  General  Reason for Referral: Ductal carcinoma in situ (DCIS) of left breast, failed flap, 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 Ortiz) with insertion of TE (per plastic surgery, Dr. Dewey) on 11/11/2024  Family/Caregiver Present: Yes  Caregiver Feedback: Boyfriend present with good support.  Prior to Session Communication: Bedside nurse  Patient Position Received: Up in chair, Alarm off, caregiver present  Preferred Learning Style: auditory, verbal, visual, written  General Comment: Pt sitting in chair upon arrival, pleasant, cooperative and willing to participate in therapy.  Home Living:  Home Living  Type of Home: House  Lives With: Significant other, Dependent children, Adult children (Boyfried, 1 adult child, 2 dependent children)  Home Adaptive Equipment: Wheelchair-manual  Home Layout: Two level, Full bath main level, Stairs to alternate level with rails  Alternate Level Stairs-Rails: Right  Alternate Level Stairs-Number of Steps: 15  Home Access: Stairs to enter without rails  Entrance Stairs-Rails: None  Entrance Stairs-Number of Steps: 2  Bathroom Shower/Tub: Walk-in shower  Bathroom Toilet: Standard  Bathroom Equipment: Bedside commode  Bathroom Accessibility: Pt uses 1st floor bathroom only.  Home Living Comments: Pt does not go to the 2nd floor and will be sleeping in recliner.  Prior Level of Function:  Prior Function Per Pt/Caregiver Report  Level of [place]: Independent with ADLs and functional transfers, Independent with homemaking with ambulation  Receives Help From: Family  ADL Assistance: Independent  Homemaking Assistance: Independent  Ambulatory Assistance: Independent  Vocational: Part time employment  Leisure: Reading and walking  Hand Dominance: Right  Prior Function Comments: Pt was independent with all mobility without an assistive device in the household and in the community.  Precautions:  Precautions  Hearing/Visual Limitations: Hearing and vision WFL with reading glasses  UE Weight Bearing Status: Left  Non-Weight Bearing, Other (Comment) (L UE to be positioned at 45 deg angle at all times and not to touch left breast or raised to 90 deg)  LE Weight Bearing Status: Toe-touch weight bearing with block (L LE)  Medical Precautions: Fall precautions, Other (comment) (OK for toe touch weight bearing to LLE for necessary movements/transfers only · Keep left arm at a 45 degree angle away from body at all times. Do not allow arm to be compressing left breast or be at a 90 degree angle)  Braces Applied: Left knee immobilizer  Precautions Comment: Pt in compliance with precautions throughout therapy session.      Date/Time Vitals Session Patient Position Pulse Resp SpO2 BP MAP (mmHg)    07/24/25 1218 --  --  99  18  96 %  140/98  112     07/24/25 1220 Pre PT  Sitting  102  --  96 %  140/98  --      Vital Signs Comment: Slight tachycardia     Objective   Pain:  Pain Assessment  Pain Assessment: 0-10  0-10 (Numeric) Pain Score: 2  Pain Type: Acute pain, Surgical pain  Pain Location: Abdomen  Pain Orientation: Lower  Pain Radiating Towards: entire abdominals  Pain Descriptors: Aching, Discomfort  Pain Frequency: Constant/continuous  Pain Onset: Ongoing  Clinical Progression: Not changed  Effect of Pain on Daily Activities: Discomfort  Patient's Stated Pain Goal: No pain  Pain Interventions: Therapeutic presence  Response to Interventions: No change in pain  Cognition:  Cognition  Overall Cognitive Status: Within Functional Limits  Arousal/Alertness: Appropriate responses to stimuli  Orientation Level: Oriented X4  Following Commands: Follows all commands and directions without difficulty  Attention: Within Functional Limits    General Assessments:  General Observation  General Observation: Pt performed safe transfers with minimal assistance and HHA.     Activity Tolerance  Endurance: Decreased tolerance for upright activites    Sensation  Light Touch: No apparent deficits    Strength  Strength Comments: L UE, trunk, and L LE  decreased strength.  Perception  Inattention/Neglect: Appears intact    Coordination  Movements are Fluid and Coordinated: Yes  Coordination Comment: WFL    Postural Control  Postural Control: Within Functional Limits  Posture Comment: Pt presented with good sitting posture and good standing posture with HHA.    Static Sitting Balance  Static Sitting-Balance Support: Right upper extremity supported, Feet supported  Static Sitting-Level of Assistance: Distant supervision  Static Sitting-Comment/Number of Minutes: in chair  Dynamic Sitting Balance  Dynamic Sitting-Balance Support: Right upper extremity supported, Feet supported  Dynamic Sitting-Level of Assistance: Distant supervision  Dynamic Sitting-Comments: in chair    Static Standing Balance  Static Standing-Balance Support: Right upper extremity supported  Static Standing-Level of Assistance: Minimum assistance  Static Standing-Comment/Number of Minutes: HHA  Dynamic Standing Balance  Dynamic Standing-Balance Support: Right upper extremity supported  Dynamic Standing-Level of Assistance: Minimum assistance  Dynamic Standing-Comments: HHA  Functional Assessments:  Bed Mobility  Bed Mobility: No    Transfers  Transfer: Yes  Transfer 1  Transfer From 1: Sit to  Transfer to 1: Stand  Transfer Device 1:  (no device)  Transfer Level of Assistance 1: Hand held assistance, Minimum assistance  Transfers 2  Transfer From 2: Stand to  Transfer to 2: Sit  Transfer Device 2:  (no device)  Transfer Level of Assistance 2: Hand held assistance, Minimum assistance    Ambulation/Gait Training  Ambulation/Gait Training Performed: No    Stairs  Stairs: No  Extremity/Trunk Assessments:  Cervical Spine   Cervical Spine: Within Functional Limits  Lumbar Spine   Lumbar Spine : Exceptions to Funtional Limits  Lumbar Spine Comment: Decreased strength and ROM.    RUE   RUE : Within Functional Limits  LUE   LUE: Exceptions to WFL  LUE AROM (degrees)  LUE AROM Comment: Decreased ROM  restrictions  LUE Strength  LUE Overall Strength: Deficits, Due to  precautions  RLE   RLE : Within Functional Limits  LLE   LLE : Exceptions to WFL  AROM LLE (degrees)  LLE AROM Comment: Decreased knee flexion due to knee immobilizer donned  Strength LLE  LLE Overall Strength: Deficits, Due to  precautions  Outcome Measures:  [facility] Basic Mobility  Turning from your back to your side while in a flat bed without using bedrails: A little  Moving from lying on your back to sitting on the side of a flat bed without using bedrails: A little  Moving to and from bed to chair (including a wheelchair): A little  Standing up from a chair using your arms (e.g. wheelchair or bedside chair): A little  To walk in hospital room: Total  Climbing 3-5 steps with railing: Total  Basic Mobility - Total Score: 14    Encounter Problems       Encounter Problems (Active)       Balance       STG - Maintains SBA dynamic standing balance without upper extremity support. (Progressing)       Start:  07/24/25    Expected End:  08/07/25       INTERVENTIONS:  1. Practice standing with minimal support.  2. Educate patient about standing tolerance.  3. Educate patient about independence with gait, transfers, and ADL's.  4. Educate patient about use of assistive device.  5. Educate patient about self-directed care.         STG - Maintains SBA static standing balance without upper extremity support. (Progressing)       Start:  07/24/25    Expected End:  08/07/25       INTERVENTIONS:  1. Practice standing with minimal support.  2. Educate patient about maintaining total hip precautions while maintaining balance.  3. Educate patient about standing tolerance.  4. Educate patient about independence with gait, transfers, and ADL's.  5. Educate patient about use of assistive device.  6. Educate patient about self-directed care.            PT Transfers       STG - Transfer from bed to chair with SBA. (Progressing)       Start:  07/24/25    Expected End:   08/07/25            STG - Patient to transfer to and from sit to supine with SBA. (Progressing)       Start:  07/24/25    Expected End:  08/07/25            STG - Patient will transfer sit to and from stand with SBA. (Progressing)       Start:  07/24/25    Expected End:  08/07/25                   Education Documentation  Precautions, taught by Brandon Denney PT at 7/24/2025  1:18 PM.  Learner: Significant Other, Patient  Readiness: Acceptance  Method: Explanation, Demonstration  Response: Verbalizes Understanding, Demonstrated Understanding  Comment: Pt received education on L LE and L UE restrictions and precautions and how to perform transfers in compliance with the restrictions and precautions.    Body Mechanics, taught by Brandon Denney PT at 7/24/2025  1:18 PM.  Learner: Significant Other, Patient  Readiness: Acceptance  Method: Explanation, Demonstration  Response: Verbalizes Understanding, Demonstrated Understanding  Comment: Pt received education on L LE and L UE restrictions and precautions and how to perform transfers in compliance with the restrictions and precautions.    Home Exercise Program, taught by Brandon Denney PT at 7/24/2025  1:18 PM.  Learner: Significant Other, Patient  Readiness: Acceptance  Method: Explanation, Demonstration  Response: Verbalizes Understanding, Demonstrated Understanding  Comment: Pt received education on L LE and L UE restrictions and precautions and how to perform transfers in compliance with the restrictions and precautions.    Mobility Training, taught by Brandon Denney PT at 7/24/2025  1:18 PM.  Learner: Significant Other, Patient  Readiness: Acceptance  Method: Explanation, Demonstration  Response: Verbalizes Understanding, Demonstrated Understanding  Comment: Pt received education on L LE and L UE restrictions and precautions and how to perform transfers in compliance with the restrictions and precautions.    Education Comments  No comments  found.

## 2025-07-25 ENCOUNTER — HOME HEALTH ADMISSION (OUTPATIENT)
Dept: HOME HEALTH SERVICES | Facility: HOME HEALTH | Age: 45
End: 2025-07-25
Payer: COMMERCIAL

## 2025-07-25 ENCOUNTER — PHARMACY VISIT (OUTPATIENT)
Dept: PHARMACY | Facility: CLINIC | Age: 45
End: 2025-07-25
Payer: MEDICAID

## 2025-07-25 ENCOUNTER — DOCUMENTATION (OUTPATIENT)
Dept: HOME HEALTH SERVICES | Facility: HOME HEALTH | Age: 45
End: 2025-07-25
Payer: COMMERCIAL

## 2025-07-25 VITALS
OXYGEN SATURATION: 98 % | BODY MASS INDEX: 37.63 KG/M2 | WEIGHT: 204.5 LBS | RESPIRATION RATE: 18 BRPM | HEIGHT: 62 IN | SYSTOLIC BLOOD PRESSURE: 121 MMHG | TEMPERATURE: 98.2 F | DIASTOLIC BLOOD PRESSURE: 78 MMHG | HEART RATE: 83 BPM

## 2025-07-25 PROCEDURE — 2500000002 HC RX 250 W HCPCS SELF ADMINISTERED DRUGS (ALT 637 FOR MEDICARE OP, ALT 636 FOR OP/ED): Mod: SE

## 2025-07-25 PROCEDURE — 2500000001 HC RX 250 WO HCPCS SELF ADMINISTERED DRUGS (ALT 637 FOR MEDICARE OP): Mod: SE

## 2025-07-25 PROCEDURE — 2500000004 HC RX 250 GENERAL PHARMACY W/ HCPCS (ALT 636 FOR OP/ED): Mod: SE

## 2025-07-25 PROCEDURE — 99238 HOSP IP/OBS DSCHRG MGMT 30/<: CPT

## 2025-07-25 RX ORDER — ACETAMINOPHEN 325 MG/1
975 TABLET ORAL EVERY 8 HOURS
Qty: 126 TABLET | Refills: 0 | Status: SHIPPED | OUTPATIENT
Start: 2025-07-25 | End: 2025-08-08

## 2025-07-25 RX ORDER — CEPHALEXIN 500 MG/1
500 CAPSULE ORAL 4 TIMES DAILY
Qty: 56 CAPSULE | Refills: 0 | Status: SHIPPED | OUTPATIENT
Start: 2025-07-25

## 2025-07-25 RX ORDER — CELECOXIB 100 MG/1
100 CAPSULE ORAL 2 TIMES DAILY
Qty: 14 CAPSULE | Refills: 0 | Status: SHIPPED | OUTPATIENT
Start: 2025-07-25 | End: 2025-08-01

## 2025-07-25 RX ORDER — PANTOPRAZOLE SODIUM 40 MG/1
40 TABLET, DELAYED RELEASE ORAL
Qty: 7 TABLET | Refills: 0 | Status: SHIPPED | OUTPATIENT
Start: 2025-07-26 | End: 2025-08-02

## 2025-07-25 RX ADMIN — PANTOPRAZOLE SODIUM 40 MG: 40 TABLET, DELAYED RELEASE ORAL at 08:54

## 2025-07-25 RX ADMIN — CEFAZOLIN SODIUM 2 G: 2 INJECTION, SOLUTION INTRAVENOUS at 05:50

## 2025-07-25 RX ADMIN — ACETAMINOPHEN 975 MG: 325 TABLET ORAL at 06:00

## 2025-07-25 RX ADMIN — ACETAMINOPHEN 975 MG: 325 TABLET ORAL at 13:14

## 2025-07-25 RX ADMIN — GABAPENTIN 300 MG: 300 CAPSULE ORAL at 13:14

## 2025-07-25 RX ADMIN — GABAPENTIN 300 MG: 300 CAPSULE ORAL at 06:00

## 2025-07-25 RX ADMIN — CELECOXIB 100 MG: 100 CAPSULE ORAL at 08:54

## 2025-07-25 RX ADMIN — CEFAZOLIN SODIUM 2 G: 2 INJECTION, SOLUTION INTRAVENOUS at 13:14

## 2025-07-25 RX ADMIN — DOCUSATE SODIUM 100 MG: 100 CAPSULE, LIQUID FILLED ORAL at 08:54

## 2025-07-25 RX ADMIN — BUPRENORPHINE AND NALOXONE 1 TABLET: 8; 2 TABLET SUBLINGUAL at 13:14

## 2025-07-25 RX ADMIN — APIXABAN 2.5 MG: 2.5 TABLET, FILM COATED ORAL at 08:54

## 2025-07-25 RX ADMIN — BUPRENORPHINE AND NALOXONE 1 TABLET: 8; 2 TABLET SUBLINGUAL at 06:00

## 2025-07-25 ASSESSMENT — COGNITIVE AND FUNCTIONAL STATUS - GENERAL
DRESSING REGULAR UPPER BODY CLOTHING: A LOT
TOILETING: A LOT
MOVING FROM LYING ON BACK TO SITTING ON SIDE OF FLAT BED WITH BEDRAILS: A LITTLE
DRESSING REGULAR LOWER BODY CLOTHING: A LITTLE
CLIMB 3 TO 5 STEPS WITH RAILING: A LOT
HELP NEEDED FOR BATHING: A LITTLE
PERSONAL GROOMING: A LITTLE
MOBILITY SCORE: 16
TURNING FROM BACK TO SIDE WHILE IN FLAT BAD: A LITTLE
WALKING IN HOSPITAL ROOM: A LOT
STANDING UP FROM CHAIR USING ARMS: A LITTLE
DAILY ACTIVITIY SCORE: 17
MOVING TO AND FROM BED TO CHAIR: A LITTLE

## 2025-07-25 ASSESSMENT — PAIN - FUNCTIONAL ASSESSMENT: PAIN_FUNCTIONAL_ASSESSMENT: 0-10

## 2025-07-25 ASSESSMENT — PAIN SCALES - GENERAL: PAINLEVEL_OUTOF10: 2

## 2025-07-25 NOTE — DISCHARGE SUMMARY
Discharge Diagnosis  Failed flap    Issues Requiring Follow-Up  Follow-up with Dr. Dotson 7/28  Follow-up with Dr. Dewey 8/5  ROSCOE drains x 3  Prevena incisional vac    Test Results Pending At Discharge  Pending Labs       No current pending labs.            Hospital Course  BRIEF HISTORY:    Etelvina Banks is a 45 y.o. 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 Ortiz) with insertion of TE (per plastic surgery, Dr. Dewey) on 11/11/2024. Patient is now s/p OR with plastic surgery (Dr. Dewey and Dr. Lara) on 7/11/2025 for left breast tissue expander removal with capsulectomy and left breast reconstruction with free Deep Inferior Epigastric  flap (MANUEL). Did well post operatively and was discharged on 7/14/25. Patient is 4 days post op and has been doing well at home, pain well controlled, tolerating diet, voiding and had a BM since discharge. Reports waking up this morning and noticed discoloration of the left breast and it felt cool to touch to the upper breast flap with some increase in edema which she messaged the Plastic Surgery office and sent pictures and was instructed to present to ED immediately for evaluation and concern for flap perfusion compromise. Pain well controlled at this time and denies pain. Ambulating without difficulty. Due to high volumes in the ED and emergent need to see patient, patient was seen in triage room and after assessment concern for flap tissue perfusion compromise and decision to take patient emergently to the OR for Level 1 flap take back.     HOSPITAL COURSE:    Taken to OR emergently for exploration of flap with possible vein graft, admitted post operatively for flap monitoring with doppler checks and vioptix every 1 hour. On POD#1 7/16, patient was doing well but around 1500 flap lost signal and became dusky and pale in appearance.  Evaluated at bedside by Dr. Lara, decision to monitor flap viability. Flap deemed nonviable and decided to take patient back to the OR on 7/20 for debridement of left breast flap and creation of PAP flap to left breast with Dr. Lara and Dr. Dewey. Patient doing well post-operatively. She has a wound vac to left leg inner thigh incisions which will remain x2 weeks. She is in a knee immobilizer to LLE to allow sufficient time for healing.     CONSULTATIONS:  PT/OT consulted for home care needs and elected to discharge home as she has plenty of help. She has recliner at home and plans to have bedside commode and wheelchair is available to get patient to post-op appointments since she is NWB to L leg x2 weeks    DAY OF DISCHARGE:    On the day of discharge, the patient was seen and evaluated by the Plastic Surgery team and deemed suitable for discharge to home.  There were no significant events overnight. Vitals were reviewed and within normal limits. Labs were stable at discharge. On day of discharge the patient was tolerating a diet, pain was controlled on PO pain medication, was ambulating well and voiding spontaneously. The patient was given detailed discharge instructions and were scheduled to follow up as an outpatient.      Visit Vitals  /73 (BP Location: Right arm, Patient Position: Lying)   Pulse 84   Temp 36.5 °C (97.7 °F) (Temporal)   Resp 18     Vitals:       Immunization History   Administered Date(s) Administered    Tdap vaccine, age 7 year and older (BOOSTRIX, ADACEL) 03/24/2021       Results  Scheduled medications  Scheduled Medications[1]  Continuous medications  Continuous Medications[2]  PRN medications  PRN Medications[3]    Pertinent Physical Exam At Time of Discharge  Physical Exam  Constitutional: A&Ox3, NAD, resting in bed.  Eyes: EOMI, clear sclera.   ENMT: Moist mucous membranes, no apparent injuries or lesions.  Head/Neck: NC/AT.   Cardiovascular: RRR.   Respiratory/Thorax: Regular  respirations on RA. Good symmetric chest expansion.   Gastrointestinal: Abdomen soft, slightly distended, appropriately tender, no peritoneal signs, lower transverse abdominal incision dressed with Mepliex AG. ROSCOE drain x 1 with serous output to LLQ, patent and maintaining self suction, dressed with CHG dressing. Umbilicus well approximated with sutures, no drainage, erythema or dehiscence, VAN.  Genitourinary: voiding independently  Extremities: Moving all 4 extremities actively.  Left leg with incisional wound vac in place, holding suction at -125mmHg with no alarms or leaks. Knee Immobilizer in place. ROSCOE drain to left thigh with ss output, BLE neurovascular intact, cap refill < 2 sec, +SILT, +df/pf, DP/PT/ radial pulses 2+, no drainage noted.   Neurological: A&Ox3.   Psychological: Appropriate mood and behavior.    Breast: MANUEL free flap recipient site to left breast. Flap is warm to touch, appropriate tissue turgor, with mild ecchymosis to central portion of flap. Intact biphasic pulse signal at marked suture sites at central aspect of free flap. Cap refill < 3 sec. Incisions well approximated with sutures, dressed with dermabond and steri strips, no incisional dehiscence or drainage. ROSCOE drain x1 to lateral inferior aspect of left breast which is patent, maintaining self suction with bloody serous output postoperatively. Vioptix monitor lead intact currently reading mid 70s% saturation with 90+ signal quality.     Home Medications     Medication List      PAUSE taking these medications     hydroCHLOROthiazide 12.5 mg tablet; Wait to take this until your doctor   or other care provider tells you to start again.; Commonly known as:   Microzide; Take 1 tablet (12.5 mg) by mouth once daily as needed (pitting   edema).   losartan 50 mg tablet; Wait to take this until your doctor or other care   provider tells you to start again.; Measure BP three times daily (AM,   afternoon, and PM) and record readings. Bring these  readings to your   follow-up appointment on Monday, 7/28 to assess if we can safely resume   your BP medicaiton.; Commonly known as: Cozaar; Take 1 tablet (50 mg) by   mouth once daily.     START taking these medications     acetaminophen 325 mg tablet; Commonly known as: Tylenol; Take 3 tablets   (975 mg) by mouth every 8 hours for 14 days.   apixaban 2.5 mg tablet; Commonly known as: Eliquis; Take 1 tablet (2.5   mg) by mouth every 12 hours.   celecoxib 100 mg capsule; Commonly known as: CeleBREX; Take 1 capsule   (100 mg) by mouth 2 times a day for 7 days.   cephalexin 500 mg capsule; Commonly known as: Keflex; Take 1 capsule   (500 mg) by mouth 4 times a day.   pantoprazole 40 mg EC tablet; Commonly known as: ProtoNix; Take 1 tablet   (40 mg) by mouth once daily in the morning. Take before meals for 7 days.   Do not crush, chew, or split.; Start taking on: July 26, 2025     CHANGE how you take these medications     metFORMIN  mg 24 hr tablet; Commonly known as: Glucophage-XR; Take   2 tablet PO dailyTake 2 tablet PO daily; What changed: how much to take,   how to take this, when to take this     CONTINUE taking these medications     buprenorphine-naloxone 8-2 mg SL tablet; Commonly known as: Suboxone   gabapentin 300 mg capsule; Commonly known as: Neurontin; Take 1 capsule   (300 mg) by mouth every 8 hours for 14 days.   methocarbamol 500 mg tablet; Commonly known as: Robaxin; Take 1 tablet   (500 mg) by mouth every 6 hours if needed for muscle spasms for up to 7   days.   multivitamin tablet   Transdermal Pain Base cream; Generic drug: cream base no.31 (bulk)     STOP taking these medications     docusate sodium 100 mg capsule; Commonly known as: Colace   ibuprofen 800 mg tablet       Outpatient Follow-Up  Future Appointments   Date Time Provider Department [place]   7/28/2025  9:00 AM Hui Lara MD [id number] [facility]   8/5/2025  1:20 PM Jaye Dewey MD [id number] [facility]   9/4/2025  7:20 AM Judith  DO Aryan [id number] [facility]       Anushka Valadez PA-C       [1] acetaminophen, 975 mg, oral, q8h  apixaban, 2.5 mg, oral, q12h  buprenorphine-naloxone, 1 tablet, sublingual, 4x daily  ceFAZolin, 2 g, intravenous, q8h  celecoxib, 100 mg, oral, BID  docusate sodium, 100 mg, oral, BID  gabapentin, 300 mg, oral, q8h  lidocaine, 1 patch, transdermal, Daily  [Held by provider] losartan, 50 mg, oral, Daily  metFORMIN (MOD), 500 mg, oral, Daily with evening meal  pantoprazole, 40 mg, oral, Daily before breakfast  polyethylene glycol, 17 g, oral, Daily  [2]    [3] PRN medications: bisacodyl, [Transfer Hold] dextrose, dextrose, glucagon, methocarbamol

## 2025-07-25 NOTE — NURSING NOTE
Patient discharged to home via private car. All education and supplies given. AVS given and understood. No concerns from patient as she left.  -Ada White

## 2025-07-25 NOTE — PROGRESS NOTES
07/24/25 1300   Discharge Planning   Living Arrangements Spouse/significant other   Support Systems Spouse/significant other   Assistance Needed none   Type of Residence Private residence   Who is requesting discharge planning? Provider   Home or Post Acute Services None   Expected Discharge Disposition Home   Does the patient need discharge transport arranged? No     Etelvina Banks is a 45 y.o. female re-admitted following emergent OR on Tuesday 7/15 PM for re-exploration of left breast free flap, repair of vessel, embolectomy, and flap debridement after patient presented with pallor and purple discoloration of her free flap site. She remains admitted for further flap observation/management. Flap declared non-viable. RTOR on 7/20 for debridement and PAP flap to left breast with Dr. Dewey and Dr. Lara. ADOD 7/25, [facility]-PT, OT, DME: BSC and WC.     [facility] met with patient and significant other bedside. Discussed recommendation for home PT/OT and provided patient option for HHC of choice. Patient names [facility] as AOC. [facility] discussed recommendation for BSC and WC, patient agreeable. Medical team updated on need for orders. Will submit and continue to follow for discharge planning. Anushka Seymour RN [facility]     7/25 @ 11:41    [facility] received signed scripts for BSC and W/C. Sent to [place] who confirmed approval. BSC and wheelchair will be shipped to patients house. Patient and  aware of estimated delivery date of 7/28. Patient has transport home and is anticipated to discharge today. Anushka Seymour RN [facility]

## 2025-07-25 NOTE — PROGRESS NOTES
Subjective :  Patient ID: Etelvina Banks is a 45 y.o. female presenting for post operative visit     History of Present Illness: Etelvina Banks is a 45 y.o. 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 Ortiz) with insertion of TE (per plastic surgery, Dr. Dewey) on 11/11/2024. Patient is now s/p OR with plastic surgery (Dr. Dewey and Dr. Lara) on 7/11/2025 for left breast tissue expander removal with capsulectomy and left breast reconstruction with free Deep Inferior Epigastric  flap (MANUEL) and 7/20 for debridement of left breast flap and creation of PAP flap to left breast with Dr. Lara and Dr. Dewey.     Review of Systems  ROS: All 10 systems were reviewed and are unremarkable except for those mentioned in HPI.     Objective :  Physical Exam  Vitals and nursing note reviewed. Exam conducted with a chaperone present.   Constitutional:       General: not in acute distress.     Appearance: not ill-appearing.   Eyes:      Extraocular Movements: Extraocular movements intact.      Conjunctiva/sclera: Conjunctivae normal.      Pupils: Pupils are equal, round, and reactive to light.   Cardiovascular:      Rate and Rhythm: Normal rate and regular rhythm.      Pulses: Normal pulses.   Pulmonary:      Effort: Pulmonary effort is normal.      Breath sounds: Normal breath sounds.   Abdominal:      Palpations: Abdomen is soft. There is no mass.      Tenderness: There is no abdominal tenderness.      Hernia: No hernia is present.   Musculoskeletal:         General: No swelling or tenderness.      Cervical back: Normal range of motion and neck supple.   Skin:     Capillary Refill: Capillary refill takes less than 2 seconds.      Coloration: Skin is not jaundiced.      Findings: No bruising or rash.   Neurological:      General: No focal deficit present.      Mental Status: oriented to  person, place, and time.   Psychiatric:         Mood and Affect: Mood normal.         Behavior: Behavior normal.         Thought Content: Thought content normal.         Judgment: Judgment normal.     Assessment/Plan :  {Assess/PlanSmartLinks:32890}    Activity:   -Light Activity as tolerated.  -No pushing, pulling, or lifting objects greater than 5 pounds for at least 8 weeks.  -Okay to shower. Avoid scrubbing of incision sites.     Surgical Site/Wound Care:   -Wound care: Breast incision lines can be left open to air. Abdominal wound vac removed in-office today and can now be left open to air.   -Wear abdominal binder for majority of day as tolerated. Ensure binder sits below the left breast to avoid compression.  -Avoid application of creams, lotions or ointments to surgical site. No excessive scrubbing of surgical site. Okay to shower.  - ROSCOE Drains x2 removed in office today. Drain sites were cleaned with betadine and dressed with antibiotic ointment and bandage. Monitor drain sites for any green/yellow/purulent drainage, foul odor, erythema, or edema. Follow up sooner if any signs of infection develop.    Flap care:   -Continue to monitor flap for changes in general appearance, color, temperature and turgor.   -Please additionally monitor for any developing signs of infection which may include increased redness, swelling, fever/chills, green/yellow drainage, or foul odor from surgical sites or wounds.   -If any signs of infection or changes in flap appearance are to occur, please immediately contact the plastic surgery office.    attempts, I pulled it out. We're sending her for US to make sure there is no residual drain tube or fluid collection.     Activity:   -Knee immobilizer for a total of 2 weeks  -Light toe-touch is allowed  -No hip abduction or flexion for 2 weeks   -No pushing, pulling, or lifting objects greater than 5 pounds for at least 8 weeks.  -Okay to shower. Avoid scrubbing of incision sites.    Drain Care:   Two drains are left, at the knee and the breast. Dressing was changed. Continue with drain stripping and recording drains output.     Flap care:   -Continue to monitor flap for changes in general appearance, color, temperature and turgor.   -Please additionally monitor for any developing signs of infection which may include increased redness, swelling, fever/chills, green/yellow drainage, or foul odor from surgical sites or wounds.   -If any signs of infection or changes in flap appearance are to occur, please immediately contact the plastic surgery office.     Medication:  Continue with Elequis as prescribed for a total of 30 days.   Continue with keflex as prescribed   No changes to pain medication.   Losartan and Microzide are still on hold, with BP check three times a day.     Follow up  After 2 weeks.

## 2025-07-25 NOTE — SIGNIFICANT EVENT
"  Department of Plastic and Reconstructive Surgery  PM Flap Check    Subjective: Sitting upright in chair all day, pain well controlled, Denies any fever, chills, night sweats, CP, SOB, palpitations, nausea, vomiting, diarrhea, constipation, dysuria, hematuria, hematochezia, hematemesis, flank pain.     Objective:  /87 (BP Location: Right arm, Patient Position: Sitting)   Pulse 97   Temp 35.9 °C (96.6 °F) (Temporal)   Resp 16   Ht 1.575 m (5' 2\")   Wt 92.8 kg (204 lb 8 oz)   SpO2 96%   BMI 37.40 kg/m²    PE:   Constitutional: A&Ox3, NAD, resting in bed.  Eyes: EOMI, clear sclera.   ENMT: Moist mucous membranes, no apparent injuries or lesions.  Head/Neck: NC/AT.   Cardiovascular: RRR.   Respiratory/Thorax: Regular respirations on RA. Good symmetric chest expansion.   Gastrointestinal: Abdomen soft, slightly distended, appropriately tender, no peritoneal signs, lower transverse abdominal incision dressed with Mepliex AG. ROSCOE drain x 1 with serous output to each side of the b/l lower abdomen, patent and maintaining self suction, dressed with CHG dressing. Umbilicus well approximated with sutures, no drainage, erythema or dehiscence, VAN.  Genitourinary: unable to void post hong removal, straight cath  Extremities: Non-pitting peripheral edema of the lower extremities. Moving all 4 extremities actively.  Left leg wrapped in ACE wrap with wound vac in place, holding suction at -125mmHg with no alarms or leaks. Knee Immobilizer in place. ROSCOE drain to left thigh with ss output, BLE neurovascular intact, cap refill < 2 sec, +SILT, +df/pf, DP/PT/ radial pulses 2+, no drainage noted.   Neurological: A&Ox3.   Psychological: Appropriate mood and behavior.    Breast: MANUEL free flap recipient site to left breast. Flap is warm to touch, appropriate tissue turgor. Intact biphasic pulse signal at marked suture sites at central aspect of free flap. Cap refill < 3 sec. Incisions well approximated with sutures, dressed " with dermabond and steri strips, no incisional dehiscence or drainage. ROSCOE drain x1 to lateral inferior aspect of left breast which is patent, maintaining self suction with bloody serous output postoperatively. Vioptix monitor lead intact currently reading mid 73% saturation with 90+ signal quality.     Assessment:  Etelvina Banks  is a 45 y.o. 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 Ortiz) with insertion of TE (per plastic surgery, Dr. Dewey) on 11/11/2024. Patient is now s/p OR with plastic surgery (Dr. Dewey and Dr. Lara) on 7/11/2025 for left breast tissue expander removal with capsulectomy and left breast reconstruction with free Deep Inferior Epigastric  flap (MANUEL). She progressed well postoperatively and was discharged home on 7/14/25. Patient re-admitted following emergent OR on Tuesday 7/15 PM for re-exploration of left breast free flap, repair of vessel, embolectomy, and flap debridement after patient presented with pallor and purple discoloration of her free flap site. She remains admitted for further flap observation/management. Flap declared non-viable. RTOR on 7/20 for debridement and PAP flap to left breast with Dr. Dewey and Dr. Lara.      Plan:   # S/p left breast debridement and PAP flap to left breast:  - Flap check stable, biphasic doppler signal, good strength, warm to touch, continue Q2 flap check and Vioptix check  - voiding without difficulty  - continue all other care per daily progress note  - anticipate discharge to home tomorrow  - Updated Dr. Dewey and Dr. Ray PA-C  Plastic and Reconstructive Surgery   Available via [employer]  Pager #[phone]  Team phone: [phone]

## 2025-07-25 NOTE — CARE PLAN
The clinical goals for the shift include pt will remain HDS throughout shift      Problem: Skin  Goal: Decreased wound size/increased tissue granulation at next dressing change  Outcome: Progressing  Flowsheets (Taken 7/25/2025 0148)  Decreased wound size/increased tissue granulation at next dressing change: Protective dressings over bony prominences  Goal: Participates in plan/prevention/treatment measures  Outcome: Progressing  Flowsheets (Taken 7/25/2025 0148)  Participates in plan/prevention/treatment measures: Increase activity/out of bed for meals  Goal: Prevent/manage excess moisture  Outcome: Progressing  Flowsheets (Taken 7/25/2025 0148)  Prevent/manage excess moisture: Monitor for/manage infection if present  Goal: Prevent/minimize sheer/friction injuries  Outcome: Progressing  Flowsheets (Taken 7/25/2025 0148)  Prevent/minimize sheer/friction injuries: Increase activity/out of bed for meals  Goal: Promote/optimize nutrition  Outcome: Progressing  Flowsheets (Taken 7/25/2025 0148)  Promote/optimize nutrition: Offer water/supplements/favorite foods  Goal: Promote skin healing  Outcome: Progressing  Flowsheets (Taken 7/25/2025 0148)  Promote skin healing: Assess skin/pad under line(s)/device(s)     Problem: Pain - Adult  Goal: Verbalizes/displays adequate comfort level or baseline comfort level  Outcome: Progressing     Problem: Safety - Adult  Goal: Free from fall injury  Outcome: Progressing     Problem: Discharge Planning  Goal: Discharge to home or other facility with appropriate resources  Outcome: Progressing     Problem: Chronic Conditions and Co-morbidities  Goal: Patient's chronic conditions and co-morbidity symptoms are monitored and maintained or improved  Outcome: Progressing     Problem: Nutrition  Goal: Nutrient intake appropriate for maintaining nutritional needs  Outcome: Progressing

## 2025-07-25 NOTE — HH CARE COORDINATION
Home Care received a Referral for Physical Therapy and Occupational Therapy. We have processed the referral for a Start of Care within 48 hours of 7.26.25.     If you have any questions or concerns, please feel free to contact us at [phone]. Follow the prompts, enter your five digit zip code, and you will be directed to your care team on [facility].

## 2025-07-26 NOTE — OP NOTE
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 [Z90.12]     Procedures  DEBRIDEMENT, THORAX  46689 - WI DEBRIDEMENT SUBCUTANEOUS TISSUE 1ST 20 SQ CM/<    RECONSTRUCTION, BREAST, USING FREE FLAP  96590 - WI BREAST RECONSTRUCTION W/FREE FLAP  35044 Free skin flap   Modifier 22 is indicated due to the increased procedural service associated with the dissection of 3 perforators in the 1 mm in diameter intramuscularly,  fibrosis and scarring of the recipient vessels which required tedious vessel preparation under the microscope, and small size vessels requiring 10/0 micro sutures, and given the fact that this is a second free flap after failed previous one.  08957: Intraoperative assessment of flap perfusion with ICG  Surgeons   Panel 1:     * Jaye Dewey - Primary  Panel 2:     * Hui Lara - Primary    Resident/Fellow/Other Assistant:  Surgeons and Role:  Panel 2:     * Jaye Dewey MD - Assisting  Free flap assist note: Thre was no qualified resident physician. Dr. Dewey expertise and first assit throughout the case was critical to safe and successful execution of surgery.   Staff:   Circulator: Lizz  Scrub Person: Saritha  Relief Scrub: Merle  Relief Circulator: Alessio  Relief Circulator: David  Relief Scrub: Srikanth    Anesthesia Staff: Anesthesiologist: Dodie Conrad MD MPH; Dean Gates MD  CRNA: KATIANA Del Rio-CRNA  Anesthesia Resident: Darnell Samuels MD; Yifan Lujan DO    Procedure Summary  Anesthesia: General  ASA: II  Estimated  Blood Loss: 50mL  Intra-op Medications:   Administrations occurring from 0710 to 2230 on 07/20/25:   Medication Name Total Dose   heparin (porcine) 2,500 Units in sodium chloride 0.9 % 250 mL irrigation 2,500 Units   lidocaine (Xylocaine) 20 mg/mL (2 %) injection 50 mL   povidone-iodine (Betadine) 10 % topical solution 1 Application   BUPivacaine-EPINEPHrine (Marcaine w/EPI) 0.5 %-1:200,000 injection 184 mL   magnesium sulfate 500 mg/mL (50 %) injection 18 mL   sodium chloride 0.9 % irrigation solution 1,000 mL   BUPivacaine-EPINEPHrine (Marcaine w/EPI) 54 mL in sodium chloride 0.9% 130 mL syringe 184 mL   acetaminophen (Ofirmev) injection 2,000 mg   acetaminophen (Tylenol) tablet 975 mg Cannot be calculated   albumin human bottle 5% 750 mL   aspirin EC tablet 81 mg Cannot be calculated   buprenorphine-naloxone (Suboxone) 8-2 mg per SL tablet 1 tablet Cannot be calculated   ceFAZolin (Ancef) 2 g in dextrose (iso)  mL Cannot be calculated   ceFAZolin (Ancef) vial 1 g 8 g   celecoxib (CeleBREX) capsule 100 mg Cannot be calculated   dexAMETHasone (Decadron) 4 mg/mL 4 mg   docusate sodium (Colace) capsule 100 mg Cannot be calculated   fentaNYL (Sublimaze) injection 50 mcg/mL 100 mcg   gabapentin (Neurontin) capsule 300 mg Cannot be calculated   heparin (porcine) injection 5,000 Units Cannot be calculated   heparin injection 5,000 units/mL 10,000 Units   HYDROmorphone (Dilaudid) injection 0.5 mg 0.5 mg   HYDROmorphone (Dilaudid) injection 1 mg/mL 1 mg   indocyanine green (IC-Green) injection 25 mg 32.5 mg   insulin lispro injection 0-5 Units Cannot be calculated   ketamine injection 50 mg/ 5 mL (10 mg/mL) 150 mg   ketorolac (Toradol) 30 mg 30 mg   LR bolus Cannot be calculated   lidocaine (Xylocaine) injection 2 % 100 mg   magnesium sulfate 50% 2 g   metFORMIN XR (Glucophage-XR) 24 hr tablet 500 mg Cannot be calculated   midazolam PF (Versed) injection 1 mg/mL 2 mg   nitroglycerin (Gavin-Bid) 2 % ointment 0.5 inch  Cannot be calculated   ondansetron 2 mg/mL 4 mg   oxygen (O2) therapy 126 L   pantoprazole (ProtoNix) EC tablet 40 mg Cannot be calculated   propofol (Diprivan) injection 10 mg/mL 200 mg   rocuronium (ZeMuron) 50 mg/5 mL injection 260 mg   sugammadex (Bridion) 200 mg/2 mL injection 200 mg              Anesthesia Record               Intraprocedure I/O Totals          Intake    LR bolus 2750.00 mL    acetaminophen (Ofirmev) injection 200.00 mL    albumin human bottle 5% 750.00 mL    Total Intake 3700 mL       Output    Urine 4790 mL    Total Output 4790 mL       Net    Net Volume -1090 mL          Specimen: No specimens collected              Drains and/or Catheters:   Closed/Suction Drain 2 LLQ Bulb 19 Fr. (Active)   Present on Admission to Healthcare Facility N 07/20/25 2215   Site Description Healing 07/25/25 0845   Dressing Status Clean;Dry;Occlusive 07/25/25 0845   Drainage Appearance Serosanguineous 07/25/25 0845   Status To bulb suction 07/25/25 0845   Output (mL) 0 mL 07/25/25 1156       Closed/Suction Drain 4 Anterior;Left Thigh Bulb 19 Fr. (Active)   Site Description Unable to view 07/25/25 0845   Dressing Status Clean;Dry;Occlusive 07/25/25 0845   Drainage Appearance Serosanguineous 07/25/25 0845   Status To bulb suction 07/25/25 0845   Output (mL) 0 mL 07/25/25 1156       Closed/Suction Drain 1 Left Chest Bulb (Active)   Site Description Healing 07/25/25 0845   Dressing Status Dry;Clean;Occlusive 07/25/25 0845   Drainage Appearance Serosanguineous 07/25/25 0845   Status To bulb suction 07/25/25 0845   Output (mL) 20 mL 07/25/25 1156       [REMOVED] Urethral Catheter 16 Fr. (Removed)   Site Assessment Clean;Skin intact 07/22/25 1156   Collection Container Standard drainage bag 07/22/25 1156   Securement Method Securing device (Describe) 07/22/25 1156   Reason for Continuing Urinary Catheterization surgical procedures: urological/gynecological, pelvic oncology, anal, prolonged surgical procedure 07/22/25  1156   Output (mL) 400 mL 07/22/25 1405         Findings:   Flap weight 677.8 g. Pedicle length: proximal : 12 cm, Distal : 15 cm, Middle : 14 cm.   Ischemia time for transverse paddle flap: 7429-7593, ischemia time for vertical paddle flap: 18:30-20:15. Transverse flap skin paddle: 24 cm x 6 cm. Vertical flap skin paddle 15 cm x 9 cm.     Indications: Etelvina Banks is an 45 y.o. female who is having surgery for Ductal carcinoma in situ (DCIS) of left breast [D05.12]  Failed flap [T86.821]  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 [Z90.12].     The patient presents with failed left MANUEL flap status post resuscitation.  She is indicated today for debridement of the failed flap and reconstruction with another free flap from thigh with change of the recipient vessels.    Informed Consent: The patient was seen in the preoperative area. The risks, benefits, complications, treatment options, non-operative alternatives, expected recovery and outcomes were discussed with the patient. The possibilities of reaction to medication, pulmonary aspiration, pulmonary embolism and deep vein thrombosis, injury to surrounding structures, bleeding and hematoma, seroma, recurrent infection, partial total flap necrosis and wound healing issues, persistent pain and dissatisfaction with results the need for additional procedures, failure to diagnose a condition, and creating a complication requiring transfusion or operation were discussed with the patient. The patient concurred with the proposed plan, giving informed consent.  The site of surgery was properly noted/marked if necessary per policy. The patient has been actively warmed in preoperative area. Preoperative antibiotics have been ordered and given within 1 hours of incision. Venous thrombosis prophylaxis have been ordered including unilateral sequential compression device and chemical prophylaxis    Procedure Details:     The patient was greeted  in the preoperative holding area.  The planned procedure, its indications, its contraindications, alternatives and possible complications were reviewed thoroughly with patient.  The patient expressed good understanding and agreed to proceed with surgery.  Informed surgical consent was then obtained.  The patient was then transferred on her bed to operating theater, and as soon as she entered the operating room standard safety huddle was performed.  The patient was identified with 3 identifiers, and reviewed with the OR team the planned procedure is laterality, history of blood loss, DVT prophylaxis protocol, duration of the surgery, and all aspects of surgical and medical care related to patient this operation.  All agreed to proceed with surgery.  The patient was then transferred to operating bed, and there she was placed supine, she was held secured by safety belt.  All bony prominences were well-padded to avoid pressure sores the arms were supported by well-padded armboards, and were positioned at slightly under 90 degrees with the trunk.  Unilateral sequential compression device was applied to the right leg.  General anesthesia with endotracheal intubation was then performed by anesthesia personnel.  Landa catheter was next performed.  After that, standard marking for PAP flap with double skin paddle i was performed on the left thigh.  3 PAPs perforators were marked by the aid of ultrasound Doppler, and there was 1  for the transverse skin paddle, 1  for the vertical skin paddle, and 1 extra  at the T point.  After that, surgical sites were prepped with Betadine paint and draped in standard sterile fashion.  Timeout was then performed, and again all in the room agreed to proceed surgery.  She was given antibiotics before incision.    The nonviable left breast flap was sharply excised, and a chest wound was irrigated with Irrisept.  After that, the recipient vessels were explored  targeting the thoracodorsal system.  The medial border of the latissimus dorsi was identified, and then retracted laterally to expose the thoracodorsal near the muscle insertion which was then traced in antegrade fashion to identify serratus anterior branch, and the descending branch, and the transverse branch,.  Each of those branches was then dissected under the magnification of the operating microscope using jeweler's forceps and micro scissor due to their small caliber and significant scarring and fibrosis were surrounded the vessels.  The dissection was tedious but meticulous.  After that, the vessels where topically irrigated with lidocaine 2% and were covered with surgical lap.    Next, I shifted my attention to the harvest of the double skin paddle profunda femoris artery  (PAP) flap.  The anterior incision was made with 15 blade scalpel and electrocautery for hemostasis down to the gracilis muscle, and then dissection was performed in subfascial plane towards the marked cutaneous perforators.  3 medium-sized perforators ranging in diameter from 0.5 to 0.8 mm were identified emerging through the adductor tammy.  The proximal  was dissected in retrograde fashion through its intramuscular course by unroofing the  first through careful division of the overlying muscle fibers, and then meticulously ligating side branches, and thus the  was dissected free till its origin, which was the medial circumflex femoral artery.  length was 12 cm.  The middle and distal perforators were dissected similarly in retrograde fashion through their intramuscular course until their takeoff from the profunda femoris artery.  Those were both perforators originated from the profunda femoris artery, they did not have a conjoined trunk.  Each  originated from a segment of the profunda femoris.  The middle  length was 15 cm while the distal  length was 14  cm.  The dissection of the 3 perforators was tedious and lengthy due to their long intramuscular course, the bulk of the adductor tammy which is thick and multi-layer muscle, and the need to preserve entangled motor branches to minimize muscular damage.  After completion of perforators dissection, the posterior incision was made and the flap was thus isolated on its 3 vascular pedicles.  Given that the flap had two skin paddles, at least 1  per skin paddle was required.  With the aid of ICG and hand-held infrared camera/spy, the most reliable  per skin paddle was chosen, and the middle  as a result was preserved as a life boat.  Flap pedicles were then topically irrigated with lidocaine 2%, and the flap was divided and then transferred to the left chest.  There, the flap was temporarily inset in preparation for microvascular repair.  The distal  artery and vein supplying the vertical skin paddle were repaired to the descending branch of the thoracodorsal artery and end to end fashion using 9/0 micro suture for the arterial anastomosis and 10/0 micro suture for the venous anastomosis.  artery diameter was 0.8 mm and  vein diameter was 0.5 mm, and there was no size mismatch with the descending branch of the thoracodorsal artery. Ischemia time was 16:30-18:10.  After that, the proximal  artery and vein supplying the transverse skin paddle were repaired to the serratus anterior branch artery and vein and end to end fashion using 9/0 micro suture for arterial anastomosis and 10/0 micro suture for venous anastomosis without size mismatch.   artery diameter measured 0.5mm and  vein diameter measured the same.  Ischemia time was 18:30-20:15.   After that, flap inset was completed in a transverse skin paddle was utilized for the reconstruction of the lateral and superior quadrant of the new breast while the vertical skin paddle were  used for the reconstruction of the inferior and medial quadrants of the breast, and excess flap tissue was de-epithelialized and buried under the mastectomy skin for additional volume augmentation, and then the skin paddles where repaired to the mastectomy skin in 2 layers with 3/0 Monocryl for deep dermal repair and buried simple interrupted fashion, and 4/0 nylon was used for skin repair in simple interrupted fashion.  The dependent part of the left chest was drained with 19 Canadian Brian.  Flap incisions were dressed with Dermabond and Steri-Strips.  The location of the perforators was marked with 5/0 Prolene sutures. ViOptix sensor was placed and showed good signal with good oxygenation.   Next, the flap donor site at the left thigh was closed in layers with 0 PDS for the repair of the superficial fascial layer, and 2/0 PDS for the repair of the deep dermis and 3/0 STRATAFIX for for subcuticular repair.  19 Canadian Brian drain was left to drain the thigh donor site.  54 ml of marcaine with epi was diluted in 130 ml injection NS, and 140 ml were used to block the thigh while the rest was used for the chest.   The thigh incision was dressed with incisional wound VAC.  Patient was successfully extubated, she was put in a surgical bra, and transferred to PACU in stable medical condition and viable flap for monitoring.    Evidence of Infection: No   Complications:  None; patient tolerated the procedure well.    Disposition: PACU - hemodynamically stable.  Condition: stable       Attending Attestation: A qualified resident physician was not available.I performed the procedure.     Hui Godoy MD

## 2025-07-26 NOTE — OP NOTE
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   37187 x 1 Transluminal Thrombectomy with Bharti   35216 x2 Repair blood vessels intrathoracic other than fistula ( Internal mammary artery and its  vein to flap pedicle). Modifier 2 is indicated due to increased procedure service associated with repeat microvascular repair to already used and fragile vessels.    82919 Secondary closure of surgical incisions  15860 x 2 Intraoperative assessment of flap perfusion    17530 - IN UNLISTED PROCEDURE VASCULAR SURGERY  Surgeons      *Primary: Dr. Godoy     *Assisting: Dr. Dewey    * Flap take back assist note: Flap salvage is a very stressful procedure and exteremly time sensitive.  Dr. Dewey expertise and first assist were essential for the successful salvage.   Resident/Fellow/Other Assistant:     * KATIANA Rodriguez-CNP - [facility] First Assist     * No qualified resident physician was available.   Staff:   Circulator: Marcelina  Scrub Person: Sarah John Scrub: Tere John [facility]    Anesthesia Staff: Anesthesiologist: Can Li MD  Anesthesia Resident: Mela Gonzales MD; Chidi Olguin MD    Procedure Summary  Anesthesia: General  ASA: II  Estimated Blood Loss: 250mL  Intra-op Medications:   Administrations occurring from 07/15/25 2000 to 25 0000:   Medication Name Total Dose   alteplase (Cathflo Activase) injection 4 mg   heparin (porcine)  50,000 Units in sodium chloride 0.9 % 250 mL irrigation 50,000 Units   sodium chloride 0.9 % irrigation solution 1,000 mL   ceFAZolin (Ancef) vial 1 g 2 g   dexAMETHasone (Decadron) 4 mg/mL 4 mg   fentaNYL (Sublimaze) injection 50 mcg/mL 75 mcg   heparin 1,000 units/mL 5,000 Units   heparin infusion 25,000 units/ 250 mL D5W (pre-mix) 849.93 Units   ketamine injection 50 mg/ 5 mL (10 mg/mL) 40 mg   LR bolus Cannot be calculated   lidocaine (Xylocaine) injection 2 % 100 mg   midazolam PF (Versed) injection 1 mg/mL 2 mg   propofol (Diprivan) injection 10 mg/mL 150 mg   rocuronium (ZeMuron) 50 mg/5 mL injection 90 mg   succinylcholine 100 mg/5 mL SYRINGE 160 mg              Anesthesia Record               Intraprocedure I/O Totals          Intake    LR bolus 2750.00 mL    acetaminophen (Ofirmev) injection 200.00 mL    albumin human bottle 5% 750.00 mL    Total Intake 3700 mL       Output    Urine 4790 mL    Total Output 4790 mL       Net    Net Volume -1090 mL          Specimen:   ID Type Source Tests Collected by Time   1 : Left Inferior Mastectomy Tissue Tissue BREAST CAPSULE LEFT SURGICAL PATHOLOGY EXAM Jaye Dewey MD 7/16/2025 0004                 Drains and/or Catheters:   Closed/Suction Drain 2 LLQ Bulb 19 Fr. (Active)   Present on Admission to Healthcare Facility N 07/20/25 2215   Site Description Healing 07/25/25 0845   Dressing Status Clean;Dry;Occlusive 07/25/25 0845   Drainage Appearance Serosanguineous 07/25/25 0845   Status To bulb suction 07/25/25 0845   Output (mL) 0 mL 07/25/25 1156       Closed/Suction Drain 4 Anterior;Left Thigh Bulb 19 Fr. (Active)   Site Description Unable to view 07/25/25 0845   Dressing Status Clean;Dry;Occlusive 07/25/25 0845   Drainage Appearance Serosanguineous 07/25/25 0845   Status To bulb suction 07/25/25 0845   Output (mL) 0 mL 07/25/25 1156       Closed/Suction Drain 1 Left Chest Bulb (Active)   Site Description Healing 07/25/25 0845   Dressing Status Dry;Clean;Occlusive  07/25/25 0845   Drainage Appearance Serosanguineous 07/25/25 0845   Status To bulb suction 07/25/25 0845   Output (mL) 20 mL 07/25/25 1156       [REMOVED] Urethral Catheter Non-latex 16 Fr. 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 Venous stasis with no thrombus.   Thrombectomy with Bharti cath size 2 fr to recipient artery (internal mammary artery), flap pedicle (deep inferior epigastric artery), and flap veins (two veins).   TPA: 2mg at 21:39 pm, and 21:43 pm. End of ischemia: 22:19 pm. Venous anastomosis: 23:28.  Flap debridement: 88.4 g removed including skin and subcutaneous tissue.   Contracture release at the chest over the 2nd intercostal space with removal of muscle fibers: 3 cm x 3 cm.       Indications: Etelvina Banks is an 45 y.o. 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     Informed Consent: The patient was seen in the preoperative area. The risks, benefits, complications, treatment options, non-operative alternatives, expected recovery and outcomes were discussed with the patient. The possibilities of reaction to medication, pulmonary aspiration, injury to surrounding structures, bleeding, recurrent infection, the need for additional procedures, failure to diagnose a condition, and creating a complication requiring transfusion or operation were discussed with the patient. The patient concurred with the proposed plan, giving informed consent.  The site of surgery was properly noted/marked if necessary per  policy. The patient has been actively warmed in preoperative area. Preoperative antibiotics have been ordered and given within 1 hours of incision. Venous thrombosis prophylaxis have been ordered including bilateral sequential compression devices and chemical prophylaxis    Procedure Details:        This was an emergency takeback due to flap ischemia.  Indications of the procedure contraindications, alternatives and possible complications and possible outcomes were discussed with the patient.  The patient expressed good understanding and agreed to proceed with the salvage surgery.  And therefore, informed surgical consent was obtained.  The patient was then hurried to operating theater.  And there, as soon as she entered the operating room, standard safety huddle was performed and the patient was identified by 3 identifiers and all aspects of medical and surgical care related to the patient and planned procedure were reviewed thoroughly.  All in the room agreed proceed surgery.  Patient was then transferred to operating bed, and there she was placed supine, she was held secured by safety belt, and all bony prominences were padded to avoid pressure sores, and bilateral sequential compression devices were applied both legs.  Patient was also given 5000 units of subcutaneous heparin before transferred to operating theater.  The arms were placed on well-padded arm boards positioned at slightly under 90 degrees with the trunk.  General anesthesia with endotracheal intubation was performed by anesthesia personnel. The surgical site was prepped and draped in standard sterile fashion and again all in room agreed with the surgery.  Patient was given antibiotics before incision.    Rapidly, prior surgical incisions were re-incised open with 15 blade scalpel, and the buried sections of the flap under the mastectomy skin were retrieved to expose flap pedicle.  Then, blood clots were carefully removed from around the flap  pedicle, and the flap pedicle was further dissected with the aid of jeweler forceps down to the level of the anastomoses.  Compression by the the surrounding pectoralis major which appeared tight, in addition to the weight of the buried parts of the flap appeared to have exercised compression over the pedicle. I therefore performed contracture release by dividing nearby pectoralis and overlying pectoralis major muscle fibers up to the second intercostal space and some were also excised using bipolar 3 cm x 3 cm was contracture release surface area). Inspection of flap pedicle revealed thrombosis of the artery.  And the beginning of soft thrombus formation was also noted in the veins (veins were more compressible and deflated compared to the artery which was filled with relatively harder thrombus), which in my opinion, was a later development due to arterial occlusion.  The anastomoses were divided and the flap was placed on patient upper abdomen with flap skin facing down, and thrombectomy was performed with the aid of 2 fr Bharti through flap artery and accompanying veins (n:2), And then the flap was copiously flushed with heparinized saline using angio catheter introduced through the artery, observing venous outflow. Simultaneously, the internal mammary artery received thrombectomy using Bharti 2 fr as well. The internal mammary  vein used to drain the flap did not have thrombus, and its patency was further confirmed by flushing heparinized saline through the vein without any resistance. Internal mammary vein was too small to be used.   Next, flap veins were temporarily clamped with vascular clamps, and 2 mg tPA was injected into the flap using the flap artery after diluting it in 20 ml heparinized saline. This was performed twice in total. After that, the flap was temporarily inset and 2 cm segment of flap artery and 1 cm segment of the internal mammary artery were excised, and then microvascular  repair of internal mammary artery to flap artery was performed in end-to-end fashion using 9/0 microsuture and without size mismatch. Flap perfusion was restored, and the flap was left to bleed. The flap perfusion was then evaluated using ICG and hand held infrared camera/spy, and adequate perfusion was seen, except for the periphery of the flap. Questionable flap tissue was then marked and sharply excised with 15 blade scalpel and electrocautery for hemostasis, surface area measured 15 cm x 3 cm, weight measured 88.4 g. After that, the flap vein was repaired to the same recipient vein used before after adequate observation with consistent outflow. Venous microvascular repair was done similar to the arterial microvascular repair.   The chest was then irrigated with irrisept, hemostasis was meticulously performed using bipolar, and flap inset was completed, the flap skin was repaired to mastectomy skin in two layers with 3/0 monocryl in interrupted buried fashion for deep dermal repair, and 4/0 nylon for skin closure in simple interrupted fashion, and new 19 fr kate drain was placed. Flap perfusion was reevaluated again with ICG and SPY and good perfusion was observed. Perforators pulsation was also audible on US Doppler. ViOptix sensor was applied to skin paddle and showed good signals. The incisions were dressed with mupirocin 2% and Mepilex AG dressing. Patient was put in surgical bra, and she was given 300 mg suppository aspirin.   Evidence of Infection: No   Complications:  None; patient tolerated the procedure well.    Disposition: PACU - hemodynamically stable.  Condition: stable       Attending Attestation: I performed the procedure.    Hui Godoy MD.

## 2025-07-27 NOTE — OP NOTE
RECONSTRUCTION, BREAST, USING FREE FLAP (L), REMOVAL, TISSUE EXPANDER (L) Operative Note     Date: 2025  OR Location: [facility] OR    Name: Etelvina Banks, : 1980, Age: 45 y.o., MRN: [id number], Sex: female    Diagnosis  Pre-op Diagnosis      * Ductal carcinoma in situ (DCIS) of left breast [D05.12] Post-op Diagnosis     * Ductal carcinoma in situ (DCIS) of left breast [D05.12]     Procedures  RECONSTRUCTION, BREAST, USING FREE FLAP  89682 - SC BREAST RECONSTRUCTION W/FREE FLAP    28398 Removal of internal mammary lymph node.       Surgeons   Panel 1:     * Jaye Dewey - Primary  Panel 2:     * Hui Lara - Primary    Resident/Fellow/Other Assistant:  Surgeons and Role:  Panel 1:     * Magdalena Allred PA-C - [facility] First Assist    Staff:   Scrub Person:   Relief Circulator:   Circulator: Papito  Scrub Person: Meghan  Relief Circulator: Alba  Relief Scrub: Alba  Circulator: Darnell  Relief Scrub: Eli    Anesthesia Staff: Anesthesiologist: Basil Steve MD; Can Li MD  Anesthesia Resident: Filomena Hernadez MD    Procedure Summary  Anesthesia: General  ASA: II  Estimated Blood Loss: 50mL  Intra-op Medications:   Administrations occurring from 655 to  on 25:   Medication Name Total Dose   papaverine injection 180 mg   lidocaine (Xylocaine) 20 mg/mL (2 %) injection 50 mL   sodium chloride 0.9 % irrigation solution 3,000 mL   BUPivacaine-EPINEPHrine (Marcaine w/EPI) 53 mL in sodium chloride 0.9% 120 mL syringe 113 mL   nitroglycerin (Gavin-Bid) 2 % ointment 4 inch   BUPivacaine-EPINEPHrine (Marcaine w/EPI) 53 mL in sodium chloride 0.9% 120 mL syringe 40 mL   acetaminophen (Ofirmev) injection 2,000 mg   albumin human bottle 5% 500 mL   ceFAZolin (Ancef) vial 1 g 6 g   dexAMETHasone (Decadron) 4 mg/mL 4 mg   droperidol (Inapsine) injection 1.25 mg   ePHEDrine injection 15 mg   fentaNYL PF 0.05 mg/mL 100 mcg   glycopyrrolate (Robinul) injection 0.2 mg   heparin 5,000 units/mL 5,000  Units   hydroCHLOROthiazide (HYDRODiuril) tablet 12.5 mg Cannot be calculated   indocyanine green (IC-Green) injection 25 mg 1.5 mg   ketamine injection 50 mg/ 5 mL (10 mg/mL) 60 mg   ketorolac (Toradol) injection 30 mg 30 mg   LR bolus Cannot be calculated   lactated Ringer's infusion Cannot be calculated   lactated Ringer's infusion 65 mL   lidocaine (Xylocaine) injection 2 % 80 mg   losartan (Cozaar) tablet 50 mg Cannot be calculated   magnesium sulfate IV 2 g/50 mL water (premix) 2 g   metFORMIN XR (Glucophage-XR) 24 hr tablet 500 mg Cannot be calculated   midazolam (Versed) 1 mg/1 mL 2 mg   ondansetron 2 mg/mL 4 mg   oxygen (O2) therapy 319.4 L   phenylephrine 40 mcg/mL syringe 10 mL 1,000 mcg   propofol (Diprivan) injection 10 mg/mL 200 mg   rocuronium (ZeMuron) 50 mg/5 mL injection 240 mg   sugammadex (Bridion) 200 mg/2 mL injection 200 mg              Anesthesia Record               Intraprocedure I/O Totals          Intake    Magnesium Sulfate 0.00 mL    The total shown is the total volume documented since Anesthesia Start was filed.    LR bolus 2000.00 mL    Total Intake 2000 mL       Output    Urine 954 mL    Est. Blood Loss 50 mL    Total Output 1004 mL       Net    Net Volume 996 mL          Specimen:   ID Type Source Tests Collected by Time   1 : LEFT BREAST CAPSULE Tissue BREAST CAPSULE LEFT SURGICAL PATHOLOGY EXAM Jaye Dewey MD 7/11/2025 0958   2 : LEFT INTERNAL MAMMARY LYMPH NODE Tissue LYMPH NODE EXCISION SURGICAL PATHOLOGY EXAM Jaye Dewey MD 7/11/2025 1402   3 : Left inferior mastectomy skin Tissue BREAST SCAR LEFT SURGICAL PATHOLOGY EXAM Jaye Dewey MD 7/11/2025 1539                 Drains and/or Catheters:   Closed/Suction Drain 2 LLQ Bulb 19 Fr. (Active)   Present on Admission to Healthcare Facility N 07/20/25 2215   Site Description Healing 07/25/25 0845   Dressing Status Clean;Dry;Occlusive 07/25/25 0845   Drainage Appearance Serosanguineous 07/25/25 0845   Status To bulb suction  07/25/25 0845   Output (mL) 0 mL 07/25/25 1156       Closed/Suction Drain 4 Anterior;Left Thigh Bulb 19 Fr. (Active)   Site Description Unable to view 07/25/25 0845   Dressing Status Clean;Dry;Occlusive 07/25/25 0845   Drainage Appearance Serosanguineous 07/25/25 0845   Status To bulb suction 07/25/25 0845   Output (mL) 0 mL 07/25/25 1156       Closed/Suction Drain 1 Left Chest Bulb (Active)   Site Description Healing 07/25/25 0845   Dressing Status Dry;Clean;Occlusive 07/25/25 0845   Drainage Appearance Serosanguineous 07/25/25 0845   Status To bulb suction 07/25/25 0845   Output (mL) 20 mL 07/25/25 1156       [REMOVED] Closed/Suction Drain 3 RLQ Bulb 19 Fr. (Removed)   Present on Admission to Healthcare Facility Y 07/16/25 0637   Site Description Healing 07/23/25 2139   Dressing Status Clean;Dry 07/23/25 2139   Drainage Appearance Serosanguineous 07/23/25 2139   Status To bulb suction 07/23/25 2139   Output (mL) 2.5 mL 07/24/25 0940   Intake (ml) 0 ml 07/19/25 1300       [REMOVED] Closed/Suction Drain 1 LUQ Bulb 19 Fr. (Removed)   Present on Admission to Healthcare Facility Y 07/16/25 0637   Site Description Healing 07/20/25 0654   Dressing Status Clean;Dry;Occlusive 07/20/25 0654   Drainage Appearance Serosanguineous 07/20/25 0654   Status To bulb suction 07/20/25 0654   Output (mL) 10 mL 07/20/25 0519       [REMOVED] Urethral Catheter Non-latex 16 Fr. (Removed)   Site Assessment Clean;Skin intact 07/11/25 2356   Collection Container Standard drainage bag 07/11/25 2113   Securement Method Securing device (Describe) 07/11/25 2113   Reason for Continuing Urinary Catheterization surgical procedures: urological/gynecological, pelvic oncology, anal, prolonged surgical procedure 07/11/25 2113   Output (mL) 500 mL 07/12/25 0900         Findings:     Left breast: 2  MANUEL Flap 967.1 gm, Pedicle length 9.5 cm, Vein 1:1.5 size match end to end to IM  2.5 mm to 3 mm vein; Artery 1:1 size match 2.5 mm end  to end, Ischemia time 33 min     Indications: Etelvina Banks is an 45 y.o. female who is having surgery for secondary breast reconstruction for acquired absence of breast and nipple status post skin sparing mastectomy and placement of tissue expander.    Informed Consent: The patient was seen in the preoperative area. The risks, benefits, complications, treatment options, non-operative alternatives, expected recovery and outcomes were discussed with the patient. The possibilities of reaction to medication, pulmonary aspiration, pulmonary embolism and deep vein thrombosis, injury to surrounding structures, bleeding and hematoma, seroma recurrent infection, partial or total flap necrosis, wound healing issues, abdominal wall bulge/hernia, persistent pain, dissatisfaction with results, the need for additional procedures, failure to diagnose a condition, and creating a complication requiring transfusion or operation were discussed with the patient. The patient concurred with the proposed plan, giving informed consent.  The site of surgery was properly noted/marked if necessary per policy. The patient has been actively warmed in preoperative area. Preoperative antibiotics have been ordered and given within 1 hours of incision. Venous thrombosis prophylaxis have been ordered including bilateral sequential compression devices and chemical prophylaxis    Procedure Details:     Standard safety huddle was performed as soon as the patient entered the operating theater.  The patient was identified by 2 identifiers.  And after that, the planned procedure and its laterality, estimated blood loss, DVT prophylaxis protocol, duration of surgery, and all related medical and surgical aspects of patient care were reviewed thoroughly.  All in the room agreed to proceed with surgery.  The patient was then transferred to operating bed, and there she was placed supine, and she was held secured by safety belt.  All bony prominences were  well-padded to avoid pressure sores.  Bilateral sequential compression devices were applied to both legs.  The arms were placed on well-padded arm boards position at slightly under 90 degrees with the trunk.  General anesthesia with endotracheal intubation was performed by anesthesia personnel.  After that, the surgical sites were prepped and draped in standard sterile fashion.  Timeout was then performed, and again all in the room agreed proceed surgery.  She was given a biotics before incision.  Dr. Dewey proceeded with the removal of the tissue expander and performed capsulectomy assisted by the PA.  After that part of the procedure is completed, Dr. Dewey shifted her attention to flap elevation while I worked on exploration of the recipient vessels.  Utilizing rib sparing approach, the intercostal space between the 3rd and 4th rib was approached and overlying muscle fibers of the pectoralis major and intercostal muscles where divided with the aid of bipolar exposing a sizable internal mammary artery .  The  vein measured 2.5 mm and thus was prepared for microvascular repair however the  artery was under 1 mm, and therefore, I continued with the dissection to expose the internal mammary artery and vein.  The internal mammary vessels were exposed, and the internal mammary vein was smaller in caliber compared to the encountered  vein.  Additional mammary artery on the other hand had much larger caliber than the  artery.  Therefore, for microvascular repair in this case, which shows the  vein and the internal mammary artery for better size match with the flap pedicle.  The vessels were then prepared for microvascular anastomosis, and sidebranches were carefully clipped.  Internal mammary lymph node was encountered during the preparation of vessels, and its feeding branch was clipped and the lymph node was dissected off of from the surrounding vessels using  bipolar and sent for histopathology.  The vessels were then topically irrigated with lidocaine 2% and covered with surgical lap.  After that, I performed additional scar release with the aid of electrocautery to accommodate the flap to avoid any possible compression.  Meticulous hemostasis maintained throughout this procedure.  The flap was then divided and transferred to the chest.  At the chest, the flap was inset temporarily in preparation for microvascular repair.  The pedicle was flushed with heparinized saline via intra-arterial catheter.  Then, I repaired under microscope magnification the deep inferior gastric vein which was conjoint vein to the  vein in end-to-end fashion without size mismatch using 9/0 micro sutures.  After that, the deep inferior gastric artery was repaired under microscope magnification to the internal mammary artery in end-to-end fashion without size mismatch, and using 9/0 micro sutures.  Flap perfusion was then assessed intraoperatively using ICG and hand-held infrared camera/spy, and good perfusion was observed throughout the pedicle and the flap.  After that, flap inset was performed, and the flap was appropriately oriented, and anchored with quilting sutures with Vicryl 2/0, placed at the superior lateral, inferior lateral, and inferior medial corners.  After that the excess flap skin was the epithelialized, and buried 2 provide volume at the breast upper pole.  The flap was then repaired to mastectomy skin in 2 layers with 3/0 Monocryl used for the repair of the deep dermis and 4/0 Monocryl for skin closure in subcuticular fashion.  19 Fr Brian drain was left to drain the flap site.  The incisions were dressed with Dermabond and Steri-Strips  The abdomen was then closed by approximating the rectus abdominis muscle fibers at the transcripts using Vicryl 0 in interrupted buried figure-of-eight fashion and then the fascial incision was closed in 2 layers using interrupted  figure-of-eight Vicryl 0 followed by running locking with Prolene size 0.    53 cc of 0.5% Marcaine with epinephrine was diluted in 120 cc of injectable saline.  43 mL of the solution was used to block the recipient site targeting intercostal nerves and lateral chest.  Breast was used at the abdomen by Dr. Dewey.  The abdomen was then closed in a manner similar to abdominoplasty.  Please refer to Dr. Ontiveros notes for details on flap elevation closure of the abdomen and removal of the expander.    After completion of of the procedure, the location of the  was marked on the flap with Prolene suture, a ViOptix sensor was placed on the flap skin paddle and showed strong signal with good oxygenation.  The patient was extubated successfully without any issues, she was placed in a surgical bra and then she was sent to the PACU in stable condition and viable flap.    Evidence of Infection: No   Complications:  None; patient tolerated the procedure well.    Disposition: PACU - hemodynamically stable.  Condition: stable         Attending Attestation: A qualified resident physician was not available. I was present and scrubbed for the entire procedure.     Hui Godoy MD

## 2025-07-28 ENCOUNTER — HOME CARE VISIT (OUTPATIENT)
Dept: HOME HEALTH SERVICES | Facility: HOME HEALTH | Age: 45
End: 2025-07-28
Payer: COMMERCIAL

## 2025-07-28 ENCOUNTER — APPOINTMENT (OUTPATIENT)
Dept: PLASTIC SURGERY | Facility: CLINIC | Age: 45
End: 2025-07-28
Payer: COMMERCIAL

## 2025-07-28 VITALS — HEART RATE: 91 BPM | DIASTOLIC BLOOD PRESSURE: 84 MMHG | RESPIRATION RATE: 18 BRPM | SYSTOLIC BLOOD PRESSURE: 120 MMHG

## 2025-07-28 VITALS
TEMPERATURE: 97.6 F | OXYGEN SATURATION: 97 % | DIASTOLIC BLOOD PRESSURE: 60 MMHG | SYSTOLIC BLOOD PRESSURE: 110 MMHG | HEART RATE: 90 BPM

## 2025-07-28 DIAGNOSIS — Z48.03 ENCOUNTER FOR CHANGE OR REMOVAL OF DRAINS: Primary | ICD-10-CM

## 2025-07-28 DIAGNOSIS — Z98.890 S/P BREAST RECONSTRUCTION: ICD-10-CM

## 2025-07-28 DIAGNOSIS — Z98.890 POST-OPERATIVE STATE: ICD-10-CM

## 2025-07-28 PROCEDURE — 3074F SYST BP LT 130 MM HG: CPT

## 2025-07-28 PROCEDURE — 99024 POSTOP FOLLOW-UP VISIT: CPT

## 2025-07-28 PROCEDURE — 4010F ACE/ARB THERAPY RXD/TAKEN: CPT

## 2025-07-28 PROCEDURE — G0152 HHCP-SERV OF OT,EA 15 MIN: HCPCS

## 2025-07-28 PROCEDURE — 3079F DIAST BP 80-89 MM HG: CPT

## 2025-07-28 SDOH — ECONOMIC STABILITY: HOUSING INSECURITY
HOME SAFETY: TTWB LLE FROM GRAFT SITE ON LLE FOR BREAST FLAP.  LIMITED LEFT ARM FUNCTION.  NO PUSHING PULLING OR LIFTING MORE THAN 5 LBS WITH RIGHT UE

## 2025-07-28 ASSESSMENT — ENCOUNTER SYMPTOMS
PAIN: 1
PAIN LOCATION - PAIN SEVERITY: 2/10
PERSON REPORTING PAIN: PATIENT
AGGRESSION WITHIN DEFINED LIMITS: 1
PAIN LOCATION - PAIN QUALITY: ACHING
PAIN LOCATION - EXACERBATING FACTORS: MOVEMENT
PAIN LOCATION - RELIEVING FACTORS: POSITIONING
PAIN LOCATION - PAIN DURATION: DAILY
PAIN LOCATION - PAIN FREQUENCY: INTERMITTENT
ANGER WITHIN DEFINED LIMITS: 1
PAIN LOCATION: ABDOMEN

## 2025-07-28 ASSESSMENT — ACTIVITIES OF DAILY LIVING (ADL)
SPONGING_LB_CURRENT_FUNCTION: MINIMUM ASSIST
ORAL_CARE_CURRENT_FUNCTION: INDEPENDENT
ENTERING_EXITING_HOME: MODERATE ASSIST
SPONGING_UB_CURRENT_FUNCTION: MINIMUM ASSIST
HAIR_CARE_MAXIMUM_ASSIST: 1
HAIR_CARE_ASSESSED: 1
ORAL_CARE_ASSESSED: 1
FEEDING_WITHIN_DEFINED_LIMITS: 1
OASIS_M1830: 05

## 2025-07-28 ASSESSMENT — PAIN SCALES - GENERAL: PAINLEVEL_OUTOF10: 1

## 2025-07-29 ENCOUNTER — APPOINTMENT (OUTPATIENT)
Dept: PLASTIC SURGERY | Facility: CLINIC | Age: 45
End: 2025-07-29
Payer: COMMERCIAL

## 2025-07-29 ENCOUNTER — HOME CARE VISIT (OUTPATIENT)
Dept: HOME HEALTH SERVICES | Facility: HOME HEALTH | Age: 45
End: 2025-07-29
Payer: COMMERCIAL

## 2025-07-29 VITALS — RESPIRATION RATE: 16 BRPM | OXYGEN SATURATION: 98 %

## 2025-07-29 PROCEDURE — G0151 HHCP-SERV OF PT,EA 15 MIN: HCPCS

## 2025-07-29 SDOH — HEALTH STABILITY: PHYSICAL HEALTH: EXERCISE COMMENTS: AROM INITIATED BLE, X LLE W KNEE IMMOBILIZER

## 2025-07-29 ASSESSMENT — ACTIVITIES OF DAILY LIVING (ADL)
AMBULATION ASSISTANCE: ONE PERSON
AMBULATION ASSISTANCE: CONTACT GUARD ASSIST
CURRENT_FUNCTION: CONTACT GUARD ASSIST
PHYSICAL TRANSFERS ASSESSED: 1
AMBULATION ASSISTANCE: 1

## 2025-07-29 ASSESSMENT — ENCOUNTER SYMPTOMS
PAIN LOCATION - PAIN SEVERITY: 3/10
SUBJECTIVE PAIN PROGRESSION: WAXING AND WANING
PAIN LOCATION: LEFT ARM
PAIN: 1
PAIN SEVERITY GOAL: 2/10
HIGHEST PAIN SEVERITY IN PAST 24 HOURS: 8/10
PERSON REPORTING PAIN: PATIENT
LOWEST PAIN SEVERITY IN PAST 24 HOURS: 3/10

## 2025-07-29 NOTE — HOME HEALTH
Etelvina Banks is a 45 y.o. 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 Ortiz) with insertion of TE (per plastic surgery, Dr. Dewey) on 11/11/2024. Patient is now s/p OR with plastic surgery (Dr. Dewey and Dr. Lara) on 7/11/2025 for left breast tissue expander removal with capsulectomy and left breast reconstruction with free Deep Inferior Epigastric  flap (MANUEL). Did well post operatively and was discharged on 7/14/25.    Primary Reason for Home Care: debility for home functional mobility  Skilled Needs: fall risk   Precautions: fall prevention educ.  Instruction provided:  Keep pathways clear, use cane or walker for stability, have superv for steps and home egress.  Patient response to instruction: receptive   Patient instructed on plan of care and visit frequency.   Patient in agreement with plan of care and visit frequency.  1w4  Discipline Communication: PTA PSC OT   Plan for next visit: fall prevention, med rec, HEP and gait    Medication Reconciliation:    Demonstrates Adherence : Yes  Issues/Concerns: No  Provider Notified of Issues/Concerns: N/A  Caregiver Notified of Issues/Concerns: N/A  patient  response to instructions Verbalized Understanding

## 2025-07-29 NOTE — Clinical Note
Etelvina Banks is a 45 y.o. 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 Ortiz) with insertion of TE (per plastic surgery, Dr. Dewey) on 11/11/2024. Patient is now s/p OR with plastic surgery (Dr. Dewey and Dr. Lara) on 7/11/2025 for left breast tissue expander removal with capsulectomy and left breast reconstruction with free Deep Inferior Epigastric  flap (MANUEL). Did well post operatively and was discharged on 7/14/25.    Pt has assist of family for short walk to bathroom. LLE TTWB. May obtain a cane for additional support. Cannot use walker due to NWB LUE.   Will hold PT tx for 2 weeks, calling pt week of 8/18 to verify additional LLE WB or increased activity approval by MD. Then 2w4.    Primary Reason for Home Care: debility for home functional mobility  Skilled Needs: fall risk   Precautions: fall prevention educ.  Instruction provided:  Keep pathways clear, use cane or walker for stability, have superv for steps and home egress.  Patient response to instruction: receptive   Patient instructed on plan of care and visit frequency.   Patient in agreement with plan of care and visit frequency.  Call week of 8/18, if additonal WB or activity approved by MD, then 2w4 for PT tx.    Discipline Communication: PTA PSC OT   Plan for next visit: fall prevention, med rec, HEP and gait    Medication Reconciliation:    Demonstrates Adherence : Yes  Issues/Concerns: No  Provider Notified of Issues/Concerns: N/A  Caregiver Notified of Issues/Concerns: N/A  patient  response to instructions Verbalized Understanding Initiate Treatment: Clindamycin twice daily to toe webs Detail Level: Zone

## 2025-07-30 ENCOUNTER — HOME CARE VISIT (OUTPATIENT)
Dept: HOME HEALTH SERVICES | Facility: HOME HEALTH | Age: 45
End: 2025-07-30
Payer: COMMERCIAL

## 2025-08-01 ENCOUNTER — HOSPITAL ENCOUNTER (OUTPATIENT)
Dept: RADIOLOGY | Facility: HOSPITAL | Age: 45
Discharge: HOME | End: 2025-08-01
Payer: COMMERCIAL

## 2025-08-01 DIAGNOSIS — Z98.890 POST-OPERATIVE STATE: ICD-10-CM

## 2025-08-01 DIAGNOSIS — Z98.890 S/P BREAST RECONSTRUCTION: ICD-10-CM

## 2025-08-01 PROCEDURE — 76705 ECHO EXAM OF ABDOMEN: CPT

## 2025-08-01 NOTE — PROGRESS NOTES
PLASTIC SURGERY CLINIC VISIT  POSTOP BREAST RECONSTRUCTION     Date: 8/5/25  Date of Surgery: 7/11/25  Surgical Procedure: Left MANUEL; Left PAP.         HPI:   Etelvina Banks 45 y.o. female is here for post-operative appointment for the above procedure(s).      Interval changes as of this date:   8/5 Pt discharged after Left MANUEL on 7/14/25. Pt noticed change in temperature of flap on 7/15; advised to go to ED for evaluation. Pt admitted; underwent a exploration thrombectomy and reanastomosis. Postoperatively, after about 24 hours she subsequently lost the arterial flap. She was subsequently taken to the OR where she was debrided and a PAP flap was created. Her pain is well controlled. ROSCOE drains with SS output. She saw Dr. Lara last week with removal of Abdominal drain       MEDICATIONS  Current Medications[1]      OBJECTIVE [x]Expand by Default  Blood pressure 119/81, pulse 91, temperature 36.7 °C (98 °F), temperature source Temporal, SpO2 97%.     REVIEW OF SYSTEMS:    Constitutional: negative for fevers, chills, unintentional weight loss  HEENT: negative for changes in vision, headaches, changes in hearing, congestion, sore throat  Cardiovascular: negative for chest pain, palpitations  Respiratory: negative for cough, shortness of breath  Gastrointestinal: negative for nausea, vomiting, diarrhea  Genitourinary: negative for dysuria, hematuria  Musculoskeletal: negative for joint swelling or pain  Skin: negative for rashes or lesions  Psych: negative for depression, anxiety  Endocrine: negative for polyuria, polydipsia, cold/heat intolerance  Hem/Lymph: negative for bleeding disorder     PHYSICAL EXAM  General: alert and oriented, no apparent distress    Focused exam of the breasts:  Left: flap warm and soft.  Small medial partial thickness wound with pale yellow fibrinous exudate.     ASSESSMENT/PLAN  Etelvina Banks 45 y.o. female who had Left MANUEL on 7/11/25 with Left PAP on 7/20/25 who presents for  POV    ROSCOE drain(s) in place. No erythema or edema surrounding the drain site. There is serous output from the drain. Patient recorded output of the drains showed 2 consecutive days of less than 30cc output at time of removal. Patient was educated on purpose of surgical drains and informed of risk for seroma post drain removal.       ROSCOE drain(s) removed at this visit: 1     Continue activity restrictions.   Continue adequate protein intake.   She is healing well, no sign of infection or inflammation.   Pt will follow  up with Dr. Lara who will manage lower extremity donor site     RTC in 3 months    Scribe Attestation  By signing my name below, Brenda SANDHU Scribe, attest that this documentation has been prepared under the direction and in the presence of Jaye Dewey MD. Verbal consent obtained from the patient.      Attending Attestation:  Jaye SANDHU MD, personal performed the history, exam, and decision making on this patient.         [1]   Current Outpatient Medications:     acetaminophen (Tylenol) 325 mg tablet, Take 3 tablets (975 mg) by mouth every 8 hours for 14 days., Disp: 126 tablet, Rfl: 0    apixaban (Eliquis) 2.5 mg tablet, Take 1 tablet (2.5 mg) by mouth every 12 hours., Disp: 60 tablet, Rfl: 0    buprenorphine-naloxone (Suboxone) 8-2 mg SL tablet, Place 1 tablet under the tongue 4 times a day., Disp: , Rfl:     cephalexin (Keflex) 500 mg capsule, Take 1 capsule (500 mg) by mouth 4 times a day., Disp: 56 capsule, Rfl: 0    cream base no.31, bulk, (Transdermal Pain Base) cream, once daily as needed., Disp: , Rfl:     [Paused] hydroCHLOROthiazide (Microzide) 12.5 mg tablet, Take 1 tablet (12.5 mg) by mouth once daily as needed (pitting edema)., Disp: 30 tablet, Rfl: 2    [Paused] losartan (Cozaar) 50 mg tablet, Take 1 tablet (50 mg) by mouth once daily., Disp: 90 tablet, Rfl: 3    metFORMIN XR (Glucophage-XR) 500 mg 24 hr tablet, Take 2 tablet PO dailyTake 2 tablet PO daily, Disp: 180  tablet, Rfl: 0    multivitamin tablet, Take 1 tablet by mouth once daily., Disp: , Rfl:     gabapentin (Neurontin) 300 mg capsule, Take 1 capsule (300 mg) by mouth every 8 hours for 14 days., Disp: 42 capsule, Rfl: 0    methocarbamol (Robaxin) 500 mg tablet, Take 1 tablet (500 mg) by mouth every 6 hours if needed for muscle spasms for up to 7 days., Disp: 28 tablet, Rfl: 0    pantoprazole (ProtoNix) 40 mg EC tablet, Take 1 tablet (40 mg) by mouth once daily in the morning. Take before meals for 7 days. Do not crush, chew, or split., Disp: 7 tablet, Rfl: 0

## 2025-08-05 ENCOUNTER — APPOINTMENT (OUTPATIENT)
Dept: PLASTIC SURGERY | Facility: CLINIC | Age: 45
End: 2025-08-05
Payer: COMMERCIAL

## 2025-08-05 VITALS
HEART RATE: 91 BPM | DIASTOLIC BLOOD PRESSURE: 81 MMHG | TEMPERATURE: 98 F | SYSTOLIC BLOOD PRESSURE: 119 MMHG | OXYGEN SATURATION: 97 %

## 2025-08-05 DIAGNOSIS — D05.12 DUCTAL CARCINOMA IN SITU (DCIS) OF LEFT BREAST: Primary | ICD-10-CM

## 2025-08-05 PROCEDURE — 3074F SYST BP LT 130 MM HG: CPT | Performed by: SURGERY

## 2025-08-05 PROCEDURE — 4010F ACE/ARB THERAPY RXD/TAKEN: CPT | Performed by: SURGERY

## 2025-08-05 PROCEDURE — 3079F DIAST BP 80-89 MM HG: CPT | Performed by: SURGERY

## 2025-08-05 PROCEDURE — 99211 OFF/OP EST MAY X REQ PHY/QHP: CPT | Performed by: SURGERY

## 2025-08-05 ASSESSMENT — PAIN SCALES - GENERAL: PAINLEVEL_OUTOF10: 0-NO PAIN

## 2025-08-06 ENCOUNTER — HOME CARE VISIT (OUTPATIENT)
Dept: HOME HEALTH SERVICES | Facility: HOME HEALTH | Age: 45
End: 2025-08-06
Payer: COMMERCIAL

## 2025-08-06 ENCOUNTER — PATIENT MESSAGE (OUTPATIENT)
Dept: PLASTIC SURGERY | Facility: CLINIC | Age: 45
End: 2025-08-06
Payer: COMMERCIAL

## 2025-08-06 DIAGNOSIS — D05.12 DUCTAL CARCINOMA IN SITU (DCIS) OF LEFT BREAST: ICD-10-CM

## 2025-08-06 DIAGNOSIS — G89.18 POST-OP PAIN: ICD-10-CM

## 2025-08-06 RX ORDER — CELECOXIB 200 MG/1
200 CAPSULE ORAL 2 TIMES DAILY
Qty: 28 CAPSULE | Refills: 0 | Status: SHIPPED | OUTPATIENT
Start: 2025-08-06 | End: 2025-08-20

## 2025-08-06 RX ORDER — GABAPENTIN 300 MG/1
300 CAPSULE ORAL EVERY 8 HOURS
Qty: 42 CAPSULE | Refills: 0 | Status: SHIPPED | OUTPATIENT
Start: 2025-08-06 | End: 2025-08-20

## 2025-08-07 ENCOUNTER — OFFICE VISIT (OUTPATIENT)
Dept: PLASTIC SURGERY | Facility: CLINIC | Age: 45
End: 2025-08-07
Payer: COMMERCIAL

## 2025-08-07 VITALS — HEART RATE: 85 BPM | DIASTOLIC BLOOD PRESSURE: 80 MMHG | RESPIRATION RATE: 16 BRPM | SYSTOLIC BLOOD PRESSURE: 118 MMHG

## 2025-08-07 DIAGNOSIS — Z98.890 STATUS POST RECONSTRUCTION PROCEDURE: Primary | ICD-10-CM

## 2025-08-07 DIAGNOSIS — Z48.03 ENCOUNTER FOR CHANGE OR REMOVAL OF DRAINS: ICD-10-CM

## 2025-08-07 DIAGNOSIS — Z48.89 ENCOUNTER FOR POSTOPERATIVE WOUND CHECK: ICD-10-CM

## 2025-08-07 DIAGNOSIS — Z48.00 ENCOUNTER FOR CHANGE OR REMOVAL OF WOUND DRESSING: ICD-10-CM

## 2025-08-07 PROCEDURE — 99024 POSTOP FOLLOW-UP VISIT: CPT

## 2025-08-07 PROCEDURE — 3074F SYST BP LT 130 MM HG: CPT | Performed by: PHYSICIAN ASSISTANT

## 2025-08-07 PROCEDURE — 3079F DIAST BP 80-89 MM HG: CPT | Performed by: PHYSICIAN ASSISTANT

## 2025-08-07 PROCEDURE — 4010F ACE/ARB THERAPY RXD/TAKEN: CPT | Performed by: PHYSICIAN ASSISTANT

## 2025-08-07 ASSESSMENT — PAIN SCALES - GENERAL: PAINLEVEL_OUTOF10: 0-NO PAIN

## 2025-08-07 NOTE — PROGRESS NOTES
Division of Plastic and Reconstructive Surgery  Clinic Visit Note    Patient Name: Etelvina Banks  MRN: [id number]  Date:  08/07/25     [facility]    Etelvina Banks is a 45 y.o. 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 Ortiz) with insertion of TE (per plastic surgery, Dr. Dewey) on 11/11/2024. Patient is now s/p OR with plastic surgery (Dr. Dewey and Dr. Lara) on 7/11/2025 for left breast tissue expander removal with capsulectomy and left breast reconstruction with free Deep Inferior Epigastric  flap (MANUEL). Postoperatively, patient had loss of flap perfusion requiring return to OR on 7/15 for flap re-exploration and attempted re-vascularization. She additionally returned to the OR on      for debridement of left breast flap and creation of PAP flap to left breast with Dr. Lara and Dr. Dewey. Patient doing well and pain is controlled with current regiment.     Subjective    ***    Medical History[1]  Surgical History[2]  Allergies[3]  Current Medications[4]   Family History[5]  Social History[6]    ROS: All 10 systems were reviewed and are unremarkable except for those mentioned in HPI.      Objective   /80 (BP Location: Right arm, Patient Position: Sitting, BP Cuff Size: Adult)   Pulse 85   Resp 16      Physical Exam  Constitutional: A&Ox3, calm and cooperative, NAD.  Eyes: EOMI, clear sclera.  ENMT: Moist mucous membranes, no apparent injuries or lesions.  Head/Neck: NC/AT. Neck supple.   Cardiovascular: Normal rate and regular rhythm. 2+ equal pulses of the distal extremities.  Respiratory/Thorax: Breathing comfortably with regular respirations on RA. Good symmetric chest expansion.   Gastrointestinal: Abdomen soft, non-tender.   Genitourinary: Voiding independently.   Extremities: No peripheral edema. Moves all 4 extremities actively, strength appropriate.    Neurological: A&Ox3.   Psychological: Appropriate mood and behavior.   Skin: Warm and dry, no rashes.      Assessment   Etelvina Banks is a 45 y.o. female with history of ***.     Plan      Patient and plan discussed with Dr. Lara.    [facility]  Plastic and Reconstructive Surgery         [1]   Past Medical History:  Diagnosis Date    Anxiety     Ar times of high stress    Arthritis 2015    Atypical ductal hyperplasia, breast about a year    Breast cancer less than a year    Contusion of left breast, initial encounter 07/15/2025    COVID-19     Had in the past    Depression, major, single episode, moderate (Multi) 02/28/2023    Diabetes mellitus (Multi) 4-01-24    Headache, unspecified     Bad headache    Hypertension     Type 2 diabetes mellitus One year ago    Vision loss     I have glasses    Wears dentures     upper only   [2]   Past Surgical History:  Procedure Laterality Date    BREAST BIOPSY Left 06/07/2024    BREAST BIOPSY Left 09/12/2024    Procedure: Left breast Magseed lumpectomy x 3;  Surgeon: Mirtha Dimas MD;  Location: [facility];  Service: General Surgery;  Laterality: Left;  8 AM, 2 hours    BREAST LUMPECTOMY Left 09/12/2024    Left breast Magseed lumpectomy x 3 ([facility])    FOOT SURGERY      Surgery on my right foot. Plate and pins    MASTECTOMY  11-11-24    OTHER SURGICAL HISTORY  03/11/2019    Ovarian cystectomy    OTHER SURGICAL HISTORY  10/14/2022    Tubal ligation    OTHER SURGICAL HISTORY Right 03/22/2021    arthritis metal plate and screw   [3] No Known Allergies  [4]   Current Outpatient Medications:     acetaminophen (Tylenol) 325 mg tablet, Take 3 tablets (975 mg) by mouth every 8 hours for 14 days., Disp: 126 tablet, Rfl: 0    apixaban (Eliquis) 2.5 mg tablet, Take 1 tablet (2.5 mg) by mouth every 12 hours., Disp: 60 tablet, Rfl: 0    buprenorphine-naloxone (Suboxone) 8-2 mg SL tablet, Place 1 tablet under the tongue 4 times a day., Disp: , Rfl:     celecoxib  (CeleBREX) 200 mg capsule, Take 1 capsule (200 mg) by mouth 2 times a day for 14 days., Disp: 28 capsule, Rfl: 0    cephalexin (Keflex) 500 mg capsule, Take 1 capsule (500 mg) by mouth 4 times a day., Disp: 56 capsule, Rfl: 0    cream base no.31, bulk, (Transdermal Pain Base) cream, once daily as needed., Disp: , Rfl:     gabapentin (Neurontin) 300 mg capsule, Take 1 capsule (300 mg) by mouth every 8 hours for 14 days., Disp: 42 capsule, Rfl: 0    [Paused] hydroCHLOROthiazide (Microzide) 12.5 mg tablet, Take 1 tablet (12.5 mg) by mouth once daily as needed (pitting edema)., Disp: 30 tablet, Rfl: 2    [Paused] losartan (Cozaar) 50 mg tablet, Take 1 tablet (50 mg) by mouth once daily., Disp: 90 tablet, Rfl: 3    metFORMIN XR (Glucophage-XR) 500 mg 24 hr tablet, Take 2 tablet PO dailyTake 2 tablet PO daily, Disp: 180 tablet, Rfl: 0    multivitamin tablet, Take 1 tablet by mouth once daily., Disp: , Rfl:     gabapentin (Neurontin) 300 mg capsule, Take 1 capsule (300 mg) by mouth every 8 hours for 14 days., Disp: 42 capsule, Rfl: 0    methocarbamol (Robaxin) 500 mg tablet, Take 1 tablet (500 mg) by mouth every 6 hours if needed for muscle spasms for up to 7 days., Disp: 28 tablet, Rfl: 0    pantoprazole (ProtoNix) 40 mg EC tablet, Take 1 tablet (40 mg) by mouth once daily in the morning. Take before meals for 7 days. Do not crush, chew, or split., Disp: 7 tablet, Rfl: 0  [5]   Family History  Problem Relation Name Age of Onset    Breast cancer Mother Wadna     Arthritis Mother Wanda     Hypertension Mother Wanda     Asthma Brother Zenon     Diabetes Other Wanda     Cancer Other aunt    [6]   Social History  Tobacco Use    Smoking status: Former     Types: Cigarettes     Passive exposure: Past    Smokeless tobacco: Never   Vaping Use    Vaping status: Never Used   Substance Use Topics    Alcohol use: Not Currently    Drug use: Never     Comment: clean 15 yrs   pain medication      09/12/2024    Left breast Magseed lumpectomy x 3 ([facility])    FOOT SURGERY      Surgery on my right foot. Plate and pins    MASTECTOMY  11-11-24    OTHER SURGICAL HISTORY  03/11/2019    Ovarian cystectomy    OTHER SURGICAL HISTORY  10/14/2022    Tubal ligation    OTHER SURGICAL HISTORY Right 03/22/2021    arthritis metal plate and screw   [3] No Known Allergies  [4]   Current Outpatient Medications:     acetaminophen (Tylenol) 325 mg tablet, Take 3 tablets (975 mg) by mouth every 8 hours for 14 days., Disp: 126 tablet, Rfl: 0    apixaban (Eliquis) 2.5 mg tablet, Take 1 tablet (2.5 mg) by mouth every 12 hours., Disp: 60 tablet, Rfl: 0    buprenorphine-naloxone (Suboxone) 8-2 mg SL tablet, Place 1 tablet under the tongue 4 times a day., Disp: , Rfl:     celecoxib (CeleBREX) 200 mg capsule, Take 1 capsule (200 mg) by mouth 2 times a day for 14 days., Disp: 28 capsule, Rfl: 0    cephalexin (Keflex) 500 mg capsule, Take 1 capsule (500 mg) by mouth 4 times a day., Disp: 56 capsule, Rfl: 0    cream base no.31, bulk, (Transdermal Pain Base) cream, once daily as needed., Disp: , Rfl:     gabapentin (Neurontin) 300 mg capsule, Take 1 capsule (300 mg) by mouth every 8 hours for 14 days., Disp: 42 capsule, Rfl: 0    [Paused] hydroCHLOROthiazide (Microzide) 12.5 mg tablet, Take 1 tablet (12.5 mg) by mouth once daily as needed (pitting edema)., Disp: 30 tablet, Rfl: 2    [Paused] losartan (Cozaar) 50 mg tablet, Take 1 tablet (50 mg) by mouth once daily., Disp: 90 tablet, Rfl: 3    metFORMIN XR (Glucophage-XR) 500 mg 24 hr tablet, Take 2 tablet PO dailyTake 2 tablet PO daily, Disp: 180 tablet, Rfl: 0    multivitamin tablet, Take 1 tablet by mouth once daily., Disp: , Rfl:     gabapentin (Neurontin) 300 mg capsule, Take 1 capsule (300 mg) by mouth every 8 hours for 14 days., Disp: 42 capsule, Rfl: 0    methocarbamol (Robaxin) 500 mg tablet, Take 1 tablet (500 mg) by mouth every 6 hours if needed for muscle spasms for up to 7 days.,  Disp: 28 tablet, Rfl: 0    pantoprazole (ProtoNix) 40 mg EC tablet, Take 1 tablet (40 mg) by mouth once daily in the morning. Take before meals for 7 days. Do not crush, chew, or split., Disp: 7 tablet, Rfl: 0  [5]   Family History  Problem Relation Name Age of Onset    Breast cancer Mother Wanda     Arthritis Mother Wanda     Hypertension Mother Wanda     Asthma Brother Zenon     Diabetes Other Wanda     Cancer Other aunt    [6]   Social History  Tobacco Use    Smoking status: Former     Types: Cigarettes     Passive exposure: Past    Smokeless tobacco: Never   Vaping Use    Vaping status: Never Used   Substance Use Topics    Alcohol use: Not Currently    Drug use: Never     Comment: clean 15 yrs   pain medication

## 2025-08-12 RX ORDER — MUPIROCIN 20 MG/G
OINTMENT TOPICAL 2 TIMES DAILY
Qty: 15 G | Refills: 0 | Status: SHIPPED | OUTPATIENT
Start: 2025-08-12 | End: 2025-08-26

## 2025-08-13 ENCOUNTER — HOME CARE VISIT (OUTPATIENT)
Dept: HOME HEALTH SERVICES | Facility: HOME HEALTH | Age: 45
End: 2025-08-13
Payer: COMMERCIAL

## 2025-08-14 ENCOUNTER — OFFICE VISIT (OUTPATIENT)
Dept: PLASTIC SURGERY | Facility: HOSPITAL | Age: 45
End: 2025-08-14
Payer: COMMERCIAL

## 2025-08-14 VITALS
HEIGHT: 62 IN | TEMPERATURE: 97.5 F | WEIGHT: 199.96 LBS | HEART RATE: 83 BPM | SYSTOLIC BLOOD PRESSURE: 123 MMHG | BODY MASS INDEX: 36.8 KG/M2 | DIASTOLIC BLOOD PRESSURE: 86 MMHG | OXYGEN SATURATION: 97 % | RESPIRATION RATE: 20 BRPM

## 2025-08-14 DIAGNOSIS — T81.31XD POSTOPERATIVE WOUND DEHISCENCE, SUBSEQUENT ENCOUNTER: ICD-10-CM

## 2025-08-14 DIAGNOSIS — Z98.890 POST-OPERATIVE STATE: Primary | ICD-10-CM

## 2025-08-14 PROCEDURE — 3008F BODY MASS INDEX DOCD: CPT

## 2025-08-14 PROCEDURE — 3074F SYST BP LT 130 MM HG: CPT

## 2025-08-14 PROCEDURE — 3079F DIAST BP 80-89 MM HG: CPT

## 2025-08-14 PROCEDURE — 4010F ACE/ARB THERAPY RXD/TAKEN: CPT

## 2025-08-14 PROCEDURE — 99211 OFF/OP EST MAY X REQ PHY/QHP: CPT

## 2025-08-14 ASSESSMENT — PAIN SCALES - GENERAL: PAINLEVEL_OUTOF10: 0-NO PAIN

## 2025-08-15 PROBLEM — T81.31XA WOUND DEHISCENCE, SURGICAL: Status: ACTIVE | Noted: 2025-08-15

## 2025-08-17 ENCOUNTER — HOME CARE VISIT (OUTPATIENT)
Dept: HOME HEALTH SERVICES | Facility: HOME HEALTH | Age: 45
End: 2025-08-17
Payer: COMMERCIAL

## 2025-08-18 ENCOUNTER — HOME CARE VISIT (OUTPATIENT)
Dept: HOME HEALTH SERVICES | Facility: HOME HEALTH | Age: 45
End: 2025-08-18
Payer: COMMERCIAL

## 2025-08-18 VITALS — RESPIRATION RATE: 16 BRPM

## 2025-08-18 PROCEDURE — G0151 HHCP-SERV OF PT,EA 15 MIN: HCPCS

## 2025-08-18 SDOH — HEALTH STABILITY: PHYSICAL HEALTH: EXERCISE COMMENTS: REVIEW OF BLE ROM

## 2025-08-18 ASSESSMENT — ACTIVITIES OF DAILY LIVING (ADL)
AMBULATION ASSISTANCE: 1
PHYSICAL TRANSFERS ASSESSED: 1
AMBULATION_DISTANCE/DURATION_TOLERATED: 40FT
AMBULATION ASSISTANCE: STAND BY ASSIST
CURRENT_FUNCTION: STAND BY ASSIST
AMBULATION ASSISTANCE ON FLAT SURFACES: 1

## 2025-08-18 ASSESSMENT — ENCOUNTER SYMPTOMS
DENIES PAIN: 1
LIMITED RANGE OF MOTION: 1
PERSON REPORTING PAIN: PATIENT
MUSCLE WEAKNESS: 1

## 2025-08-19 ENCOUNTER — HOME CARE VISIT (OUTPATIENT)
Dept: HOME HEALTH SERVICES | Facility: HOME HEALTH | Age: 45
End: 2025-08-19
Payer: COMMERCIAL

## 2025-08-19 ENCOUNTER — APPOINTMENT (OUTPATIENT)
Dept: PLASTIC SURGERY | Facility: CLINIC | Age: 45
End: 2025-08-19
Payer: COMMERCIAL

## 2025-08-19 VITALS
WEIGHT: 199 LBS | HEIGHT: 62 IN | HEART RATE: 91 BPM | SYSTOLIC BLOOD PRESSURE: 156 MMHG | DIASTOLIC BLOOD PRESSURE: 99 MMHG | BODY MASS INDEX: 36.62 KG/M2

## 2025-08-19 DIAGNOSIS — T81.31XD POSTOPERATIVE WOUND DEHISCENCE, SUBSEQUENT ENCOUNTER: Primary | ICD-10-CM

## 2025-08-20 DIAGNOSIS — G89.18 ACUTE POST-OPERATIVE PAIN: ICD-10-CM

## 2025-08-20 RX ORDER — GABAPENTIN 400 MG/1
400 CAPSULE ORAL 3 TIMES DAILY
Qty: 90 CAPSULE | Refills: 2 | Status: SHIPPED | OUTPATIENT
Start: 2025-08-20 | End: 2025-11-18

## 2025-08-20 RX ORDER — CELECOXIB 100 MG/1
100 CAPSULE ORAL 2 TIMES DAILY
Qty: 14 CAPSULE | Refills: 0 | Status: CANCELLED | OUTPATIENT
Start: 2025-08-20 | End: 2025-08-27

## 2025-08-22 ENCOUNTER — HOME CARE VISIT (OUTPATIENT)
Dept: HOME HEALTH SERVICES | Facility: HOME HEALTH | Age: 45
End: 2025-08-22
Payer: COMMERCIAL

## 2025-08-22 VITALS
HEART RATE: 67 BPM | TEMPERATURE: 97.8 F | RESPIRATION RATE: 16 BRPM | SYSTOLIC BLOOD PRESSURE: 122 MMHG | OXYGEN SATURATION: 98 % | DIASTOLIC BLOOD PRESSURE: 82 MMHG

## 2025-08-22 PROCEDURE — G0157 HHC PT ASSISTANT EA 15: HCPCS | Mod: CQ

## 2025-08-22 SDOH — HEALTH STABILITY: PHYSICAL HEALTH: EXERCISE COMMENTS: P ABD, HIP FLEX/EXT, MINI SQUATS , HAM CURLS

## 2025-08-22 SDOH — HEALTH STABILITY: PHYSICAL HEALTH: EXERCISE TYPE: B LE STRENGTHENING THER EX , ENDURANCE TRAINING

## 2025-08-22 SDOH — HEALTH STABILITY: PHYSICAL HEALTH
EXERCISE COMMENTS: 1 SET OF 10 EACH, BLUE THERABAND  SEATED THER EX: MARCHES, LEG EXT, LAQ, TBAND HIP ABD, BALL SQUEEZES HIP ADD  SUPINE THER EX: SLR, HIP ABD, HEEL SLIDES, SAQ, GLUT SETS 5 SEC HOLDS QUAD SETS 5 SEC HOLDS  STANDING THER EX: MARCHES, HEEL/TOE RAISES, HI

## 2025-08-22 ASSESSMENT — ENCOUNTER SYMPTOMS
PAIN LOCATION - PAIN SEVERITY: 3/10
PAIN LOCATION - RELIEVING FACTORS: TIME OF DAY
SUBJECTIVE PAIN PROGRESSION: UNCHANGED
PAIN LOCATION: LEFT LEG
ARTHRALGIAS: 1
PAIN LOCATION - EXACERBATING FACTORS: TIME OF DAY
LOWEST PAIN SEVERITY IN PAST 24 HOURS: 3/10
PAIN LOCATION - PAIN FREQUENCY: INTERMITTENT
PERSON REPORTING PAIN: PATIENT
OCCASIONAL FEELINGS OF UNSTEADINESS: 0
PAIN SEVERITY GOAL: 0/10
PAIN LOCATION - PAIN QUALITY: ACHING
PAIN: 1
HIGHEST PAIN SEVERITY IN PAST 24 HOURS: 5/10

## 2025-08-22 ASSESSMENT — ACTIVITIES OF DAILY LIVING (ADL)
AMBULATION_DISTANCE/DURATION_TOLERATED: 85 FT
BATHING EQUIPMENT USED: SHOWER CHAIR
BATHING ASSESSED: 1
BATHING_CURRENT_FUNCTION: STAND BY ASSIST
AMBULATION ASSISTANCE ON FLAT SURFACES: 1

## 2025-08-27 ENCOUNTER — HOME CARE VISIT (OUTPATIENT)
Dept: HOME HEALTH SERVICES | Facility: HOME HEALTH | Age: 45
End: 2025-08-27
Payer: COMMERCIAL

## 2025-08-27 VITALS
HEART RATE: 98 BPM | OXYGEN SATURATION: 98 % | DIASTOLIC BLOOD PRESSURE: 72 MMHG | SYSTOLIC BLOOD PRESSURE: 125 MMHG | RESPIRATION RATE: 16 BRPM | TEMPERATURE: 97.8 F

## 2025-08-27 PROCEDURE — G0157 HHC PT ASSISTANT EA 15: HCPCS | Mod: CQ

## 2025-08-27 SDOH — HEALTH STABILITY: PHYSICAL HEALTH: EXERCISE TYPE: B LE STRENGTHENING THER EX , ENDURANCE TRAINING

## 2025-08-27 SDOH — HEALTH STABILITY: PHYSICAL HEALTH
EXERCISE COMMENTS: 1 SET OF 15 EACH,   SUPINE THER EX: SLR, HIP ABD, HEEL SLIDES, SAQ, GLUT SETS 5 SEC HOLDS QUAD SETS 5 SEC HOLDS  SEATED THER EX: MARCHES, LEG EXT, LAQ, TBAND HIP ABD, BALL SQUEEZES HIP ADD  STANDING THER EX: MARCHES, HEEL/TOE RAISES, HIP ABD, HIP FLE

## 2025-08-27 SDOH — HEALTH STABILITY: PHYSICAL HEALTH: EXERCISE COMMENTS: X/EXT, MINI SQUATS , HAM CURLS

## 2025-08-27 ASSESSMENT — ENCOUNTER SYMPTOMS
PAIN LOCATION - PAIN QUALITY: ACHING
PAIN LOCATION - PAIN SEVERITY: 3/10
PAIN SEVERITY GOAL: 0/10
PAIN LOCATION - EXACERBATING FACTORS: TIME OF DAY
ARTHRALGIAS: 1
PAIN LOCATION - RELIEVING FACTORS: TIME OF DAY
PAIN LOCATION: LEFT LEG
SUBJECTIVE PAIN PROGRESSION: UNCHANGED
PAIN: 1
MUSCLE WEAKNESS: 1
HIGHEST PAIN SEVERITY IN PAST 24 HOURS: 7/10
OCCASIONAL FEELINGS OF UNSTEADINESS: 0
PAIN LOCATION - PAIN FREQUENCY: INTERMITTENT
PERSON REPORTING PAIN: PATIENT
LOWEST PAIN SEVERITY IN PAST 24 HOURS: 3/10

## 2025-08-27 ASSESSMENT — ACTIVITIES OF DAILY LIVING (ADL)
AMBULATION_DISTANCE/DURATION_TOLERATED: 100 FT
BATHING EQUIPMENT USED: SHOWER CHAIR
AMBULATION ASSISTANCE ON FLAT SURFACES: 1
BATHING_CURRENT_FUNCTION: STAND BY ASSIST
BATHING ASSESSED: 1

## 2025-08-28 ENCOUNTER — APPOINTMENT (OUTPATIENT)
Dept: PLASTIC SURGERY | Facility: CLINIC | Age: 45
End: 2025-08-28
Payer: COMMERCIAL

## 2025-08-28 VITALS
BODY MASS INDEX: 36.4 KG/M2 | RESPIRATION RATE: 18 BRPM | WEIGHT: 199 LBS | HEART RATE: 108 BPM | DIASTOLIC BLOOD PRESSURE: 91 MMHG | SYSTOLIC BLOOD PRESSURE: 132 MMHG

## 2025-08-28 DIAGNOSIS — T81.31XD POSTOPERATIVE WOUND DEHISCENCE, SUBSEQUENT ENCOUNTER: Primary | ICD-10-CM

## 2025-08-28 PROCEDURE — 4010F ACE/ARB THERAPY RXD/TAKEN: CPT

## 2025-08-28 PROCEDURE — 3075F SYST BP GE 130 - 139MM HG: CPT

## 2025-08-28 PROCEDURE — 3080F DIAST BP >= 90 MM HG: CPT

## 2025-08-28 PROCEDURE — 99024 POSTOP FOLLOW-UP VISIT: CPT

## 2025-08-29 ENCOUNTER — HOME CARE VISIT (OUTPATIENT)
Dept: HOME HEALTH SERVICES | Facility: HOME HEALTH | Age: 45
End: 2025-08-29
Payer: COMMERCIAL

## 2025-08-29 VITALS
HEART RATE: 90 BPM | SYSTOLIC BLOOD PRESSURE: 126 MMHG | TEMPERATURE: 97.9 F | OXYGEN SATURATION: 98 % | RESPIRATION RATE: 17 BRPM | DIASTOLIC BLOOD PRESSURE: 67 MMHG

## 2025-08-29 PROCEDURE — G0157 HHC PT ASSISTANT EA 15: HCPCS | Mod: CQ

## 2025-08-29 SDOH — HEALTH STABILITY: PHYSICAL HEALTH
EXERCISE COMMENTS: 1 SET OF 15 EACH, BLUE THERABAND  SEATED THER EX: MARCHES, LEG EXT, LAQ, TBAND HIP ABD, BALL SQUEEZES HIP ADD  STANDING THER EX: MARCHES, HEEL/TOE RAISES, HIP ABD, HIP FLEX/EXT, MINI SQUATS , HAM CURLS  SUPINE THER EX: SLR, HIP ABD, HEEL SLIDES, SAQ,

## 2025-08-29 SDOH — HEALTH STABILITY: PHYSICAL HEALTH: EXERCISE COMMENTS: GLUT SETS 5 SEC HOLDS QUAD SETS 5 SEC HOLDS

## 2025-08-29 SDOH — HEALTH STABILITY: PHYSICAL HEALTH: EXERCISE TYPE: B LE STRENGTHENING THER EX, ENDURANCE TRAINING

## 2025-08-29 ASSESSMENT — ENCOUNTER SYMPTOMS
PAIN: 1
PAIN LOCATION: LEFT LEG
PAIN SEVERITY GOAL: 0/10
PAIN LOCATION - PAIN SEVERITY: 6/10
PAIN LOCATION - EXACERBATING FACTORS: TIME OF DAY
PERSON REPORTING PAIN: PATIENT
HIGHEST PAIN SEVERITY IN PAST 24 HOURS: 6/10
OCCASIONAL FEELINGS OF UNSTEADINESS: 0
LOWEST PAIN SEVERITY IN PAST 24 HOURS: 4/10
SUBJECTIVE PAIN PROGRESSION: UNCHANGED
MUSCLE WEAKNESS: 1
PAIN LOCATION - RELIEVING FACTORS: TIME OF DAY
PAIN LOCATION - PAIN FREQUENCY: INTERMITTENT
PAIN LOCATION - PAIN QUALITY: ACHING
ARTHRALGIAS: 1

## 2025-08-29 ASSESSMENT — ACTIVITIES OF DAILY LIVING (ADL)
AMBULATION ASSISTANCE ON FLAT SURFACES: 1
BATHING ASSESSED: 1
BATHING EQUIPMENT USED: SHOWER CHAIR
AMBULATION_DISTANCE/DURATION_TOLERATED: 125 FT
BATHING_CURRENT_FUNCTION: STAND BY ASSIST

## 2025-09-03 ENCOUNTER — HOME CARE VISIT (OUTPATIENT)
Dept: HOME HEALTH SERVICES | Facility: HOME HEALTH | Age: 45
End: 2025-09-03
Payer: COMMERCIAL

## 2025-09-03 VITALS
SYSTOLIC BLOOD PRESSURE: 125 MMHG | HEART RATE: 77 BPM | TEMPERATURE: 97.9 F | RESPIRATION RATE: 16 BRPM | OXYGEN SATURATION: 98 % | DIASTOLIC BLOOD PRESSURE: 65 MMHG

## 2025-09-03 PROCEDURE — G0157 HHC PT ASSISTANT EA 15: HCPCS | Mod: CQ

## 2025-09-03 SDOH — HEALTH STABILITY: PHYSICAL HEALTH
EXERCISE COMMENTS: 2 SETS OF 10 EACH, BLUE THERABAND  SEATED THER EX: MARCHES, LEG EXT, LAQ, TBAND HIP ABD, BALL SQUEEZES HIP ADD  SUPINE THER EX: SLR, HIP ABD, HEEL SLIDES, SAQ, GLUT SETS 5 SEC HOLDS QUAD SETS 5 SEC HOLDS  STANDING THER EX: MARCHES, HEEL/TOE RAISES, H

## 2025-09-03 SDOH — HEALTH STABILITY: PHYSICAL HEALTH: EXERCISE TYPE: B LE STRENGTHENING THER EX, ENDURANCE TRAINING

## 2025-09-03 SDOH — HEALTH STABILITY: PHYSICAL HEALTH: EXERCISE COMMENTS: IP ABD, HIP FLEX/EXT, MINI SQUATS , HAM CURLS

## 2025-09-03 ASSESSMENT — ACTIVITIES OF DAILY LIVING (ADL)
AMBULATION ASSISTANCE: STAND BY ASSIST
AMBULATION_DISTANCE/DURATION_TOLERATED: 140 FT
CURRENT_FUNCTION: STAND BY ASSIST
AMBULATION ASSISTANCE ON FLAT SURFACES: 1

## 2025-09-03 ASSESSMENT — ENCOUNTER SYMPTOMS
PAIN: 1
PAIN SEVERITY GOAL: 0/10
SUBJECTIVE PAIN PROGRESSION: UNCHANGED
MUSCLE WEAKNESS: 1
HIGHEST PAIN SEVERITY IN PAST 24 HOURS: 6/10
PAIN LOCATION - PAIN QUALITY: ACHING
OCCASIONAL FEELINGS OF UNSTEADINESS: 0
PAIN LOCATION: LEFT LEG
LOWEST PAIN SEVERITY IN PAST 24 HOURS: 1/10
ARTHRALGIAS: 1
PAIN LOCATION - PAIN SEVERITY: 5/10
PERSON REPORTING PAIN: PATIENT
PAIN LOCATION - EXACERBATING FACTORS: TIME OF DAY
PAIN LOCATION - RELIEVING FACTORS: TIME OF DAY
PAIN LOCATION - PAIN FREQUENCY: INTERMITTENT

## 2025-09-04 ENCOUNTER — APPOINTMENT (OUTPATIENT)
Dept: PRIMARY CARE | Facility: CLINIC | Age: 45
End: 2025-09-04
Payer: COMMERCIAL

## 2025-09-04 ENCOUNTER — OFFICE VISIT (OUTPATIENT)
Dept: PLASTIC SURGERY | Facility: CLINIC | Age: 45
End: 2025-09-04
Payer: COMMERCIAL

## 2025-09-04 VITALS
DIASTOLIC BLOOD PRESSURE: 99 MMHG | SYSTOLIC BLOOD PRESSURE: 141 MMHG | WEIGHT: 199 LBS | HEART RATE: 101 BPM | HEIGHT: 62 IN | BODY MASS INDEX: 36.62 KG/M2

## 2025-09-04 DIAGNOSIS — Z98.890 POST-OPERATIVE STATE: Primary | ICD-10-CM

## 2025-09-04 PROCEDURE — 3077F SYST BP >= 140 MM HG: CPT | Performed by: PHYSICIAN ASSISTANT

## 2025-09-04 PROCEDURE — 99024 POSTOP FOLLOW-UP VISIT: CPT | Performed by: PHYSICIAN ASSISTANT

## 2025-09-04 PROCEDURE — 3008F BODY MASS INDEX DOCD: CPT | Performed by: PHYSICIAN ASSISTANT

## 2025-09-04 PROCEDURE — 3080F DIAST BP >= 90 MM HG: CPT | Performed by: PHYSICIAN ASSISTANT

## 2025-09-04 PROCEDURE — 4010F ACE/ARB THERAPY RXD/TAKEN: CPT | Performed by: PHYSICIAN ASSISTANT

## 2025-09-04 PROCEDURE — 1036F TOBACCO NON-USER: CPT | Performed by: PHYSICIAN ASSISTANT

## 2025-09-05 ENCOUNTER — HOME CARE VISIT (OUTPATIENT)
Dept: HOME HEALTH SERVICES | Facility: HOME HEALTH | Age: 45
End: 2025-09-05
Payer: COMMERCIAL

## 2025-09-05 DIAGNOSIS — Z98.890 POST-OPERATIVE STATE: Primary | ICD-10-CM

## 2025-09-05 RX ORDER — CELECOXIB 100 MG/1
100 CAPSULE ORAL 2 TIMES DAILY
Qty: 14 CAPSULE | Refills: 0 | Status: SHIPPED | OUTPATIENT
Start: 2025-09-05 | End: 2025-09-12

## 2025-09-08 ENCOUNTER — APPOINTMENT (OUTPATIENT)
Dept: PLASTIC SURGERY | Facility: CLINIC | Age: 45
End: 2025-09-08
Payer: COMMERCIAL

## 2025-09-15 ENCOUNTER — APPOINTMENT (OUTPATIENT)
Dept: SURGERY | Facility: CLINIC | Age: 45
End: 2025-09-15
Payer: COMMERCIAL

## (undated) DEVICE — SYRINGE, 5 CC, LUER LOCK

## (undated) DEVICE — PAD, GROUNDING, ELECTROSURGICAL, W/9 FT CABLE, POLYHESIVE II, ADULT, LF

## (undated) DEVICE — GLOVES, SURG BIOGEL, SZ-6.5, PF, LF

## (undated) DEVICE — TIP,  ELECTRODE COATED INSULATED, EXTENDED, LF

## (undated) DEVICE — TUBING SET, SOFTOUCH PUMP, SINGLE SPIKE

## (undated) DEVICE — Device

## (undated) DEVICE — DRAPE PACK, UNIVERSAL II

## (undated) DEVICE — PREP TRAY, SKIN, DRY, W/GLOVES

## (undated) DEVICE — WOUND SYSTEM, DEBRIDEMENT & CLEANING, O.R DUOPAK

## (undated) DEVICE — BAG, DECANTER

## (undated) DEVICE — APPLIER,  LIGACLIP MULTI CLIP, 30 MED 11 1/2

## (undated) DEVICE — SPONGE, GAUZE, XRAY DECT, 16 PLY, 4 X 4, W/MASTER DMT,STERILE

## (undated) DEVICE — STAPLER, SKIN PROXIMATE, 35 WIDE

## (undated) DEVICE — THERAPY UNIT, 14-DAY PREVENA PLUS 125

## (undated) DEVICE — DRAPE, SHEET, THREE QUARTER, FAN FOLD, 57 X 77 IN

## (undated) DEVICE — POSITIONING KIT, PAGAZZI, PINK PAD XL, W/ ARM AND HEAD REST

## (undated) DEVICE — MARKER, SURGICAL, SKIN, REG TIP, W/ RULER & LABELS

## (undated) DEVICE — SUTURE, VICRYL PLUS 3-0, SH, 27IN

## (undated) DEVICE — CATHETER TRAY, SURESTEP, 16FR, URINE METER W/STATLOCK

## (undated) DEVICE — SUTURE, MONOCRYL, 4-0, 27 IN, PS-2, UNDYED

## (undated) DEVICE — ADHESIVE, SKIN, DERMABOND ADVANCED, 15CM, PEN-STYLE

## (undated) DEVICE — SUTURE, MONOCRYL, 3-0, 27 IN, PS-2, UNDYED

## (undated) DEVICE — SPONGE, LAP, XRAY DECT, 18IN X 18IN, W/MASTER DMT, STERILE

## (undated) DEVICE — BANDAGE, GAUZE, 6 PLY, KERLIX, 4.5 IN X 4.1 YD, AMD, STERILE

## (undated) DEVICE — TOWEL, SURGICAL, NEURO, O/R, 16 X 26, BLUE, STERILE

## (undated) DEVICE — DRAPE, SHEET, ENDOSCOPY, GENERAL, FENESTRATED, ARMBOARD COVER, 98 X 123.5 IN, DISPOSABLE, LF, STERILE

## (undated) DEVICE — SOLUTION, IRRIGATION, STERILE WATER, 1000 ML, POUR BOTTLE

## (undated) DEVICE — CATHETER, IV, INSYTE, AUTOGUARD, SHIELDED, 24 G X 0.75 IN, VIALON

## (undated) DEVICE — SUTURE, VICRYL, 4-0, 18 IN, UNDYED BR PS-2

## (undated) DEVICE — GLOVE, SURGICAL, PROTEXIS PI BLUE W/NEUTHERA, 6.5, PF, LF

## (undated) DEVICE — SOLUTION, INJECTION, USP, SODIUM CHLORIDE 0.9%, .9, NACL, 1000 ML, BAG

## (undated) DEVICE — GLOVE, PROTEXIS PI CLASSIC, SZ-7.0, PF, LF

## (undated) DEVICE — STAY SET, SURGICAL, 5MM SHARP HOOK, 8PK

## (undated) DEVICE — NEEDLE, HYPODERMIC, 23 GA X 1.5 IN

## (undated) DEVICE — STAY SET, SURGICAL, 12MM BLUNT HOOK, 8/PK

## (undated) DEVICE — DRAPE, SMARTDRAPE, FOR TIVATO MICROSCOPE

## (undated) DEVICE — TAPE, CLOTH, HYPOALLERGENIC, MEDIPORE HI SOFT, 6 IN X 10 YD

## (undated) DEVICE — DRAPE, INCISE, ANTIMICROBIAL, IOBAN 2, LARGE, 17 X 23 IN, DISPOSABLE, STERILE

## (undated) DEVICE — TUBING, SMOKE EVAC, 3/8 X 10 FT

## (undated) DEVICE — COVER HANDLE LIGHT, STERIS, BLUE, STERILE

## (undated) DEVICE — ELECTRODE, INSULATED BLADE, EDGE, W/ 2.75 SLEEVE

## (undated) DEVICE — MICROCLIPS, GEM HEMOSTATIC TITANIUM

## (undated) DEVICE — STRIP, SKIN CLOSURE, STERI STRIP, REINFORCED, 0.5 X 4 IN

## (undated) DEVICE — SPONGE, DISSECTOR, PEANUT, 3/8, STERILE 5 FOAM HOLDER"

## (undated) DEVICE — CLIP, LIGATING, HORIZON, MEDIUM, TITANIUM

## (undated) DEVICE — SENSOR, SMALL PATCH, VIOPTIX

## (undated) DEVICE — CORD, BIPOLAR,  12 FT, DISPOSABLE, LF

## (undated) DEVICE — SUTURE, VICRYL, 2-0, 27 IN, SH, UNDYED

## (undated) DEVICE — NEEDLE, MICRODISSECTION STR 4CM

## (undated) DEVICE — NEEDLE, HYPODERMIC, REGULAR WALL, REGULAR BEVEL, 22 G X 1.5 IN

## (undated) DEVICE — EVACUATOR, WOUND, SUCTION, CLOSED, JACKSON-PRATT, 100 CC, SILICONE

## (undated) DEVICE — APPLICATOR, CHLORAPREP, W/ORANGE TINT, 26ML

## (undated) DEVICE — DRAIN, CHANNEL, HUBLESS, 1/4 ROUND FULL FLUTE, 19 FR/W 1/4" TROCAR"

## (undated) DEVICE — BANDAGE, GAUZE, CONFORMING, KERLIX, 6 PLY, 4.5 IN X 4.1 YD

## (undated) DEVICE — COVER PROBE, SOFT FLEX W/ GEL, 5 X 48 IN (13X122CM)

## (undated) DEVICE — SYRINGE, HYPODERMIC, LUER LOCK, 6 CC

## (undated) DEVICE — APPLIER, LIGACLIP, MULTIPLE, SMALL 9-3/8IN

## (undated) DEVICE — COVER, CART, 45 X 27 X 48 IN, CLEAR

## (undated) DEVICE — COVER, TABLE, 44 X 75 IN, DISPOSABLE, LF, STERILE

## (undated) DEVICE — RADIOGRAPHY DEVICE, SPECIMEN, TRANSPEC

## (undated) DEVICE — DISSECTOR, EXACT, LIGASURE

## (undated) DEVICE — SUTURE, VICRYL, 2-0, 36 IN, CT-1, UNDYED

## (undated) DEVICE — DRESSING, GAUZE, SPONGE, VERSALON, ALL PURPOSE, 4 X 4 IN, SOFT

## (undated) DEVICE — SOLUTION, IRRIGATION, SODIUM CHLORIDE 0.9%, 1000 ML, POUR BOTTLE

## (undated) DEVICE — SUTURE, ETHILON, 2-0, 18 IN, FS, BLACK, BX/36

## (undated) DEVICE — SYRINGE, 10 CC, LUER LOCK

## (undated) DEVICE — CLEANER, WIPE, INSTRUMENT, 3.25 X 3.25 IN

## (undated) DEVICE — BANDAGE, ELASTIC, ACE, SELF-CLOSURE, 6 IN X 5 YD, STERILE

## (undated) DEVICE — MANIFOLD, 4 PORT NEPTUNE STANDARD

## (undated) DEVICE — IMMOBILIZER, KNEE, POST OP, SUPER LITE, W/STRAIGHT STAYS, LARGE, 20 IN

## (undated) DEVICE — SOLUTION, INJECTION, USP, NACL, SODIUM CHLORIDE 0.45%, 1000 ML, BAG

## (undated) DEVICE — ELECTRODE, ELECTROSURGICAL, BLADE EXT 4 INCH, INSULATED

## (undated) DEVICE — DRAIN, CHANNEL W/TROCAR 15F

## (undated) DEVICE — SUTURE, MONOCRYL, 4-0, 18 IN, PS2, UNDYED

## (undated) DEVICE — COVER, TRANSDUCER, NEO GUARD, 2.6 X 30CM, LF

## (undated) DEVICE — CAUTERY, PENCIL, PUSH BUTTON, SMOKE EVAC, 70MM

## (undated) DEVICE — CATHETER, EMBOLECTOMY, 2F X 60CM, SYNTEL, SILICONE

## (undated) DEVICE — TUBING, NON COND, 6MM X 20

## (undated) DEVICE — POSITIONING, THE PINK PAD, PIGAZZI SYSTEM

## (undated) DEVICE — TIP, SUCTION, FRAZIER, W/CONTROL VENT, 12 FR

## (undated) DEVICE — MARKER, SKIN, DUAL TIP, W/RULER AND LABEL